# Patient Record
Sex: MALE | Race: OTHER | NOT HISPANIC OR LATINO
[De-identification: names, ages, dates, MRNs, and addresses within clinical notes are randomized per-mention and may not be internally consistent; named-entity substitution may affect disease eponyms.]

---

## 2024-02-27 PROBLEM — Z00.00 ENCOUNTER FOR PREVENTIVE HEALTH EXAMINATION: Status: ACTIVE | Noted: 2024-02-27

## 2024-02-29 ENCOUNTER — NON-APPOINTMENT (OUTPATIENT)
Age: 69
End: 2024-02-29

## 2024-02-29 ENCOUNTER — APPOINTMENT (OUTPATIENT)
Dept: THORACIC SURGERY | Facility: CLINIC | Age: 69
End: 2024-02-29
Payer: MEDICARE

## 2024-02-29 VITALS
HEART RATE: 102 BPM | DIASTOLIC BLOOD PRESSURE: 60 MMHG | BODY MASS INDEX: 21.97 KG/M2 | RESPIRATION RATE: 18 BRPM | TEMPERATURE: 96.5 F | WEIGHT: 140 LBS | SYSTOLIC BLOOD PRESSURE: 125 MMHG | HEIGHT: 67 IN | OXYGEN SATURATION: 97 %

## 2024-02-29 DIAGNOSIS — Z87.19 PERSONAL HISTORY OF OTHER DISEASES OF THE DIGESTIVE SYSTEM: ICD-10-CM

## 2024-02-29 DIAGNOSIS — Z78.9 OTHER SPECIFIED HEALTH STATUS: ICD-10-CM

## 2024-02-29 DIAGNOSIS — Z86.39 PERSONAL HISTORY OF OTHER ENDOCRINE, NUTRITIONAL AND METABOLIC DISEASE: ICD-10-CM

## 2024-02-29 DIAGNOSIS — Z86.79 PERSONAL HISTORY OF OTHER DISEASES OF THE CIRCULATORY SYSTEM: ICD-10-CM

## 2024-02-29 PROCEDURE — 99203 OFFICE O/P NEW LOW 30 MIN: CPT

## 2024-02-29 RX ORDER — METFORMIN HYDROCHLORIDE 500 MG/1
500 TABLET, COATED ORAL
Refills: 0 | Status: ACTIVE | COMMUNITY

## 2024-02-29 RX ORDER — OMEPRAZOLE 20 MG/1
20 CAPSULE, DELAYED RELEASE ORAL DAILY
Qty: 30 | Refills: 1 | Status: ACTIVE | COMMUNITY

## 2024-02-29 NOTE — PHYSICAL EXAM
[Fully active, able to carry on all pre-disease performance without restriction] : Status 0 - Fully active, able to carry on all pre-disease performance without restriction [General Appearance - Alert] : alert [General Appearance - In No Acute Distress] : in no acute distress [Sclera] : the sclera and conjunctiva were normal [PERRL With Normal Accommodation] : pupils were equal in size, round, and reactive to light [Extraocular Movements] : extraocular movements were intact [Outer Ear] : the ears and nose were normal in appearance [Oropharynx] : the oropharynx was normal [Neck Appearance] : the appearance of the neck was normal [Neck Cervical Mass (___cm)] : no neck mass was observed [Jugular Venous Distention Increased] : there was no jugular-venous distention [Auscultation Breath Sounds / Voice Sounds] : lungs were clear to auscultation bilaterally [Heart Rate And Rhythm] : heart rate was normal and rhythm regular [Heart Sounds] : normal S1 and S2 [Heart Sounds Gallop] : no gallops [Murmurs] : no murmurs [Heart Sounds Pericardial Friction Rub] : no pericardial rub [Examination Of The Chest] : the chest was normal in appearance [Chest Visual Inspection Thoracic Asymmetry] : no chest asymmetry [Diminished Respiratory Excursion] : normal chest expansion [Bowel Sounds] : normal bowel sounds [Abdomen Soft] : soft [Abdomen Tenderness] : non-tender [Abdomen Mass (___ Cm)] : no abdominal mass palpated [Cervical Lymph Nodes Enlarged Posterior Bilaterally] : posterior cervical [Cervical Lymph Nodes Enlarged Anterior Bilaterally] : anterior cervical [Supraclavicular Lymph Nodes Enlarged Bilaterally] : supraclavicular [Axillary Lymph Nodes Enlarged Bilaterally] : axillary [Femoral Lymph Nodes Enlarged Bilaterally] : femoral [Inguinal Lymph Nodes Enlarged Bilaterally] : inguinal [No CVA Tenderness] : no ~M costovertebral angle tenderness [No Spinal Tenderness] : no spinal tenderness [Abnormal Walk] : normal gait [Nail Clubbing] : no clubbing  or cyanosis of the fingernails [Musculoskeletal - Swelling] : no joint swelling seen [Skin Color & Pigmentation] : normal skin color and pigmentation [Motor Tone] : muscle strength and tone were normal [Skin Turgor] : normal skin turgor [] : no rash [Deep Tendon Reflexes (DTR)] : deep tendon reflexes were 2+ and symmetric [Sensation] : the sensory exam was normal to light touch and pinprick [No Focal Deficits] : no focal deficits [Oriented To Time, Place, And Person] : oriented to person, place, and time [Affect] : the affect was normal [Impaired Insight] : insight and judgment were intact

## 2024-03-03 NOTE — ASSESSMENT
[FreeTextEntry1] : Patient is a 67 yo male, former smoker (40+ years, currently attempting to quit) with a PMHx of HTN, GERD, DM II and high cholesterol. He is referred by DR. NATALY ARCE for surgical eval of a lingular mass.    I have independently reviewed the medical records and imaging at the time of this office consultation.  Patient was presented at multidisciplinary tumor board this morning and imaging was reviewed. I discussed the following interpretations with the patient:  Mass was initially noted on a chest x-ray and followed with a CT Chest.  There is a 4.3 x 7.4 x 5.4 cm mass in the lingula of the left lung. There are concerning hilar and mediastinal lymph nodes. There is a right adrenal nodule. There does not appear to be any other suspicious areas. The patient reports no neurologic symptoms or bone pain.   Concern for lung cancer was discussed with the patient. Diagnostic and staging tests are required. PET/CT and brain MRI will be obtained for staging. If the adrenal nodule is avid, we will attempt biopsy with IR as that would confirm stage IV disease. If there is no concern regarding the adrenal nodule, and the brain MRI is negative, then he will require invasive mediastinal staging. Patient will RTC once testing is complete to discuss next steps in care.   Plan: -MRI of the brain -PET/CT -RTC to discuss results  I, Dr. Ramone Werner MD, personally performed the evaluation and management (E/M) services for this new patient.  That E/M includes conducting the clinically appropriate initial history &/or exam, assessing all conditions, and establishing the plan of care.  Today, my CHANA, Irasema Dang NP, was here to observe &/or participate in the visit & follow plan of care established by me.

## 2024-03-03 NOTE — HISTORY OF PRESENT ILLNESS
[FreeTextEntry1] : Patient is a 69 yo male, former smoker (40+ years, currently attempting to quit) with a PMHx of HTN, GERD, DM II and high cholesterol. He is referred by DR. NATALY ARCE for surgical eval of a lingular mass.  The mass was discovered during work up for a cough.  Patient spends the majority of the year in [place] and stays the [place] for 2 months at a time.  He denies any symptoms, cough has now resolved.   Imaging is as follows;  CT chest 2/15/24: -Lingular mass highly suspicious for malignancy -Mild mediastinal lymphadenopathy -Scattered bilateral small pulmonary nodules, the largest of which measures 0.5 cm. Metastases may be considered. -Mild emphysema -Few right thyroid sub centimeter nodules for which no additional imaging is needed according to ACR recs.  -Right adrenal nonspecific nodule for which metastasis should be considered in light of pulmonary mass.  -Cholelithiasis -Right renal subcentimeter lesion too small to be characterized by ct criteria most likely benign.   Xray 2/7/24: -5.5 cm mass in the lingula segment of the left upper lobe. Neoplasm is strongly suspected ct scan of the chest is suggested.

## 2024-03-03 NOTE — SOCIAL HISTORY
[TextEntry] : 40 year pack hx- attempting to quit now   COVID history: vaccinated   Lung TB history: denies  Occupation: retired    Patient lives with familly in   Patient denies any major mental health history

## 2024-03-15 ENCOUNTER — OUTPATIENT (OUTPATIENT)
Dept: OUTPATIENT SERVICES | Facility: HOSPITAL | Age: 69
LOS: 1 days | End: 2024-03-15
Payer: MEDICARE

## 2024-03-15 ENCOUNTER — APPOINTMENT (OUTPATIENT)
Dept: NUCLEAR MEDICINE | Facility: HOSPITAL | Age: 69
End: 2024-03-15

## 2024-03-15 ENCOUNTER — APPOINTMENT (OUTPATIENT)
Dept: MRI IMAGING | Facility: HOSPITAL | Age: 69
End: 2024-03-15

## 2024-03-15 LAB
GLUCOSE BLDC GLUCOMTR-MCNC: 199 MG/DL — HIGH (ref 70–99)
GLUCOSE BLDC GLUCOMTR-MCNC: 213 MG/DL — HIGH (ref 70–99)
GLUCOSE BLDC GLUCOMTR-MCNC: 226 MG/DL — HIGH (ref 70–99)

## 2024-03-15 PROCEDURE — 70553 MRI BRAIN STEM W/O & W/DYE: CPT | Mod: 26

## 2024-03-15 PROCEDURE — 78815 PET IMAGE W/CT SKULL-THIGH: CPT | Mod: 26,PI

## 2024-03-15 PROCEDURE — 82962 GLUCOSE BLOOD TEST: CPT

## 2024-03-15 PROCEDURE — A9585: CPT

## 2024-03-15 PROCEDURE — A9552: CPT

## 2024-03-15 PROCEDURE — 70553 MRI BRAIN STEM W/O & W/DYE: CPT

## 2024-03-15 PROCEDURE — 78815 PET IMAGE W/CT SKULL-THIGH: CPT

## 2024-03-21 ENCOUNTER — OUTPATIENT (OUTPATIENT)
Dept: OUTPATIENT SERVICES | Facility: HOSPITAL | Age: 69
LOS: 1 days | End: 2024-03-21
Payer: MEDICARE

## 2024-03-21 ENCOUNTER — APPOINTMENT (OUTPATIENT)
Dept: THORACIC SURGERY | Facility: CLINIC | Age: 69
End: 2024-03-21
Payer: MEDICARE

## 2024-03-21 VITALS
DIASTOLIC BLOOD PRESSURE: 67 MMHG | BODY MASS INDEX: 24.8 KG/M2 | HEART RATE: 99 BPM | OXYGEN SATURATION: 98 % | HEIGHT: 67 IN | TEMPERATURE: 96.2 F | WEIGHT: 158 LBS | SYSTOLIC BLOOD PRESSURE: 123 MMHG

## 2024-03-21 DIAGNOSIS — C34.90 MALIGNANT NEOPLASM OF UNSPECIFIED PART OF UNSPECIFIED BRONCHUS OR LUNG: ICD-10-CM

## 2024-03-21 DIAGNOSIS — R91.8 OTHER NONSPECIFIC ABNORMAL FINDING OF LUNG FIELD: ICD-10-CM

## 2024-03-21 DIAGNOSIS — R79.1 ABNORMAL COAGULATION PROFILE: ICD-10-CM

## 2024-03-21 LAB
ALBUMIN SERPL ELPH-MCNC: 4 G/DL — SIGNIFICANT CHANGE UP (ref 3.3–5)
ALP SERPL-CCNC: 109 U/L — SIGNIFICANT CHANGE UP (ref 40–120)
ALT FLD-CCNC: 16 U/L — SIGNIFICANT CHANGE UP (ref 10–45)
ANION GAP SERPL CALC-SCNC: 14 MMOL/L — SIGNIFICANT CHANGE UP (ref 5–17)
APPEARANCE UR: CLEAR — SIGNIFICANT CHANGE UP
APTT BLD: 31.3 SEC — SIGNIFICANT CHANGE UP (ref 24.5–35.6)
AST SERPL-CCNC: 12 U/L — SIGNIFICANT CHANGE UP (ref 10–40)
BASOPHILS # BLD AUTO: 0.04 K/UL — SIGNIFICANT CHANGE UP (ref 0–0.2)
BASOPHILS NFR BLD AUTO: 0.5 % — SIGNIFICANT CHANGE UP (ref 0–2)
BILIRUB SERPL-MCNC: 0.3 MG/DL — SIGNIFICANT CHANGE UP (ref 0.2–1.2)
BILIRUB UR-MCNC: NEGATIVE — SIGNIFICANT CHANGE UP
BUN SERPL-MCNC: 20 MG/DL — SIGNIFICANT CHANGE UP (ref 7–23)
CALCIUM SERPL-MCNC: 10.1 MG/DL — SIGNIFICANT CHANGE UP (ref 8.4–10.5)
CHLORIDE SERPL-SCNC: 99 MMOL/L — SIGNIFICANT CHANGE UP (ref 96–108)
CO2 SERPL-SCNC: 23 MMOL/L — SIGNIFICANT CHANGE UP (ref 22–31)
COLOR SPEC: YELLOW — SIGNIFICANT CHANGE UP
CREAT SERPL-MCNC: 0.66 MG/DL — SIGNIFICANT CHANGE UP (ref 0.5–1.3)
DIFF PNL FLD: NEGATIVE — SIGNIFICANT CHANGE UP
EGFR: 101 ML/MIN/1.73M2 — SIGNIFICANT CHANGE UP
EOSINOPHIL # BLD AUTO: 0.08 K/UL — SIGNIFICANT CHANGE UP (ref 0–0.5)
EOSINOPHIL NFR BLD AUTO: 0.9 % — SIGNIFICANT CHANGE UP (ref 0–6)
GLUCOSE SERPL-MCNC: 207 MG/DL — HIGH (ref 70–99)
GLUCOSE UR QL: NEGATIVE MG/DL — SIGNIFICANT CHANGE UP
HCT VFR BLD CALC: 37.2 % — LOW (ref 39–50)
HGB BLD-MCNC: 12.5 G/DL — LOW (ref 13–17)
IMM GRANULOCYTES NFR BLD AUTO: 0.2 % — SIGNIFICANT CHANGE UP (ref 0–0.9)
INR BLD: 0.98 — SIGNIFICANT CHANGE UP (ref 0.85–1.18)
KETONES UR-MCNC: NEGATIVE MG/DL — SIGNIFICANT CHANGE UP
LEUKOCYTE ESTERASE UR-ACNC: NEGATIVE — SIGNIFICANT CHANGE UP
LYMPHOCYTES # BLD AUTO: 2.16 K/UL — SIGNIFICANT CHANGE UP (ref 1–3.3)
LYMPHOCYTES # BLD AUTO: 24.9 % — SIGNIFICANT CHANGE UP (ref 13–44)
MCHC RBC-ENTMCNC: 27.8 PG — SIGNIFICANT CHANGE UP (ref 27–34)
MCHC RBC-ENTMCNC: 33.6 GM/DL — SIGNIFICANT CHANGE UP (ref 32–36)
MCV RBC AUTO: 82.7 FL — SIGNIFICANT CHANGE UP (ref 80–100)
MONOCYTES # BLD AUTO: 0.73 K/UL — SIGNIFICANT CHANGE UP (ref 0–0.9)
MONOCYTES NFR BLD AUTO: 8.4 % — SIGNIFICANT CHANGE UP (ref 2–14)
NEUTROPHILS # BLD AUTO: 5.63 K/UL — SIGNIFICANT CHANGE UP (ref 1.8–7.4)
NEUTROPHILS NFR BLD AUTO: 65.1 % — SIGNIFICANT CHANGE UP (ref 43–77)
NITRITE UR-MCNC: NEGATIVE — SIGNIFICANT CHANGE UP
NRBC # BLD: 0 /100 WBCS — SIGNIFICANT CHANGE UP (ref 0–0)
PH UR: 5.5 — SIGNIFICANT CHANGE UP (ref 5–8)
PLATELET # BLD AUTO: 206 K/UL — SIGNIFICANT CHANGE UP (ref 150–400)
POTASSIUM SERPL-MCNC: 4.8 MMOL/L — SIGNIFICANT CHANGE UP (ref 3.5–5.3)
POTASSIUM SERPL-SCNC: 4.8 MMOL/L — SIGNIFICANT CHANGE UP (ref 3.5–5.3)
PROT SERPL-MCNC: 7.4 G/DL — SIGNIFICANT CHANGE UP (ref 6–8.3)
PROT UR-MCNC: NEGATIVE MG/DL — SIGNIFICANT CHANGE UP
PROTHROM AB SERPL-ACNC: 11.2 SEC — SIGNIFICANT CHANGE UP (ref 9.5–13)
RBC # BLD: 4.5 M/UL — SIGNIFICANT CHANGE UP (ref 4.2–5.8)
RBC # FLD: 13.9 % — SIGNIFICANT CHANGE UP (ref 10.3–14.5)
SODIUM SERPL-SCNC: 136 MMOL/L — SIGNIFICANT CHANGE UP (ref 135–145)
SP GR SPEC: 1.02 — SIGNIFICANT CHANGE UP (ref 1–1.03)
UROBILINOGEN FLD QL: 0.2 MG/DL — SIGNIFICANT CHANGE UP (ref 0.2–1)
WBC # BLD: 8.66 K/UL — SIGNIFICANT CHANGE UP (ref 3.8–10.5)
WBC # FLD AUTO: 8.66 K/UL — SIGNIFICANT CHANGE UP (ref 3.8–10.5)

## 2024-03-21 PROCEDURE — 85730 THROMBOPLASTIN TIME PARTIAL: CPT

## 2024-03-21 PROCEDURE — 86850 RBC ANTIBODY SCREEN: CPT

## 2024-03-21 PROCEDURE — 36415 COLL VENOUS BLD VENIPUNCTURE: CPT

## 2024-03-21 PROCEDURE — 86900 BLOOD TYPING SEROLOGIC ABO: CPT

## 2024-03-21 PROCEDURE — 85025 COMPLETE CBC W/AUTO DIFF WBC: CPT

## 2024-03-21 PROCEDURE — 81003 URINALYSIS AUTO W/O SCOPE: CPT

## 2024-03-21 PROCEDURE — 85610 PROTHROMBIN TIME: CPT

## 2024-03-21 PROCEDURE — 99215 OFFICE O/P EST HI 40 MIN: CPT

## 2024-03-21 PROCEDURE — 80053 COMPREHEN METABOLIC PANEL: CPT

## 2024-03-21 PROCEDURE — 86901 BLOOD TYPING SEROLOGIC RH(D): CPT

## 2024-03-21 PROCEDURE — 99205 OFFICE O/P NEW HI 60 MIN: CPT

## 2024-03-25 ENCOUNTER — OUTPATIENT (OUTPATIENT)
Dept: OUTPATIENT SERVICES | Facility: HOSPITAL | Age: 69
LOS: 1 days | End: 2024-03-25
Payer: MEDICARE

## 2024-03-25 DIAGNOSIS — R91.8 OTHER NONSPECIFIC ABNORMAL FINDING OF LUNG FIELD: ICD-10-CM

## 2024-03-25 PROCEDURE — 94729 DIFFUSING CAPACITY: CPT

## 2024-03-25 PROCEDURE — 94010 BREATHING CAPACITY TEST: CPT | Mod: 26

## 2024-03-25 PROCEDURE — 94726 PLETHYSMOGRAPHY LUNG VOLUMES: CPT | Mod: 26

## 2024-03-25 PROCEDURE — 94726 PLETHYSMOGRAPHY LUNG VOLUMES: CPT

## 2024-03-25 PROCEDURE — 94060 EVALUATION OF WHEEZING: CPT

## 2024-03-25 PROCEDURE — 94760 N-INVAS EAR/PLS OXIMETRY 1: CPT

## 2024-03-25 PROCEDURE — 94729 DIFFUSING CAPACITY: CPT | Mod: 26

## 2024-03-25 RX ORDER — ALBUTEROL 90 UG/1
2 AEROSOL, METERED ORAL ONCE
Refills: 0 | Status: DISCONTINUED | OUTPATIENT
Start: 2024-03-25 | End: 2024-04-08

## 2024-03-25 NOTE — ASSESSMENT
[FreeTextEntry1] : Patient is known to me. He was referred for a left upper lobe lung mass, measuring 7.4 cm in greatest dimension. He underwent brain MRI and PET/CT. He presents to review the results.   Based on imaging, stage is [id number], clinical stage IIIA. The primary tumor is PET avid, but there is no tissue diagnosis. Patient requires invasive mediastinal staging per NCCN guidelines. Discussed EBUS for staging and biopsy of the primary tumor. He agreed with that plan. Depending on diagnosis and surgical candidacy, would recommend neoadjuvant treatment. We will obtain PFTs to assess operability.   Discussed with patient who understood.   PLAN 1.  EBUS for LN staging and tumor biopsy 2.  Medical evaluation for general anesthesia 3.  PFTs 4.  Referral to medical oncology when diagnosis is established and complete staging is complete 5. Blood drawn in office and reviewed in sunrise      I, Dr. Werner, personally performed the evaluation and management (E/M) services for this established patient who presents today with (a) new problem(s)/exacerbation of (an) existing condition(s). That E/M includes conducting the clinically appropriate interval history &/or exam, assessing all new/exacerbated conditions, and establishing a new plan of care. Today, my CHANA, Irasema Dang, was here to observe my evaluation and management service for this new problem/exacerbated condition and follow the plan of care established by me going forward.

## 2024-03-25 NOTE — HISTORY OF PRESENT ILLNESS
[FreeTextEntry1] : Patient is a 69 yo male, former smoker (40+ years, currently attempting to quit) with a PMHx of HTN, GERD, DM II and high cholesterol. He is referred by DR. NATALY ARCE for surgical eval of a lingular mass.   The mass was discovered during work up for a cough. Imaging shows a 4.3 x 7.4 x 5.4 cm mass in the lingula of the left lung. There are also concerning hilar/ mediastinal lymph nodes and a right adrenal nodule noted. PET/CT and brain MRI were ordered for staging.  Current plan is to pursue IR biopsy of the adrenal nodule if  it is found to be PET avid, as that would confirm stage IV disease. If there is no concern regarding the adrenal nodule, and the brain MRI is negative, then invasive mediastinal staging will be pursued.   He presents today to review the results and discuss next steps in care.   Imaging is as follows;  PET/CT 3/15/24: Impression:  1. Compared to chest CT from 2/15/2024, the 76 mm mass in the lingula is hypermetabolic and suspicious for lung cancer. 2. The borderline sized thoracic lymph nodes on the CT scan are not FDG avid. 3. Mildly nodular right adrenal gland is not FDG avid. No evidence of metastatic lung cancer. 4. Mild increased activity in enlarged and lobular prostate. Recommend correlation with PSA to rule out prostate cancer.  MRI of the brain 3/15/24: -Punctate focus of enhancement the left lateral cerebellar hemisphere (series 701 image 55 and series 702 image 23). There is no associated signal abnormality in the brain parenchyma. Given the lack of associated signal abnormality or additional areas of pathologic enhancement, these findings may represent vascular enhancement versus artifact. However, attention on follow-up imaging is recommended to exclude intracranial metastasis.  -Focal area of intrinsic T1 hyperintensity more inferiorly in the left cerebellar hemisphere without superimposed pathologic enhancement. This may represent area of mineralization.  -5 mm hypoenhancing lesion in the left side of the sella most compatible with pituitary adenoma. Correlation with endocrine function and consideration for further evaluation dedicated MRI sella is recommended.    Patient is feeling well today, reports that the cough has since resolved. He has no active complaints today.

## 2024-03-25 NOTE — PHYSICAL EXAM
[Sclera] : the sclera and conjunctiva were normal [PERRL With Normal Accommodation] : pupils were equal in size, round, and reactive to light [Extraocular Movements] : extraocular movements were intact [Neck Appearance] : the appearance of the neck was normal [Neck Cervical Mass (___cm)] : no neck mass was observed [Jugular Venous Distention Increased] : there was no jugular-venous distention [Auscultation Breath Sounds / Voice Sounds] : lungs were clear to auscultation bilaterally [Heart Rate And Rhythm] : heart rate was normal and rhythm regular [Heart Sounds] : normal S1 and S2 [Heart Sounds Gallop] : no gallops [Chest Visual Inspection Thoracic Asymmetry] : no chest asymmetry [Examination Of The Chest] : the chest was normal in appearance [Diminished Respiratory Excursion] : normal chest expansion [Bowel Sounds] : normal bowel sounds [Abdomen Soft] : soft [Abdomen Tenderness] : non-tender [Abdomen Mass (___ Cm)] : no abdominal mass palpated [Cervical Lymph Nodes Enlarged Posterior Bilaterally] : posterior cervical [Cervical Lymph Nodes Enlarged Anterior Bilaterally] : anterior cervical [Axillary Lymph Nodes Enlarged Bilaterally] : axillary [Supraclavicular Lymph Nodes Enlarged Bilaterally] : supraclavicular [Femoral Lymph Nodes Enlarged Bilaterally] : femoral [Inguinal Lymph Nodes Enlarged Bilaterally] : inguinal [No CVA Tenderness] : no ~M costovertebral angle tenderness [No Spinal Tenderness] : no spinal tenderness [Abnormal Walk] : normal gait [Nail Clubbing] : no clubbing  or cyanosis of the fingernails [Musculoskeletal - Swelling] : no joint swelling seen [Motor Tone] : muscle strength and tone were normal [Skin Color & Pigmentation] : normal skin color and pigmentation [Skin Turgor] : normal skin turgor [] : no rash [Sensation] : the sensory exam was normal to light touch and pinprick [Deep Tendon Reflexes (DTR)] : deep tendon reflexes were 2+ and symmetric [No Focal Deficits] : no focal deficits [Oriented To Time, Place, And Person] : oriented to person, place, and time [Impaired Insight] : insight and judgment were intact [Affect] : the affect was normal

## 2024-03-25 NOTE — DATA REVIEWED
[FreeTextEntry1] : CT chest 2/15/24: -Lingular mass highly suspicious for malignancy -Mild mediastinal lymphadenopathy -Scattered bilateral small pulmonary nodules, the largest of which measures 0.5 cm. Metastases may be considered. -Mild emphysema -Few right thyroid sub centimeter nodules for which no additional imaging is needed according to ACR recs. -Right adrenal nonspecific nodule for which metastasis should be considered in light of pulmonary mass. -Cholelithiasis -Right renal subcentimeter lesion too small to be characterized by ct criteria most likely benign.  Xray 2/7/24: -5.5 cm mass in the lingula segment of the left upper lobe. Neoplasm is strongly suspected ct scan of the chest is suggested.

## 2024-03-26 ENCOUNTER — LABORATORY RESULT (OUTPATIENT)
Age: 69
End: 2024-03-26

## 2024-03-26 ENCOUNTER — APPOINTMENT (OUTPATIENT)
Dept: HEMATOLOGY ONCOLOGY | Facility: CLINIC | Age: 69
End: 2024-03-26
Payer: MEDICARE

## 2024-03-26 VITALS
BODY MASS INDEX: 24.96 KG/M2 | SYSTOLIC BLOOD PRESSURE: 119 MMHG | DIASTOLIC BLOOD PRESSURE: 76 MMHG | OXYGEN SATURATION: 98 % | HEART RATE: 88 BPM | TEMPERATURE: 97.5 F | HEIGHT: 67 IN | WEIGHT: 159 LBS | RESPIRATION RATE: 18 BRPM

## 2024-03-26 LAB
ALBUMIN SERPL ELPH-MCNC: 3.5 G/DL
ALP BLD-CCNC: 89 U/L
ALT SERPL-CCNC: 21 U/L
ANION GAP SERPL CALC-SCNC: 6 MMOL/L
APTT BLD: 33.3 SEC
AST SERPL-CCNC: 17 U/L
BILIRUB SERPL-MCNC: 0.9 MG/DL
BUN SERPL-MCNC: 18 MG/DL
CALCIUM SERPL-MCNC: 10.2 MG/DL
CHLORIDE SERPL-SCNC: 102 MMOL/L
CO2 SERPL-SCNC: 27 MMOL/L
CREAT SERPL-MCNC: 0.8 MG/DL
EGFR: 95 ML/MIN/1.73M2
GLUCOSE SERPL-MCNC: 182 MG/DL
INR PPP: 0.96
POTASSIUM SERPL-SCNC: 4.7 MMOL/L
PROT SERPL-MCNC: 8.2 G/DL
PT BLD: 11 SEC
SODIUM SERPL-SCNC: 135 MMOL/L

## 2024-03-26 PROCEDURE — G2211 COMPLEX E/M VISIT ADD ON: CPT

## 2024-03-26 PROCEDURE — 99205 OFFICE O/P NEW HI 60 MIN: CPT

## 2024-03-31 LAB — TSH SERPL-ACNC: 1.01 UIU/ML

## 2024-03-31 NOTE — HISTORY OF PRESENT ILLNESS
[Disease: _____________________] : Disease: [unfilled] [de-identified] : Don Castrejon is a 69-year-old male who presents to the clinic for initial consultation of lung mass.  Social Hx: 50 pack yr smoking hx, quit when he found out about the mass Originally from [place], lives in [place] by himself. Retired .  Son lives in [place]   3/15/24: MRI Brain:  Punctate focus of enhancement the left lateral cerebellar hemisphere (series 701 image 55 and series 702 image 23). There is no associated signal abnormality in the brain parenchyma. Given the lack of associated signal abnormality or additional areas of pathologic enhancement, these findings may represent vascular enhancement versus artifact. However, attention on follow-up imaging is recommended to exclude intracranial metastasis.  Focal area of intrinsic T1 hyperintensity more inferiorly in the left cerebellar hemisphere without superimposed pathologic enhancement. This may represent area of mineralization. 5 mm hypoenhancing lesion in the left side of the sella most compatible with pituitary adenoma. Correlation with endocrine function and consideration for further evaluation dedicated MRI sella is recommended. PET:  1. Compared to chest CT from 2/15/2024, the 76 mm mass in the lingula is hypermetabolic and suspicious for lung cancer. 2. The borderline sized thoracic lymph nodes on the CT scan are not FDG avid. 3. Mildly nodular right adrenal gland is not FDG avid. No evidence of metastatic lung cancer. 4. Mild increased activity in enlarged and lobular prostate. Recommend correlation with PSA to rule out prostate cancer. [de-identified] : Endorses feeling well overall. Initial cough resolved now. No SOB. No fatigue.  [de-identified] : CTSx:

## 2024-03-31 NOTE — END OF VISIT
[] : Fellow [FreeTextEntry3] : Seen with fellow, . Agree with above. [Time Spent: ___ minutes] : I have spent [unfilled] minutes of time on the encounter.

## 2024-03-31 NOTE — ASSESSMENT
[FreeTextEntry1] : 69YM w/ PMHx GERD, HTN, HLD, DMII presenting for initial evaluation of L lingula mass 4.3x 7.4 x 5.4cm   # L lingula mass highly suspicious for malignancy, clinical stage IIIA - planned for EBUS with Dr Werner. We explained to Mr Castrejon this is highly concerning for cancer, however, we need tissue dx and EBUS to complete staging + explore treatment options.  -- PET and MR brain negative for metastatic dx -- will fu EBUS results -- NGS, Hepatits B, C, HIV today -- RTC following EBUS  #Pitiutary adenoma --will refer to Dr.D'Amico after for lung ca complete

## 2024-04-01 RX ORDER — ATORVASTATIN CALCIUM 20 MG/1
20 TABLET, FILM COATED ORAL
Refills: 0 | Status: ACTIVE | COMMUNITY

## 2024-04-01 RX ORDER — BACLOFEN 10 MG/1
10 TABLET ORAL 3 TIMES DAILY
Refills: 0 | Status: ACTIVE | COMMUNITY

## 2024-04-01 NOTE — H&P ADULT - NSHPLABSRESULTS_GEN_ALL_CORE
CT chest 2/15/24:  -Lingular mass highly suspicious for malignancy  -Mild mediastinal lymphadenopathy  -Scattered bilateral small pulmonary nodules, the largest of which measures 0.5 cm. Metastases may be considered.  -Mild emphysema  -Few right thyroid sub centimeter nodules for which no additional imaging is needed according to ACR recs.  -Right adrenal nonspecific nodule for which metastasis should be considered in light of pulmonary mass.  -Cholelithiasis  -Right renal subcentimeter lesion too small to be characterized by ct criteria most likely benign.    Xray 2/7/24:  -5.5 cm mass in the lingula segment of the left upper lobe. Neoplasm is strongly suspected ct scan of the chest is suggested.

## 2024-04-01 NOTE — H&P ADULT - NSHPSOCIALHISTORY_GEN_ALL_CORE
Travels to [place] frequently        40 year pack hx- attempting to quit now  ?  COVID history: vaccinated  ?  Lung TB history: denies  ?  Occupation: retired   ?  Patient lives with family   ?  Patient denies any major mental health history

## 2024-04-01 NOTE — H&P ADULT - NSICDXPASTMEDICALHX_GEN_ALL_CORE_FT
PAST MEDICAL HISTORY:  History of diabetes mellitus     History of gastroesophageal reflux (GERD)     History of high cholesterol     History of hypertension     History of persistent cough     Mass of lingula of lung

## 2024-04-01 NOTE — H&P ADULT - ASSESSMENT
Patient is known to me. He was referred for a left upper lobe lung mass, measuring 7.4 cm in greatest dimension. He underwent brain MRI and PET/CT. He presents to review the results.    Based on imaging, stage is [id number], clinical stage IIIA. The primary tumor is PET avid, but there is no tissue diagnosis. Patient requires invasive mediastinal staging per NCCN guidelines. Discussed EBUS for staging and biopsy of the primary tumor. He agreed with that plan. Depending on diagnosis and surgical candidacy, would recommend neoadjuvant treatment. We will obtain PFTs to assess operability.    Discussed with patient who understood.    PLAN  1. EBUS for LN staging and tumor biopsy  2. Medical evaluation for general anesthesia  3. PFTs  4. Referral to medical oncology when diagnosis is established and complete staging is complete  5. Blood drawn in office and reviewed in sunrise

## 2024-04-01 NOTE — H&P ADULT - HISTORY OF PRESENT ILLNESS
Patient is a 67 yo male, former smoker (40+ years, currently attempting to quit) with a PMHx of HTN, GERD, DM II and high cholesterol. He is referred by DR. NATALY ARCE for surgical eval of a lingular mass.    The mass was discovered during work up for a cough. Imaging shows a 4.3 x 7.4 x 5.4 cm mass in the lingula of the left lung. There are also concerning hilar/ mediastinal lymph nodes and a right adrenal nodule noted. PET/CT and brain MRI were ordered for staging.  Current plan is to pursue IR biopsy of the adrenal nodule if it is found to be PET avid, as that would confirm stage IV disease. If there is no concern regarding the adrenal nodule, and the brain MRI is negative, then invasive mediastinal staging will be pursued.    He presents today to review the results and discuss next steps in care.    Imaging is as follows;    PET/CT 3/15/24:  Impression:  1. Compared to chest CT from 2/15/2024, the 76 mm mass in the lingula is hypermetabolic and suspicious for lung cancer.  2. The borderline sized thoracic lymph nodes on the CT scan are not FDG avid.  3. Mildly nodular right adrenal gland is not FDG avid. No evidence of metastatic lung cancer.  4. Mild increased activity in enlarged and lobular prostate. Recommend correlation with PSA to rule out prostate cancer.    MRI of the brain 3/15/24:  -Punctate focus of enhancement the left lateral cerebellar hemisphere (series 701 image 55 and series 702 image 23). There is no associated signal abnormality in the brain parenchyma. Given the lack of associated signal abnormality or additional areas of pathologic enhancement, these findings may represent vascular enhancement versus artifact. However, attention on follow-up imaging is recommended to exclude intracranial metastasis.  -Focal area of intrinsic T1 hyperintensity more inferiorly in the left cerebellar hemisphere without superimposed pathologic enhancement. This may represent area of mineralization.  -5 mm hypoenhancing lesion in the left side of the sella most compatible with pituitary adenoma. Correlation with endocrine function and consideration for further evaluation dedicated MRI sella is recommended.    Patient is feeling well today, reports that the cough has since resolved. He has no active complaints today.

## 2024-04-01 NOTE — H&P ADULT - NSHPOUTPATIENTPROVIDERS_GEN_ALL_CORE
REF:   DR. NATALY ARCE  P [phone]      PCP:     Dr. Judi Mills  [address] [place] [address]  P [phone]  F [phone]

## 2024-04-01 NOTE — H&P ADULT - NSHPPHYSICALEXAM_GEN_ALL_CORE
wt: 158 lb     Eyes: the sclera and conjunctiva were normal, pupils were equal in size, round, and reactive to light and extraocular movements were intact.  Neck: the appearance of the neck was normal and no neck mass was observed . there was no jugular-venous distention.  Pulmonary: no respiratory distress and lungs were clear to auscultation bilaterally.  Heart: heart rate was normal and rhythm regular, normal S1 and S2 and no gallops.  Chest: the chest was normal in appearance, no chest asymmetry and normal chest expansion.  Abdomen: normal bowel sounds, soft, non-tender, no hepato-splenomegaly and no abdominal mass palpated.  Lymphatics: The posterior cervical, anterior cervical, supraclavicular, axillary, femoral and inguinal nodes were non-tender and normal size.  Back: no costovertebral angle tenderness and no spinal tenderness.  Musculoskeletal: normal gait, no clubbing or cyanosis of the fingernails, no joint swelling seen and muscle strength and tone were normal.  Skin: normal skin color and pigmentation, normal skin turgor and no rash.  Neurological: deep tendon reflexes were 2+ and symmetric, the sensory exam was normal to light touch and pinprick and no focal deficits.  Psychiatric: oriented to person, place, and time, insight and judgment were intact and the affect was normal.

## 2024-04-02 ENCOUNTER — TRANSCRIPTION ENCOUNTER (OUTPATIENT)
Age: 69
End: 2024-04-02

## 2024-04-02 VITALS
HEIGHT: 67 IN | DIASTOLIC BLOOD PRESSURE: 88 MMHG | TEMPERATURE: 98 F | OXYGEN SATURATION: 97 % | RESPIRATION RATE: 16 BRPM | SYSTOLIC BLOOD PRESSURE: 132 MMHG | HEART RATE: 94 BPM | WEIGHT: 158.29 LBS

## 2024-04-03 ENCOUNTER — RESULT REVIEW (OUTPATIENT)
Age: 69
End: 2024-04-03

## 2024-04-03 ENCOUNTER — OUTPATIENT (OUTPATIENT)
Dept: OUTPATIENT SERVICES | Facility: HOSPITAL | Age: 69
LOS: 1 days | Discharge: HOME | End: 2024-04-03
Payer: MEDICARE

## 2024-04-03 ENCOUNTER — APPOINTMENT (OUTPATIENT)
Dept: THORACIC SURGERY | Facility: HOSPITAL | Age: 69
End: 2024-04-03

## 2024-04-03 VITALS
OXYGEN SATURATION: 95 % | TEMPERATURE: 97 F | RESPIRATION RATE: 22 BRPM | DIASTOLIC BLOOD PRESSURE: 59 MMHG | SYSTOLIC BLOOD PRESSURE: 109 MMHG | HEART RATE: 88 BPM

## 2024-04-03 DIAGNOSIS — Z90.49 ACQUIRED ABSENCE OF OTHER SPECIFIED PARTS OF DIGESTIVE TRACT: Chronic | ICD-10-CM

## 2024-04-03 LAB
ALBUMIN SERPL ELPH-MCNC: 3.6 G/DL — SIGNIFICANT CHANGE UP (ref 3.3–5)
ALP SERPL-CCNC: 83 U/L — SIGNIFICANT CHANGE UP (ref 40–120)
ALT FLD-CCNC: 15 U/L — SIGNIFICANT CHANGE UP (ref 10–45)
ANION GAP SERPL CALC-SCNC: 8 MMOL/L — SIGNIFICANT CHANGE UP (ref 5–17)
APTT BLD: 26.3 SEC — SIGNIFICANT CHANGE UP (ref 24.5–35.6)
AST SERPL-CCNC: 10 U/L — SIGNIFICANT CHANGE UP (ref 10–40)
BILIRUB SERPL-MCNC: 0.4 MG/DL — SIGNIFICANT CHANGE UP (ref 0.2–1.2)
BUN SERPL-MCNC: 23 MG/DL — SIGNIFICANT CHANGE UP (ref 7–23)
CALCIUM SERPL-MCNC: 9.3 MG/DL — SIGNIFICANT CHANGE UP (ref 8.4–10.5)
CHLORIDE SERPL-SCNC: 97 MMOL/L — SIGNIFICANT CHANGE UP (ref 96–108)
CO2 SERPL-SCNC: 23 MMOL/L — SIGNIFICANT CHANGE UP (ref 22–31)
CREAT SERPL-MCNC: 0.76 MG/DL — SIGNIFICANT CHANGE UP (ref 0.5–1.3)
EGFR: 97 ML/MIN/1.73M2 — SIGNIFICANT CHANGE UP
GLUCOSE BLDC GLUCOMTR-MCNC: 167 MG/DL — HIGH (ref 70–99)
GLUCOSE BLDC GLUCOMTR-MCNC: 228 MG/DL — HIGH (ref 70–99)
GLUCOSE SERPL-MCNC: 190 MG/DL — HIGH (ref 70–99)
GRAM STN FLD: SIGNIFICANT CHANGE UP
HCT VFR BLD CALC: 32.9 % — LOW (ref 39–50)
HGB BLD-MCNC: 11.3 G/DL — LOW (ref 13–17)
INR BLD: 1.14 — SIGNIFICANT CHANGE UP (ref 0.85–1.18)
MCHC RBC-ENTMCNC: 28.2 PG — SIGNIFICANT CHANGE UP (ref 27–34)
MCHC RBC-ENTMCNC: 34.3 GM/DL — SIGNIFICANT CHANGE UP (ref 32–36)
MCV RBC AUTO: 82 FL — SIGNIFICANT CHANGE UP (ref 80–100)
NRBC # BLD: 0 /100 WBCS — SIGNIFICANT CHANGE UP (ref 0–0)
PLATELET # BLD AUTO: 273 K/UL — SIGNIFICANT CHANGE UP (ref 150–400)
POTASSIUM SERPL-MCNC: 5 MMOL/L — SIGNIFICANT CHANGE UP (ref 3.5–5.3)
POTASSIUM SERPL-SCNC: 5 MMOL/L — SIGNIFICANT CHANGE UP (ref 3.5–5.3)
PROT SERPL-MCNC: 6.9 G/DL — SIGNIFICANT CHANGE UP (ref 6–8.3)
PROTHROM AB SERPL-ACNC: 13 SEC — SIGNIFICANT CHANGE UP (ref 9.5–13)
RBC # BLD: 4.01 M/UL — LOW (ref 4.2–5.8)
RBC # FLD: 13.9 % — SIGNIFICANT CHANGE UP (ref 10.3–14.5)
SODIUM SERPL-SCNC: 128 MMOL/L — LOW (ref 135–145)
SPECIMEN SOURCE: SIGNIFICANT CHANGE UP
WBC # BLD: 9.98 K/UL — SIGNIFICANT CHANGE UP (ref 3.8–10.5)
WBC # FLD AUTO: 9.98 K/UL — SIGNIFICANT CHANGE UP (ref 3.8–10.5)

## 2024-04-03 PROCEDURE — 71045 X-RAY EXAM CHEST 1 VIEW: CPT | Mod: 26

## 2024-04-03 PROCEDURE — 88173 CYTOPATH EVAL FNA REPORT: CPT | Mod: 26

## 2024-04-03 PROCEDURE — 93010 ELECTROCARDIOGRAM REPORT: CPT

## 2024-04-03 PROCEDURE — 71045 X-RAY EXAM CHEST 1 VIEW: CPT | Mod: 26,77

## 2024-04-03 PROCEDURE — 31652 BRONCH EBUS SAMPLNG 1/2 NODE: CPT

## 2024-04-03 PROCEDURE — 88342 IMHCHEM/IMCYTCHM 1ST ANTB: CPT | Mod: 26

## 2024-04-03 PROCEDURE — 88305 TISSUE EXAM BY PATHOLOGIST: CPT | Mod: 26,59

## 2024-04-03 PROCEDURE — 88341 IMHCHEM/IMCYTCHM EA ADD ANTB: CPT | Mod: 26

## 2024-04-03 PROCEDURE — 32408 CORE NDL BX LNG/MED PERQ: CPT

## 2024-04-03 PROCEDURE — 88112 CYTOPATH CELL ENHANCE TECH: CPT | Mod: 26,59

## 2024-04-03 RX ORDER — METFORMIN HYDROCHLORIDE 850 MG/1
1 TABLET ORAL
Refills: 0 | DISCHARGE

## 2024-04-03 RX ORDER — BACLOFEN 100 %
1 POWDER (GRAM) MISCELLANEOUS
Refills: 0 | DISCHARGE

## 2024-04-03 RX ORDER — OMEPRAZOLE 10 MG/1
1 CAPSULE, DELAYED RELEASE ORAL
Refills: 0 | DISCHARGE

## 2024-04-03 RX ORDER — ATORVASTATIN CALCIUM 80 MG/1
1 TABLET, FILM COATED ORAL
Refills: 0 | DISCHARGE

## 2024-04-03 RX ORDER — SODIUM CHLORIDE 9 MG/ML
1000 INJECTION, SOLUTION INTRAVENOUS
Refills: 0 | Status: DISCONTINUED | OUTPATIENT
Start: 2024-04-03 | End: 2024-04-03

## 2024-04-03 NOTE — BRIEF OPERATIVE NOTE - NSICDXBRIEFPREOP_GEN_ALL_CORE_FT
PRE-OP DIAGNOSIS:  Thoracic lymphadenopathy 03-Apr-2024 09:14:42  Andree Chavez  Lung mass 03-Apr-2024 09:15:09  Andree Chavez

## 2024-04-03 NOTE — PRE-ANESTHESIA EVALUATION ADULT - NSANTHOSAYNRD_GEN_A_CORE
No. RITESH screening performed.  STOP BANG Legend: 0-2 = LOW Risk; 3-4 = INTERMEDIATE Risk; 5-8 = HIGH Risk

## 2024-04-03 NOTE — BRIEF OPERATIVE NOTE - NSICDXBRIEFPOSTOP_GEN_ALL_CORE_FT
POST-OP DIAGNOSIS:  Lung mass 03-Apr-2024 09:14:51  Andree Chavez  Thoracic lymphadenopathy 03-Apr-2024 09:15:19  Andree Chavez

## 2024-04-03 NOTE — BRIEF OPERATIVE NOTE - NSICDXBRIEFPROCEDURE_GEN_ALL_CORE_FT
PROCEDURES:  Bronchoscopy, intraoperative, with EBUS and biopsy 03-Apr-2024 09:14:11  Andree Chavez

## 2024-04-04 ENCOUNTER — NON-APPOINTMENT (OUTPATIENT)
Age: 69
End: 2024-04-04

## 2024-04-04 LAB
NIGHT BLUE STAIN TISS: SIGNIFICANT CHANGE UP
NON-GYNECOLOGICAL CYTOLOGY STUDY: SIGNIFICANT CHANGE UP
SPECIMEN SOURCE: SIGNIFICANT CHANGE UP

## 2024-04-05 LAB
CULTURE RESULTS: SIGNIFICANT CHANGE UP
NON-GYNECOLOGICAL CYTOLOGY STUDY: SIGNIFICANT CHANGE UP
NON-GYNECOLOGICAL CYTOLOGY STUDY: SIGNIFICANT CHANGE UP
SPECIMEN SOURCE: SIGNIFICANT CHANGE UP

## 2024-04-08 ENCOUNTER — APPOINTMENT (OUTPATIENT)
Dept: OTOLARYNGOLOGY | Facility: CLINIC | Age: 69
End: 2024-04-08
Payer: MEDICARE

## 2024-04-08 ENCOUNTER — APPOINTMENT (OUTPATIENT)
Dept: HEMATOLOGY ONCOLOGY | Facility: CLINIC | Age: 69
End: 2024-04-08
Payer: MEDICARE

## 2024-04-08 VITALS
SYSTOLIC BLOOD PRESSURE: 108 MMHG | HEIGHT: 67 IN | RESPIRATION RATE: 18 BRPM | HEART RATE: 93 BPM | BODY MASS INDEX: 24.48 KG/M2 | TEMPERATURE: 97.6 F | WEIGHT: 156 LBS | DIASTOLIC BLOOD PRESSURE: 56 MMHG | OXYGEN SATURATION: 96 %

## 2024-04-08 PROBLEM — Z86.39 PERSONAL HISTORY OF OTHER ENDOCRINE, NUTRITIONAL AND METABOLIC DISEASE: Chronic | Status: ACTIVE | Noted: 2024-04-01

## 2024-04-08 PROBLEM — Z87.898 PERSONAL HISTORY OF OTHER SPECIFIED CONDITIONS: Chronic | Status: ACTIVE | Noted: 2024-04-01

## 2024-04-08 PROBLEM — Z87.19 PERSONAL HISTORY OF OTHER DISEASES OF THE DIGESTIVE SYSTEM: Chronic | Status: ACTIVE | Noted: 2024-04-01

## 2024-04-08 PROBLEM — Z86.79 PERSONAL HISTORY OF OTHER DISEASES OF THE CIRCULATORY SYSTEM: Chronic | Status: ACTIVE | Noted: 2024-04-01

## 2024-04-08 PROBLEM — R91.8 OTHER NONSPECIFIC ABNORMAL FINDING OF LUNG FIELD: Chronic | Status: ACTIVE | Noted: 2024-04-01

## 2024-04-08 LAB — SURGICAL PATHOLOGY STUDY: SIGNIFICANT CHANGE UP

## 2024-04-08 PROCEDURE — 92550 TYMPANOMETRY & REFLEX THRESH: CPT | Mod: 52

## 2024-04-08 PROCEDURE — 92557 COMPREHENSIVE HEARING TEST: CPT

## 2024-04-08 PROCEDURE — 99215 OFFICE O/P EST HI 40 MIN: CPT

## 2024-04-08 PROCEDURE — G2211 COMPLEX E/M VISIT ADD ON: CPT

## 2024-04-08 NOTE — ASSESSMENT
[FreeTextEntry1] : 69YM w/ PMHx GERD, HTN, HLD, DMII presenting for initial evaluation of L lingula mass 4.3x 7.4 x 5.4cm   # Squamous Cell Lung Ca, AJCC IIIa, T4N0M0  -- PET and MR brain negative for metastatic dx -- Liquid NGS w/ no targetable mutations; will send for ngs + PDL-1 testing on the biopsy sample  -- This is a potentially curative malignancy. Given his performance status, he is a candidate for neoadjuvant therapy followed by resection and then immunotherapy x 1 year. .  -- We proposed doublet platinum based chemotherapy + immunotherapy based on the Checkmate 816 trial - The median event-free survival was 31.6 months (95% confidence interval [CI], 30.2 to not reached) with nivolumab plus chemotherapy and 20.8 months (95% CI, 14.0 to 26.7) with chemotherapy alone (hazard ratio for disease progression, disease recurrence, or death, 0.63; 97.38% CI, 0.43 to 0.91; P=0.005). The percentage of patients with a pathological complete response was 24.0% (95% CI, 18.0 to 31.0) and 2.2% (95% CI, 0.6 to 5.6), respectively (odds ratio, 13.94; 99% CI, 3.49 to 55.75; P<0.001). Results for event-free survival and pathological complete response across most subgroups favored nivolumab plus chemotherapy over chemotherapy alone.  -- we explained risks and benefits of cisplatin (or carboplatin), gemcitabine and nivolumab to the patient, including, but not limited to, low blood cell count, infections, nausea, neuropathy, kidney/liver damage as well as pneumonitis risks. All of his questions were answered in detail and he consented for treatment.  -- tentative plan for 3 cycles cis/gem/nivo q3wks with restaging scans after.  -- We also spoke to Mr Castrejon about prospective trials at [facility]; research team will reach out to him prior to starting treatment to assess for candidacy   #Pitiutary adenoma --will refer to Dr.D'Amico after for lung ca complete  RTC next week

## 2024-04-08 NOTE — END OF VISIT
[] : Fellow [Time Spent: ___ minutes] : I have spent [unfilled] minutes of time on the encounter. [FreeTextEntry3] : Seen with fellow, . Agree with above.

## 2024-04-08 NOTE — HISTORY OF PRESENT ILLNESS
[Disease: _____________________] : Disease: [unfilled] [T: ___] : T[unfilled] [N: ___] : N[unfilled] [M: ___] : M[unfilled] [AJCC Stage: ____] : AJCC Stage: [unfilled] [de-identified] : Don Castrejon is a 69-year-old male who presents to the clinic for f/u of LLL mass, c/w stage IIIA (T4N0M0) NSCLC - SCC histology.  Onc hx:  Social Hx: 50 pack yr smoking hx, quit when he found out about the mass Originally from [place], lives in [place] by himself. Retired .  Son lives in [place]   3/15/24: MRI Brain:  Punctate focus of enhancement the left lateral cerebellar hemisphere (series 701 image 55 and series 702 image 23). There is no associated signal abnormality in the brain parenchyma. Given the lack of associated signal abnormality or additional areas of pathologic enhancement, these findings may represent vascular enhancement versus artifact. However, attention on follow-up imaging is recommended to exclude intracranial metastasis.  Focal area of intrinsic T1 hyperintensity more inferiorly in the left cerebellar hemisphere without superimposed pathologic enhancement. This may represent area of mineralization. 5 mm hypoenhancing lesion in the left side of the sella most compatible with pituitary adenoma. Correlation with endocrine function and consideration for further evaluation dedicated MRI sella is recommended. PET:  1. Compared to chest CT from 2/15/2024, the 76 mm mass in the lingula is hypermetabolic and suspicious for lung cancer. 2. The borderline sized thoracic lymph nodes on the CT scan are not FDG avid. 3. Mildly nodular right adrenal gland is not FDG avid. No evidence of metastatic lung cancer. 4. Mild increased activity in enlarged and lobular prostate. Recommend correlation with PSA to rule out prostate cancer.  3/15/24: MR Head:  Punctate focus of enhancement the left lateral cerebellar hemisphere (series 701 image 55 and series 702 image 23). There is no associated signal abnormality in the brain parenchyma. Given the lack of associated signal abnormality or additional areas of pathologic enhancement, these findings may represent vascular enhancement versus artifact. However, attention on follow-up imaging is recommended to exclude intracranial metastasis. Focal area of intrinsic T1 hyperintensity more inferiorly in the left cerebellar hemisphere without superimposed pathologic enhancement. This may represent area of mineralization. 5 mm hypoenhancing lesion in the left side of the sella most compatible with pituitary adenoma. Correlation with endocrine function and consideration for further evaluation dedicated MRI sella is recommended.  3/15/24: PET: 1. Compared to chest CT from 2/15/2024, the 76 mm mass in the lingula is hypermetabolic and suspicious for lung cancer. 2. The borderline sized thoracic lymph nodes on the CT scan are not FDG avid. 3. Mildly nodular right adrenal gland is not FDG avid. No evidence of metastatic lung cancer. 4. Mild increased activity in enlarged and lobular prostate. Recommend correlation with PSA to rule out prostate cancer.  4/3/24: Chest Xray:  Small left apex pneumothorax, similar to prior exam earlier same day. Left lower lung mass again noted. No acute infiltrates. No significant pleural effusion.  04/03/2024 EBUS  Lung, left, core biopsy: -Squamous cell carcinoma. LYMPH NODE, 11L NEGATIVE FOR MALIGNANT CELLS.  [de-identified] : Squamous Cell Ca  [de-identified] : CTSx:  [de-identified] : Endorses feeling well overall. Initial cough resolved now. No SOB. No fatigue.

## 2024-04-15 ENCOUNTER — LABORATORY RESULT (OUTPATIENT)
Age: 69
End: 2024-04-15

## 2024-04-15 ENCOUNTER — APPOINTMENT (OUTPATIENT)
Dept: HEMATOLOGY ONCOLOGY | Facility: CLINIC | Age: 69
End: 2024-04-15
Payer: MEDICARE

## 2024-04-15 VITALS
OXYGEN SATURATION: 98 % | HEIGHT: 67 IN | TEMPERATURE: 98.3 F | SYSTOLIC BLOOD PRESSURE: 120 MMHG | BODY MASS INDEX: 24.64 KG/M2 | WEIGHT: 157 LBS | DIASTOLIC BLOOD PRESSURE: 70 MMHG | RESPIRATION RATE: 18 BRPM | HEART RATE: 96 BPM

## 2024-04-15 LAB
ALBUMIN SERPL ELPH-MCNC: 3 G/DL
ALP BLD-CCNC: 69 U/L
ALT SERPL-CCNC: 18 U/L
ANION GAP SERPL CALC-SCNC: 3 MMOL/L
AST SERPL-CCNC: 13 U/L
BILIRUB SERPL-MCNC: 0.7 MG/DL
BUN SERPL-MCNC: 16 MG/DL
CALCIUM SERPL-MCNC: 9.3 MG/DL
CHLORIDE SERPL-SCNC: 101 MMOL/L
CO2 SERPL-SCNC: 28 MMOL/L
CREAT SERPL-MCNC: 0.7 MG/DL
EGFR: 99 ML/MIN/1.73M2
GLUCOSE SERPL-MCNC: 254 MG/DL
HBV CORE IGG+IGM SER QL: REACTIVE
HBV SURFACE AB SER QL: NONREACTIVE
HBV SURFACE AG SER QL: NONREACTIVE
HCV AB SER QL: NONREACTIVE
HCV S/CO RATIO: 0.03 S/CO
POTASSIUM SERPL-SCNC: 4.6 MMOL/L
PROT SERPL-MCNC: 6.9 G/DL
SODIUM SERPL-SCNC: 132 MMOL/L
TSH SERPL-ACNC: 1.5 UIU/ML

## 2024-04-15 PROCEDURE — 99214 OFFICE O/P EST MOD 30 MIN: CPT

## 2024-04-15 PROCEDURE — G2211 COMPLEX E/M VISIT ADD ON: CPT

## 2024-04-15 RX ORDER — OLANZAPINE 5 MG/1
5 TABLET, FILM COATED ORAL
Qty: 3 | Refills: 6 | Status: ACTIVE | COMMUNITY
Start: 2024-04-15 | End: 1900-01-01

## 2024-04-15 RX ORDER — DEXAMETHASONE 4 MG/1
4 TABLET ORAL
Qty: 6 | Refills: 6 | Status: ACTIVE | COMMUNITY
Start: 2024-04-15 | End: 1900-01-01

## 2024-04-15 RX ORDER — ONDANSETRON 4 MG/1
4 TABLET ORAL 3 TIMES DAILY
Qty: 90 | Refills: 6 | Status: ACTIVE | COMMUNITY
Start: 2024-04-15 | End: 1900-01-01

## 2024-04-15 NOTE — ASSESSMENT
Pt calling for refill of losartan.  States has been out for about a week.  Also informed pt due for med check and is not wanting to schedule at this time.  Pt informed may be able to give 30 day supply but for additional refills will need to be seen,  Verbalized understanding.  Bp was 118/88 on 04/25/2017.  Okay to give 1 refill in PCP absence?   [FreeTextEntry1] : 69YM w/ PMHx GERD, HTN, HLD, DMII presenting for initial evaluation of L lingula mass 4.3x 7.4 x 5.4cm   # Squamous Cell Lung Ca, AJCC IIIa, T4N0M0  - PDL1 negative -- PET and MR brain negative for metastatic dx -- Liquid NGS w/ no targetable mutations; penidng ngs on biopsy.  -- This is a potentially curative malignancy. Given his performance status, he is a candidate for neoadjuvant therapy followed by resection and then immunotherapy x 1 year. .  -- We proposed doublet platinum based chemotherapy + immunotherapy based on the Checkmate 816 trial - The median event-free survival was 31.6 months (95% confidence interval [CI], 30.2 to not reached) with nivolumab plus chemotherapy and 20.8 months (95% CI, 14.0 to 26.7) with chemotherapy alone (hazard ratio for disease progression, disease recurrence, or death, 0.63; 97.38% CI, 0.43 to 0.91; P=0.005). The percentage of patients with a pathological complete response was 24.0% (95% CI, 18.0 to 31.0) and 2.2% (95% CI, 0.6 to 5.6), respectively (odds ratio, 13.94; 99% CI, 3.49 to 55.75; P<0.001). Results for event-free survival and pathological complete response across most subgroups favored nivolumab plus chemotherapy over chemotherapy alone.  -- we explained risks and benefits of cisplatin (or carboplatin), gemcitabine and nivolumab to the patient, including, but not limited to, low blood cell count, infections, nausea, neuropathy, kidney/liver damage as well as pneumonitis risks. All of his questions were answered in detail and he consented for treatment.  -- tentative plan for 3 cycles cis/gem/nivo q3wks with restaging scans after.  -- patient does not wish to pursue clinical trials at [facility] due to distance (discussed perioperative MATISSE trial) --plan for chemoIO next week provided we obtain NGS results --Hearing test 4/8/2024 - mild SNHL - monitor while on cisplatin --hepatiis panel today  #Pitiutary adenoma --will refer to Dr.D'Amico after for lung ca complete  RTC with tx next week

## 2024-04-15 NOTE — HISTORY OF PRESENT ILLNESS
[Disease: _____________________] : Disease: [unfilled] [T: ___] : T[unfilled] [N: ___] : N[unfilled] [M: ___] : M[unfilled] [AJCC Stage: ____] : AJCC Stage: [unfilled] [de-identified] : Don Castrejon is a 69-year-old male who presents to the clinic for f/u of LLL mass, c/w stage IIIA (T4N0M0) NSCLC - SCC histology.  Onc hx:  Social Hx: 50 pack yr smoking hx, quit when he found out about the mass Originally from [place], lives in [place] by himself. Retired .  Son lives in [place]   3/15/24: MRI Brain:  Punctate focus of enhancement the left lateral cerebellar hemisphere (series 701 image 55 and series 702 image 23). There is no associated signal abnormality in the brain parenchyma. Given the lack of associated signal abnormality or additional areas of pathologic enhancement, these findings may represent vascular enhancement versus artifact. However, attention on follow-up imaging is recommended to exclude intracranial metastasis.  Focal area of intrinsic T1 hyperintensity more inferiorly in the left cerebellar hemisphere without superimposed pathologic enhancement. This may represent area of mineralization. 5 mm hypoenhancing lesion in the left side of the sella most compatible with pituitary adenoma. Correlation with endocrine function and consideration for further evaluation dedicated MRI sella is recommended. PET:  1. Compared to chest CT from 2/15/2024, the 76 mm mass in the lingula is hypermetabolic and suspicious for lung cancer. 2. The borderline sized thoracic lymph nodes on the CT scan are not FDG avid. 3. Mildly nodular right adrenal gland is not FDG avid. No evidence of metastatic lung cancer. 4. Mild increased activity in enlarged and lobular prostate. Recommend correlation with PSA to rule out prostate cancer.  3/15/24: MR Head:  Punctate focus of enhancement the left lateral cerebellar hemisphere (series 701 image 55 and series 702 image 23). There is no associated signal abnormality in the brain parenchyma. Given the lack of associated signal abnormality or additional areas of pathologic enhancement, these findings may represent vascular enhancement versus artifact. However, attention on follow-up imaging is recommended to exclude intracranial metastasis. Focal area of intrinsic T1 hyperintensity more inferiorly in the left cerebellar hemisphere without superimposed pathologic enhancement. This may represent area of mineralization. 5 mm hypoenhancing lesion in the left side of the sella most compatible with pituitary adenoma. Correlation with endocrine function and consideration for further evaluation dedicated MRI sella is recommended.  3/15/24: PET: 1. Compared to chest CT from 2/15/2024, the 76 mm mass in the lingula is hypermetabolic and suspicious for lung cancer. 2. The borderline sized thoracic lymph nodes on the CT scan are not FDG avid. 3. Mildly nodular right adrenal gland is not FDG avid. No evidence of metastatic lung cancer. 4. Mild increased activity in enlarged and lobular prostate. Recommend correlation with PSA to rule out prostate cancer.  4/3/24: Chest Xray:  Small left apex pneumothorax, similar to prior exam earlier same day. Left lower lung mass again noted. No acute infiltrates. No significant pleural effusion.  04/03/2024 EBUS  Lung, left, core biopsy: -Squamous cell carcinoma. LYMPH NODE, 11L NEGATIVE FOR MALIGNANT CELLS.  4/4/2024: TTB - rec for MAGDALENA [de-identified] : Squamous Cell Ca  - PDL1 negative [de-identified] : CTSx:  [de-identified] : Endorses feeling well overall. Initial cough resolved now. No SOB. No fatigue.

## 2024-04-23 ENCOUNTER — NON-APPOINTMENT (OUTPATIENT)
Age: 69
End: 2024-04-23

## 2024-04-23 ENCOUNTER — APPOINTMENT (OUTPATIENT)
Dept: INFUSION THERAPY | Facility: CLINIC | Age: 69
End: 2024-04-23

## 2024-04-23 ENCOUNTER — OUTPATIENT (OUTPATIENT)
Dept: OUTPATIENT SERVICES | Facility: HOSPITAL | Age: 69
LOS: 1 days | End: 2024-04-23
Payer: MEDICARE

## 2024-04-23 VITALS
DIASTOLIC BLOOD PRESSURE: 69 MMHG | SYSTOLIC BLOOD PRESSURE: 107 MMHG | TEMPERATURE: 97 F | RESPIRATION RATE: 18 BRPM | HEIGHT: 66 IN | WEIGHT: 158.07 LBS | OXYGEN SATURATION: 98 % | HEART RATE: 96 BPM

## 2024-04-23 VITALS
RESPIRATION RATE: 16 BRPM | HEART RATE: 88 BPM | TEMPERATURE: 97 F | SYSTOLIC BLOOD PRESSURE: 102 MMHG | OXYGEN SATURATION: 99 % | DIASTOLIC BLOOD PRESSURE: 64 MMHG

## 2024-04-23 DIAGNOSIS — C34.92 MALIGNANT NEOPLASM OF UNSPECIFIED PART OF LEFT BRONCHUS OR LUNG: ICD-10-CM

## 2024-04-23 DIAGNOSIS — Z90.49 ACQUIRED ABSENCE OF OTHER SPECIFIED PARTS OF DIGESTIVE TRACT: Chronic | ICD-10-CM

## 2024-04-23 LAB — GLUCOSE BLDC GLUCOMTR-MCNC: 296 MG/DL — HIGH (ref 70–99)

## 2024-04-23 PROCEDURE — 36415 COLL VENOUS BLD VENIPUNCTURE: CPT

## 2024-04-23 PROCEDURE — 96417 CHEMO IV INFUS EACH ADDL SEQ: CPT

## 2024-04-23 PROCEDURE — 96367 TX/PROPH/DG ADDL SEQ IV INF: CPT

## 2024-04-23 PROCEDURE — 96413 CHEMO IV INFUSION 1 HR: CPT

## 2024-04-23 PROCEDURE — 82962 GLUCOSE BLOOD TEST: CPT

## 2024-04-23 PROCEDURE — 96375 TX/PRO/DX INJ NEW DRUG ADDON: CPT

## 2024-04-23 RX ORDER — NIVOLUMAB 10 MG/ML
360 INJECTION INTRAVENOUS ONCE
Refills: 0 | Status: COMPLETED | OUTPATIENT
Start: 2024-04-23 | End: 2024-04-23

## 2024-04-23 RX ORDER — GEMCITABINE 38 MG/ML
1820 INJECTION, SOLUTION INTRAVENOUS ONCE
Refills: 0 | Status: COMPLETED | OUTPATIENT
Start: 2024-04-23 | End: 2024-04-23

## 2024-04-23 RX ORDER — SODIUM CHLORIDE 9 MG/ML
1000 INJECTION, SOLUTION INTRAVENOUS
Refills: 0 | Status: COMPLETED | OUTPATIENT
Start: 2024-04-23 | End: 2024-04-23

## 2024-04-23 RX ORDER — FOSAPREPITANT DIMEGLUMINE 150 MG/5ML
150 INJECTION, POWDER, LYOPHILIZED, FOR SOLUTION INTRAVENOUS ONCE
Refills: 0 | Status: COMPLETED | OUTPATIENT
Start: 2024-04-23 | End: 2024-04-23

## 2024-04-23 RX ORDER — CISPLATIN 1 MG/ML
136 INJECTION, SOLUTION INTRAVENOUS ONCE
Refills: 0 | Status: COMPLETED | OUTPATIENT
Start: 2024-04-23 | End: 2024-04-23

## 2024-04-23 RX ORDER — DEXAMETHASONE 0.5 MG/5ML
10 ELIXIR ORAL ONCE
Refills: 0 | Status: COMPLETED | OUTPATIENT
Start: 2024-04-23 | End: 2024-04-23

## 2024-04-23 RX ORDER — SODIUM CHLORIDE 9 MG/ML
1000 INJECTION INTRAMUSCULAR; INTRAVENOUS; SUBCUTANEOUS ONCE
Refills: 0 | Status: COMPLETED | OUTPATIENT
Start: 2024-04-23 | End: 2024-04-23

## 2024-04-23 RX ORDER — PALONOSETRON HYDROCHLORIDE 0.25 MG/5ML
0.25 INJECTION, SOLUTION INTRAVENOUS ONCE
Refills: 0 | Status: COMPLETED | OUTPATIENT
Start: 2024-04-23 | End: 2024-04-23

## 2024-04-23 RX ADMIN — CISPLATIN 136 MILLIGRAM(S): 1 INJECTION, SOLUTION INTRAVENOUS at 11:23

## 2024-04-23 RX ADMIN — NIVOLUMAB 360 MILLIGRAM(S): 10 INJECTION INTRAVENOUS at 10:50

## 2024-04-23 RX ADMIN — SODIUM CHLORIDE 1000 MILLILITER(S): 9 INJECTION INTRAMUSCULAR; INTRAVENOUS; SUBCUTANEOUS at 10:00

## 2024-04-23 RX ADMIN — PALONOSETRON HYDROCHLORIDE 0.25 MILLIGRAM(S): 0.25 INJECTION, SOLUTION INTRAVENOUS at 11:15

## 2024-04-23 RX ADMIN — Medication 204 MILLIGRAM(S): at 10:59

## 2024-04-23 RX ADMIN — FOSAPREPITANT DIMEGLUMINE 500 MILLIGRAM(S): 150 INJECTION, POWDER, LYOPHILIZED, FOR SOLUTION INTRAVENOUS at 11:20

## 2024-04-23 RX ADMIN — GEMCITABINE 1820 MILLIGRAM(S): 38 INJECTION, SOLUTION INTRAVENOUS at 14:31

## 2024-04-23 RX ADMIN — FOSAPREPITANT DIMEGLUMINE 150 MILLIGRAM(S): 150 INJECTION, POWDER, LYOPHILIZED, FOR SOLUTION INTRAVENOUS at 11:45

## 2024-04-23 RX ADMIN — SODIUM CHLORIDE 1000 MILLILITER(S): 9 INJECTION INTRAMUSCULAR; INTRAVENOUS; SUBCUTANEOUS at 11:00

## 2024-04-23 RX ADMIN — SODIUM CHLORIDE 507 MILLILITER(S): 9 INJECTION, SOLUTION INTRAVENOUS at 13:39

## 2024-04-23 RX ADMIN — GEMCITABINE 1820 MILLIGRAM(S): 38 INJECTION, SOLUTION INTRAVENOUS at 13:01

## 2024-04-23 RX ADMIN — CISPLATIN 136 MILLIGRAM(S): 1 INJECTION, SOLUTION INTRAVENOUS at 12:30

## 2024-04-23 RX ADMIN — SODIUM CHLORIDE 1000 MILLILITER(S): 9 INJECTION, SOLUTION INTRAVENOUS at 15:20

## 2024-04-23 RX ADMIN — Medication 10 MILLIGRAM(S): at 11:14

## 2024-04-23 RX ADMIN — NIVOLUMAB 360 MILLIGRAM(S): 10 INJECTION INTRAVENOUS at 10:20

## 2024-04-23 NOTE — CHART NOTE - NSCHARTNOTEFT_GEN_A_CORE
Patient cleared for chemo with labs from 4/15. FS only done prior to treatment. His . He receives 1L pre treatment and 1L post treatment (2L total) today. OK to proceed with chemo today per his oncology team. He is feeling well today and is well appearing.

## 2024-04-23 NOTE — PHARMACY COMMUNICATION NOTE - COMMENTS
Rafal PM, Price J, Luci S, et al. Neoadjuvant Nivolumab plus Chemotherapy in Resectable Lung Cancer. N Engl J Med. 2022;386(21):6167-7416. doi:[id number]

## 2024-04-30 ENCOUNTER — LABORATORY RESULT (OUTPATIENT)
Age: 69
End: 2024-04-30

## 2024-04-30 ENCOUNTER — APPOINTMENT (OUTPATIENT)
Dept: HEMATOLOGY ONCOLOGY | Facility: CLINIC | Age: 69
End: 2024-04-30
Payer: MEDICARE

## 2024-04-30 ENCOUNTER — APPOINTMENT (OUTPATIENT)
Dept: INFUSION THERAPY | Facility: CLINIC | Age: 69
End: 2024-04-30

## 2024-04-30 ENCOUNTER — OUTPATIENT (OUTPATIENT)
Dept: OUTPATIENT SERVICES | Facility: HOSPITAL | Age: 69
LOS: 1 days | End: 2024-04-30
Payer: MEDICARE

## 2024-04-30 VITALS
RESPIRATION RATE: 18 BRPM | DIASTOLIC BLOOD PRESSURE: 70 MMHG | HEART RATE: 80 BPM | OXYGEN SATURATION: 99 % | SYSTOLIC BLOOD PRESSURE: 110 MMHG | TEMPERATURE: 98 F

## 2024-04-30 VITALS
HEART RATE: 88 BPM | TEMPERATURE: 98 F | DIASTOLIC BLOOD PRESSURE: 63 MMHG | WEIGHT: 158.07 LBS | HEIGHT: 66 IN | OXYGEN SATURATION: 98 % | RESPIRATION RATE: 18 BRPM | SYSTOLIC BLOOD PRESSURE: 114 MMHG

## 2024-04-30 VITALS
SYSTOLIC BLOOD PRESSURE: 114 MMHG | WEIGHT: 158 LBS | HEIGHT: 67 IN | TEMPERATURE: 97.6 F | BODY MASS INDEX: 24.8 KG/M2 | HEART RATE: 88 BPM | DIASTOLIC BLOOD PRESSURE: 63 MMHG | RESPIRATION RATE: 18 BRPM | OXYGEN SATURATION: 98 %

## 2024-04-30 DIAGNOSIS — C34.92 MALIGNANT NEOPLASM OF UNSPECIFIED PART OF LEFT BRONCHUS OR LUNG: ICD-10-CM

## 2024-04-30 DIAGNOSIS — Z90.49 ACQUIRED ABSENCE OF OTHER SPECIFIED PARTS OF DIGESTIVE TRACT: Chronic | ICD-10-CM

## 2024-04-30 PROCEDURE — 96361 HYDRATE IV INFUSION ADD-ON: CPT

## 2024-04-30 PROCEDURE — G2211 COMPLEX E/M VISIT ADD ON: CPT

## 2024-04-30 PROCEDURE — 99215 OFFICE O/P EST HI 40 MIN: CPT

## 2024-04-30 PROCEDURE — 96360 HYDRATION IV INFUSION INIT: CPT

## 2024-04-30 RX ORDER — SODIUM CHLORIDE 9 MG/ML
2000 INJECTION INTRAMUSCULAR; INTRAVENOUS; SUBCUTANEOUS ONCE
Refills: 0 | Status: COMPLETED | OUTPATIENT
Start: 2024-04-30 | End: 2024-04-30

## 2024-04-30 RX ADMIN — SODIUM CHLORIDE 2000 MILLILITER(S): 9 INJECTION INTRAMUSCULAR; INTRAVENOUS; SUBCUTANEOUS at 17:35

## 2024-04-30 RX ADMIN — SODIUM CHLORIDE 1000 MILLILITER(S): 9 INJECTION INTRAMUSCULAR; INTRAVENOUS; SUBCUTANEOUS at 15:35

## 2024-05-01 ENCOUNTER — APPOINTMENT (OUTPATIENT)
Dept: INFUSION THERAPY | Facility: CLINIC | Age: 69
End: 2024-05-01

## 2024-05-01 ENCOUNTER — LABORATORY RESULT (OUTPATIENT)
Age: 69
End: 2024-05-01

## 2024-05-01 ENCOUNTER — APPOINTMENT (OUTPATIENT)
Dept: HEMATOLOGY ONCOLOGY | Facility: CLINIC | Age: 69
End: 2024-05-01
Payer: MEDICARE

## 2024-05-01 ENCOUNTER — OUTPATIENT (OUTPATIENT)
Dept: OUTPATIENT SERVICES | Facility: HOSPITAL | Age: 69
LOS: 1 days | End: 2024-05-01
Payer: MEDICARE

## 2024-05-01 VITALS
DIASTOLIC BLOOD PRESSURE: 64 MMHG | RESPIRATION RATE: 18 BRPM | OXYGEN SATURATION: 98 % | HEART RATE: 80 BPM | SYSTOLIC BLOOD PRESSURE: 114 MMHG | TEMPERATURE: 98 F

## 2024-05-01 VITALS
HEIGHT: 66 IN | WEIGHT: 162.92 LBS | RESPIRATION RATE: 18 BRPM | DIASTOLIC BLOOD PRESSURE: 68 MMHG | OXYGEN SATURATION: 99 % | SYSTOLIC BLOOD PRESSURE: 112 MMHG | TEMPERATURE: 97 F | HEART RATE: 87 BPM

## 2024-05-01 VITALS
DIASTOLIC BLOOD PRESSURE: 66 MMHG | RESPIRATION RATE: 18 BRPM | BODY MASS INDEX: 25.43 KG/M2 | OXYGEN SATURATION: 98 % | TEMPERATURE: 97.1 F | HEIGHT: 67 IN | HEART RATE: 93 BPM | SYSTOLIC BLOOD PRESSURE: 115 MMHG | WEIGHT: 162 LBS

## 2024-05-01 DIAGNOSIS — Z90.49 ACQUIRED ABSENCE OF OTHER SPECIFIED PARTS OF DIGESTIVE TRACT: Chronic | ICD-10-CM

## 2024-05-01 DIAGNOSIS — C34.92 MALIGNANT NEOPLASM OF UNSPECIFIED PART OF LEFT BRONCHUS OR LUNG: ICD-10-CM

## 2024-05-01 LAB
ALBUMIN SERPL ELPH-MCNC: 2.8 G/DL
ALBUMIN SERPL ELPH-MCNC: 3 G/DL
ALP BLD-CCNC: 65 U/L
ALP BLD-CCNC: 76 U/L
ALT SERPL-CCNC: 23 U/L
ALT SERPL-CCNC: 26 U/L
ANION GAP SERPL CALC-SCNC: 15 MMOL/L
ANION GAP SERPL CALC-SCNC: 8 MMOL/L
AST SERPL-CCNC: 20 U/L
AST SERPL-CCNC: 20 U/L
BILIRUB SERPL-MCNC: 0.5 MG/DL
BILIRUB SERPL-MCNC: 0.7 MG/DL
BUN SERPL-MCNC: 92 MG/DL
BUN SERPL-MCNC: 93 MG/DL
CALCIUM SERPL-MCNC: 9.2 MG/DL
CALCIUM SERPL-MCNC: 9.9 MG/DL
CHLORIDE SERPL-SCNC: 100 MMOL/L
CHLORIDE SERPL-SCNC: 91 MMOL/L
CO2 SERPL-SCNC: 26 MMOL/L
CO2 SERPL-SCNC: 29 MMOL/L
CREAT SERPL-MCNC: 2.6 MG/DL
CREAT SERPL-MCNC: 2.6 MG/DL
EGFR: 26 ML/MIN/1.73M2
EGFR: 26 ML/MIN/1.73M2
GLUCOSE SERPL-MCNC: 130 MG/DL
GLUCOSE SERPL-MCNC: 238 MG/DL
MAGNESIUM SERPL-MCNC: 1.6 MG/DL
MAGNESIUM SERPL-MCNC: 1.7 MG/DL
POTASSIUM SERPL-SCNC: 4.3 MMOL/L
POTASSIUM SERPL-SCNC: 4.5 MMOL/L
PROT SERPL-MCNC: 6.1 G/DL
PROT SERPL-MCNC: 7.3 G/DL
SODIUM SERPL-SCNC: 134 MMOL/L
SODIUM SERPL-SCNC: 135 MMOL/L
TSH SERPL-ACNC: 0.34 UIU/ML

## 2024-05-01 PROCEDURE — G2211 COMPLEX E/M VISIT ADD ON: CPT

## 2024-05-01 PROCEDURE — 99214 OFFICE O/P EST MOD 30 MIN: CPT

## 2024-05-01 PROCEDURE — 96360 HYDRATION IV INFUSION INIT: CPT

## 2024-05-01 PROCEDURE — 36415 COLL VENOUS BLD VENIPUNCTURE: CPT

## 2024-05-01 RX ORDER — SODIUM CHLORIDE 9 MG/ML
1000 INJECTION INTRAMUSCULAR; INTRAVENOUS; SUBCUTANEOUS ONCE
Refills: 0 | Status: COMPLETED | OUTPATIENT
Start: 2024-05-01 | End: 2024-05-01

## 2024-05-01 RX ADMIN — SODIUM CHLORIDE 1000 MILLILITER(S): 9 INJECTION INTRAMUSCULAR; INTRAVENOUS; SUBCUTANEOUS at 14:05

## 2024-05-01 RX ADMIN — SODIUM CHLORIDE 1000 MILLILITER(S): 9 INJECTION INTRAMUSCULAR; INTRAVENOUS; SUBCUTANEOUS at 13:05

## 2024-05-01 NOTE — HISTORY OF PRESENT ILLNESS
[Disease: _____________________] : Disease: [unfilled] [T: ___] : T[unfilled] [N: ___] : N[unfilled] [M: ___] : M[unfilled] [AJCC Stage: ____] : AJCC Stage: [unfilled] [de-identified] : Don Castrejon is a 69-year-old male who presents to the clinic for f/u of LLL mass, c/w stage IIIA (T4N0M0) NSCLC - SCC histology.  Onc hx:  Social Hx: 50 pack yr smoking hx, quit when he found out about the mass Originally from [place], lives in [place] by himself. Retired . Son lives in [place]  3/15/24: MRI Brain: Punctate focus of enhancement the left lateral cerebellar hemisphere (series 701 image 55 and series 702 image 23). There is no associated signal abnormality in the brain parenchyma. Given the lack of associated signal abnormality or additional areas of pathologic enhancement, these findings may represent vascular enhancement versus artifact. However, attention on follow-up imaging is recommended to exclude intracranial metastasis.  Focal area of intrinsic T1 hyperintensity more inferiorly in the left cerebellar hemisphere without superimposed pathologic enhancement. This may represent area of mineralization. 5 mm hypoenhancing lesion in the left side of the sella most compatible with pituitary adenoma. Correlation with endocrine function and consideration for further evaluation dedicated MRI sella is recommended. PET: 1. Compared to chest CT from 2/15/2024, the 76 mm mass in the lingula is hypermetabolic and suspicious for lung cancer. 2. The borderline sized thoracic lymph nodes on the CT scan are not FDG avid. 3. Mildly nodular right adrenal gland is not FDG avid. No evidence of metastatic lung cancer. 4. Mild increased activity in enlarged and lobular prostate. Recommend correlation with PSA to rule out prostate cancer.  3/15/24: MR Head: Punctate focus of enhancement the left lateral cerebellar hemisphere (series 701 image 55 and series 702 image 23). There is no associated signal abnormality in the brain parenchyma. Given the lack of associated signal abnormality or additional areas of pathologic enhancement, these findings may represent vascular enhancement versus artifact. However, attention on follow-up imaging is recommended to exclude intracranial metastasis. Focal area of intrinsic T1 hyperintensity more inferiorly in the left cerebellar hemisphere without superimposed pathologic enhancement. This may represent area of mineralization. 5 mm hypoenhancing lesion in the left side of the sella most compatible with pituitary adenoma. Correlation with endocrine function and consideration for further evaluation dedicated MRI sella is recommended.  3/15/24: PET: 1. Compared to chest CT from 2/15/2024, the 76 mm mass in the lingula is hypermetabolic and suspicious for lung cancer. 2. The borderline sized thoracic lymph nodes on the CT scan are not FDG avid. 3. Mildly nodular right adrenal gland is not FDG avid. No evidence of metastatic lung cancer. 4. Mild increased activity in enlarged and lobular prostate. Recommend correlation with PSA to rule out prostate cancer.  4/3/24: Chest Xray: Small left apex pneumothorax, similar to prior exam earlier same day. Left lower lung mass again noted. No acute infiltrates. No significant pleural effusion.  04/03/2024 EBUS Lung, left, core biopsy: -Squamous cell carcinoma. LYMPH NODE, 11L NEGATIVE FOR MALIGNANT CELLS. PD-L1 negative TMB-high KRAS A146T VUS: STK11 p.([id number])  ALK p.([id number])  ERBB3 p.([id number])  POLE p.([id number]) [de-identified] : Squamous cell carcinoma [de-identified] : CTSx:  [FreeTextEntry1] : C1D1 Nivolumab, Gemcitabine, Cisplatin  4/23/24 C1D8 Gemcitabine  4/30/24 - held due to HENRY [de-identified] : Reports had extreme fatigue, and vomited once, though now back at baseline.  Otherwise no acute complaints Had been taking zofran 3x daily (not PRN)

## 2024-05-01 NOTE — ASSESSMENT
[FreeTextEntry1] : 69YM w/ PMHx GERD, HTN, HLD, DMII presenting for follow up of L lingula mass 4.3x 7.4 x 5.4 cm.  Squamous Cell Lung Ca, AJCC IIIa, T4N0M0 PD-L1 negative, KRAS mutant, TMB-high -- PET and MR brain negative for metastatic dx -- Liquid NGS w/ no targetable mutations   -- This is a potentially curative malignancy. Given his performance status, he is a candidate for neoadjuvant therapy followed by resection and then immunotherapy x 1 year.. -- tx with doublet platinum based chemotherapy + immunotherapy based on the Checkmate 816 trial - The median event-free survival was 31.6 months (95% confidence interval [CI], 30.2 to not reached) with nivolumab plus chemotherapy and 20.8 months (95% CI, 14.0 to 26.7) with chemotherapy alone (hazard ratio for disease progression, disease recurrence, or death, 0.63; 97.38% CI, 0.43 to 0.91; P=0.005). The percentage of patients with a pathological complete response was 24.0% (95% CI, 18.0 to 31.0) and 2.2% (95% CI, 0.6 to 5.6), respectively (odds ratio, 13.94; 99% CI, 3.49 to 55.75; P<0.001). Results for event-free survival and pathological complete response across most subgroups favored nivolumab plus chemotherapy over chemotherapy alone. -- we explained risks and benefits of cisplatin (or carboplatin), gemcitabine and nivolumab to the patient, including, but not limited to, low blood cell count, infections, nausea, neuropathy, kidney/liver damage as well as pneumonitis risks. All of his questions were answered in detail and he consented for treatment.  Plan  Here for D8 gemcitabine - noted to have creatinine of 2.6 --> will hold gemcitabine today, and obtain labs tomorrow, potential treatment later in the week if improving creatinine - suspect 2/2 to cisplatin  -- tentative plan for 3 cycles cis/gem/nivo q3wks with restaging scans after.  #Pitiutary adenoma --will refer to Dr.D'Amico after tx for lung ca complete  RTC  tomorrow with RIVER [facility]man.  5/1/2024 onc clinic a/p LLL mass, c/w stage IIIA NSCLC Labs: Cr 2.60 unchanged compared to yesterday. BUN: 93. Otherwise remaining labs WNL. Continue to monitor Cr levels post-hydration.  Continue hydration on days 5/3.  Continue on 5/6 with hydration, follow up and repeat labs. Will hold C1D8 gemcitabine until 5/7 pending lab results. potential treatment later in the week if improving creatinine - suspect 2/2 to cisplatin  -- tentative plan for 3 cycles cis/gem/nivo q3wks with restaging scans after. Next visit labs: CBC, CMP .Encourage to contact the office for any concerns or worsening symptoms. Pt verbalizes understanding

## 2024-05-01 NOTE — HISTORY OF PRESENT ILLNESS
[Disease: _____________________] : Disease: [unfilled] [T: ___] : T[unfilled] [N: ___] : N[unfilled] [M: ___] : M[unfilled] [AJCC Stage: ____] : AJCC Stage: [unfilled] [de-identified] : Don Castrejon is a 69-year-old male who presents to the clinic for f/u of LLL mass, c/w stage IIIA (T4N0M0) NSCLC - SCC histology.  Onc hx:  Social Hx: 50 pack yr smoking hx, quit when he found out about the mass Originally from [place], lives in [place] by himself. Retired . Son lives in [place]  3/15/24: MRI Brain: Punctate focus of enhancement the left lateral cerebellar hemisphere (series 701 image 55 and series 702 image 23). There is no associated signal abnormality in the brain parenchyma. Given the lack of associated signal abnormality or additional areas of pathologic enhancement, these findings may represent vascular enhancement versus artifact. However, attention on follow-up imaging is recommended to exclude intracranial metastasis.  Focal area of intrinsic T1 hyperintensity more inferiorly in the left cerebellar hemisphere without superimposed pathologic enhancement. This may represent area of mineralization. 5 mm hypoenhancing lesion in the left side of the sella most compatible with pituitary adenoma. Correlation with endocrine function and consideration for further evaluation dedicated MRI sella is recommended. PET: 1. Compared to chest CT from 2/15/2024, the 76 mm mass in the lingula is hypermetabolic and suspicious for lung cancer. 2. The borderline sized thoracic lymph nodes on the CT scan are not FDG avid. 3. Mildly nodular right adrenal gland is not FDG avid. No evidence of metastatic lung cancer. 4. Mild increased activity in enlarged and lobular prostate. Recommend correlation with PSA to rule out prostate cancer.  3/15/24: MR Head: Punctate focus of enhancement the left lateral cerebellar hemisphere (series 701 image 55 and series 702 image 23). There is no associated signal abnormality in the brain parenchyma. Given the lack of associated signal abnormality or additional areas of pathologic enhancement, these findings may represent vascular enhancement versus artifact. However, attention on follow-up imaging is recommended to exclude intracranial metastasis. Focal area of intrinsic T1 hyperintensity more inferiorly in the left cerebellar hemisphere without superimposed pathologic enhancement. This may represent area of mineralization. 5 mm hypoenhancing lesion in the left side of the sella most compatible with pituitary adenoma. Correlation with endocrine function and consideration for further evaluation dedicated MRI sella is recommended.  3/15/24: PET: 1. Compared to chest CT from 2/15/2024, the 76 mm mass in the lingula is hypermetabolic and suspicious for lung cancer. 2. The borderline sized thoracic lymph nodes on the CT scan are not FDG avid. 3. Mildly nodular right adrenal gland is not FDG avid. No evidence of metastatic lung cancer. 4. Mild increased activity in enlarged and lobular prostate. Recommend correlation with PSA to rule out prostate cancer.  4/3/24: Chest Xray: Small left apex pneumothorax, similar to prior exam earlier same day. Left lower lung mass again noted. No acute infiltrates. No significant pleural effusion.  04/03/2024 EBUS Lung, left, core biopsy: -Squamous cell carcinoma. LYMPH NODE, 11L NEGATIVE FOR MALIGNANT CELLS. PD-L1 negative TMB-high KRAS A146T VUS: STK11 p.([id number])  ALK p.([id number])  ERBB3 p.([id number])  POLE p.([id number])  5/1/2024 onc clinic 69-year-old male who presents to the clinic for f/u of LLL mass, c/w stage IIIA (T4N0M0) NSCLC - SCC histology. Patient presents today for re-evaluation on Cr levels post treatment and for hydration. Patient reports feeling well, offers no acute symptoms. Denies SOB, CP, flank pain, or lower leg edema. Patient able to urinate without difficulties. [de-identified] : Squamous cell carcinoma [de-identified] : CTSx:  [FreeTextEntry1] : C1D1 Nivolumab, Gemcitabine, Cisplatin  4/23/24 C1D8 Gemcitabine  4/30/24 - held due to HENRY 4/30/2024 Hydration 5/1/2024 Hydration 5/3/2024 Hydration [de-identified] : Reports had extreme fatigue, and vomited once, though now back at baseline.  Otherwise no acute complaints Had been taking zofran 3x daily (not PRN)

## 2024-05-01 NOTE — ASSESSMENT
[FreeTextEntry1] : 69YM w/ PMHx GERD, HTN, HLD, DMII presenting for follow up of L lingula mass 4.3x 7.4 x 5.4 cm.  Squamous Cell Lung Ca, AJCC IIIa, T4N0M0 PD-L1 negative, KRAS mutant, TMB-high -- PET and MR brain negative for metastatic dx -- Liquid NGS w/ no targetable mutations   -- This is a potentially curative malignancy. Given his performance status, he is a candidate for neoadjuvant therapy followed by resection and then immunotherapy x 1 year.. -- tx with doublet platinum based chemotherapy + immunotherapy based on the Checkmate 816 trial - The median event-free survival was 31.6 months (95% confidence interval [CI], 30.2 to not reached) with nivolumab plus chemotherapy and 20.8 months (95% CI, 14.0 to 26.7) with chemotherapy alone (hazard ratio for disease progression, disease recurrence, or death, 0.63; 97.38% CI, 0.43 to 0.91; P=0.005). The percentage of patients with a pathological complete response was 24.0% (95% CI, 18.0 to 31.0) and 2.2% (95% CI, 0.6 to 5.6), respectively (odds ratio, 13.94; 99% CI, 3.49 to 55.75; P<0.001). Results for event-free survival and pathological complete response across most subgroups favored nivolumab plus chemotherapy over chemotherapy alone. -- we explained risks and benefits of cisplatin (or carboplatin), gemcitabine and nivolumab to the patient, including, but not limited to, low blood cell count, infections, nausea, neuropathy, kidney/liver damage as well as pneumonitis risks. All of his questions were answered in detail and he consented for treatment.  Plan  Here for D8 gemcitabine - noted to have creatinine of 2.6 --> will hold gemcitabine today, and obtain labs tomorrow, potential treatment later in the week if improving creatinine - suspect 2/2 to cisplatin  -- tentative plan for 3 cycles cis/gem/nivo q3wks with restaging scans after.  #Pitiutary adenoma --will refer to Dr.D'Amico after tx for lung ca complete  RTC  tomorrow with RIVER Whitman.

## 2024-05-01 NOTE — END OF VISIT
[] : Fellow [FreeTextEntry3] : Patient was seen with oncology fellow, Dr. Winn. 69-year-old male with stage IIIa squamous cell lung cancer, unresectable currently undergoing neoadjuvant chemoimmunotherapy. He received the first dose of cisplatin/etoposide in 2/nivolumab last week.  Unfortunately his creatinine today is elevated.  This could be cisplatin toxicity. Will give him 2 L IV fluids today. Follow-up tomorrow for lab check and repeat hydration.  Hold gemcitabine for now and will reschedule according to creatinine levels.

## 2024-05-02 ENCOUNTER — APPOINTMENT (OUTPATIENT)
Dept: NEPHROLOGY | Facility: CLINIC | Age: 69
End: 2024-05-02
Payer: MEDICARE

## 2024-05-02 ENCOUNTER — LABORATORY RESULT (OUTPATIENT)
Age: 69
End: 2024-05-02

## 2024-05-02 ENCOUNTER — NON-APPOINTMENT (OUTPATIENT)
Age: 69
End: 2024-05-02

## 2024-05-02 PROCEDURE — 99205 OFFICE O/P NEW HI 60 MIN: CPT

## 2024-05-02 PROCEDURE — G2211 COMPLEX E/M VISIT ADD ON: CPT

## 2024-05-03 ENCOUNTER — APPOINTMENT (OUTPATIENT)
Dept: INFUSION THERAPY | Facility: CLINIC | Age: 69
End: 2024-05-03

## 2024-05-03 ENCOUNTER — INPATIENT (INPATIENT)
Facility: HOSPITAL | Age: 69
LOS: 0 days | Discharge: HOME | End: 2024-05-04
Attending: STUDENT IN AN ORGANIZED HEALTH CARE EDUCATION/TRAINING PROGRAM | Admitting: STUDENT IN AN ORGANIZED HEALTH CARE EDUCATION/TRAINING PROGRAM
Payer: MEDICARE

## 2024-05-03 ENCOUNTER — OUTPATIENT (OUTPATIENT)
Dept: OUTPATIENT SERVICES | Facility: HOSPITAL | Age: 69
LOS: 1 days | End: 2024-05-03
Payer: MEDICARE

## 2024-05-03 VITALS
RESPIRATION RATE: 18 BRPM | WEIGHT: 164.91 LBS | DIASTOLIC BLOOD PRESSURE: 71 MMHG | TEMPERATURE: 99 F | HEART RATE: 101 BPM | OXYGEN SATURATION: 98 % | HEIGHT: 66 IN | SYSTOLIC BLOOD PRESSURE: 122 MMHG

## 2024-05-03 VITALS
TEMPERATURE: 100 F | HEIGHT: 66 IN | RESPIRATION RATE: 20 BRPM | OXYGEN SATURATION: 99 % | HEART RATE: 97 BPM | DIASTOLIC BLOOD PRESSURE: 66 MMHG | SYSTOLIC BLOOD PRESSURE: 118 MMHG

## 2024-05-03 VITALS
HEART RATE: 87 BPM | DIASTOLIC BLOOD PRESSURE: 75 MMHG | RESPIRATION RATE: 17 BRPM | OXYGEN SATURATION: 99 % | TEMPERATURE: 98 F | SYSTOLIC BLOOD PRESSURE: 116 MMHG

## 2024-05-03 DIAGNOSIS — C34.92 MALIGNANT NEOPLASM OF UNSPECIFIED PART OF LEFT BRONCHUS OR LUNG: ICD-10-CM

## 2024-05-03 DIAGNOSIS — D64.9 ANEMIA, UNSPECIFIED: ICD-10-CM

## 2024-05-03 DIAGNOSIS — I10 ESSENTIAL (PRIMARY) HYPERTENSION: ICD-10-CM

## 2024-05-03 DIAGNOSIS — D35.2 BENIGN NEOPLASM OF PITUITARY GLAND: ICD-10-CM

## 2024-05-03 DIAGNOSIS — Z90.49 ACQUIRED ABSENCE OF OTHER SPECIFIED PARTS OF DIGESTIVE TRACT: Chronic | ICD-10-CM

## 2024-05-03 DIAGNOSIS — N17.9 ACUTE KIDNEY FAILURE, UNSPECIFIED: ICD-10-CM

## 2024-05-03 DIAGNOSIS — C34.90 MALIGNANT NEOPLASM OF UNSPECIFIED PART OF UNSPECIFIED BRONCHUS OR LUNG: ICD-10-CM

## 2024-05-03 DIAGNOSIS — E87.29 OTHER ACIDOSIS: ICD-10-CM

## 2024-05-03 DIAGNOSIS — E78.5 HYPERLIPIDEMIA, UNSPECIFIED: ICD-10-CM

## 2024-05-03 DIAGNOSIS — E11.9 TYPE 2 DIABETES MELLITUS WITHOUT COMPLICATIONS: ICD-10-CM

## 2024-05-03 DIAGNOSIS — Z29.9 ENCOUNTER FOR PROPHYLACTIC MEASURES, UNSPECIFIED: ICD-10-CM

## 2024-05-03 LAB
ALBUMIN SERPL ELPH-MCNC: 2.7 G/DL — LOW (ref 3.3–5)
ALP SERPL-CCNC: 51 U/L — SIGNIFICANT CHANGE UP (ref 40–120)
ALT FLD-CCNC: 17 U/L — SIGNIFICANT CHANGE UP (ref 10–45)
ANION GAP SERPL CALC-SCNC: 14 MMOL/L — SIGNIFICANT CHANGE UP (ref 5–17)
ANION GAP SERPL CALC-SCNC: 5 MMOL/L — SIGNIFICANT CHANGE UP (ref 5–17)
ANISOCYTOSIS BLD QL: SIGNIFICANT CHANGE UP
APPEARANCE UR: CLEAR — SIGNIFICANT CHANGE UP
AST SERPL-CCNC: 19 U/L — SIGNIFICANT CHANGE UP (ref 10–40)
BASOPHILS # BLD AUTO: 0 K/UL — SIGNIFICANT CHANGE UP (ref 0–0.2)
BASOPHILS NFR BLD AUTO: 0 % — SIGNIFICANT CHANGE UP (ref 0–2)
BILIRUB SERPL-MCNC: 0.7 MG/DL — SIGNIFICANT CHANGE UP (ref 0.2–1.2)
BILIRUB UR-MCNC: NEGATIVE — SIGNIFICANT CHANGE UP
BUN SERPL-MCNC: 63 MG/DL — HIGH (ref 7–23)
BUN SERPL-MCNC: 67 MG/DL — HIGH (ref 7–23)
BURR CELLS BLD QL SMEAR: PRESENT — SIGNIFICANT CHANGE UP
CALCIUM SERPL-MCNC: 8.4 MG/DL — SIGNIFICANT CHANGE UP (ref 8.4–10.5)
CALCIUM SERPL-MCNC: 8.4 MG/DL — SIGNIFICANT CHANGE UP (ref 8.4–10.5)
CHLORIDE SERPL-SCNC: 101 MMOL/L — SIGNIFICANT CHANGE UP (ref 96–108)
CHLORIDE SERPL-SCNC: 96 MMOL/L — SIGNIFICANT CHANGE UP (ref 96–108)
CO2 SERPL-SCNC: 20 MMOL/L — LOW (ref 22–31)
CO2 SERPL-SCNC: 25 MMOL/L — SIGNIFICANT CHANGE UP (ref 22–31)
COLOR SPEC: YELLOW — SIGNIFICANT CHANGE UP
CREAT ?TM UR-MCNC: 61 MG/DL — SIGNIFICANT CHANGE UP
CREAT SERPL-MCNC: 2.79 MG/DL — HIGH (ref 0.5–1.3)
CREAT SERPL-MCNC: 2.9 MG/DL — HIGH (ref 0.5–1.3)
DACRYOCYTES BLD QL SMEAR: SLIGHT — SIGNIFICANT CHANGE UP
DIFF PNL FLD: NEGATIVE — SIGNIFICANT CHANGE UP
EGFR: 23 ML/MIN/1.73M2 — LOW
EGFR: 24 ML/MIN/1.73M2 — LOW
ELLIPTOCYTES BLD QL SMEAR: SLIGHT — SIGNIFICANT CHANGE UP
EOSINOPHIL # BLD AUTO: 0 K/UL — SIGNIFICANT CHANGE UP (ref 0–0.5)
EOSINOPHIL NFR BLD AUTO: 0 % — SIGNIFICANT CHANGE UP (ref 0–6)
GLUCOSE BLDC GLUCOMTR-MCNC: 136 MG/DL — HIGH (ref 70–99)
GLUCOSE SERPL-MCNC: 166 MG/DL — HIGH (ref 70–99)
GLUCOSE SERPL-MCNC: 171 MG/DL — HIGH (ref 70–99)
GLUCOSE UR QL: NEGATIVE MG/DL — SIGNIFICANT CHANGE UP
HCT VFR BLD CALC: 29.2 % — LOW (ref 39–50)
HGB BLD-MCNC: 10.2 G/DL — LOW (ref 13–17)
HYPOCHROMIA BLD QL: SLIGHT — SIGNIFICANT CHANGE UP
KETONES UR-MCNC: NEGATIVE MG/DL — SIGNIFICANT CHANGE UP
LEUKOCYTE ESTERASE UR-ACNC: NEGATIVE — SIGNIFICANT CHANGE UP
LYMPHOCYTES # BLD AUTO: 0.78 K/UL — LOW (ref 1–3.3)
LYMPHOCYTES # BLD AUTO: 13.3 % — SIGNIFICANT CHANGE UP (ref 13–44)
MANUAL SMEAR VERIFICATION: SIGNIFICANT CHANGE UP
MCHC RBC-ENTMCNC: 28.1 PG — SIGNIFICANT CHANGE UP (ref 27–34)
MCHC RBC-ENTMCNC: 34.9 GM/DL — SIGNIFICANT CHANGE UP (ref 32–36)
MCV RBC AUTO: 80.4 FL — SIGNIFICANT CHANGE UP (ref 80–100)
MICROCYTES BLD QL: SIGNIFICANT CHANGE UP
MONOCYTES # BLD AUTO: 0.26 K/UL — SIGNIFICANT CHANGE UP (ref 0–0.9)
MONOCYTES NFR BLD AUTO: 4.4 % — SIGNIFICANT CHANGE UP (ref 2–14)
NEUTROPHILS # BLD AUTO: 4.8 K/UL — SIGNIFICANT CHANGE UP (ref 1.8–7.4)
NEUTROPHILS NFR BLD AUTO: 82.3 % — HIGH (ref 43–77)
NITRITE UR-MCNC: NEGATIVE — SIGNIFICANT CHANGE UP
OSMOLALITY UR: 424 MOSM/KG — SIGNIFICANT CHANGE UP (ref 300–900)
OVALOCYTES BLD QL SMEAR: SLIGHT — SIGNIFICANT CHANGE UP
PH UR: 5.5 — SIGNIFICANT CHANGE UP (ref 5–8)
PLAT MORPH BLD: NORMAL — SIGNIFICANT CHANGE UP
PLATELET # BLD AUTO: 101 K/UL — LOW (ref 150–400)
POIKILOCYTOSIS BLD QL AUTO: SLIGHT — SIGNIFICANT CHANGE UP
POLYCHROMASIA BLD QL SMEAR: SLIGHT — SIGNIFICANT CHANGE UP
POTASSIUM SERPL-MCNC: 3.9 MMOL/L — SIGNIFICANT CHANGE UP (ref 3.5–5.3)
POTASSIUM SERPL-MCNC: 4.6 MMOL/L — SIGNIFICANT CHANGE UP (ref 3.5–5.3)
POTASSIUM SERPL-SCNC: 3.9 MMOL/L — SIGNIFICANT CHANGE UP (ref 3.5–5.3)
POTASSIUM SERPL-SCNC: 4.6 MMOL/L — SIGNIFICANT CHANGE UP (ref 3.5–5.3)
POTASSIUM UR-SCNC: 19 MMOL/L — SIGNIFICANT CHANGE UP
PROT ?TM UR-MCNC: 6 MG/DL — SIGNIFICANT CHANGE UP (ref 0–12)
PROT SERPL-MCNC: 5.7 G/DL — LOW (ref 6–8.3)
PROT UR-MCNC: NEGATIVE MG/DL — SIGNIFICANT CHANGE UP
PROT/CREAT UR-RTO: 0.1 RATIO — SIGNIFICANT CHANGE UP (ref 0–0.2)
RBC # BLD: 3.63 M/UL — LOW (ref 4.2–5.8)
RBC # FLD: 13.2 % — SIGNIFICANT CHANGE UP (ref 10.3–14.5)
RBC BLD AUTO: ABNORMAL
SCHISTOCYTES BLD QL AUTO: SLIGHT — SIGNIFICANT CHANGE UP
SMUDGE CELLS # BLD: PRESENT — SIGNIFICANT CHANGE UP
SODIUM SERPL-SCNC: 130 MMOL/L — LOW (ref 135–145)
SODIUM SERPL-SCNC: 131 MMOL/L — LOW (ref 135–145)
SODIUM UR-SCNC: 56 MMOL/L — SIGNIFICANT CHANGE UP
SP GR SPEC: 1.01 — SIGNIFICANT CHANGE UP (ref 1–1.03)
SPHEROCYTES BLD QL SMEAR: SLIGHT — SIGNIFICANT CHANGE UP
UROBILINOGEN FLD QL: 0.2 MG/DL — SIGNIFICANT CHANGE UP (ref 0.2–1)
UUN UR-MCNC: 755 MG/DL — SIGNIFICANT CHANGE UP
WBC # BLD: 5.83 K/UL — SIGNIFICANT CHANGE UP (ref 3.8–10.5)
WBC # FLD AUTO: 5.83 K/UL — SIGNIFICANT CHANGE UP (ref 3.8–10.5)

## 2024-05-03 PROCEDURE — 36415 COLL VENOUS BLD VENIPUNCTURE: CPT

## 2024-05-03 PROCEDURE — 80053 COMPREHEN METABOLIC PANEL: CPT

## 2024-05-03 PROCEDURE — 96360 HYDRATION IV INFUSION INIT: CPT

## 2024-05-03 PROCEDURE — 99223 1ST HOSP IP/OBS HIGH 75: CPT

## 2024-05-03 PROCEDURE — 99285 EMERGENCY DEPT VISIT HI MDM: CPT

## 2024-05-03 RX ORDER — INSULIN LISPRO 100/ML
VIAL (ML) SUBCUTANEOUS
Refills: 0 | Status: DISCONTINUED | OUTPATIENT
Start: 2024-05-03 | End: 2024-05-04

## 2024-05-03 RX ORDER — LANOLIN ALCOHOL/MO/W.PET/CERES
3 CREAM (GRAM) TOPICAL AT BEDTIME
Refills: 0 | Status: DISCONTINUED | OUTPATIENT
Start: 2024-05-03 | End: 2024-05-04

## 2024-05-03 RX ORDER — SODIUM CHLORIDE 9 MG/ML
1000 INJECTION, SOLUTION INTRAVENOUS
Refills: 0 | Status: DISCONTINUED | OUTPATIENT
Start: 2024-05-03 | End: 2024-05-04

## 2024-05-03 RX ORDER — ONDANSETRON 8 MG/1
4 TABLET, FILM COATED ORAL EVERY 8 HOURS
Refills: 0 | Status: DISCONTINUED | OUTPATIENT
Start: 2024-05-03 | End: 2024-05-04

## 2024-05-03 RX ORDER — DEXTROSE 50 % IN WATER 50 %
25 SYRINGE (ML) INTRAVENOUS ONCE
Refills: 0 | Status: DISCONTINUED | OUTPATIENT
Start: 2024-05-03 | End: 2024-05-04

## 2024-05-03 RX ORDER — INFLUENZA VIRUS VACCINE 15; 15; 15; 15 UG/.5ML; UG/.5ML; UG/.5ML; UG/.5ML
0.7 SUSPENSION INTRAMUSCULAR ONCE
Refills: 0 | Status: DISCONTINUED | OUTPATIENT
Start: 2024-05-03 | End: 2024-05-04

## 2024-05-03 RX ORDER — GLUCAGON INJECTION, SOLUTION 0.5 MG/.1ML
1 INJECTION, SOLUTION SUBCUTANEOUS ONCE
Refills: 0 | Status: DISCONTINUED | OUTPATIENT
Start: 2024-05-03 | End: 2024-05-04

## 2024-05-03 RX ORDER — DEXTROSE 50 % IN WATER 50 %
15 SYRINGE (ML) INTRAVENOUS ONCE
Refills: 0 | Status: DISCONTINUED | OUTPATIENT
Start: 2024-05-03 | End: 2024-05-04

## 2024-05-03 RX ORDER — DEXTROSE 10 % IN WATER 10 %
125 INTRAVENOUS SOLUTION INTRAVENOUS ONCE
Refills: 0 | Status: DISCONTINUED | OUTPATIENT
Start: 2024-05-03 | End: 2024-05-04

## 2024-05-03 RX ORDER — DEXTROSE 50 % IN WATER 50 %
12.5 SYRINGE (ML) INTRAVENOUS ONCE
Refills: 0 | Status: DISCONTINUED | OUTPATIENT
Start: 2024-05-03 | End: 2024-05-04

## 2024-05-03 RX ORDER — ACETAMINOPHEN 500 MG
650 TABLET ORAL EVERY 6 HOURS
Refills: 0 | Status: DISCONTINUED | OUTPATIENT
Start: 2024-05-03 | End: 2024-05-04

## 2024-05-03 RX ORDER — SODIUM CHLORIDE 9 MG/ML
1000 INJECTION INTRAMUSCULAR; INTRAVENOUS; SUBCUTANEOUS ONCE
Refills: 0 | Status: COMPLETED | OUTPATIENT
Start: 2024-05-03 | End: 2024-05-03

## 2024-05-03 RX ADMIN — SODIUM CHLORIDE 1000 MILLILITER(S): 9 INJECTION INTRAMUSCULAR; INTRAVENOUS; SUBCUTANEOUS at 09:45

## 2024-05-03 RX ADMIN — SODIUM CHLORIDE 1000 MILLILITER(S): 9 INJECTION INTRAMUSCULAR; INTRAVENOUS; SUBCUTANEOUS at 10:45

## 2024-05-03 NOTE — H&P ADULT - HISTORY OF PRESENT ILLNESS
69M former smoker  PMH squamous cell lung CA sp first chemotx 4/23 gem-cisplatin- nevo sp 2L NS 4/30, 1L NS 5/1, 1L today with worsening Cr  possibly 2/2 checkpt inhibitor toxicity     ED   Vitals T99 HR 90 /72 RR 18 spO2 96%  Labs Hb 10.2 platelet 101 Cr 2.79 AG 14 bicarb 20  Imaging none  Intervention none   69M former smoker  PMH squamous cell lung CA sp first chemotx 4/23 gem-cisplatin- nevo sp 2L NS 4/30, 1L NS 5/1, 1L today with worsening Cr concern for 2/2 checkpt inhibitor toxicity    ED   Vitals T99 HR 90 /72 RR 18 spO2 96%  Labs Hb 10.2 platelet 101 Cr 2.79 AG 14 bicarb 20  Imaging none  Intervention none   69M former smoker  PMH squamous cell lung CA sp first chemotx 4/23 gem/cisplatin/nevo sp 2L NS 4/30, 1L NS 5/1, 1L today with worsening Cr concern for 2/2 checkpt inhibitor toxicity. Pt reports he tolerated chemotherapy well last week 4/30 but yesterday noted swelling in all his extremities and cheeks that was causing him fatigue. He reports the swelling was the worst this morning causing him fatigue when he was taking the taxi to the hospital this morning. He usually drinks around 2L water a day even when he was receiving IV fluids. Denies dysuria, polyuria and changes to the color and character of his urine, constipation and diarrhea. He denies blurry vision, itchiness,     ED   Vitals T99 HR 90 /72 RR 18 spO2 96%  Labs Hb 10.2 platelet 101 Cr 2.79 AG 14 bicarb 20  Imaging none  Intervention none

## 2024-05-03 NOTE — H&P ADULT - PROBLEM SELECTOR PLAN 2
Follows with Dr Larose s/p    - heme onc consulted, following  - DVT pphx: heparin sq 5000 q8 Follows with Dr Larose diagnosed with LLL Stage IIIA squamous cell lung CA  s/p C1D1 Nivolumab, Gemcitabine, Cisplatin 4/23/24  C1D8 Gemcitabine 4/30/24 - held due to HENRY    - heme onc consulted, following  - DVT pphx: heparin sq 5000 q8

## 2024-05-03 NOTE — H&P ADULT - ATTENDING COMMENTS
70 yo M with PMHx HTN, HLD, GERD, DM, L lingula mass (Cisplastin chemotx c/b HENRY) px at urging of outpatient oncologist due to concern for worsening kidney function admitted for further management of suspected cisplatin toxicity.     VSS. Labs with mild anemia/thrombocytopenia (+schistocytes). HENRY with BUN/Cr 67/2.79. UA bland. Renal consulted in ED given worsening creatinine trend from outpatient labs thought to be 2/2 cisplatin toxicity.      [ ] Renal consulted in ED (Non oliguric ATN 2/2 cisplatin toxicity)    [ ] Renal US  [ ] Strict UOP   [ ] Hemolysis labs (LDH, Haptoglobin, Reticulocyte)   [ ] Repeat UA + Ulytes   [ ] Avoid nephrotoxic agents   [ ] Hold Lisinopril

## 2024-05-03 NOTE — H&P ADULT - PROBLEM SELECTOR PLAN 8
Home meds: atorvastatin 20mg qd. Denies hx of heart disease     - can continue home meds on discharge

## 2024-05-03 NOTE — CONSULT NOTE ADULT - SUBJECTIVE AND OBJECTIVE BOX
HPI:  69YM w/ PMHx GERD, HTN, HLD, DMII with L lingula mass 4.3x 7.4 x 5.4 cm, started on cisplatin based chemotherapy and developed HENRY. Had nl renal function previously (baseline sCr 0.7-0.8) noted sCr of 2.6 s/p platinum based chemotherapy. Chemotherapy currently held iso HENRY. Pte with nocturia and straining at the end of urination, denies hematuria.  Repeat blood work was done and showed sCr of 2.9 with bland UA no proteinuria or albuminuira on labs from 5/2. Nephrology consulted for further management of HENRY   ?    PAST MEDICAL & SURGICAL HISTORY:  History of hypertension      History of high cholesterol      History of gastroesophageal reflux (GERD)      History of diabetes mellitus      History of persistent cough      Mass of lingula of lung      S/P appendectomy            Allergies:  No Known Allergies      Home Medications:   atorvastatin 20 mg oral tablet: 1 tab(s) orally once a day (at bedtime)  lisinopril 10 mg oral tablet: 1 tab(s) orally once a day  metFORMIN 500 mg oral tablet: 1 tab(s) orally 2 times a day      Hospital Medications:   MEDICATIONS  (STANDING):      SOCIAL HISTORY:  Denies ETOh, Smoking    Family History:  FAMILY HISTORY:  No pertinent family history in first degree relatives          VITALS:  T(F): 99.7 (05-03-24 @ 15:29), Max: 99.7 (05-03-24 @ 15:29)  HR: 97 (05-03-24 @ 15:29)  BP: 118/66 (05-03-24 @ 15:29)  RR: 20 (05-03-24 @ 15:29)  SpO2: 99% (05-03-24 @ 15:29)  Wt(kg): --    Height (cm): 167.6 (05-03 @ 15:29), 167.6 (05-03 @ 09:33)  Weight (kg): 74.843 (05-03 @ 09:33)  BMI (kg/m2): 26.6 (05-03 @ 15:29), 26.6 (05-03 @ 09:33)  BSA (m2): 1.84 (05-03 @ 15:29), 1.84 (05-03 @ 09:33)  CAPILLARY BLOOD GLUCOSE          Review of Systems:  CONSTITUTIONAL: No fever or chills.  RESPIRATORY: No shortness of breath, cough  CARDIOVASCULAR: No Chest pain, shortness of breath, palpitations  GASTROINTESTINAL: No abdominal pain, nausea, vomiting, diarrhea  GENITOURINARY: No urinary frequency, gross hematuria, dysuria  NEUROLOGICAL: No headache, weakness  SKIN: No rash or skin lesion  MUSCULOSKELETAL: No swelling  Psych: Denies suicidal or homicidal ideation    PHYSICAL EXAM:  GENERAL: Alert, awake, oriented x3   HEENT: NKECHI, EOMI, neck supple, no JVP  CHEST/LUNG: Bilateral clear breath sounds  HEART: Regular rate and rhythm, no murmur, no gallops, no rub   ABDOMEN: Soft, nontender, non distended  : No flank or supra pubic tenderness.  EXTREMITIES: no pedal edema  Neurology: AAOx3, no focal neurological deficit  SKIN: No rash or skin lesion     LABS:  05-03    131<L>  |  101  |  63<H>  ----------------------------<  166<H>  4.6   |  25  |  2.90<H>    Ca    8.4      03 May 2024 11:13    TPro  5.7<L>  /  Alb  2.7<L>  /  TBili  0.7  /  DBili      /  AST  19  /  ALT  17  /  AlkPhos  51  05-03    Creatinine Trend: 2.90 <--    Urine Studies:  Urinalysis Basic - ( 03 May 2024 11:13 )    Color:  / Appearance:  / SG:  / pH:   Gluc: 166 mg/dL / Ketone:   / Bili:  / Urobili:    Blood:  / Protein:  / Nitrite:    Leuk Esterase:  / RBC:  / WBC    Sq Epi:  / Non Sq Epi:  / Bacteria:                HPI:  69YM w/ PMHx GERD, HTN, HLD, DMII with L lingula mass 4.3x 7.4 x 5.4 cm, started on cisplatin based chemotherapy and developed HENRY. Had nl renal function previously (baseline sCr 0.7-0.8) noted sCr of 2.6 s/p platinum based chemotherapy. Chemotherapy currently held iso HENRY. Pte with nocturia and straining at the end of urination, denies hematuria.  Repeat blood work was done and showed sCr of 2.9 with bland UA no proteinuria or albuminuira on labs from 5/2. Patient states that he has has a good Apetite and no issues with urination. Patient also mentions that he has gained 4lbs since last week at start of tx. Also states he was continued to take his daily dose of lisinopril  Nephrology consulted for further management of HENRY  ?    PAST MEDICAL & SURGICAL HISTORY:  History of hypertension      History of high cholesterol      History of gastroesophageal reflux (GERD)      History of diabetes mellitus      History of persistent cough      Mass of lingula of lung      S/P appendectomy            Allergies:  No Known Allergies      Home Medications:   atorvastatin 20 mg oral tablet: 1 tab(s) orally once a day (at bedtime)  lisinopril 10 mg oral tablet: 1 tab(s) orally once a day  metFORMIN 500 mg oral tablet: 1 tab(s) orally 2 times a day      Hospital Medications:   MEDICATIONS  (STANDING):      SOCIAL HISTORY:  Denies ETOh, Smoking    Family History:  FAMILY HISTORY:  No pertinent family history in first degree relatives          VITALS:  T(F): 99.7 (05-03-24 @ 15:29), Max: 99.7 (05-03-24 @ 15:29)  HR: 97 (05-03-24 @ 15:29)  BP: 118/66 (05-03-24 @ 15:29)  RR: 20 (05-03-24 @ 15:29)  SpO2: 99% (05-03-24 @ 15:29)  Wt(kg): --    Height (cm): 167.6 (05-03 @ 15:29), 167.6 (05-03 @ 09:33)  Weight (kg): 74.843 (05-03 @ 09:33)  BMI (kg/m2): 26.6 (05-03 @ 15:29), 26.6 (05-03 @ 09:33)  BSA (m2): 1.84 (05-03 @ 15:29), 1.84 (05-03 @ 09:33)  CAPILLARY BLOOD GLUCOSE          Review of Systems:  CONSTITUTIONAL: No fever or chills.  RESPIRATORY: No shortness of breath, cough  CARDIOVASCULAR: No Chest pain, shortness of breath, palpitations  GASTROINTESTINAL: No abdominal pain, nausea, vomiting, diarrhea  GENITOURINARY: No urinary frequency, gross hematuria, dysuria  NEUROLOGICAL: No headache, weakness  SKIN: No rash or skin lesion  MUSCULOSKELETAL: No swelling      PHYSICAL EXAM:  GENERAL: Alert, awake, oriented x3   CHEST/LUNG: Bilateral clear breath sounds  HEART: Regular rate and rhythm, no murmur, no gallops, no rub   ABDOMEN: Soft, nontender, non distended  : No flank or supra pubic tenderness.  EXTREMITIES: trace pedal edema b/l LE   Neurology: AAOx3, no focal neurological deficit  SKIN: No rash or skin lesion     LABS:  05-03    131<L>  |  101  |  63<H>  ----------------------------<  166<H>  4.6   |  25  |  2.90<H>    Ca    8.4      03 May 2024 11:13    TPro  5.7<L>  /  Alb  2.7<L>  /  TBili  0.7  /  DBili      /  AST  19  /  ALT  17  /  AlkPhos  51  05-03    Creatinine Trend: 2.90 <--    Urine Studies:  Urinalysis Basic - ( 03 May 2024 11:13 )    Color:  / Appearance:  / SG:  / pH:   Gluc: 166 mg/dL / Ketone:   / Bili:  / Urobili:    Blood:  / Protein:  / Nitrite:    Leuk Esterase:  / RBC:  / WBC    Sq Epi:  / Non Sq Epi:  / Bacteria:

## 2024-05-03 NOTE — H&P ADULT - NSHPLABSRESULTS_GEN_ALL_CORE
.  LABS:                         10.2   5.83  )-----------( 101      ( 03 May 2024 16:58 )             29.2     05-03    130<L>  |  96  |  67<H>  ----------------------------<  171<H>  3.9   |  20<L>  |  2.79<H>    Ca    8.4      03 May 2024 16:58    TPro  5.7<L>  /  Alb  2.7<L>  /  TBili  0.7  /  DBili  x   /  AST  19  /  ALT  17  /  AlkPhos  51  05-03      Urinalysis Basic - ( 03 May 2024 18:10 )    Color: Yellow / Appearance: Clear / S.015 / pH: x  Gluc: x / Ketone: Negative mg/dL  / Bili: Negative / Urobili: 0.2 mg/dL   Blood: x / Protein: Negative mg/dL / Nitrite: Negative   Leuk Esterase: Negative / RBC: x / WBC x   Sq Epi: x / Non Sq Epi: x / Bacteria: x                RADIOLOGY, EKG & ADDITIONAL TESTS: Reviewed.

## 2024-05-03 NOTE — H&P ADULT - ASSESSMENT
69M former smoker  PMH squamous cell lung CA sp first chemotx 4/23 gem-cisplatin- nevo sp 2L NS 4/30, 1L NS 5/1, 1L today with worsening Cr  likely 2/2 checkpt inhibitor toxicity  69M former smoker  PMH squamous cell lung CA sp first chemotx 4/23 gem-cisplatin- nevo sp 2L NS 4/30, 1L NS 5/1, 1L today with worsening Cr  likely 2/2 checkpt inhibitor toxicity admitted for HENRY management and assessment

## 2024-05-03 NOTE — ED ADULT NURSE REASSESSMENT NOTE - NS ED NURSE REASSESS COMMENT FT1
Received patient care and endorsement from previous RN.  Patient w/ Hx of lung CA,  as per patient acute kidney injury from chemotherapy, patient aox 4, ambulatory, not in any pain, no symptom complaint.  Creatinine 2.79.  Patient continent, able to void urine, no dysuria. PIV #18 left AC.  Vital signs stable.  For admit.

## 2024-05-03 NOTE — ED ADULT TRIAGE NOTE - MEANS OF ARRIVAL
oriented to person, place and time , normal sensation , short and long term memory intact ambulatory

## 2024-05-03 NOTE — ED ADULT NURSE NOTE - OBJECTIVE STATEMENT
70 yo M presents to ED with abnormal kidney function. PMHx HTN, HLD, DM, and lung cx on chemotherapy. Last chemotherapy session on 4/23/24. Alert and oriented and ambulatory independently.

## 2024-05-03 NOTE — CONSULT NOTE ADULT - ASSESSMENT
69YM w/hx of  GERD, HTN, HLD, DMII with L lingula mass 4.3x 7.4 x 5.4 cm, started on cisplatin based chemotherapy 4/15 and started to have HENRY with sCr of 2.9 from baseline (0.7-0.8), sent in by oncology for worsening renal function, consulted for further management     Nonoliguric ATN 2/2 cisplatin toxicity   Current tx as below:   C1D1 Nivolumab, Gemcitabine, Cisplatin 4/23/24  C1D8 Gemcitabine 4/30/24 - held due to HENRY  4/30/2024 Hydration  5/1/2024 Hydration  5/3/2024 Hydration.    sCr continues to rise despite hydration also noted to have drop in Hgb and and Plts from 12.2 to 10.9 and 256 with most recent 171, can also be possible TMA but less likely given bland UA, v possible AIN though less suspicion given no peripheral eosinophilia, bland UA     Plan:  Recommend formal renal US   Strict ins and outs   Please send repeat UA with Urine Na, Urine Cr, UPCR, ACR,   Please send LDH, haptoglobin   Encourage PO intake, if unable to tolerate PO can start on IV NS @ 40cc/hr   Hold home ACE/ARB i/s/o of HENRY   Hold PPI     69YM w/hx of  GERD, HTN, HLD, DMII with L lingula mass 4.3x 7.4 x 5.4 cm, started on cisplatin based chemotherapy 4/15 and started to have HENRY with sCr of 2.9 from baseline (0.7-0.8), sent in by oncology for worsening renal function, consulted for further management     Nonoliguric ATN 2/2 cisplatin toxicity   Current tx as below:   C1D1 Nivolumab, Gemcitabine, Cisplatin 4/23/24  C1D8 Gemcitabine 4/30/24 - held due to HENRY  4/30/2024 Hydration  5/1/2024 Hydration  5/3/2024 Hydration.    sCr continues to rise despite hydration also noted to have drop in Hgb and and Plts from 12.2 to 10.9 and 256 with most recent 171, can also be possible TMA but less likely given bland UA, v possible AIN though less suspicion given no peripheral eosinophilia, bland UA     Plan:  Recommend formal renal US   Strict ins and outs   Please send repeat UA with Urine Na, Urine Cr, UPCR, ACR,   Please send LDH, haptoglobin   Encourage PO intake   Hold home ACE/ARB i/s/o of HENRY   Hold PPI

## 2024-05-03 NOTE — ED ADULT TRIAGE NOTE - CHIEF COMPLAINT QUOTE
70 y/o male sent by oncologist for evaluation of worsening kidney function. Pt last  and only chemotherapy session 4/23/24.

## 2024-05-03 NOTE — H&P ADULT - PROBLEM SELECTOR PLAN 5
Admission Hb 10.2 MCV 80.4. +eliza cell, smudge cells Home meds metformin 500 q12. BG normal on metabolic panel    - mod iSS with BG monitoring

## 2024-05-03 NOTE — H&P ADULT - PROBLEM SELECTOR PLAN 1
Admission Cr 2.9-> 2.79. UA negative   squamous cell lung CA sp first chemotx 4/23 gem-cisplatin- nevo  s/p outpatient Dr Larose's office 2L NS, 1L NS at  Urine studies may be skewed by recent IV hydration    - f/u urine studies, RTP U/S Admission Cr 2.9-> 2.79. UA negative   squamous cell lung CA sp first chemotx 4/23 gem-cisplatin- nevo  s/p outpatient Dr Larose's office 2L NS 4/30, 1L NS 5/2, 1L NS this AM and sent to Dr Elaine' office for evaluation yesterday.  HENRY likely intrinsic- has continued ACEi, Urine studies may be skewed by recent IV hydration    - f/u urine studies, RTP U/S Admission Cr 2.9-> 2.79. (2.6 outpatient 5/1) UA negative   squamous cell lung CA sp first chemotx 4/23 gem-cisplatin- nevo  s/p outpatient Dr Larose's office 2L NS 4/30, 1L NS 5/2, 1L NS this AM and sent to Dr Elaine' office for evaluation yesterday.  HENRY likely intrinsic- has continued ACEi,  Urine studies may be skewed by recent IV hydration    - on exam appears euvolemic will hold off on fluids for now  - f/u urine studies, RTP U/S

## 2024-05-03 NOTE — H&P ADULT - NSHPPHYSICALEXAM_GEN_ALL_CORE
.  VITAL SIGNS:  T(C): 36.9 (05-03-24 @ 19:45), Max: 37.6 (05-03-24 @ 15:29)  T(F): 98.4 (05-03-24 @ 19:45), Max: 99.7 (05-03-24 @ 15:29)  HR: 88 (05-03-24 @ 19:45) (87 - 101)  BP: 123/72 (05-03-24 @ 19:45) (116/71 - 125/72)  BP(mean): --  RR: 18 (05-03-24 @ 19:45) (17 - 20)  SpO2: 96% (05-03-24 @ 19:45) (96% - 99%)  Wt(kg): --    PHYSICAL EXAM:    Constitutional: Resting comfortably in bed; NAD  Head: NC/AT  Eyes: PERRL, EOMI, clear conjunctiva  ENT: no nasal discharge; uvula midline, no oropharyngeal erythema or exudates; MMM  Neck: supple; no JVD or thyromegaly  Respiratory: CTA B/L; no W/R/R, no retractions  Cardiac: +S1/S2; RRR; no M/R/G;  Gastrointestinal: soft, NT/ND; no rebound or guarding; +BSx4, no CVA tenderness  Extremities: WWP, no clubbing or cyanosis; no peripheral edema  Musculoskeletal: NROM x4; no joint swelling, tenderness or erythema  Vascular: 2+ radial, femoral, DP/PT pulses B/L  Dermatologic: skin warm, dry and intact; no rashes, wounds, or scars  Neurologic: AAOx3; CNII-XII grossly intact; no focal deficits  Psychiatric: affect and characteristics of appearance, verbalizations, behaviors are appropriate

## 2024-05-03 NOTE — H&P ADULT - PROBLEM SELECTOR PLAN 3
AG 14 bicarb 20  Not toxic appearing VSS- 1L NS infusion this AM can cause increase in NAGMA     - f/u repeat CMP, lactate, repeat BG AG 14 bicarb 20  Not toxic appearing VSS- 1L NS infusion this AM can cause increase in NAGMA   Likely 2/2 renal dysfunction     - f/u repeat CMP, lactate, repeat BG AG 14 bicarb 20  Not toxic appearing VSS- 1L NS infusion this AM can cause increase in NAGMA   Likely 2/2 renal dysfunction     - f/u AM CMP, lactate, repeat BG

## 2024-05-03 NOTE — H&P ADULT - PROBLEM SELECTOR PLAN 6
Admission Hb 10.2 MCV 80.4. +eliza cell, smudge cells  No active s/s bleeding  Likely 2/2 chemotherapy- per UTD, gemcitabine causes anemia 68% of patients, neutropenia 63%, thrombocytopenia 24%    - cw monitor

## 2024-05-03 NOTE — ED PROVIDER NOTE - OBJECTIVE STATEMENT
Pt is a 68yo m, h/o GERD, HTN, HLD, DMII, L lingula mass 4.3x 7.4 x 5.4 cm, started on cisplatin based chemotherapy on 4/23/24, and developed HENRY; was scheduled to received 2nd round of chemo on 4/30/24 but withheld due to rising Cr. Pt was hydrated with 2L NS on 4/30, followed by another 1L on 5/1 and today. Repeat labs today showed worsening Cr to 2.90. Pt admits to wt gain over the past few days and gen weakness. Otherwise denies any cp, sob, palp, dizziness, syncope, abd pain, flank pain, urinary difficulty, dysuria, hematuria, urinary urgency, fever, chills...

## 2024-05-03 NOTE — H&P ADULT - PROBLEM SELECTOR PLAN 9
F: s/p 1L outpatient today, PO  E: Replete PRN  N: CC diet  Lines: pIV    DVT pphx: heparin sq 5000 q8H      CODE STATUS: Full  DISPO: [id number]

## 2024-05-03 NOTE — H&P ADULT - PROBLEM SELECTOR PLAN 4
Home meds metformin 500 q12. 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 Was planned for outpatient followup with Dr DAmico after lung CA treatment  Denies bluriness, visual field deficits     - cw outpatient f/u

## 2024-05-03 NOTE — ED PROVIDER NOTE - CLINICAL SUMMARY MEDICAL DECISION MAKING FREE TEXT BOX
Impression: 70yo m, h/o GERD, HTN, HLD, DMII, L lingula mass 4.3x 7.4 x 5.4 cm, started on cisplatin based chemotherapy on 4/23/24, and developed HENRY; was scheduled to received 2nd round of chemo on 4/30/24 but withheld due to rising Cr. Pt was hydrated with 2L NS on 4/30, followed by another 1L on 5/1 and today. Repeat labs today showed worsening Cr to 2.90. Pt admits to wt gain over the past few days and gen weakness. Otherwise denies any cp, sob, palp, dizziness, syncope, abd pain, flank pain, urinary difficulty, dysuria, hematuria, urinary urgency, fever, chills... Impression: 70yo m, h/o GERD, HTN, HLD, DMII, L lingula mass 4.3x 7.4 x 5.4 cm, started on cisplatin based chemotherapy on 4/23/24, and developed HENRY; was scheduled to received 2nd round of chemo on 4/30/24 but withheld due to rising Cr. Pt was hydrated with 2L NS on 4/30, followed by another 1L on 5/1 and today. Repeat labs today showed worsening Cr to 2.90. Pt admits to wt gain over the past few days and gen weakness. Otherwise denies any cp, sob, palp, dizziness, syncope, abd pain, flank pain, urinary difficulty, dysuria, hematuria, urinary urgency, fever, chills...    Afebrile. HDS. PE unremarkable. Repeat labs showing slight improvement of Cr to 2.79. + mild anemia. Case d/w Dr. Elaine- will admit to hospitalist [facility] for monitoring of Cr. Renal us ordered- medicine to f/u on results.

## 2024-05-03 NOTE — H&P ADULT - PROBLEM SELECTOR PLAN 7
Home meds lisinopril 10mg daily. Currently normotensive  Hold ACEi iso HENRY    - monitor give hydralazine if  sBP>160

## 2024-05-04 ENCOUNTER — TRANSCRIPTION ENCOUNTER (OUTPATIENT)
Age: 69
End: 2024-05-04

## 2024-05-04 VITALS
OXYGEN SATURATION: 98 % | RESPIRATION RATE: 17 BRPM | SYSTOLIC BLOOD PRESSURE: 125 MMHG | HEART RATE: 88 BPM | TEMPERATURE: 98 F | DIASTOLIC BLOOD PRESSURE: 76 MMHG

## 2024-05-04 DIAGNOSIS — R91.8 OTHER NONSPECIFIC ABNORMAL FINDING OF LUNG FIELD: ICD-10-CM

## 2024-05-04 LAB
A1C WITH ESTIMATED AVERAGE GLUCOSE RESULT: 8 % — HIGH (ref 4–5.6)
ADD ON TEST-SPECIMEN IN LAB: SIGNIFICANT CHANGE UP
ALBUMIN SERPL ELPH-MCNC: 2.7 G/DL — LOW (ref 3.3–5)
ALBUMIN SERPL ELPH-MCNC: 2.8 G/DL — LOW (ref 3.3–5)
ALP SERPL-CCNC: 58 U/L — SIGNIFICANT CHANGE UP (ref 40–120)
ALP SERPL-CCNC: 62 U/L — SIGNIFICANT CHANGE UP (ref 40–120)
ALT FLD-CCNC: 15 U/L — SIGNIFICANT CHANGE UP (ref 10–45)
ALT FLD-CCNC: 17 U/L — SIGNIFICANT CHANGE UP (ref 10–45)
ANION GAP SERPL CALC-SCNC: 10 MMOL/L — SIGNIFICANT CHANGE UP (ref 5–17)
ANION GAP SERPL CALC-SCNC: 13 MMOL/L — SIGNIFICANT CHANGE UP (ref 5–17)
ANISOCYTOSIS BLD QL: SLIGHT — SIGNIFICANT CHANGE UP
AST SERPL-CCNC: 15 U/L — SIGNIFICANT CHANGE UP (ref 10–40)
AST SERPL-CCNC: 16 U/L — SIGNIFICANT CHANGE UP (ref 10–40)
BASOPHILS # BLD AUTO: 0 K/UL — SIGNIFICANT CHANGE UP (ref 0–0.2)
BASOPHILS NFR BLD AUTO: 0 % — SIGNIFICANT CHANGE UP (ref 0–2)
BILIRUB SERPL-MCNC: 0.3 MG/DL — SIGNIFICANT CHANGE UP (ref 0.2–1.2)
BILIRUB SERPL-MCNC: 0.3 MG/DL — SIGNIFICANT CHANGE UP (ref 0.2–1.2)
BUN SERPL-MCNC: 61 MG/DL — HIGH (ref 7–23)
BUN SERPL-MCNC: 62 MG/DL — HIGH (ref 7–23)
BURR CELLS BLD QL SMEAR: PRESENT — SIGNIFICANT CHANGE UP
CALCIUM SERPL-MCNC: 8 MG/DL — LOW (ref 8.4–10.5)
CALCIUM SERPL-MCNC: 8.2 MG/DL — LOW (ref 8.4–10.5)
CHLORIDE SERPL-SCNC: 100 MMOL/L — SIGNIFICANT CHANGE UP (ref 96–108)
CHLORIDE SERPL-SCNC: 100 MMOL/L — SIGNIFICANT CHANGE UP (ref 96–108)
CO2 SERPL-SCNC: 18 MMOL/L — LOW (ref 22–31)
CO2 SERPL-SCNC: 23 MMOL/L — SIGNIFICANT CHANGE UP (ref 22–31)
CREAT SERPL-MCNC: 2.61 MG/DL — HIGH (ref 0.5–1.3)
CREAT SERPL-MCNC: 2.75 MG/DL — HIGH (ref 0.5–1.3)
CULTURE RESULTS: SIGNIFICANT CHANGE UP
EGFR: 24 ML/MIN/1.73M2 — LOW
EGFR: 26 ML/MIN/1.73M2 — LOW
ELLIPTOCYTES BLD QL SMEAR: SLIGHT — SIGNIFICANT CHANGE UP
EOSINOPHIL # BLD AUTO: 0 K/UL — SIGNIFICANT CHANGE UP (ref 0–0.5)
EOSINOPHIL NFR BLD AUTO: 0 % — SIGNIFICANT CHANGE UP (ref 0–6)
ESTIMATED AVERAGE GLUCOSE: 183 MG/DL — HIGH (ref 68–114)
GLUCOSE BLDC GLUCOMTR-MCNC: 139 MG/DL — HIGH (ref 70–99)
GLUCOSE BLDC GLUCOMTR-MCNC: 211 MG/DL — HIGH (ref 70–99)
GLUCOSE SERPL-MCNC: 142 MG/DL — HIGH (ref 70–99)
GLUCOSE SERPL-MCNC: 223 MG/DL — HIGH (ref 70–99)
HAPTOGLOB SERPL-MCNC: SIGNIFICANT CHANGE UP (ref 34–200)
HCT VFR BLD CALC: 27.5 % — LOW (ref 39–50)
HGB BLD-MCNC: 9.8 G/DL — LOW (ref 13–17)
LDH SERPL L TO P-CCNC: 275 U/L — HIGH (ref 50–242)
LYMPHOCYTES # BLD AUTO: 0.69 K/UL — LOW (ref 1–3.3)
LYMPHOCYTES # BLD AUTO: 14.9 % — SIGNIFICANT CHANGE UP (ref 13–44)
MAGNESIUM SERPL-MCNC: 1.5 MG/DL — LOW (ref 1.6–2.6)
MANUAL SMEAR VERIFICATION: SIGNIFICANT CHANGE UP
MCHC RBC-ENTMCNC: 27.8 PG — SIGNIFICANT CHANGE UP (ref 27–34)
MCHC RBC-ENTMCNC: 35.6 GM/DL — SIGNIFICANT CHANGE UP (ref 32–36)
MCV RBC AUTO: 78.1 FL — LOW (ref 80–100)
MICROCYTES BLD QL: SLIGHT — SIGNIFICANT CHANGE UP
MONOCYTES # BLD AUTO: 0.16 K/UL — SIGNIFICANT CHANGE UP (ref 0–0.9)
MONOCYTES NFR BLD AUTO: 3.5 % — SIGNIFICANT CHANGE UP (ref 2–14)
NEUTROPHILS # BLD AUTO: 3.75 K/UL — SIGNIFICANT CHANGE UP (ref 1.8–7.4)
NEUTROPHILS NFR BLD AUTO: 81.6 % — HIGH (ref 43–77)
PHOSPHATE SERPL-MCNC: 3.8 MG/DL — SIGNIFICANT CHANGE UP (ref 2.5–4.5)
PLAT MORPH BLD: NORMAL — SIGNIFICANT CHANGE UP
PLATELET # BLD AUTO: 88 K/UL — LOW (ref 150–400)
POIKILOCYTOSIS BLD QL AUTO: SLIGHT — SIGNIFICANT CHANGE UP
POLYCHROMASIA BLD QL SMEAR: SLIGHT — SIGNIFICANT CHANGE UP
POTASSIUM SERPL-MCNC: 3.6 MMOL/L — SIGNIFICANT CHANGE UP (ref 3.5–5.3)
POTASSIUM SERPL-MCNC: 4 MMOL/L — SIGNIFICANT CHANGE UP (ref 3.5–5.3)
POTASSIUM SERPL-SCNC: 3.6 MMOL/L — SIGNIFICANT CHANGE UP (ref 3.5–5.3)
POTASSIUM SERPL-SCNC: 4 MMOL/L — SIGNIFICANT CHANGE UP (ref 3.5–5.3)
PROT SERPL-MCNC: 5 G/DL — LOW (ref 6–8.3)
PROT SERPL-MCNC: 5.4 G/DL — LOW (ref 6–8.3)
RBC # BLD: 3.52 M/UL — LOW (ref 4.2–5.8)
RBC # FLD: 13.3 % — SIGNIFICANT CHANGE UP (ref 10.3–14.5)
RBC BLD AUTO: ABNORMAL
SODIUM SERPL-SCNC: 131 MMOL/L — LOW (ref 135–145)
SODIUM SERPL-SCNC: 133 MMOL/L — LOW (ref 135–145)
SPECIMEN SOURCE: SIGNIFICANT CHANGE UP
WBC # BLD: 4.6 K/UL — SIGNIFICANT CHANGE UP (ref 3.8–10.5)
WBC # FLD AUTO: 4.6 K/UL — SIGNIFICANT CHANGE UP (ref 3.8–10.5)

## 2024-05-04 PROCEDURE — 81003 URINALYSIS AUTO W/O SCOPE: CPT

## 2024-05-04 PROCEDURE — 99285 EMERGENCY DEPT VISIT HI MDM: CPT

## 2024-05-04 PROCEDURE — 84540 ASSAY OF URINE/UREA-N: CPT

## 2024-05-04 PROCEDURE — 36415 COLL VENOUS BLD VENIPUNCTURE: CPT

## 2024-05-04 PROCEDURE — 84100 ASSAY OF PHOSPHORUS: CPT

## 2024-05-04 PROCEDURE — 80053 COMPREHEN METABOLIC PANEL: CPT

## 2024-05-04 PROCEDURE — 99232 SBSQ HOSP IP/OBS MODERATE 35: CPT

## 2024-05-04 PROCEDURE — 80048 BASIC METABOLIC PNL TOTAL CA: CPT

## 2024-05-04 PROCEDURE — 83935 ASSAY OF URINE OSMOLALITY: CPT

## 2024-05-04 PROCEDURE — 83615 LACTATE (LD) (LDH) ENZYME: CPT

## 2024-05-04 PROCEDURE — 84156 ASSAY OF PROTEIN URINE: CPT

## 2024-05-04 PROCEDURE — 85025 COMPLETE CBC W/AUTO DIFF WBC: CPT

## 2024-05-04 PROCEDURE — 82962 GLUCOSE BLOOD TEST: CPT

## 2024-05-04 PROCEDURE — 83735 ASSAY OF MAGNESIUM: CPT

## 2024-05-04 PROCEDURE — 83010 ASSAY OF HAPTOGLOBIN QUANT: CPT

## 2024-05-04 PROCEDURE — 84133 ASSAY OF URINE POTASSIUM: CPT

## 2024-05-04 PROCEDURE — 82570 ASSAY OF URINE CREATININE: CPT

## 2024-05-04 PROCEDURE — 84300 ASSAY OF URINE SODIUM: CPT

## 2024-05-04 PROCEDURE — 99239 HOSP IP/OBS DSCHRG MGMT >30: CPT | Mod: GC

## 2024-05-04 PROCEDURE — 83036 HEMOGLOBIN GLYCOSYLATED A1C: CPT

## 2024-05-04 RX ORDER — POTASSIUM CHLORIDE 20 MEQ
40 PACKET (EA) ORAL ONCE
Refills: 0 | Status: COMPLETED | OUTPATIENT
Start: 2024-05-04 | End: 2024-05-04

## 2024-05-04 RX ORDER — MAGNESIUM SULFATE 500 MG/ML
2 VIAL (ML) INJECTION ONCE
Refills: 0 | Status: COMPLETED | OUTPATIENT
Start: 2024-05-04 | End: 2024-05-04

## 2024-05-04 RX ORDER — HEPARIN SODIUM 5000 [USP'U]/ML
5000 INJECTION INTRAVENOUS; SUBCUTANEOUS EVERY 8 HOURS
Refills: 0 | Status: DISCONTINUED | OUTPATIENT
Start: 2024-05-04 | End: 2024-05-04

## 2024-05-04 RX ORDER — LISINOPRIL 2.5 MG/1
1 TABLET ORAL
Refills: 0 | DISCHARGE

## 2024-05-04 RX ADMIN — Medication 4: at 13:17

## 2024-05-04 RX ADMIN — Medication 25 GRAM(S): at 09:35

## 2024-05-04 RX ADMIN — HEPARIN SODIUM 5000 UNIT(S): 5000 INJECTION INTRAVENOUS; SUBCUTANEOUS at 13:17

## 2024-05-04 RX ADMIN — Medication 40 MILLIEQUIVALENT(S): at 09:34

## 2024-05-04 NOTE — DISCHARGE NOTE PROVIDER - HOSPITAL COURSE
#Discharge: do not delete    69M former smoker PMH squamous cell lung CA sp first chemotx 4/23 gem/cisplatin/nevo sp 2L NS 4/30, 1L NS 5/1, 1L today with worsening Cr concern for 2/2 checkpt inhibitor toxicity. Pt reports he tolerated chemotherapy well last week 4/30 presents with HENRY. Cr trend improving and renal ultrasound showed ____    Problem List/Main Diagnoses (system-based):   #HENRY  Likely 2/2 cisplatin nephrotoxicity, Cr improving with renal ultrasound showing  - outpatient follow up with heme/onc and nephro    #Squamous cell carcinoma of lung  Follows with Dr Larose diagnosed with LLL Stage IIIA squamous cell lung CA  s/p C1D1 Nivolumab, Gemcitabine, Cisplatin 4/23/24  C1D8 Gemcitabine 4/30/24  - outpatient heme/onc f/u    Pituitary adenoma.   Outpatient MR head 3/15 showing 5mm hypoenchancing  L sella adenoma likely pituitary. Was planned for outpatient followup with Dr DAmico after lung CA treatment  Denies bluriness, visual field deficits   - cw outpatient f/u.    #HTN  Normotensive during admission  - continue to hold lisinopril in setting of HENRY, restart outpatient    #HLD  - continue statin    Inpatient treatment course: None  New medications: None  Labs to be followed outpatient: BMP  Exam to be followed outpatient: f/u with heme/onc and nephrology   #Discharge: do not delete    69M former smoker PMH squamous cell lung CA sp first chemotx 4/23 gem/cisplatin/nevo sp 2L NS 4/30, 1L NS 5/1, 1L today with worsening Cr concern for 2/2 checkpt inhibitor toxicity. Pt reports he tolerated chemotherapy well last week 4/30 presents with HENRY. Cr trend improving and renal pocus by nephrology showed no hydronephrosis and normal echogenicity.    Problem List/Main Diagnoses (system-based):   #HENRY  Likely 2/2 cisplatin nephrotoxicity, Cr improving with renal pocus by nephrology showed no hydronephrosis and normal echogenicity.  - outpatient follow up with heme/onc and nephro    #Squamous cell carcinoma of lung  Follows with Dr Larose diagnosed with LLL Stage IIIA squamous cell lung CA  s/p C1D1 Nivolumab, Gemcitabine, Cisplatin 4/23/24  C1D8 Gemcitabine 4/30/24  - outpatient heme/onc f/u    Pituitary adenoma.   Outpatient MR head 3/15 showing 5mm hypoenchancing  L sella adenoma likely pituitary. Was planned for outpatient followup with Dr DAmico after lung CA treatment  Denies bluriness, visual field deficits   - cw outpatient f/u.    #HTN  Normotensive during admission  - continue to hold lisinopril in setting of HENRY, restart outpatient once renal function stable    #HLD  - continue statin    Inpatient treatment course: None  New medications: None  Labs to be followed outpatient: BMP  Exam to be followed outpatient: f/u with heme/onc and nephrology   #Discharge: do not delete    69M former smoker PMH squamous cell lung CA sp first chemotx 4/23 gem/cisplatin/nevo sp 2L NS 4/30, 1L NS 5/1, 1L today with worsening Cr concern for 2/2 checkpt inhibitor toxicity. Pt reports he tolerated chemotherapy well last week 4/30 presents with HENRY. Cr trend improving and renal pocus by nephrology showed no hydronephrosis and normal echogenicity.    Problem List/Main Diagnoses (system-based):   #HENRY  Likely 2/2 cisplatin nephrotoxicity, Cr improving with renal pocus by nephrology showed no hydronephrosis and normal echogenicity.  - outpatient follow up with heme/onc and nephro (f/u with Dr. Elaine 5/6)    #Squamous cell carcinoma of lung  Follows with Dr Larose diagnosed with LLL Stage IIIA squamous cell lung CA  s/p C1D1 Nivolumab, Gemcitabine, Cisplatin 4/23/24  C1D8 Gemcitabine 4/30/24  - outpatient heme/onc f/u    Pituitary adenoma.   Outpatient MR head 3/15 showing 5mm hypoenchancing  L sella adenoma likely pituitary. Was planned for outpatient followup with Dr DAmico after lung CA treatment  Denies bluriness, visual field deficits   - cw outpatient f/u.    #HTN  Normotensive during admission  - continue to hold lisinopril in setting of HENRY, restart outpatient once renal function stable    #HLD  - continue statin    Inpatient treatment course: None  New medications: None  Labs to be followed outpatient: BMP  Exam to be followed outpatient: f/u with heme/onc and nephrology

## 2024-05-04 NOTE — PROGRESS NOTE ADULT - SUBJECTIVE AND OBJECTIVE BOX
Patient is a 69y Male seen and evaluated at bedside. States that he feels well, overall, no complaints   Renal function improving sCr down to 2.6      Meds:    acetaminophen     Tablet .. 650 every 6 hours PRN  dextrose 10% Bolus 125 once  dextrose 5%. 1000 <Continuous>  dextrose 5%. 1000 <Continuous>  dextrose 50% Injectable 12.5 once  dextrose 50% Injectable 25 once  dextrose Oral Gel 15 once PRN  glucagon  Injectable 1 once  heparin   Injectable 5000 every 8 hours  influenza  Vaccine (HIGH DOSE) 0.7 once  insulin lispro (ADMELOG) corrective regimen sliding scale  Before meals and at bedtime      T(C): , Max: 37.6 (05-03-24 @ 15:29)  T(F): , Max: 99.7 (05-03-24 @ 15:29)  HR: 93 (05-04-24 @ 06:01)  BP: 130/72 (05-04-24 @ 06:01)  BP(mean): 91 (05-04-24 @ 06:01)  RR: 18 (05-04-24 @ 06:01)  SpO2: 97% (05-04-24 @ 06:01)  Wt(kg): --    05-03 @ 07:01  -  05-04 @ 07:00  --------------------------------------------------------  IN: 240 mL / OUT: 750 mL / NET: -510 mL    05-04 @ 07:01  -  05-04 @ 14:07  --------------------------------------------------------  IN: 0 mL / OUT: 600 mL / NET: -600 mL      Height (cm): 170.2 (05-03 @ 19:45)  Weight (kg): 75.3 (05-03 @ 19:45)  BMI (kg/m2): 26 (05-03 @ 19:45)  BSA (m2): 1.87 (05-03 @ 19:45)    Review of Systems:  ROS negative except as per HPI      PHYSICAL EXAM:  GENERAL: NAD, sitting up in bed   CHEST/LUNG: Clear to auscultation bilaterally  HEART: normal S1S2, RRR  ABDOMEN: Soft, Nontender, +BS, No flank tenderness bilateral  EXTREMITIES: No clubbing, cyanosis, or edema   NEUROLOGY: AAO x3, no focal neurological deficit  POCUS: Right Kidney 9cm with no hydro nephrosis, stones, normal echogenicity , Left Kidney 9cm with no hydro nephrosis, stones, normal echogenicity       LABS:                        9.8    4.60  )-----------( 88       ( 04 May 2024 12:13 )             27.5     05-04    133<L>  |  100  |  61<H>  ----------------------------<  223<H>  4.0   |  23  |  2.61<H>    Ca    8.2<L>      04 May 2024 12:13  Phos  3.8     05-04  Mg     1.5     05-04    TPro  5.4<L>  /  Alb  2.8<L>  /  TBili  0.3  /  DBili  x   /  AST  16  /  ALT  17  /  AlkPhos  62  05-04        Urinalysis Basic - ( 04 May 2024 12:13 )    Color: x / Appearance: x / SG: x / pH: x  Gluc: 223 mg/dL / Ketone: x  / Bili: x / Urobili: x   Blood: x / Protein: x / Nitrite: x   Leuk Esterase: x / RBC: x / WBC x   Sq Epi: x / Non Sq Epi: x / Bacteria: x      Sodium, Random Urine: 56 mmol/L (05-03 @ 22:37)  Creatinine, Random Urine: 61 mg/dL (05-03 @ 22:37)  Protein/Creatinine Ratio Calculation: 0.1 Ratio (05-03 @ 22:37)  Osmolality, Random Urine: 424 mosm/kg (05-03 @ 22:37)  Potassium, Random Urine: 19 mmol/L (05-03 @ 22:37)        RADIOLOGY & ADDITIONAL STUDIES:

## 2024-05-04 NOTE — DISCHARGE NOTE NURSING/CASE MANAGEMENT/SOCIAL WORK - NSDCPEFALRISK_GEN_ALL_CORE
For information on Fall & Injury Prevention, visit: [email or web address] OR  [email or web address] OR  https://www.cdc.gov/steadi/patient.html

## 2024-05-04 NOTE — DISCHARGE NOTE PROVIDER - CARE PROVIDERS DIRECT ADDRESSES
[email or web address]

## 2024-05-04 NOTE — PROGRESS NOTE ADULT - ASSESSMENT
69YM w/hx of GERD, HTN, HLD, DMII with L lingula mass 4.3x 7.4 x 5.4 cm, started on cisplatin based chemotherapy 4/15 admitted for nonoliguric ATN 2/2 cisplatin with sCr of 2.9 at peak now downtrending with most recent at 2.6    Nonoliguric ATN 2/2 cisplatin toxicity   Current tx as below:   C1D1 Nivolumab, Gemcitabine, Cisplatin 4/23/24  C1D8 Gemcitabine 4/30/24 - held due to HENRY  4/30/2024 Hydration  5/1/2024 Hydration  5/3/2024 Hydration.  Repeat UA balnd, consistent with ATN     Plan:  Patient stable for discharge from renal perspective, will need close follow up with outpatient Nephrology and Oncology   Should get repeat BMP on 5/6   Continue to hold ACE on d/c can resume as outpatient once renal function stable   No indication for renal bx at this time

## 2024-05-04 NOTE — DISCHARGE NOTE PROVIDER - NSDCFUSCHEDAPPT_GEN_ALL_CORE_FT
Marcelo Larose  [facility]  [facility] [address]  Scheduled Appointment: 05/14/2024    [facility]  AM[facility]  [address]  Scheduled Appointment: 05/14/2024    Marcelo Larose  [facility]  [facility] [address]  Scheduled Appointment: 05/21/2024    Ramone Werner  [facility]  TH[facility] [address]  Scheduled Appointment: 05/30/2024    Marcelo Larose  [facility]  [facility] [address]  Scheduled Appointment: 06/04/2024    [facility]  AM[facility]  [address]  Scheduled Appointment: 06/04/2024    Marcelo Larose  [facility]  [facility] [address]  Scheduled Appointment: 06/11/2024    [facility]  [facility]  [address]  Scheduled Appointment: 06/11/2024

## 2024-05-04 NOTE — DISCHARGE NOTE PROVIDER - NSDCMRMEDTOKEN_GEN_ALL_CORE_FT
atorvastatin 20 mg oral tablet: 1 tab(s) orally once a day (at bedtime)  lisinopril 10 mg oral tablet: 1 tab(s) orally once a day  metFORMIN 500 mg oral tablet: 1 tab(s) orally 2 times a day   atorvastatin 20 mg oral tablet: 1 tab(s) orally once a day (at bedtime)  metFORMIN 500 mg oral tablet: 1 tab(s) orally 2 times a day

## 2024-05-04 NOTE — DISCHARGE NOTE PROVIDER - ATTENDING DISCHARGE PHYSICAL EXAMINATION:
PE  Gen: pleasant male in NAD  HEENT: EOMI NCAT  Neck: no jvd no bruits  Lungs: CTA b/l nonlabored  CV: RRR S1S2  GI: +BS nontender  LE: no edema appreciated up to knees b/l  Psych: calm cooperative

## 2024-05-04 NOTE — DISCHARGE NOTE PROVIDER - NSDCFUADDAPPT_GEN_ALL_CORE_FT
Please follow up with nephrologist Dr. Raymundo Elaine on 5/6 to repeat your blood work to monitor your kidney function.

## 2024-05-04 NOTE — CONSULT NOTE ADULT - ASSESSMENT
69 M former smoker PMH squamous cell lung CA (s/p first chemotx 4/23 gem/cisplatin/nivo) c/c/b HENRY, sp 2L NS 4/30, 1L NS 5/1, 1L admitted to [facility] with worsening Cr concern for 2/2 checkpt inhibitor toxicity.    # Squamous Cell Lung Ca, AJCC IIIa, T4N0M0  PD-L1 negative, KRAS mutant, TMB-high  - This is a potentially curative malignancy. Given his performance status, he is a candidate for neoadjuvant therapy followed by resection and then immunotherapy x 1 year.  - Treatment Hx: C1D1 Nivolumab, Gemcitabine, Cisplatin 4/23/24, C1D8 Gemcitabine 4/30/24 - held due to HENRY.  4/30/2024 Hydration outpt, 5/1/2024 Hydration outpt, 5/3/2024 Hydration outpt  - Differential for current HENRY possibly 2/2 cisplatin vs less likely immune checkpoint related    Plan:  - Will hold further systemic therapy until resolution of HENRY  - Appreciate renal recommendations for HENRY  - When medically stable for discharge, please ensure that patient has follow up with renal. patient has appt with Dr. Larose on 5/14/24.    Discussed with hematology oncology attending Dr. Marcelo Larose. Recommendations are considered final after attending attestation.

## 2024-05-04 NOTE — DISCHARGE NOTE PROVIDER - CARE PROVIDER_API CALL
Marcelo Larose  Medical Oncology  [address]  [place] [address]  Phone: [phone]  Fax: [phone]  Follow Up Time:    Marcelo Larose  Medical Oncology  [address]  [place] [address]  Phone: [phone]  Fax: [phone]  Follow Up Time:     Raymundo Elaine  Nephrology  [address]  [place] [address]  Phone: [phone]  Fax: [phone]  Follow Up Time: 1-3 days

## 2024-05-04 NOTE — DISCHARGE NOTE NURSING/CASE MANAGEMENT/SOCIAL WORK - PATIENT PORTAL LINK FT
You can access the FollowMyHealth Patient Portal offered by [facility] by registering at the following website: [email or web address]. By joining [employer]’s FollowMyHealth portal, you will also be able to view your health information using other applications (apps) compatible with our system.

## 2024-05-04 NOTE — PROGRESS NOTE ADULT - ATTENDING COMMENTS
likely cisplatin ATN -- more likely than CPI AIN grazyna as normal UA and no eosinophilia-- as creat improving ok to dc with close f/u   hopefully can limit or avoid cisplatin in future  if can stops improving may consider kidney bx later but suspect will not change mgt

## 2024-05-04 NOTE — DISCHARGE NOTE PROVIDER - PROVIDER TOKENS
PROVIDER:[TOKEN:[[id number]]] PROVIDER:[TOKEN:[[id number]]],PROVIDER:[TOKEN:[[id number]],FOLLOWUP:[1-3 days]]

## 2024-05-04 NOTE — CONSULT NOTE ADULT - SUBJECTIVE AND OBJECTIVE BOX
*** NOTE IN PROGRESS ***    Hematology Oncology Progress / Consult Note      HPI:  69M former smoker  PMH squamous cell lung CA sp first chemotx 4/23 gem/cisplatin/nevo sp 2L NS 4/30, 1L NS 5/1, 1L today with worsening Cr concern for 2/2 checkpt inhibitor toxicity. Pt reports he tolerated chemotherapy well last week 4/30 but yesterday noted swelling in all his extremities and cheeks that was causing him fatigue. He reports the swelling was the worst this morning causing him fatigue when he was taking the taxi to the hospital this morning. He usually drinks around 2L water a day even when he was receiving IV fluids. Denies dysuria, polyuria and changes to the color and character of his urine, constipation and diarrhea. He denies blurry vision, itchiness,     ED   Vitals T99 HR 90 /72 RR 18 spO2 96%  Labs Hb 10.2 platelet 101 Cr 2.79 AG 14 bicarb 20  Imaging none  Intervention none   (03 May 2024 21:09)      Interval History:    SUBJECTIVE: Patient seen and examined at bedside.    OBJECTIVE:    VITAL SIGNS:  ICU Vital Signs Last 24 Hrs  T(C): 36.9 (04 May 2024 06:01), Max: 37.6 (03 May 2024 15:29)  T(F): 98.5 (04 May 2024 06:01), Max: 99.7 (03 May 2024 15:29)  HR: 93 (04 May 2024 06:01) (87 - 97)  BP: 130/72 (04 May 2024 06:01) (116/71 - 130/72)  BP(mean): 91 (04 May 2024 06:01) (91 - 91)  ABP: --  ABP(mean): --  RR: 18 (04 May 2024 06:01) (17 - 20)  SpO2: 97% (04 May 2024 06:01) (96% - 99%)    O2 Parameters below as of 04 May 2024 06:01  Patient On (Oxygen Delivery Method): room air              05-03 @ 07:01  -  05-04 @ 07:00  --------------------------------------------------------  IN: 240 mL / OUT: 750 mL / NET: -510 mL      CAPILLARY BLOOD GLUCOSE      POCT Blood Glucose.: 139 mg/dL (04 May 2024 07:03)      PHYSICAL EXAM:  General: NAD, answering questions, pleasantly conversant  HEENT: NC/AT; PERRL, clear conjunctiva  Neck: supple  Respiratory: CTA b/l  Cardiovascular: +S1/S2; RRR  Abdomen: soft, NT/ND; +BS x4  Extremities: WWP, 2+ peripheral pulses b/l; no LE edema  Skin: normal color and turgor; no rash  Neurological:     MEDICATIONS:  MEDICATIONS  (STANDING):  dextrose 10% Bolus 125 milliLiter(s) IV Bolus once  dextrose 5%. 1000 milliLiter(s) (50 mL/Hr) IV Continuous <Continuous>  dextrose 5%. 1000 milliLiter(s) (100 mL/Hr) IV Continuous <Continuous>  dextrose 50% Injectable 12.5 Gram(s) IV Push once  dextrose 50% Injectable 25 Gram(s) IV Push once  glucagon  Injectable 1 milliGRAM(s) IntraMuscular once  heparin   Injectable 5000 Unit(s) SubCutaneous every 8 hours  influenza  Vaccine (HIGH DOSE) 0.7 milliLiter(s) IntraMuscular once  insulin lispro (ADMELOG) corrective regimen sliding scale   SubCutaneous Before meals and at bedtime    MEDICATIONS  (PRN):  acetaminophen     Tablet .. 650 milliGRAM(s) Oral every 6 hours PRN Temp greater or equal to 38C (100.4F), Mild Pain (1 - 3)  dextrose Oral Gel 15 Gram(s) Oral once PRN Blood Glucose LESS THAN 70 milliGRAM(s)/deciliter      ALLERGIES:  Allergies    No Known Allergies    Intolerances        LABS:                        10.2   5.83  )-----------( 101      ( 03 May 2024 16:58 )             29.2     05-04    131<L>  |  100  |  62<H>  ----------------------------<  142<H>  3.6   |  18<L>  |  2.75<H>    Ca    8.0<L>      04 May 2024 07:01  Phos  3.8     05-04  Mg     1.5     05-04    TPro  5.0<L>  /  Alb  2.7<L>  /  TBili  0.3  /  DBili  x   /  AST  15  /  ALT  15  /  AlkPhos  58  05-04      Urinalysis Basic - ( 04 May 2024 07:01 )    Color: x / Appearance: x / SG: x / pH: x  Gluc: 142 mg/dL / Ketone: x  / Bili: x / Urobili: x   Blood: x / Protein: x / Nitrite: x   Leuk Esterase: x / RBC: x / WBC x   Sq Epi: x / Non Sq Epi: x / Bacteria: x            RADIOLOGY, PATHOLOGY, & ADDITIONAL TESTS: Reviewed.   Hematology Oncology Consult Note      SUBJECTIVE: Patient seen and examined at bedside. Denies fever, headache, nausea, vomiting, chest pain, shortness of breath, abdominal pain, changes in bowel movements or urination.     OBJECTIVE:    VITAL SIGNS:  ICU Vital Signs Last 24 Hrs  T(C): 36.9 (04 May 2024 06:01), Max: 37.6 (03 May 2024 15:29)  T(F): 98.5 (04 May 2024 06:01), Max: 99.7 (03 May 2024 15:29)  HR: 93 (04 May 2024 06:01) (87 - 97)  BP: 130/72 (04 May 2024 06:01) (116/71 - 130/72)  BP(mean): 91 (04 May 2024 06:01) (91 - 91)  ABP: --  ABP(mean): --  RR: 18 (04 May 2024 06:01) (17 - 20)  SpO2: 97% (04 May 2024 06:01) (96% - 99%)    O2 Parameters below as of 04 May 2024 06:01  Patient On (Oxygen Delivery Method): room air              05-03 @ 07:01  -  05-04 @ 07:00  --------------------------------------------------------  IN: 240 mL / OUT: 750 mL / NET: -510 mL      CAPILLARY BLOOD GLUCOSE      POCT Blood Glucose.: 139 mg/dL (04 May 2024 07:03)      PHYSICAL EXAM:  General: NAD, answering questions, pleasantly conversant  HEENT: NC/AT; PERRL, clear conjunctiva  Neck: supple  Respiratory: CTA b/l  Cardiovascular: +S1/S2; RRR  Abdomen: soft, NT/ND; +BS x4  Extremities: WWP, 2+ peripheral pulses b/l; no LE edema  Skin: normal color and turgor; no rash  Neurological: AAOx3    MEDICATIONS:  MEDICATIONS  (STANDING):  dextrose 10% Bolus 125 milliLiter(s) IV Bolus once  dextrose 5%. 1000 milliLiter(s) (50 mL/Hr) IV Continuous <Continuous>  dextrose 5%. 1000 milliLiter(s) (100 mL/Hr) IV Continuous <Continuous>  dextrose 50% Injectable 12.5 Gram(s) IV Push once  dextrose 50% Injectable 25 Gram(s) IV Push once  glucagon  Injectable 1 milliGRAM(s) IntraMuscular once  heparin   Injectable 5000 Unit(s) SubCutaneous every 8 hours  influenza  Vaccine (HIGH DOSE) 0.7 milliLiter(s) IntraMuscular once  insulin lispro (ADMELOG) corrective regimen sliding scale   SubCutaneous Before meals and at bedtime    MEDICATIONS  (PRN):  acetaminophen     Tablet .. 650 milliGRAM(s) Oral every 6 hours PRN Temp greater or equal to 38C (100.4F), Mild Pain (1 - 3)  dextrose Oral Gel 15 Gram(s) Oral once PRN Blood Glucose LESS THAN 70 milliGRAM(s)/deciliter      ALLERGIES:  Allergies    No Known Allergies    Intolerances        LABS:                        10.2   5.83  )-----------( 101      ( 03 May 2024 16:58 )             29.2     05-04    131<L>  |  100  |  62<H>  ----------------------------<  142<H>  3.6   |  18<L>  |  2.75<H>    Ca    8.0<L>      04 May 2024 07:01  Phos  3.8     05-04  Mg     1.5     05-04    TPro  5.0<L>  /  Alb  2.7<L>  /  TBili  0.3  /  DBili  x   /  AST  15  /  ALT  15  /  AlkPhos  58  05-04      Urinalysis Basic - ( 04 May 2024 07:01 )    Color: x / Appearance: x / SG: x / pH: x  Gluc: 142 mg/dL / Ketone: x  / Bili: x / Urobili: x   Blood: x / Protein: x / Nitrite: x   Leuk Esterase: x / RBC: x / WBC x   Sq Epi: x / Non Sq Epi: x / Bacteria: x            RADIOLOGY, PATHOLOGY, & ADDITIONAL TESTS: Reviewed.

## 2024-05-04 NOTE — DISCHARGE NOTE PROVIDER - NSDCCPCAREPLAN_GEN_ALL_CORE_FT
PRINCIPAL DISCHARGE DIAGNOSIS  Diagnosis: HENRY (acute kidney injury)  Assessment and Plan of Treatment: You presented to the hospital due to worsening kidney function. During your hospitalization, the nephrology team (kidney doctors) monitored your kidney function closely and continued to improve. In addition, an ultrasound of your kidney was performed and they were normal. Continue to follow up with your hematology and oncology doctors as well as nephrology (kidney) doctor for continued evaluation of your kidney function.      SECONDARY DISCHARGE DIAGNOSES  Diagnosis: Hypertension  Assessment and Plan of Treatment: Due to your kidney function, your blood pressure medication lisinopril was held and not given to you during your hospitalization. Your blood pressure remained normal. Follow up with your primary care physician to monitor your kidney function and to evaluate restarting lisinopril.    Diagnosis: Lung mass  Assessment and Plan of Treatment: Continue to follow up with your hematologist and oncologist for further evaluation and management regarding your lung cancer.     PRINCIPAL DISCHARGE DIAGNOSIS  Diagnosis: HENRY (acute kidney injury)  Assessment and Plan of Treatment: You presented to the hospital due to worsening kidney function. During your hospitalization, the nephrology team (kidney doctors) monitored your kidney function closely and continued to improve. In addition, a bedside ultrasound of your kidney was performed by the nephrology team and did not show abnormality. Continue to follow up with your hematology and oncology doctors as well as nephrology (kidney) doctor for continued evaluation of your kidney function. Follow up with nephrologist Dr. Elaine to repeat your labs to monitor your kidney function.      SECONDARY DISCHARGE DIAGNOSES  Diagnosis: Hypertension  Assessment and Plan of Treatment: Due to your kidney function, your blood pressure medication lisinopril was held and not given to you during your hospitalization. Your blood pressure remained normal. Follow up with your primary care physician to monitor your kidney function and to evaluate restarting lisinopril once your kidney function stabilizes.    Diagnosis: Lung mass  Assessment and Plan of Treatment: Continue to follow up with your hematologist and oncologist for further evaluation and management regarding your lung cancer.

## 2024-05-06 ENCOUNTER — LABORATORY RESULT (OUTPATIENT)
Age: 69
End: 2024-05-06

## 2024-05-06 ENCOUNTER — APPOINTMENT (OUTPATIENT)
Dept: HEMATOLOGY ONCOLOGY | Facility: CLINIC | Age: 69
End: 2024-05-06
Payer: MEDICARE

## 2024-05-06 ENCOUNTER — APPOINTMENT (OUTPATIENT)
Dept: HEMATOLOGY ONCOLOGY | Facility: CLINIC | Age: 69
End: 2024-05-06

## 2024-05-06 ENCOUNTER — APPOINTMENT (OUTPATIENT)
Dept: INFUSION THERAPY | Facility: CLINIC | Age: 69
End: 2024-05-06

## 2024-05-06 VITALS
SYSTOLIC BLOOD PRESSURE: 117 MMHG | RESPIRATION RATE: 18 BRPM | HEIGHT: 67 IN | TEMPERATURE: 98.9 F | HEART RATE: 86 BPM | BODY MASS INDEX: 25.9 KG/M2 | DIASTOLIC BLOOD PRESSURE: 74 MMHG | WEIGHT: 165 LBS | OXYGEN SATURATION: 97 %

## 2024-05-06 VITALS — HEART RATE: 84 BPM | RESPIRATION RATE: 14 BRPM | DIASTOLIC BLOOD PRESSURE: 72 MMHG | SYSTOLIC BLOOD PRESSURE: 118 MMHG

## 2024-05-06 LAB
25(OH)D3 SERPL-MCNC: 15.6 NG/ML
ALBUMIN MFR SERPL ELPH: 51.4 %
ALBUMIN SERPL ELPH-MCNC: 3.6 G/DL
ALBUMIN SERPL-MCNC: 3.2 G/DL
ALBUMIN/GLOB SERPL: 1 RATIO
ALP BLD-CCNC: 70 U/L
ALPHA1 GLOB MFR SERPL ELPH: 6.6 %
ALPHA1 GLOB SERPL ELPH-MCNC: 0.4 G/DL
ALPHA2 GLOB MFR SERPL ELPH: 15.9 %
ALPHA2 GLOB SERPL ELPH-MCNC: 1 G/DL
ALT SERPL-CCNC: 22 U/L
ANA SER IF-ACNC: NEGATIVE
ANION GAP SERPL CALC-SCNC: 15 MMOL/L
APPEARANCE: CLEAR
AST SERPL-CCNC: 18 U/L
B-GLOBULIN MFR SERPL ELPH: 12.6 %
B-GLOBULIN SERPL ELPH-MCNC: 0.8 G/DL
BACTERIA UR CULT: NORMAL
BACTERIA: NEGATIVE /HPF
BASOPHILS # BLD AUTO: 0 K/UL
BASOPHILS NFR BLD AUTO: 0 %
BILIRUB SERPL-MCNC: 0.5 MG/DL
BILIRUBIN URINE: NEGATIVE
BLOOD URINE: NEGATIVE
BUN SERPL-MCNC: 64 MG/DL
C3 SERPL-MCNC: 135 MG/DL
C4 SERPL-MCNC: 38 MG/DL
CALCIUM SERPL-MCNC: 8.6 MG/DL
CAST: 0 /LPF
CHLORIDE SERPL-SCNC: 96 MMOL/L
CO2 SERPL-SCNC: 22 MMOL/L
COLOR: YELLOW
CREAT SERPL-MCNC: 2.87 MG/DL
CREAT SPEC-SCNC: 98 MG/DL
CREAT SPEC-SCNC: 98 MG/DL
CREAT/PROT UR: 0.1 RATIO
CRP SERPL-MCNC: 54 MG/L
CRYOGLOB SERPL-MCNC: NEGATIVE
CYSTATIN C SERPL-MCNC: 1.65 MG/L
DEPRECATED D DIMER PPP IA-ACNC: 1466 NG/ML DDU
DEPRECATED KAPPA LC FREE/LAMBDA SER: 1.65 RATIO
DSDNA AB SER-ACNC: <1 IU/ML
EGFR: 23 ML/MIN/1.73M2
EOSINOPHIL # BLD AUTO: 0.03 K/UL
EOSINOPHIL NFR BLD AUTO: 0.5 %
EPITHELIAL CELLS: 2 /HPF
ERYTHROCYTE [SEDIMENTATION RATE] IN BLOOD BY WESTERGREN METHOD: 45 MM/HR
ESTIMATED AVERAGE GLUCOSE: 194 MG/DL
GAMMA GLOB FLD ELPH-MCNC: 0.9 G/DL
GAMMA GLOB MFR SERPL ELPH: 13.5 %
GFR/BSA.PRED SERPLBLD CYS-BASED-ARV: 39 ML/MIN/1.73M2
GLUCOSE QUALITATIVE U: NEGATIVE MG/DL
GLUCOSE SERPL-MCNC: 145 MG/DL
HBA1C MFR BLD HPLC: 8.4 %
HBV SURFACE AB SER QL: NONREACTIVE
HBV SURFACE AG SER QL: NONREACTIVE
HCT VFR BLD CALC: 34 %
HCV AB SER QL: NONREACTIVE
HCV S/CO RATIO: 0.09 S/CO
HGB BLD-MCNC: 10.9 G/DL
IGA SER QL IEP: 194 MG/DL
IGG SER QL IEP: 931 MG/DL
IGM SER QL IEP: 98 MG/DL
IMM GRANULOCYTES NFR BLD AUTO: 0.3 %
INTERPRETATION SERPL IEP-IMP: NORMAL
KAPPA LC CSF-MCNC: 1.77 MG/DL
KAPPA LC SERPL-MCNC: 2.92 MG/DL
KETONES URINE: NEGATIVE MG/DL
LEUKOCYTE ESTERASE URINE: NEGATIVE
LYMPHOCYTES # BLD AUTO: 0.76 K/UL
LYMPHOCYTES NFR BLD AUTO: 12.6 %
M PROTEIN SPEC IFE-MCNC: NORMAL
MAN DIFF?: NORMAL
MCHC RBC-ENTMCNC: 27.1 PG
MCHC RBC-ENTMCNC: 32.1 GM/DL
MCV RBC AUTO: 84.6 FL
MICROALBUMIN 24H UR DL<=1MG/L-MCNC: 2.6 MG/DL
MICROALBUMIN/CREAT 24H UR-RTO: 27 MG/G
MICROSCOPIC-UA: NORMAL
MONOCYTES # BLD AUTO: 0.49 K/UL
MONOCYTES NFR BLD AUTO: 8.2 %
MYELOPEROXIDASE AB SER QL IA: <5 UNITS
MYELOPEROXIDASE CELLS FLD QL: NEGATIVE
NEUTROPHILS # BLD AUTO: 4.71 K/UL
NEUTROPHILS NFR BLD AUTO: 78.4 %
NITRITE URINE: NEGATIVE
OSMOLALITY UR: 481 MOSM/KG
PH URINE: 5.5
PHOSPHATE SERPL-MCNC: 3.6 MG/DL
PLATELET # BLD AUTO: NORMAL K/UL
POTASSIUM SERPL-SCNC: 4 MMOL/L
PROT SERPL-MCNC: 6.2 G/DL
PROT SERPL-MCNC: 6.3 G/DL
PROT SERPL-MCNC: 6.3 G/DL
PROT UR-MCNC: 10 MG/DL
PROTEIN URINE: NEGATIVE MG/DL
PROTEINASE3 AB SER IA-ACNC: <5 UNITS
PROTEINASE3 AB SER-ACNC: NEGATIVE
RBC # BLD: 4.02 M/UL
RBC # FLD: 14.3 %
RED BLOOD CELLS URINE: 1 /HPF
SODIUM ?TM SUB UR QN: 36 MMOL/L
SODIUM SERPL-SCNC: 133 MMOL/L
SPECIFIC GRAVITY URINE: 1.01
TSH SERPL-ACNC: 0.48 UIU/ML
UROBILINOGEN URINE: 0.2 MG/DL
WBC # FLD AUTO: 6.01 K/UL
WHITE BLOOD CELLS URINE: 1 /HPF

## 2024-05-06 PROCEDURE — G2211 COMPLEX E/M VISIT ADD ON: CPT

## 2024-05-06 PROCEDURE — 99215 OFFICE O/P EST HI 40 MIN: CPT

## 2024-05-06 NOTE — ASSESSMENT
[FreeTextEntry1] : 69YM w/ PMHx GERD, HTN, HLD, DMII with L lingula mass 4.3x 7.4 x 5.4 cm, started on cisplatin based chemotherapy and developed HENRY  Imp: Non oliguric HENRY. Suspected AIN.   Plan: HENRY - check labs broadly as below, will call with results ASAP and discuss with heme/onc  HTN - at goal, monitor for now  Hyponatremia - 2/2 HENRY, monitor for now  Holding further chemotherapy fo rnow

## 2024-05-06 NOTE — HISTORY OF PRESENT ILLNESS
[FreeTextEntry1] : 69YM w/ PMHx GERD, HTN, HLD, DMII with L lingula mass 4.3x 7.4 x 5.4 cm, started on cisplatin based chemotherapy and developed HENRY. Had nl renal function previously noted sCr of 2.6 s/p platinum based chemotherapy. Chemotherapy currently held iso HENRY. Pte with nocturia and straining at the end of urination, denies hematuria.

## 2024-05-06 NOTE — PHYSICAL EXAM
[General Appearance - Alert] : alert [General Appearance - In No Acute Distress] : in no acute distress [Auscultation Breath Sounds / Voice Sounds] : lungs were clear to auscultation bilaterally [Heart Rate And Rhythm] : heart rate was normal and rhythm regular [Heart Sounds] : normal S1 and S2 [Heart Sounds Gallop] : no gallops [Murmurs] : no murmurs [Heart Sounds Pericardial Friction Rub] : no pericardial rub [Full Pulse] : the pedal pulses are present [Edema] : there was no peripheral edema [Bowel Sounds] : normal bowel sounds [Abdomen Soft] : soft [Abdomen Tenderness] : non-tender [Abdomen Mass (___ Cm)] : no abdominal mass palpated [Abnormal Walk] : normal gait [Nail Clubbing] : no clubbing  or cyanosis of the fingernails [Musculoskeletal - Swelling] : no joint swelling seen [Motor Tone] : muscle strength and tone were normal [Sclera] : the sclera and conjunctiva were normal [PERRL With Normal Accommodation] : pupils were equal in size, round, and reactive to light [Extraocular Movements] : extraocular movements were intact [Outer Ear] : the ears and nose were normal in appearance [Oropharynx] : the oropharynx was normal [Neck Appearance] : the appearance of the neck was normal [Neck Cervical Mass (___cm)] : no neck mass was observed [Jugular Venous Distention Increased] : there was no jugular-venous distention [Respiration, Rhythm And Depth] : normal respiratory rhythm and effort [Exaggerated Use Of Accessory Muscles For Inspiration] : no accessory muscle use [No CVA Tenderness] : no ~M costovertebral angle tenderness [No Spinal Tenderness] : no spinal tenderness [Skin Color & Pigmentation] : normal skin color and pigmentation [Skin Turgor] : normal skin turgor [] : no rash [Cranial Nerves] : cranial nerves 2-12 were intact [No Focal Deficits] : no focal deficits [Affect] : the affect was normal [Mood] : the mood was normal

## 2024-05-07 ENCOUNTER — APPOINTMENT (OUTPATIENT)
Dept: INFUSION THERAPY | Facility: CLINIC | Age: 69
End: 2024-05-07

## 2024-05-07 NOTE — ASSESSMENT
[FreeTextEntry1] : 69YM w/ PMHx GERD, HTN, HLD, DMII presenting for follow-up for squamous cell lung cancer, stage IIIa, T4 N0 M0, PD-L1 negative currently on neoadjuvant chemoimmunotherapy. Chemotherapy course complicated by acute kidney injury, likely related to cisplatin.  Patient is here for follow-up.  Squamous Cell Lung Ca, AJCC IIIa, T4N0M0  - PDL1 negative We proposed doublet platinum based chemotherapy + immunotherapy based on the Checkmate 816 trial - The median event-free survival was 31.6 months (95% confidence interval [CI], 30.2 to not reached) with nivolumab plus chemotherapy and 20.8 months (95% CI, 14.0 to 26.7) with chemotherapy alone (hazard ratio for disease progression, disease recurrence, or death, 0.63; 97.38% CI, 0.43 to 0.91; P=0.005). The percentage of patients with a pathological complete response was 24.0% (95% CI, 18.0 to 31.0) and 2.2% (95% CI, 0.6 to 5.6), respectively (odds ratio, 13.94; 99% CI, 3.49 to 55.75; P<0.001). Results for event-free survival and pathological complete response across most subgroups favored nivolumab plus chemotherapy over chemotherapy alone.  -- we explained risks and benefits of cisplatin (or carboplatin), gemcitabine and nivolumab to the patient, including, but not limited to, low blood cell count, infections, nausea, neuropathy, kidney/liver damage as well as pneumonitis risks. All of his questions were answered in detail and he consented for treatment.  -- Unfortunately given he developed HENRY after 1 dose of cisplatin, I doubt he would be able to tolerate further cisplatin --Given his creatinine is still elevated, though downtrending we will forego chemotherapy at this time, delay for an extra week to obtain nephrology clearance to restart chemotherapy --Discussed the patient the need to change chemotherapy from cisplatin-based to carboplatin based --Teaching provided today patient was provided with educational materials, informed consent was signed -- Plan to follow-up in 2 weeks, with cycle 2 -changed to carboplatin/gemcitabine/nivolumab  HENRY Likely due to cisplatin as improving. -- Patient to follow with Dr. 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

## 2024-05-07 NOTE — HISTORY OF PRESENT ILLNESS
[Disease: _____________________] : Disease: [unfilled] [T: ___] : T[unfilled] [N: ___] : N[unfilled] [M: ___] : M[unfilled] [AJCC Stage: ____] : AJCC Stage: [unfilled] [de-identified] : Squamous Cell Ca  - PDL1 negative [de-identified] : Don Castrejon is a 69-year-old male who presents to the clinic for f/u of LLL mass, c/w stage IIIA (T4N0M0) NSCLC - SCC histology.  Onc hx:  Social Hx: 50 pack yr smoking hx, quit when he found out about the mass Originally from [place], lives in [place] by himself. Retired .  Son lives in [place]   3/15/24: MRI Brain:  Punctate focus of enhancement the left lateral cerebellar hemisphere (series 701 image 55 and series 702 image 23). There is no associated signal abnormality in the brain parenchyma. Given the lack of associated signal abnormality or additional areas of pathologic enhancement, these findings may represent vascular enhancement versus artifact. However, attention on follow-up imaging is recommended to exclude intracranial metastasis.  Focal area of intrinsic T1 hyperintensity more inferiorly in the left cerebellar hemisphere without superimposed pathologic enhancement. This may represent area of mineralization. 5 mm hypoenhancing lesion in the left side of the sella most compatible with pituitary adenoma. Correlation with endocrine function and consideration for further evaluation dedicated MRI sella is recommended. PET:  1. Compared to chest CT from 2/15/2024, the 76 mm mass in the lingula is hypermetabolic and suspicious for lung cancer. 2. The borderline sized thoracic lymph nodes on the CT scan are not FDG avid. 3. Mildly nodular right adrenal gland is not FDG avid. No evidence of metastatic lung cancer. 4. Mild increased activity in enlarged and lobular prostate. Recommend correlation with PSA to rule out prostate cancer.  3/15/24: MR Head:  Punctate focus of enhancement the left lateral cerebellar hemisphere (series 701 image 55 and series 702 image 23). There is no associated signal abnormality in the brain parenchyma. Given the lack of associated signal abnormality or additional areas of pathologic enhancement, these findings may represent vascular enhancement versus artifact. However, attention on follow-up imaging is recommended to exclude intracranial metastasis. Focal area of intrinsic T1 hyperintensity more inferiorly in the left cerebellar hemisphere without superimposed pathologic enhancement. This may represent area of mineralization. 5 mm hypoenhancing lesion in the left side of the sella most compatible with pituitary adenoma. Correlation with endocrine function and consideration for further evaluation dedicated MRI sella is recommended.  3/15/24: PET: 1. Compared to chest CT from 2/15/2024, the 76 mm mass in the lingula is hypermetabolic and suspicious for lung cancer. 2. The borderline sized thoracic lymph nodes on the CT scan are not FDG avid. 3. Mildly nodular right adrenal gland is not FDG avid. No evidence of metastatic lung cancer. 4. Mild increased activity in enlarged and lobular prostate. Recommend correlation with PSA to rule out prostate cancer.  4/3/24: Chest Xray:  Small left apex pneumothorax, similar to prior exam earlier same day. Left lower lung mass again noted. No acute infiltrates. No significant pleural effusion.  04/03/2024 EBUS  Lung, left, core biopsy: -Squamous cell carcinoma. LYMPH NODE, 11L NEGATIVE FOR MALIGNANT CELLS.  4/4/2024: TTB - rec for MAGDALENA 5/3/2024: Sent to ED by  for worsening HENRY [de-identified] : CTSx:  [de-identified] : Endorses feeling well overall, except for anasarca that is currently stable since Saturday.  He was discharged from the hospital on Saturday because his creatinine was improving at the time.  He has been drinking at least 2 L of water per day.  He is urinating frequently.  He offers no complaints. [100: Normal, no complaints, no evidence of disease.] : 100: Normal, no complaints, no evidence of disease. [ECOG Performance Status: 0 - Fully active, able to carry on all pre-disease performance without restriction] : Performance Status: 0 - Fully active, able to carry on all pre-disease performance without restriction

## 2024-05-08 LAB
ALBUMIN SERPL ELPH-MCNC: 3 G/DL
ALP BLD-CCNC: 68 U/L
ALT SERPL-CCNC: 27 U/L
ANION GAP SERPL CALC-SCNC: 6 MMOL/L
AST SERPL-CCNC: 20 U/L
BILIRUB SERPL-MCNC: 0.6 MG/DL
BUN SERPL-MCNC: 47 MG/DL
CALCIUM SERPL-MCNC: 8.8 MG/DL
CHLORIDE SERPL-SCNC: 107 MMOL/L
CO2 SERPL-SCNC: 23 MMOL/L
CREAT SERPL-MCNC: 2.4 MG/DL
EGFR: 28 ML/MIN/1.73M2
GLUCOSE SERPL-MCNC: 117 MG/DL
POTASSIUM SERPL-SCNC: 4.9 MMOL/L
PROT SERPL-MCNC: 6.4 G/DL
SODIUM SERPL-SCNC: 136 MMOL/L

## 2024-05-10 DIAGNOSIS — D35.2 BENIGN NEOPLASM OF PITUITARY GLAND: ICD-10-CM

## 2024-05-10 DIAGNOSIS — N17.9 ACUTE KIDNEY FAILURE, UNSPECIFIED: ICD-10-CM

## 2024-05-10 DIAGNOSIS — E11.9 TYPE 2 DIABETES MELLITUS WITHOUT COMPLICATIONS: ICD-10-CM

## 2024-05-10 DIAGNOSIS — N17.0 ACUTE KIDNEY FAILURE WITH TUBULAR NECROSIS: ICD-10-CM

## 2024-05-10 DIAGNOSIS — K21.9 GASTRO-ESOPHAGEAL REFLUX DISEASE WITHOUT ESOPHAGITIS: ICD-10-CM

## 2024-05-10 DIAGNOSIS — Z92.21 PERSONAL HISTORY OF ANTINEOPLASTIC CHEMOTHERAPY: ICD-10-CM

## 2024-05-10 DIAGNOSIS — C34.92 MALIGNANT NEOPLASM OF UNSPECIFIED PART OF LEFT BRONCHUS OR LUNG: ICD-10-CM

## 2024-05-10 DIAGNOSIS — Z87.891 PERSONAL HISTORY OF NICOTINE DEPENDENCE: ICD-10-CM

## 2024-05-10 DIAGNOSIS — I10 ESSENTIAL (PRIMARY) HYPERTENSION: ICD-10-CM

## 2024-05-10 DIAGNOSIS — E78.5 HYPERLIPIDEMIA, UNSPECIFIED: ICD-10-CM

## 2024-05-10 DIAGNOSIS — T45.1X5A ADVERSE EFFECT OF ANTINEOPLASTIC AND IMMUNOSUPPRESSIVE DRUGS, INITIAL ENCOUNTER: ICD-10-CM

## 2024-05-14 ENCOUNTER — APPOINTMENT (OUTPATIENT)
Dept: INFUSION THERAPY | Facility: CLINIC | Age: 69
End: 2024-05-14

## 2024-05-14 ENCOUNTER — APPOINTMENT (OUTPATIENT)
Dept: HEMATOLOGY ONCOLOGY | Facility: CLINIC | Age: 69
End: 2024-05-14

## 2024-05-15 LAB
ALBUMIN SERPL ELPH-MCNC: 3.7 G/DL
ALP BLD-CCNC: 82 U/L
ALT SERPL-CCNC: 17 U/L
ANION GAP SERPL CALC-SCNC: 13 MMOL/L
APPEARANCE: CLEAR
AST SERPL-CCNC: 12 U/L
BACTERIA: NEGATIVE /HPF
BASOPHILS # BLD AUTO: 0.09 K/UL
BASOPHILS NFR BLD AUTO: 1.5 %
BILIRUB SERPL-MCNC: 0.4 MG/DL
BILIRUBIN URINE: NEGATIVE
BLOOD URINE: NEGATIVE
BUN SERPL-MCNC: 27 MG/DL
CALCIUM SERPL-MCNC: 8.9 MG/DL
CAST: 4 /LPF
CHLORIDE SERPL-SCNC: 103 MMOL/L
CO2 SERPL-SCNC: 24 MMOL/L
COLOR: YELLOW
CREAT SERPL-MCNC: 1.81 MG/DL
CREAT SPEC-SCNC: 185 MG/DL
CREAT SPEC-SCNC: 185 MG/DL
CREAT/PROT UR: 0.1 RATIO
CYSTATIN C SERPL-MCNC: 1.32 MG/L
EGFR: 40 ML/MIN/1.73M2
EOSINOPHIL # BLD AUTO: 0.08 K/UL
EOSINOPHIL NFR BLD AUTO: 1.4 %
EPITHELIAL CELLS: 2 /HPF
GFR/BSA.PRED SERPLBLD CYS-BASED-ARV: 52 ML/MIN/1.73M2
GLUCOSE QUALITATIVE U: NEGATIVE MG/DL
GLUCOSE SERPL-MCNC: 140 MG/DL
HCT VFR BLD CALC: 32.7 %
HGB BLD-MCNC: 10.3 G/DL
IMM GRANULOCYTES NFR BLD AUTO: 0.5 %
KETONES URINE: NEGATIVE MG/DL
LEUKOCYTE ESTERASE URINE: ABNORMAL
LYMPHOCYTES # BLD AUTO: 1.5 K/UL
LYMPHOCYTES NFR BLD AUTO: 25.7 %
MAN DIFF?: NORMAL
MCHC RBC-ENTMCNC: 27.3 PG
MCHC RBC-ENTMCNC: 31.5 GM/DL
MCV RBC AUTO: 86.7 FL
MICROALBUMIN 24H UR DL<=1MG/L-MCNC: 2.9 MG/DL
MICROALBUMIN/CREAT 24H UR-RTO: 16 MG/G
MICROSCOPIC-UA: NORMAL
MONOCYTES # BLD AUTO: 0.7 K/UL
MONOCYTES NFR BLD AUTO: 12 %
NEUTROPHILS # BLD AUTO: 3.43 K/UL
NEUTROPHILS NFR BLD AUTO: 58.9 %
NITRITE URINE: NEGATIVE
PH URINE: 5.5
PLATELET # BLD AUTO: 349 K/UL
POTASSIUM SERPL-SCNC: 4.8 MMOL/L
PROT SERPL-MCNC: 6.7 G/DL
PROT UR-MCNC: 22 MG/DL
PROTEIN URINE: NORMAL MG/DL
RBC # BLD: 3.77 M/UL
RBC # FLD: 15.4 %
RED BLOOD CELLS URINE: 2 /HPF
SODIUM ?TM SUB UR QN: 42 MMOL/L
SODIUM SERPL-SCNC: 139 MMOL/L
SPECIFIC GRAVITY URINE: 1.02
UROBILINOGEN URINE: 1 MG/DL
WBC # FLD AUTO: 5.83 K/UL
WHITE BLOOD CELLS URINE: 6 /HPF

## 2024-05-16 ENCOUNTER — APPOINTMENT (OUTPATIENT)
Dept: NEPHROLOGY | Facility: CLINIC | Age: 69
End: 2024-05-16

## 2024-05-16 ENCOUNTER — APPOINTMENT (OUTPATIENT)
Dept: NEPHROLOGY | Facility: CLINIC | Age: 69
End: 2024-05-16
Payer: MEDICARE

## 2024-05-16 PROCEDURE — G2211 COMPLEX E/M VISIT ADD ON: CPT

## 2024-05-16 PROCEDURE — 99214 OFFICE O/P EST MOD 30 MIN: CPT

## 2024-05-19 VITALS — DIASTOLIC BLOOD PRESSURE: 60 MMHG | SYSTOLIC BLOOD PRESSURE: 132 MMHG

## 2024-05-19 NOTE — ASSESSMENT
[FreeTextEntry1] : 69YM w/ PMHx GERD, HTN, HLD, DMII with L lingula mass 4.3x 7.4 x 5.4 cm, started on cisplatin based chemotherapy and developed HENRY  HENRY - improving with holding of cisplatin, recheck labs on monday  HTN - at goal, monitor for now  Hyponatremia - 2/2 HENRY, monitor for now  Recheck monday, if downtrending will restart chemotherapy with close followup

## 2024-05-19 NOTE — HISTORY OF PRESENT ILLNESS
[FreeTextEntry1] : 69YM w/ PMHx GERD, HTN, HLD, DMII with L lingula mass 4.3x 7.4 x 5.4 cm, started on cisplatin based chemotherapy and developed HENRY Hacving post ATN diuresis. Discussed fluid intake, monitoring closely, ensuring light urine color and high fluid intake If sCr cvontinues to improve on monday, can restart chemotherapy dosed for GFR Discussed with heme/onc

## 2024-05-19 NOTE — PHYSICAL EXAM
[General Appearance - Alert] : alert [General Appearance - In No Acute Distress] : in no acute distress [Sclera] : the sclera and conjunctiva were normal [PERRL With Normal Accommodation] : pupils were equal in size, round, and reactive to light [Extraocular Movements] : extraocular movements were intact [Outer Ear] : the ears and nose were normal in appearance [Oropharynx] : the oropharynx was normal [Neck Appearance] : the appearance of the neck was normal [Neck Cervical Mass (___cm)] : no neck mass was observed [Jugular Venous Distention Increased] : there was no jugular-venous distention [Respiration, Rhythm And Depth] : normal respiratory rhythm and effort [Exaggerated Use Of Accessory Muscles For Inspiration] : no accessory muscle use [Auscultation Breath Sounds / Voice Sounds] : lungs were clear to auscultation bilaterally [Heart Rate And Rhythm] : heart rate was normal and rhythm regular [Heart Sounds] : normal S1 and S2 [Heart Sounds Gallop] : no gallops [Murmurs] : no murmurs [Heart Sounds Pericardial Friction Rub] : no pericardial rub [Full Pulse] : the pedal pulses are present [Edema] : there was no peripheral edema [Bowel Sounds] : normal bowel sounds [Abdomen Soft] : soft [Abdomen Tenderness] : non-tender [Abdomen Mass (___ Cm)] : no abdominal mass palpated [No CVA Tenderness] : no ~M costovertebral angle tenderness [No Spinal Tenderness] : no spinal tenderness [Abnormal Walk] : normal gait [Nail Clubbing] : no clubbing  or cyanosis of the fingernails [Musculoskeletal - Swelling] : no joint swelling seen [Motor Tone] : muscle strength and tone were normal [Skin Color & Pigmentation] : normal skin color and pigmentation [Skin Turgor] : normal skin turgor [] : no rash [Cranial Nerves] : cranial nerves 2-12 were intact [No Focal Deficits] : no focal deficits [Affect] : the affect was normal [Mood] : the mood was normal

## 2024-05-20 ENCOUNTER — APPOINTMENT (OUTPATIENT)
Dept: HEMATOLOGY ONCOLOGY | Facility: CLINIC | Age: 69
End: 2024-05-20
Payer: MEDICARE

## 2024-05-20 ENCOUNTER — LABORATORY RESULT (OUTPATIENT)
Age: 69
End: 2024-05-20

## 2024-05-20 VITALS
TEMPERATURE: 97.2 F | DIASTOLIC BLOOD PRESSURE: 77 MMHG | OXYGEN SATURATION: 97 % | RESPIRATION RATE: 18 BRPM | BODY MASS INDEX: 25.11 KG/M2 | HEIGHT: 67 IN | WEIGHT: 160 LBS | HEART RATE: 99 BPM | SYSTOLIC BLOOD PRESSURE: 133 MMHG

## 2024-05-20 DIAGNOSIS — N17.9 ACUTE KIDNEY FAILURE, UNSPECIFIED: ICD-10-CM

## 2024-05-20 LAB
ALBUMIN SERPL ELPH-MCNC: 3.1 G/DL
ALP BLD-CCNC: 71 U/L
ALT SERPL-CCNC: 17 U/L
ANION GAP SERPL CALC-SCNC: 13 MMOL/L
AST SERPL-CCNC: 16 U/L
BILIRUB SERPL-MCNC: 0.7 MG/DL
BUN SERPL-MCNC: 27 MG/DL
CALCIUM SERPL-MCNC: 9.4 MG/DL
CHLORIDE SERPL-SCNC: 102 MMOL/L
CO2 SERPL-SCNC: 27 MMOL/L
CREAT SERPL-MCNC: 1.3 MG/DL
EGFR: 59 ML/MIN/1.73M2
GLUCOSE SERPL-MCNC: 146 MG/DL
POTASSIUM SERPL-SCNC: 4.2 MMOL/L
PROT SERPL-MCNC: 7.1 G/DL
SODIUM SERPL-SCNC: 142 MMOL/L

## 2024-05-20 PROCEDURE — 99215 OFFICE O/P EST HI 40 MIN: CPT

## 2024-05-20 PROCEDURE — G2211 COMPLEX E/M VISIT ADD ON: CPT

## 2024-05-20 NOTE — END OF VISIT
[] : Fellow [FreeTextEntry3] : Patient was seen with oncology fellow, Dr. Sanches. Briefly this is a 69-year-old male with stage IIIa non-small cell lung cancer, squamous cell histology receiving neoadjuvant chemoimmunotherapy prior to planned resection. After cycle 1 he developed HENRY due to cisplatin, now resolved.  Obtained clearance from nephrology, Dr. Elaine for further chemotherapy treatments given his creatinine continues to trend. Plan to treat this Wednesday with carboplatin/gemcitabine/nivolumab.

## 2024-05-20 NOTE — HISTORY OF PRESENT ILLNESS
[Disease: _____________________] : Disease: [unfilled] [T: ___] : T[unfilled] [N: ___] : N[unfilled] [M: ___] : M[unfilled] [AJCC Stage: ____] : AJCC Stage: [unfilled] [100: Normal, no complaints, no evidence of disease.] : 100: Normal, no complaints, no evidence of disease. [ECOG Performance Status: 0 - Fully active, able to carry on all pre-disease performance without restriction] : Performance Status: 0 - Fully active, able to carry on all pre-disease performance without restriction [de-identified] : Don Castrejon is a 69-year-old male who presents to the clinic for f/u of LLL mass, c/w stage IIIA (T4N0M0) NSCLC - SCC histology.  Onc hx:  Social Hx: 50 pack yr smoking hx, quit when he found out about the mass Originally from [place], lives in [place] by himself. Retired .  Son lives in [place]   3/15/24: MRI Brain:  Punctate focus of enhancement the left lateral cerebellar hemisphere (series 701 image 55 and series 702 image 23). There is no associated signal abnormality in the brain parenchyma. Given the lack of associated signal abnormality or additional areas of pathologic enhancement, these findings may represent vascular enhancement versus artifact. However, attention on follow-up imaging is recommended to exclude intracranial metastasis.  Focal area of intrinsic T1 hyperintensity more inferiorly in the left cerebellar hemisphere without superimposed pathologic enhancement. This may represent area of mineralization. 5 mm hypoenhancing lesion in the left side of the sella most compatible with pituitary adenoma. Correlation with endocrine function and consideration for further evaluation dedicated MRI sella is recommended. PET:  1. Compared to chest CT from 2/15/2024, the 76 mm mass in the lingula is hypermetabolic and suspicious for lung cancer. 2. The borderline sized thoracic lymph nodes on the CT scan are not FDG avid. 3. Mildly nodular right adrenal gland is not FDG avid. No evidence of metastatic lung cancer. 4. Mild increased activity in enlarged and lobular prostate. Recommend correlation with PSA to rule out prostate cancer.  3/15/24: MR Head:  Punctate focus of enhancement the left lateral cerebellar hemisphere (series 701 image 55 and series 702 image 23). There is no associated signal abnormality in the brain parenchyma. Given the lack of associated signal abnormality or additional areas of pathologic enhancement, these findings may represent vascular enhancement versus artifact. However, attention on follow-up imaging is recommended to exclude intracranial metastasis. Focal area of intrinsic T1 hyperintensity more inferiorly in the left cerebellar hemisphere without superimposed pathologic enhancement. This may represent area of mineralization. 5 mm hypoenhancing lesion in the left side of the sella most compatible with pituitary adenoma. Correlation with endocrine function and consideration for further evaluation dedicated MRI sella is recommended.  3/15/24: PET: 1. Compared to chest CT from 2/15/2024, the 76 mm mass in the lingula is hypermetabolic and suspicious for lung cancer. 2. The borderline sized thoracic lymph nodes on the CT scan are not FDG avid. 3. Mildly nodular right adrenal gland is not FDG avid. No evidence of metastatic lung cancer. 4. Mild increased activity in enlarged and lobular prostate. Recommend correlation with PSA to rule out prostate cancer.  4/3/24: Chest Xray:  Small left apex pneumothorax, similar to prior exam earlier same day. Left lower lung mass again noted. No acute infiltrates. No significant pleural effusion.  04/03/2024 EBUS  Lung, left, core biopsy: -Squamous cell carcinoma. LYMPH NODE, 11L NEGATIVE FOR MALIGNANT CELLS.  4/4/2024: TTB - rec for MAGDALENA 5/3/2024: Sent to ED by  for worsening HENRY [de-identified] : Squamous Cell Ca  - PDL1 negative [de-identified] : CTSx:  [FreeTextEntry1] : 4/23/2024: C1D1 cisplatin/gemcitabine/nivolumab D8 gemcitabine skipped due to HENRY 2/2 cisplatin [de-identified] : The patient reports feeling well.  He denies any shortness of breath swelling has grossly resolved.  He is drinking a lot of fluid every day. He was seen by Dr. Elaine, nephrology, recently who recommended restarting alternative chemotherapy, will change to carboplatin, given improvement in kidney function.

## 2024-05-20 NOTE — ASSESSMENT
[FreeTextEntry1] : 69YM w/ PMHx GERD, HTN, HLD, DMII presenting for follow-up for squamous cell lung cancer, stage IIIa, T4 N0 M0, PD-L1 negative currently on neoadjuvant chemoimmunotherapy. Chemotherapy course complicated by acute kidney injury, likely related to cisplatin.  Patient is here for follow-up.  Squamous Cell Lung Ca, AJCC IIIa, T4N0M0  - PDL1 negative We proposed doublet platinum based chemotherapy + immunotherapy based on the Checkmate 816 trial - The median event-free survival was 31.6 months (95% confidence interval [CI], 30.2 to not reached) with nivolumab plus chemotherapy and 20.8 months (95% CI, 14.0 to 26.7) with chemotherapy alone (hazard ratio for disease progression, disease recurrence, or death, 0.63; 97.38% CI, 0.43 to 0.91; P=0.005). The percentage of patients with a pathological complete response was 24.0% (95% CI, 18.0 to 31.0) and 2.2% (95% CI, 0.6 to 5.6), respectively (odds ratio, 13.94; 99% CI, 3.49 to 55.75; P<0.001). Results for event-free survival and pathological complete response across most subgroups favored nivolumab plus chemotherapy over chemotherapy alone.  -- we explained risks and benefits of cisplatin (or carboplatin), gemcitabine and nivolumab to the patient, including, but not limited to, low blood cell count, infections, nausea, neuropathy, kidney/liver damage as well as pneumonitis risks. All of his questions were answered in detail and he consented for treatment.  -- He developed an HENRY following a 1 cycle of cisplatin and subsequently switched to carboplatin - ok for C2 today, planned for 5/20/2024  HENRY Likely due to cisplatin as improving. -- follows with emmanuel Smith for labs today    Pitiutary adenoma --will refer to Dr.D'Amico after therapy for lung ca complete

## 2024-05-21 LAB — TSH SERPL-ACNC: 1.38 UIU/ML

## 2024-05-22 ENCOUNTER — OUTPATIENT (OUTPATIENT)
Dept: OUTPATIENT SERVICES | Facility: HOSPITAL | Age: 69
LOS: 1 days | End: 2024-05-22
Payer: MEDICARE

## 2024-05-22 ENCOUNTER — APPOINTMENT (OUTPATIENT)
Dept: INFUSION THERAPY | Facility: CLINIC | Age: 69
End: 2024-05-22

## 2024-05-22 VITALS
OXYGEN SATURATION: 99 % | TEMPERATURE: 97 F | HEART RATE: 91 BPM | WEIGHT: 160.94 LBS | SYSTOLIC BLOOD PRESSURE: 132 MMHG | DIASTOLIC BLOOD PRESSURE: 73 MMHG | RESPIRATION RATE: 17 BRPM | HEIGHT: 66 IN

## 2024-05-22 VITALS
DIASTOLIC BLOOD PRESSURE: 74 MMHG | SYSTOLIC BLOOD PRESSURE: 130 MMHG | TEMPERATURE: 98 F | OXYGEN SATURATION: 99 % | HEART RATE: 87 BPM | RESPIRATION RATE: 17 BRPM

## 2024-05-22 DIAGNOSIS — Z90.49 ACQUIRED ABSENCE OF OTHER SPECIFIED PARTS OF DIGESTIVE TRACT: Chronic | ICD-10-CM

## 2024-05-22 DIAGNOSIS — C34.92 MALIGNANT NEOPLASM OF UNSPECIFIED PART OF LEFT BRONCHUS OR LUNG: ICD-10-CM

## 2024-05-22 LAB
ALBUMIN SERPL ELPH-MCNC: 3.2 G/DL — LOW (ref 3.3–5)
ALP SERPL-CCNC: 70 U/L — SIGNIFICANT CHANGE UP (ref 40–120)
ALT FLD-CCNC: 16 U/L — SIGNIFICANT CHANGE UP (ref 10–45)
ANION GAP SERPL CALC-SCNC: 11 MMOL/L — SIGNIFICANT CHANGE UP (ref 5–17)
AST SERPL-CCNC: 15 U/L — SIGNIFICANT CHANGE UP (ref 10–40)
BILIRUB SERPL-MCNC: 0.7 MG/DL — SIGNIFICANT CHANGE UP (ref 0.2–1.2)
BUN SERPL-MCNC: 32 MG/DL — HIGH (ref 7–23)
CALCIUM SERPL-MCNC: 9.6 MG/DL — SIGNIFICANT CHANGE UP (ref 8.4–10.5)
CHLORIDE SERPL-SCNC: 99 MMOL/L — SIGNIFICANT CHANGE UP (ref 96–108)
CO2 SERPL-SCNC: 24 MMOL/L — SIGNIFICANT CHANGE UP (ref 22–31)
CREAT SERPL-MCNC: 1.3 MG/DL — SIGNIFICANT CHANGE UP (ref 0.5–1.3)
CULTURE RESULTS: SIGNIFICANT CHANGE UP
EGFR: 59 ML/MIN/1.73M2 — LOW
GLUCOSE SERPL-MCNC: 294 MG/DL — HIGH (ref 70–99)
HCT VFR BLD CALC: 27.7 % — LOW (ref 39–50)
HGB BLD-MCNC: 9.8 G/DL — LOW (ref 13–17)
LYMPHOCYTES # BLD AUTO: 1.1 K/UL — SIGNIFICANT CHANGE UP (ref 1–3.3)
LYMPHOCYTES # BLD AUTO: 9.8 % — LOW (ref 13–44)
MAGNESIUM SERPL-MCNC: 1.4 MG/DL — LOW (ref 1.6–2.6)
MCHC RBC-ENTMCNC: 28.2 PG — SIGNIFICANT CHANGE UP (ref 27–34)
MCHC RBC-ENTMCNC: 35.4 GM/DL — SIGNIFICANT CHANGE UP (ref 32–36)
MCV RBC AUTO: 79.8 FL — LOW (ref 80–100)
NEUTROPHILS # BLD AUTO: 10 K/UL — HIGH (ref 1.8–7.4)
NEUTROPHILS NFR BLD AUTO: 85.9 % — HIGH (ref 43–77)
PLATELET # BLD AUTO: 253 K/UL — SIGNIFICANT CHANGE UP (ref 150–400)
POTASSIUM SERPL-MCNC: 4.8 MMOL/L — SIGNIFICANT CHANGE UP (ref 3.5–5.3)
POTASSIUM SERPL-SCNC: 4.8 MMOL/L — SIGNIFICANT CHANGE UP (ref 3.5–5.3)
PROT SERPL-MCNC: 7.2 G/DL — SIGNIFICANT CHANGE UP (ref 6–8.3)
RBC # BLD: 3.47 M/UL — LOW (ref 4.2–5.8)
RBC # FLD: 14 % — SIGNIFICANT CHANGE UP (ref 10.3–14.5)
SODIUM SERPL-SCNC: 134 MMOL/L — LOW (ref 135–145)
SPECIMEN SOURCE: SIGNIFICANT CHANGE UP
T4 FREE SERPL-MCNC: 1.16 NG/DL — SIGNIFICANT CHANGE UP (ref 0.93–1.7)
TSH SERPL-MCNC: 0.4 UIU/ML — SIGNIFICANT CHANGE UP (ref 0.27–4.2)
WBC # BLD: 11.6 K/UL — HIGH (ref 3.8–10.5)
WBC # FLD AUTO: 11.6 K/UL — HIGH (ref 3.8–10.5)

## 2024-05-22 PROCEDURE — 83735 ASSAY OF MAGNESIUM: CPT

## 2024-05-22 PROCEDURE — 84439 ASSAY OF FREE THYROXINE: CPT

## 2024-05-22 PROCEDURE — 96375 TX/PRO/DX INJ NEW DRUG ADDON: CPT

## 2024-05-22 PROCEDURE — 96417 CHEMO IV INFUS EACH ADDL SEQ: CPT

## 2024-05-22 PROCEDURE — 96367 TX/PROPH/DG ADDL SEQ IV INF: CPT

## 2024-05-22 PROCEDURE — 80053 COMPREHEN METABOLIC PANEL: CPT

## 2024-05-22 PROCEDURE — 96413 CHEMO IV INFUSION 1 HR: CPT

## 2024-05-22 PROCEDURE — 85025 COMPLETE CBC W/AUTO DIFF WBC: CPT

## 2024-05-22 PROCEDURE — 36415 COLL VENOUS BLD VENIPUNCTURE: CPT

## 2024-05-22 PROCEDURE — 84443 ASSAY THYROID STIM HORMONE: CPT

## 2024-05-22 RX ORDER — FOSAPREPITANT DIMEGLUMINE 150 MG/5ML
150 INJECTION, POWDER, LYOPHILIZED, FOR SOLUTION INTRAVENOUS ONCE
Refills: 0 | Status: COMPLETED | OUTPATIENT
Start: 2024-05-22 | End: 2024-05-22

## 2024-05-22 RX ORDER — DEXAMETHASONE 0.5 MG/5ML
10 ELIXIR ORAL ONCE
Refills: 0 | Status: COMPLETED | OUTPATIENT
Start: 2024-05-22 | End: 2024-05-22

## 2024-05-22 RX ORDER — GEMCITABINE 38 MG/ML
1860 INJECTION, SOLUTION INTRAVENOUS ONCE
Refills: 0 | Status: COMPLETED | OUTPATIENT
Start: 2024-05-22 | End: 2024-05-22

## 2024-05-22 RX ORDER — NIVOLUMAB 10 MG/ML
360 INJECTION INTRAVENOUS ONCE
Refills: 0 | Status: COMPLETED | OUTPATIENT
Start: 2024-05-22 | End: 2024-05-22

## 2024-05-22 RX ORDER — CARBOPLATIN 50 MG
400 VIAL (EA) INTRAVENOUS ONCE
Refills: 0 | Status: COMPLETED | OUTPATIENT
Start: 2024-05-22 | End: 2024-05-22

## 2024-05-22 RX ORDER — PALONOSETRON HYDROCHLORIDE 0.25 MG/5ML
0.25 INJECTION, SOLUTION INTRAVENOUS ONCE
Refills: 0 | Status: COMPLETED | OUTPATIENT
Start: 2024-05-22 | End: 2024-05-22

## 2024-05-22 RX ORDER — SODIUM CHLORIDE 9 MG/ML
1000 INJECTION INTRAMUSCULAR; INTRAVENOUS; SUBCUTANEOUS ONCE
Refills: 0 | Status: COMPLETED | OUTPATIENT
Start: 2024-05-22 | End: 2024-05-22

## 2024-05-22 RX ADMIN — PALONOSETRON HYDROCHLORIDE 0.25 MILLIGRAM(S): 0.25 INJECTION, SOLUTION INTRAVENOUS at 13:38

## 2024-05-22 RX ADMIN — Medication 204 MILLIGRAM(S): at 13:06

## 2024-05-22 RX ADMIN — SODIUM CHLORIDE 1000 MILLILITER(S): 9 INJECTION INTRAMUSCULAR; INTRAVENOUS; SUBCUTANEOUS at 15:54

## 2024-05-22 RX ADMIN — GEMCITABINE 1860 MILLIGRAM(S): 38 INJECTION, SOLUTION INTRAVENOUS at 14:22

## 2024-05-22 RX ADMIN — Medication 400 MILLIGRAM(S): at 14:20

## 2024-05-22 RX ADMIN — Medication 10 MILLIGRAM(S): at 13:55

## 2024-05-22 RX ADMIN — NIVOLUMAB 360 MILLIGRAM(S): 10 INJECTION INTRAVENOUS at 14:18

## 2024-05-22 RX ADMIN — NIVOLUMAB 360 MILLIGRAM(S): 10 INJECTION INTRAVENOUS at 15:55

## 2024-05-22 RX ADMIN — GEMCITABINE 1860 MILLIGRAM(S): 38 INJECTION, SOLUTION INTRAVENOUS at 16:30

## 2024-05-22 RX ADMIN — SODIUM CHLORIDE 1000 MILLILITER(S): 9 INJECTION INTRAMUSCULAR; INTRAVENOUS; SUBCUTANEOUS at 13:38

## 2024-05-22 RX ADMIN — Medication 400 MILLIGRAM(S): at 16:00

## 2024-05-22 RX ADMIN — FOSAPREPITANT DIMEGLUMINE 500 MILLIGRAM(S): 150 INJECTION, POWDER, LYOPHILIZED, FOR SOLUTION INTRAVENOUS at 13:07

## 2024-05-22 RX ADMIN — FOSAPREPITANT DIMEGLUMINE 150 MILLIGRAM(S): 150 INJECTION, POWDER, LYOPHILIZED, FOR SOLUTION INTRAVENOUS at 13:38

## 2024-05-28 RX ORDER — DEXAMETHASONE 0.5 MG/5ML
10 ELIXIR ORAL ONCE
Refills: 0 | Status: COMPLETED | OUTPATIENT
Start: 2024-05-29 | End: 2024-05-29

## 2024-05-29 ENCOUNTER — APPOINTMENT (OUTPATIENT)
Dept: INFUSION THERAPY | Facility: CLINIC | Age: 69
End: 2024-05-29

## 2024-05-29 ENCOUNTER — OUTPATIENT (OUTPATIENT)
Dept: OUTPATIENT SERVICES | Facility: HOSPITAL | Age: 69
LOS: 1 days | End: 2024-05-29
Payer: MEDICARE

## 2024-05-29 VITALS
SYSTOLIC BLOOD PRESSURE: 112 MMHG | HEART RATE: 78 BPM | HEIGHT: 66 IN | TEMPERATURE: 97 F | OXYGEN SATURATION: 99 % | RESPIRATION RATE: 18 BRPM | WEIGHT: 160.94 LBS | DIASTOLIC BLOOD PRESSURE: 82 MMHG

## 2024-05-29 DIAGNOSIS — C34.92 MALIGNANT NEOPLASM OF UNSPECIFIED PART OF LEFT BRONCHUS OR LUNG: ICD-10-CM

## 2024-05-29 DIAGNOSIS — Z90.49 ACQUIRED ABSENCE OF OTHER SPECIFIED PARTS OF DIGESTIVE TRACT: Chronic | ICD-10-CM

## 2024-05-29 LAB
ALBUMIN SERPL ELPH-MCNC: 3.2 G/DL — LOW (ref 3.3–5)
ALP SERPL-CCNC: 69 U/L — SIGNIFICANT CHANGE UP (ref 40–120)
ALT FLD-CCNC: 25 U/L — SIGNIFICANT CHANGE UP (ref 10–45)
ANION GAP SERPL CALC-SCNC: 14 MMOL/L — SIGNIFICANT CHANGE UP (ref 5–17)
AST SERPL-CCNC: 25 U/L — SIGNIFICANT CHANGE UP (ref 10–40)
BILIRUB SERPL-MCNC: 0.7 MG/DL — SIGNIFICANT CHANGE UP (ref 0.2–1.2)
BUN SERPL-MCNC: 26 MG/DL — HIGH (ref 7–23)
CALCIUM SERPL-MCNC: 9.3 MG/DL — SIGNIFICANT CHANGE UP (ref 8.4–10.5)
CHLORIDE SERPL-SCNC: 101 MMOL/L — SIGNIFICANT CHANGE UP (ref 96–108)
CO2 SERPL-SCNC: 28 MMOL/L — SIGNIFICANT CHANGE UP (ref 22–31)
CREAT SERPL-MCNC: 1.1 MG/DL — SIGNIFICANT CHANGE UP (ref 0.5–1.3)
EGFR: 72 ML/MIN/1.73M2 — SIGNIFICANT CHANGE UP
GLUCOSE SERPL-MCNC: 171 MG/DL — HIGH (ref 70–99)
HCT VFR BLD CALC: 25.5 % — LOW (ref 39–50)
HGB BLD-MCNC: 8.9 G/DL — LOW (ref 13–17)
LYMPHOCYTES # BLD AUTO: 0.9 K/UL — LOW (ref 1–3.3)
LYMPHOCYTES # BLD AUTO: 25.8 % — SIGNIFICANT CHANGE UP (ref 13–44)
MAGNESIUM SERPL-MCNC: 1.2 MG/DL — LOW (ref 1.6–2.6)
MCHC RBC-ENTMCNC: 27.6 PG — SIGNIFICANT CHANGE UP (ref 27–34)
MCHC RBC-ENTMCNC: 34.9 GM/DL — SIGNIFICANT CHANGE UP (ref 32–36)
MCV RBC AUTO: 79.2 FL — LOW (ref 80–100)
NEUTROPHILS # BLD AUTO: 2.4 K/UL — SIGNIFICANT CHANGE UP (ref 1.8–7.4)
NEUTROPHILS NFR BLD AUTO: 71.1 % — SIGNIFICANT CHANGE UP (ref 43–77)
PLATELET # BLD AUTO: 120 K/UL — LOW (ref 150–400)
POTASSIUM SERPL-MCNC: 4.7 MMOL/L — SIGNIFICANT CHANGE UP (ref 3.5–5.3)
POTASSIUM SERPL-SCNC: 4.7 MMOL/L — SIGNIFICANT CHANGE UP (ref 3.5–5.3)
PROT SERPL-MCNC: 7.3 G/DL — SIGNIFICANT CHANGE UP (ref 6–8.3)
RBC # BLD: 3.22 M/UL — LOW (ref 4.2–5.8)
RBC # FLD: 13.6 % — SIGNIFICANT CHANGE UP (ref 10.3–14.5)
SODIUM SERPL-SCNC: 143 MMOL/L — SIGNIFICANT CHANGE UP (ref 135–145)
T4 AB SER-ACNC: 9.01 UG/DL — SIGNIFICANT CHANGE UP (ref 4.5–11.7)
TSH SERPL-MCNC: 1.24 UIU/ML — SIGNIFICANT CHANGE UP (ref 0.27–4.2)
WBC # BLD: 3.4 K/UL — LOW (ref 3.8–10.5)
WBC # FLD AUTO: 3.4 K/UL — LOW (ref 3.8–10.5)

## 2024-05-29 PROCEDURE — 85025 COMPLETE CBC W/AUTO DIFF WBC: CPT

## 2024-05-29 PROCEDURE — 84443 ASSAY THYROID STIM HORMONE: CPT

## 2024-05-29 PROCEDURE — 84439 ASSAY OF FREE THYROXINE: CPT

## 2024-05-29 PROCEDURE — 84436 ASSAY OF TOTAL THYROXINE: CPT

## 2024-05-29 PROCEDURE — 36415 COLL VENOUS BLD VENIPUNCTURE: CPT

## 2024-05-29 PROCEDURE — 96375 TX/PRO/DX INJ NEW DRUG ADDON: CPT

## 2024-05-29 PROCEDURE — 80053 COMPREHEN METABOLIC PANEL: CPT

## 2024-05-29 PROCEDURE — 96413 CHEMO IV INFUSION 1 HR: CPT

## 2024-05-29 PROCEDURE — 83735 ASSAY OF MAGNESIUM: CPT

## 2024-05-29 RX ORDER — PALONOSETRON HYDROCHLORIDE 0.25 MG/5ML
0.25 INJECTION, SOLUTION INTRAVENOUS ONCE
Refills: 0 | Status: COMPLETED | OUTPATIENT
Start: 2024-05-29 | End: 2024-05-29

## 2024-05-29 RX ORDER — MAGNESIUM OXIDE 400 MG ORAL TABLET 241.3 MG
800 TABLET ORAL ONCE
Refills: 0 | Status: COMPLETED | OUTPATIENT
Start: 2024-05-29 | End: 2024-05-29

## 2024-05-29 RX ORDER — GEMCITABINE 38 MG/ML
1860 INJECTION, SOLUTION INTRAVENOUS ONCE
Refills: 0 | Status: COMPLETED | OUTPATIENT
Start: 2024-05-29 | End: 2024-05-29

## 2024-05-29 RX ADMIN — MAGNESIUM OXIDE 400 MG ORAL TABLET 800 MILLIGRAM(S): 241.3 TABLET ORAL at 14:45

## 2024-05-29 RX ADMIN — PALONOSETRON HYDROCHLORIDE 0.25 MILLIGRAM(S): 0.25 INJECTION, SOLUTION INTRAVENOUS at 13:51

## 2024-05-29 RX ADMIN — GEMCITABINE 1860 MILLIGRAM(S): 38 INJECTION, SOLUTION INTRAVENOUS at 14:45

## 2024-05-29 RX ADMIN — Medication 10 MILLIGRAM(S): at 13:45

## 2024-05-29 RX ADMIN — Medication 204 MILLIGRAM(S): at 13:30

## 2024-05-29 RX ADMIN — GEMCITABINE 1860 MILLIGRAM(S): 38 INJECTION, SOLUTION INTRAVENOUS at 13:59

## 2024-05-30 ENCOUNTER — APPOINTMENT (OUTPATIENT)
Dept: THORACIC SURGERY | Facility: CLINIC | Age: 69
End: 2024-05-30
Payer: MEDICARE

## 2024-05-30 VITALS
HEART RATE: 94 BPM | OXYGEN SATURATION: 98 % | WEIGHT: 159 LBS | HEIGHT: 67 IN | SYSTOLIC BLOOD PRESSURE: 137 MMHG | BODY MASS INDEX: 24.96 KG/M2 | DIASTOLIC BLOOD PRESSURE: 68 MMHG | TEMPERATURE: 97.4 F

## 2024-05-30 DIAGNOSIS — Z86.39 PERSONAL HISTORY OF OTHER ENDOCRINE, NUTRITIONAL AND METABOLIC DISEASE: ICD-10-CM

## 2024-05-30 DIAGNOSIS — R91.8 OTHER NONSPECIFIC ABNORMAL FINDING OF LUNG FIELD: ICD-10-CM

## 2024-05-30 PROCEDURE — 99214 OFFICE O/P EST MOD 30 MIN: CPT

## 2024-05-31 PROBLEM — Z86.39 HISTORY OF DIABETES MELLITUS: Status: RESOLVED | Noted: 2024-02-29 | Resolved: 2024-05-31

## 2024-05-31 PROBLEM — R91.8 MASS OF LINGULA OF LUNG: Status: ACTIVE | Noted: 2024-02-29

## 2024-05-31 RX ORDER — AMLODIPINE BESYLATE 5 MG/1
5 TABLET ORAL DAILY
Refills: 0 | Status: ACTIVE | COMMUNITY

## 2024-05-31 RX ORDER — LISINOPRIL 10 MG/1
10 TABLET ORAL DAILY
Refills: 0 | Status: DISCONTINUED | COMMUNITY
End: 2024-05-31

## 2024-05-31 NOTE — ASSESSMENT
[FreeTextEntry1] : Patient is a 70 yo male, former smoker with a PMHx of HTN, GERD, DM II and high cholesterol. He was referred by DR. NATALY ARCE for surgical evaluation of a left upper lobe lung mass.  The mass was discovered during work up for a cough. Imaging showed a 4.3 x 7.4 x 5.4 cm mass in the lingula of the left lung. Patient was appropriately staged per NCCN guidelines and clinic stage was determined to be [id number], stage IIIA.  Case was discussed at multidisciplinary tumor board and consensus opinion was to proceed with neoadjuvant chemo-immunotherapy and subsequent resection.   He started treatment with Dr. Larose on April 23. He had significant HENRY requiring an adjustment of his chemotherapy but is otherwise tolerating treatment and doing well. He presents today for a checkup and to discuss his plan going forward.   He has completed 2 cycles and will finish his last cycle in about 3 weeks. We will obtain repeat imaging in about 5 weeks. At that time we will repeat PFTs and see him back in clinic. We will plan for surgical resection for about 4-6 weeks after completion of systemic treatment. Patient understood and agreed.   Additionally, patient reports that he has had some difficulty managing his DM and nutrition while on treatment. Will refer to nutrition for further support, especially in preparation for surgery. Patient understood and agreed.  All questions and concerns were addressed with the patient at the time of visit, who expressed understanding.  PLAN 1.  Referral to cancer center nutritionist 2.  PFTs 5 weeks from now 3.  Follow up in office after CT and PFT completion to discuss surgery.  I, Dr. Ramone Werner MD, personally performed the evaluation and management (E/M) services for this established patient who presents today with (a) new problem(s)/exacerbation of (an) existing condition(s).  That E/M includes conducting the clinically appropriate interval history &/or exam, assessing all new/exacerbated conditions, and establishing a new plan of care.  Today, my CHANA, Irasema Dang NP, was here to observe &/or participate in the visit & follow plan of care established by me.

## 2024-05-31 NOTE — PHYSICAL EXAM
[] : no respiratory distress [Respiration, Rhythm And Depth] : normal respiratory rhythm and effort [Exaggerated Use Of Accessory Muscles For Inspiration] : no accessory muscle use [Heart Rate And Rhythm] : heart rate was normal and rhythm regular [Heart Sounds] : normal S1 and S2 [Heart Sounds Gallop] : no gallops [Murmurs] : no murmurs [Heart Sounds Pericardial Friction Rub] : no pericardial rub [Examination Of The Chest] : the chest was normal in appearance [Chest Visual Inspection Thoracic Asymmetry] : no chest asymmetry [Diminished Respiratory Excursion] : normal chest expansion [Bowel Sounds] : normal bowel sounds [Abdomen Soft] : soft [Abdomen Mass (___ Cm)] : no abdominal mass palpated [Oriented To Time, Place, And Person] : oriented to person, place, and time [Impaired Insight] : insight and judgment were intact [Affect] : the affect was normal

## 2024-05-31 NOTE — DATA REVIEWED
[FreeTextEntry1] : PFT done on 3/25/24 showed FEV1 =2.54, 94% of predicted value, FVC =4.20, 119% of predicted value, PEF =8.93,119% of predicted value and DLCO = 18.65, 81% of predicted value.  PET/CT 3/15/24: Impression: 1. Compared to chest CT from 2/15/2024, the 76 mm mass in the lingula is hypermetabolic and suspicious for lung cancer. 2. The borderline sized thoracic lymph nodes on the CT scan are not FDG avid. 3. Mildly nodular right adrenal gland is not FDG avid. No evidence of metastatic lung cancer. 4. Mild increased activity in enlarged and lobular prostate. Recommend correlation with PSA to rule out prostate cancer.  MRI of the brain 3/15/24: -Punctate focus of enhancement the left lateral cerebellar hemisphere (series 701 image 55 and series 702 image 23). There is no associated signal abnormality in the brain parenchyma. Given the lack of associated signal abnormality or additional areas of pathologic enhancement, these findings may represent vascular enhancement versus artifact. However, attention on follow-up imaging is recommended to exclude intracranial metastasis. -Focal area of intrinsic T1 hyperintensity more inferiorly in the left cerebellar hemisphere without superimposed pathologic enhancement. This may represent area of mineralization. -5 mm hypoenhancing lesion in the left side of the sella most compatible with pituitary adenoma. Correlation with endocrine function and consideration for further evaluation dedicated MRI sella is recommended.  CT chest 2/15/24: -Lingular mass highly suspicious for malignancy -Mild mediastinal lymphadenopathy -Scattered bilateral small pulmonary nodules, the largest of which measures 0.5 cm. Metastases may be considered. -Mild emphysema -Few right thyroid sub centimeter nodules for which no additional imaging is needed according to ACR recs. -Right adrenal nonspecific nodule for which metastasis should be considered in light of pulmonary mass. -Cholelithiasis -Right renal subcentimeter lesion too small to be characterized by ct criteria most likely benign.  Xray 2/7/24: -5.5 cm mass in the lingula segment of the left upper lobe. Neoplasm is strongly suspected ct scan of the chest is suggested.

## 2024-05-31 NOTE — HISTORY OF PRESENT ILLNESS
[FreeTextEntry1] : Patient is a 68 yo male, former smoker (40+ years, currently attempting to quit) with a PMHx of HTN, GERD, DM II and high cholesterol. He was referred by DR. NATALY ARCE for surgical eval of a lingular mass.  The mass was discovered during work up for a cough. Imaging showed a 4.3 x 7.4 x 5.4 cm mass in the lingula of the left lung. There are also concerning hilar/ mediastinal lymph nodes and a right adrenal nodule noted. PET/CT and brain MRI were ordered for staging. Based on imaging, stage is [id number], clinical stage IIIA.  He underwent flex bronch ebus biopsy on 4/3 to obtain a tissue dx. PFTs were also obtained to assess for operability. His case was presented at tumor board on 4/4. Outcome of the discussion was that he is a good candidate for neoadjuvant therapy, and can tx w/ immunotherapy regardless of PDL-1 status; eGFR negative. He has since started treatment with Dr. Larose on April 23. During initial treatment he experienced HENRY and was switched to a new medication. He reports that he is tolerating the new medication well.

## 2024-06-04 ENCOUNTER — APPOINTMENT (OUTPATIENT)
Dept: HEMATOLOGY ONCOLOGY | Facility: CLINIC | Age: 69
End: 2024-06-04

## 2024-06-04 ENCOUNTER — APPOINTMENT (OUTPATIENT)
Dept: INFUSION THERAPY | Facility: CLINIC | Age: 69
End: 2024-06-04

## 2024-06-05 LAB — T4 FREE SERPL-MCNC: 1.3 NG/DL — SIGNIFICANT CHANGE UP

## 2024-06-06 ENCOUNTER — NON-APPOINTMENT (OUTPATIENT)
Age: 69
End: 2024-06-06

## 2024-06-07 NOTE — HISTORY OF PRESENT ILLNESS
[Disease: _____________________] : Disease: [unfilled] [T: ___] : T[unfilled] [N: ___] : N[unfilled] [M: ___] : M[unfilled] [AJCC Stage: ____] : AJCC Stage: [unfilled] [de-identified] : Don Castrejon is a 69-year-old male who presents to the clinic for f/u of LLL mass, c/w stage IIIA (T4N0M0) NSCLC - SCC histology.  Onc hx:  Social Hx: 50 pack yr smoking hx, quit when he found out about the mass Originally from [place], lives in [place] by himself. Retired .  Son lives in [place]   3/15/24: MRI Brain:  Punctate focus of enhancement the left lateral cerebellar hemisphere (series 701 image 55 and series 702 image 23). There is no associated signal abnormality in the brain parenchyma. Given the lack of associated signal abnormality or additional areas of pathologic enhancement, these findings may represent vascular enhancement versus artifact. However, attention on follow-up imaging is recommended to exclude intracranial metastasis.  Focal area of intrinsic T1 hyperintensity more inferiorly in the left cerebellar hemisphere without superimposed pathologic enhancement. This may represent area of mineralization. 5 mm hypoenhancing lesion in the left side of the sella most compatible with pituitary adenoma. Correlation with endocrine function and consideration for further evaluation dedicated MRI sella is recommended. PET:  1. Compared to chest CT from 2/15/2024, the 76 mm mass in the lingula is hypermetabolic and suspicious for lung cancer. 2. The borderline sized thoracic lymph nodes on the CT scan are not FDG avid. 3. Mildly nodular right adrenal gland is not FDG avid. No evidence of metastatic lung cancer. 4. Mild increased activity in enlarged and lobular prostate. Recommend correlation with PSA to rule out prostate cancer.  3/15/24: MR Head:  Punctate focus of enhancement the left lateral cerebellar hemisphere (series 701 image 55 and series 702 image 23). There is no associated signal abnormality in the brain parenchyma. Given the lack of associated signal abnormality or additional areas of pathologic enhancement, these findings may represent vascular enhancement versus artifact. However, attention on follow-up imaging is recommended to exclude intracranial metastasis. Focal area of intrinsic T1 hyperintensity more inferiorly in the left cerebellar hemisphere without superimposed pathologic enhancement. This may represent area of mineralization. 5 mm hypoenhancing lesion in the left side of the sella most compatible with pituitary adenoma. Correlation with endocrine function and consideration for further evaluation dedicated MRI sella is recommended.  3/15/24: PET: 1. Compared to chest CT from 2/15/2024, the 76 mm mass in the lingula is hypermetabolic and suspicious for lung cancer. 2. The borderline sized thoracic lymph nodes on the CT scan are not FDG avid. 3. Mildly nodular right adrenal gland is not FDG avid. No evidence of metastatic lung cancer. 4. Mild increased activity in enlarged and lobular prostate. Recommend correlation with PSA to rule out prostate cancer.  4/3/24: Chest Xray:  Small left apex pneumothorax, similar to prior exam earlier same day. Left lower lung mass again noted. No acute infiltrates. No significant pleural effusion.  04/03/2024 EBUS  Lung, left, core biopsy: -Squamous cell carcinoma. LYMPH NODE, 11L NEGATIVE FOR MALIGNANT CELLS.  4/4/2024: TTB - rec for MAGDALENA 5/3/2024: Sent to ED by  for worsening HENRY [de-identified] : Squamous Cell Ca  - PDL1 negative [de-identified] : CTSx:  [FreeTextEntry1] : 4/23/2024: C1D1 cisplatin/gemcitabine/nivolumab D8 gemcitabine skipped due to HENRY 2/2 cisplatin [de-identified] : The patient reports feeling well.  He denies any shortness of breath swelling has grossly resolved.  He is drinking a lot of fluid every day. He was seen by Dr. Elaine, nephrology, recently who recommended restarting alternative chemotherapy, will change to carboplatin, given improvement in kidney function.  [100: Normal, no complaints, no evidence of disease.] : 100: Normal, no complaints, no evidence of disease. [ECOG Performance Status: 0 - Fully active, able to carry on all pre-disease performance without restriction] : Performance Status: 0 - Fully active, able to carry on all pre-disease performance without restriction

## 2024-06-10 ENCOUNTER — APPOINTMENT (OUTPATIENT)
Dept: HEMATOLOGY ONCOLOGY | Facility: CLINIC | Age: 69
End: 2024-06-10
Payer: MEDICARE

## 2024-06-10 VITALS
DIASTOLIC BLOOD PRESSURE: 67 MMHG | TEMPERATURE: 97.2 F | HEIGHT: 67 IN | OXYGEN SATURATION: 99 % | SYSTOLIC BLOOD PRESSURE: 114 MMHG | RESPIRATION RATE: 18 BRPM | WEIGHT: 152 LBS | BODY MASS INDEX: 23.86 KG/M2 | HEART RATE: 99 BPM

## 2024-06-10 LAB
ALBUMIN SERPL ELPH-MCNC: 3.1 G/DL
ALP BLD-CCNC: 82 U/L
ALT SERPL-CCNC: 31 U/L
ANION GAP SERPL CALC-SCNC: 6 MMOL/L
AST SERPL-CCNC: 19 U/L
BILIRUB SERPL-MCNC: 0.6 MG/DL
BUN SERPL-MCNC: 14 MG/DL
CALCIUM SERPL-MCNC: 9.5 MG/DL
CHLORIDE SERPL-SCNC: 105 MMOL/L
CO2 SERPL-SCNC: 28 MMOL/L
CREAT SERPL-MCNC: 1.1 MG/DL
EGFR: 72 ML/MIN/1.73M2
GLUCOSE SERPL-MCNC: 186 MG/DL
HCT VFR BLD CALC: 23.5 %
HGB BLD-MCNC: 8.2 G/DL
LYMPHOCYTES # BLD AUTO: 1.3 K/UL
LYMPHOCYTES NFR BLD AUTO: 27.4 %
MAN DIFF?: NO
MCHC RBC-ENTMCNC: 28.1 PG
MCHC RBC-ENTMCNC: 34.9 GM/DL
MCV RBC AUTO: 80.5 FL
NEUTROPHILS # BLD AUTO: 2.7 K/UL
NEUTROPHILS NFR BLD AUTO: 54.7 %
PLATELET # BLD AUTO: 332 K/UL
POTASSIUM SERPL-SCNC: 3.6 MMOL/L
PROT SERPL-MCNC: 7.1 G/DL
RBC # BLD: 2.92 M/UL
RBC # FLD: 15.2 %
SODIUM SERPL-SCNC: 139 MMOL/L
WBC # FLD AUTO: 4.9 K/UL

## 2024-06-10 PROCEDURE — G2211 COMPLEX E/M VISIT ADD ON: CPT

## 2024-06-10 PROCEDURE — 99214 OFFICE O/P EST MOD 30 MIN: CPT

## 2024-06-10 RX ORDER — CARBOPLATIN 50 MG
469 VIAL (EA) INTRAVENOUS ONCE
Refills: 0 | Status: COMPLETED | OUTPATIENT
Start: 2024-06-11 | End: 2024-06-11

## 2024-06-10 RX ORDER — PALONOSETRON HYDROCHLORIDE 0.25 MG/5ML
0.25 INJECTION, SOLUTION INTRAVENOUS ONCE
Refills: 0 | Status: COMPLETED | OUTPATIENT
Start: 2024-06-11 | End: 2024-06-11

## 2024-06-10 RX ORDER — DEXAMETHASONE 4 MG/1
4 TABLET ORAL
Qty: 12 | Refills: 2 | Status: ACTIVE | COMMUNITY
Start: 2024-05-20 | End: 1900-01-01

## 2024-06-10 NOTE — ASSESSMENT
[FreeTextEntry1] : 69YM w/ PMHx GERD, HTN, HLD, DMII presenting for follow-up for squamous cell lung cancer, stage IIIa, T4 N0 M0, PD-L1 negative currently on neoadjuvant chemoimmunotherapy. Chemotherapy course complicated by acute kidney injury, likely related to cisplatin.  Patient is here for follow-up.  Squamous Cell Lung Ca, AJCC IIIa, T4N0M0  - PDL1 negative We proposed doublet platinum based chemotherapy + immunotherapy based on the Checkmate 816 trial - The median event-free survival was 31.6 months (95% confidence interval [CI], 30.2 to not reached) with nivolumab plus chemotherapy and 20.8 months (95% CI, 14.0 to 26.7) with chemotherapy alone (hazard ratio for disease progression, disease recurrence, or death, 0.63; 97.38% CI, 0.43 to 0.91; P=0.005). The percentage of patients with a pathological complete response was 24.0% (95% CI, 18.0 to 31.0) and 2.2% (95% CI, 0.6 to 5.6), respectively (odds ratio, 13.94; 99% CI, 3.49 to 55.75; P<0.001). Results for event-free survival and pathological complete response across most subgroups favored nivolumab plus chemotherapy over chemotherapy alone.  -- He developed an HENRY following a 1 cycle of cisplatin and subsequently switched to carboplatin - C2 5/20/2024 - labs for for C3 06/10/24, cleared for tx on 6/11/2024 Hgb in 8 range - patient advised to suplement nutrition with iron rich foods, re-check next week --plan to repeat CT chest in 3 weeks with referral to CT surgery for hopefully resection  HENRY Likely due to cisplatin, now resolved - follows with Dr Chele samayoa --will refer to Dr.D'Amico after therapy for lung ca complete

## 2024-06-10 NOTE — HISTORY OF PRESENT ILLNESS
[Disease: _____________________] : Disease: [unfilled] [T: ___] : T[unfilled] [N: ___] : N[unfilled] [M: ___] : M[unfilled] [AJCC Stage: ____] : AJCC Stage: [unfilled] [100: Normal, no complaints, no evidence of disease.] : 100: Normal, no complaints, no evidence of disease. [ECOG Performance Status: 0 - Fully active, able to carry on all pre-disease performance without restriction] : Performance Status: 0 - Fully active, able to carry on all pre-disease performance without restriction [de-identified] : Don Castrejon is a 69-year-old male who presents to the clinic for f/u of LLL mass, c/w stage IIIA (T4N0M0) NSCLC - SCC histology.  Onc hx:  Social Hx: 50 pack yr smoking hx, quit when he found out about the mass Originally from [place], lives in [place] by himself. Retired .  Son lives in [place]   3/15/24: MRI Brain:  Punctate focus of enhancement the left lateral cerebellar hemisphere (series 701 image 55 and series 702 image 23). There is no associated signal abnormality in the brain parenchyma. Given the lack of associated signal abnormality or additional areas of pathologic enhancement, these findings may represent vascular enhancement versus artifact. However, attention on follow-up imaging is recommended to exclude intracranial metastasis.  Focal area of intrinsic T1 hyperintensity more inferiorly in the left cerebellar hemisphere without superimposed pathologic enhancement. This may represent area of mineralization. 5 mm hypoenhancing lesion in the left side of the sella most compatible with pituitary adenoma. Correlation with endocrine function and consideration for further evaluation dedicated MRI sella is recommended. PET:  1. Compared to chest CT from 2/15/2024, the 76 mm mass in the lingula is hypermetabolic and suspicious for lung cancer. 2. The borderline sized thoracic lymph nodes on the CT scan are not FDG avid. 3. Mildly nodular right adrenal gland is not FDG avid. No evidence of metastatic lung cancer. 4. Mild increased activity in enlarged and lobular prostate. Recommend correlation with PSA to rule out prostate cancer.  3/15/24: MR Head:  Punctate focus of enhancement the left lateral cerebellar hemisphere (series 701 image 55 and series 702 image 23). There is no associated signal abnormality in the brain parenchyma. Given the lack of associated signal abnormality or additional areas of pathologic enhancement, these findings may represent vascular enhancement versus artifact. However, attention on follow-up imaging is recommended to exclude intracranial metastasis. Focal area of intrinsic T1 hyperintensity more inferiorly in the left cerebellar hemisphere without superimposed pathologic enhancement. This may represent area of mineralization. 5 mm hypoenhancing lesion in the left side of the sella most compatible with pituitary adenoma. Correlation with endocrine function and consideration for further evaluation dedicated MRI sella is recommended.  3/15/24: PET: 1. Compared to chest CT from 2/15/2024, the 76 mm mass in the lingula is hypermetabolic and suspicious for lung cancer. 2. The borderline sized thoracic lymph nodes on the CT scan are not FDG avid. 3. Mildly nodular right adrenal gland is not FDG avid. No evidence of metastatic lung cancer. 4. Mild increased activity in enlarged and lobular prostate. Recommend correlation with PSA to rule out prostate cancer.  4/3/24: Chest Xray:  Small left apex pneumothorax, similar to prior exam earlier same day. Left lower lung mass again noted. No acute infiltrates. No significant pleural effusion.  04/03/2024 EBUS  Lung, left, core biopsy: -Squamous cell carcinoma. LYMPH NODE, 11L NEGATIVE FOR MALIGNANT CELLS.  4/4/2024: TTB - rec for MAGDALENA 5/3/2024: Sent to ED by  for worsening HENRY [de-identified] : Squamous Cell Ca  - PDL1 negative [de-identified] : CTSx:  [FreeTextEntry1] : 4/23/2024: C1D1 cisplatin/gemcitabine/nivolumab D8 gemcitabine skipped due to HENRY 2/2 cisplatin 5/21/2024: C2D1 carboplatin/gemcitabine.nivolumab 5/28/2024: C2D8 gemcitabine 6/11/2024: Due for C3D1 carboplatin/gemcitabine/nivolumab [de-identified] : The patient reports feeling well.  He denies any shortness of breath. No more swelling. Endorses fatigue around day 4-6 of chemotherapy, otherwise feeling well.

## 2024-06-11 ENCOUNTER — APPOINTMENT (OUTPATIENT)
Dept: HEMATOLOGY ONCOLOGY | Facility: CLINIC | Age: 69
End: 2024-06-11

## 2024-06-11 ENCOUNTER — APPOINTMENT (OUTPATIENT)
Dept: INFUSION THERAPY | Facility: CLINIC | Age: 69
End: 2024-06-11

## 2024-06-11 ENCOUNTER — OUTPATIENT (OUTPATIENT)
Dept: OUTPATIENT SERVICES | Facility: HOSPITAL | Age: 69
LOS: 1 days | End: 2024-06-11
Payer: MEDICARE

## 2024-06-11 VITALS
HEART RATE: 93 BPM | RESPIRATION RATE: 18 BRPM | TEMPERATURE: 96 F | SYSTOLIC BLOOD PRESSURE: 124 MMHG | HEIGHT: 66 IN | WEIGHT: 156.09 LBS | OXYGEN SATURATION: 100 % | DIASTOLIC BLOOD PRESSURE: 74 MMHG

## 2024-06-11 VITALS
HEART RATE: 84 BPM | RESPIRATION RATE: 18 BRPM | DIASTOLIC BLOOD PRESSURE: 67 MMHG | SYSTOLIC BLOOD PRESSURE: 116 MMHG | OXYGEN SATURATION: 98 % | TEMPERATURE: 98 F

## 2024-06-11 DIAGNOSIS — E83.42 HYPOMAGNESEMIA: ICD-10-CM

## 2024-06-11 DIAGNOSIS — C34.92 MALIGNANT NEOPLASM OF UNSPECIFIED PART OF LEFT BRONCHUS OR LUNG: ICD-10-CM

## 2024-06-11 DIAGNOSIS — Z90.49 ACQUIRED ABSENCE OF OTHER SPECIFIED PARTS OF DIGESTIVE TRACT: Chronic | ICD-10-CM

## 2024-06-11 LAB
ALBUMIN SERPL ELPH-MCNC: 3.1 G/DL — LOW (ref 3.3–5)
ALP SERPL-CCNC: 86 U/L — SIGNIFICANT CHANGE UP (ref 40–120)
ALT FLD-CCNC: 33 U/L — SIGNIFICANT CHANGE UP (ref 10–45)
ANION GAP SERPL CALC-SCNC: 13 MMOL/L — SIGNIFICANT CHANGE UP (ref 5–17)
AST SERPL-CCNC: 24 U/L — SIGNIFICANT CHANGE UP (ref 10–40)
BILIRUB SERPL-MCNC: 0.6 MG/DL — SIGNIFICANT CHANGE UP (ref 0.2–1.2)
BUN SERPL-MCNC: 21 MG/DL — SIGNIFICANT CHANGE UP (ref 7–23)
CALCIUM SERPL-MCNC: 10 MG/DL — SIGNIFICANT CHANGE UP (ref 8.4–10.5)
CHLORIDE SERPL-SCNC: 99 MMOL/L — SIGNIFICANT CHANGE UP (ref 96–108)
CO2 SERPL-SCNC: 26 MMOL/L — SIGNIFICANT CHANGE UP (ref 22–31)
CREAT SERPL-MCNC: 1 MG/DL — SIGNIFICANT CHANGE UP (ref 0.5–1.3)
EGFR: 81 ML/MIN/1.73M2 — SIGNIFICANT CHANGE UP
GLUCOSE SERPL-MCNC: 286 MG/DL — HIGH (ref 70–99)
HCT VFR BLD CALC: 23.9 % — LOW (ref 39–50)
HGB BLD-MCNC: 8.5 G/DL — LOW (ref 13–17)
LYMPHOCYTES # BLD AUTO: 0.8 K/UL — LOW (ref 1–3.3)
LYMPHOCYTES # BLD AUTO: 14.1 % — SIGNIFICANT CHANGE UP (ref 13–44)
MAGNESIUM SERPL-MCNC: 1.2 MG/DL — LOW (ref 1.6–2.6)
MCHC RBC-ENTMCNC: 28.6 PG — SIGNIFICANT CHANGE UP (ref 27–34)
MCHC RBC-ENTMCNC: 35.6 GM/DL — SIGNIFICANT CHANGE UP (ref 32–36)
MCV RBC AUTO: 80.5 FL — SIGNIFICANT CHANGE UP (ref 80–100)
NEUTROPHILS # BLD AUTO: 4.7 K/UL — SIGNIFICANT CHANGE UP (ref 1.8–7.4)
NEUTROPHILS NFR BLD AUTO: 81 % — HIGH (ref 43–77)
PLATELET # BLD AUTO: 436 K/UL — HIGH (ref 150–400)
POTASSIUM SERPL-MCNC: 5.2 MMOL/L — SIGNIFICANT CHANGE UP (ref 3.5–5.3)
POTASSIUM SERPL-SCNC: 5.2 MMOL/L — SIGNIFICANT CHANGE UP (ref 3.5–5.3)
PROT SERPL-MCNC: 7.6 G/DL — SIGNIFICANT CHANGE UP (ref 6–8.3)
RBC # BLD: 2.97 M/UL — LOW (ref 4.2–5.8)
RBC # FLD: 15.7 % — HIGH (ref 10.3–14.5)
SODIUM SERPL-SCNC: 138 MMOL/L — SIGNIFICANT CHANGE UP (ref 135–145)
T4 FREE SERPL-MCNC: 1.2 NG/DL — SIGNIFICANT CHANGE UP (ref 0.93–1.7)
TSH SERPL-ACNC: 1.38 UIU/ML
TSH SERPL-MCNC: 0.35 UIU/ML — SIGNIFICANT CHANGE UP (ref 0.27–4.2)
WBC # BLD: 5.8 K/UL — SIGNIFICANT CHANGE UP (ref 3.8–10.5)
WBC # FLD AUTO: 5.8 K/UL — SIGNIFICANT CHANGE UP (ref 3.8–10.5)

## 2024-06-11 PROCEDURE — 96367 TX/PROPH/DG ADDL SEQ IV INF: CPT

## 2024-06-11 PROCEDURE — 96375 TX/PRO/DX INJ NEW DRUG ADDON: CPT

## 2024-06-11 PROCEDURE — 96417 CHEMO IV INFUS EACH ADDL SEQ: CPT

## 2024-06-11 PROCEDURE — 80053 COMPREHEN METABOLIC PANEL: CPT

## 2024-06-11 PROCEDURE — 84443 ASSAY THYROID STIM HORMONE: CPT

## 2024-06-11 PROCEDURE — 84439 ASSAY OF FREE THYROXINE: CPT

## 2024-06-11 PROCEDURE — 83735 ASSAY OF MAGNESIUM: CPT

## 2024-06-11 PROCEDURE — 36415 COLL VENOUS BLD VENIPUNCTURE: CPT

## 2024-06-11 PROCEDURE — 85025 COMPLETE CBC W/AUTO DIFF WBC: CPT

## 2024-06-11 PROCEDURE — 96413 CHEMO IV INFUSION 1 HR: CPT

## 2024-06-11 RX ORDER — GEMCITABINE 38 MG/ML
1860 INJECTION, SOLUTION INTRAVENOUS ONCE
Refills: 0 | Status: COMPLETED | OUTPATIENT
Start: 2024-06-11 | End: 2024-06-11

## 2024-06-11 RX ORDER — NIVOLUMAB 10 MG/ML
360 INJECTION INTRAVENOUS ONCE
Refills: 0 | Status: COMPLETED | OUTPATIENT
Start: 2024-06-11 | End: 2024-06-11

## 2024-06-11 RX ORDER — FOSAPREPITANT DIMEGLUMINE 150 MG/5ML
150 INJECTION, POWDER, LYOPHILIZED, FOR SOLUTION INTRAVENOUS ONCE
Refills: 0 | Status: COMPLETED | OUTPATIENT
Start: 2024-06-11 | End: 2024-06-11

## 2024-06-11 RX ORDER — DEXAMETHASONE 0.5 MG/5ML
10 ELIXIR ORAL ONCE
Refills: 0 | Status: COMPLETED | OUTPATIENT
Start: 2024-06-11 | End: 2024-06-11

## 2024-06-11 RX ORDER — OMEGA-3/DHA/EPA/FISH OIL 300-1000MG
400 CAPSULE ORAL DAILY
Qty: 5 | Refills: 0 | Status: ACTIVE | COMMUNITY
Start: 2024-06-11 | End: 1900-01-01

## 2024-06-11 RX ORDER — MAGNESIUM OXIDE 400 MG ORAL TABLET 241.3 MG
800 TABLET ORAL ONCE
Refills: 0 | Status: COMPLETED | OUTPATIENT
Start: 2024-06-11 | End: 2024-06-11

## 2024-06-11 RX ADMIN — MAGNESIUM OXIDE 400 MG ORAL TABLET 800 MILLIGRAM(S): 241.3 TABLET ORAL at 12:53

## 2024-06-11 RX ADMIN — PALONOSETRON HYDROCHLORIDE 0.25 MILLIGRAM(S): 0.25 INJECTION, SOLUTION INTRAVENOUS at 13:05

## 2024-06-11 RX ADMIN — GEMCITABINE 1860 MILLIGRAM(S): 38 INJECTION, SOLUTION INTRAVENOUS at 13:55

## 2024-06-11 RX ADMIN — FOSAPREPITANT DIMEGLUMINE 500 MILLIGRAM(S): 150 INJECTION, POWDER, LYOPHILIZED, FOR SOLUTION INTRAVENOUS at 12:10

## 2024-06-11 RX ADMIN — FOSAPREPITANT DIMEGLUMINE 150 MILLIGRAM(S): 150 INJECTION, POWDER, LYOPHILIZED, FOR SOLUTION INTRAVENOUS at 12:40

## 2024-06-11 RX ADMIN — NIVOLUMAB 360 MILLIGRAM(S): 10 INJECTION INTRAVENOUS at 12:00

## 2024-06-11 RX ADMIN — Medication 10 MILLIGRAM(S): at 13:04

## 2024-06-11 RX ADMIN — Medication 469 MILLIGRAM(S): at 15:40

## 2024-06-11 RX ADMIN — Medication 469 MILLIGRAM(S): at 15:12

## 2024-06-11 RX ADMIN — NIVOLUMAB 360 MILLIGRAM(S): 10 INJECTION INTRAVENOUS at 11:30

## 2024-06-11 RX ADMIN — Medication 204 MILLIGRAM(S): at 12:49

## 2024-06-11 RX ADMIN — GEMCITABINE 1860 MILLIGRAM(S): 38 INJECTION, SOLUTION INTRAVENOUS at 14:35

## 2024-06-14 RX ORDER — PALONOSETRON HYDROCHLORIDE 0.25 MG/5ML
0.25 INJECTION, SOLUTION INTRAVENOUS ONCE
Refills: 0 | Status: COMPLETED | OUTPATIENT
Start: 2024-06-19 | End: 2024-06-19

## 2024-06-14 RX ORDER — DEXAMETHASONE 1 MG/1
10 TABLET ORAL ONCE
Refills: 0 | Status: COMPLETED | OUTPATIENT
Start: 2024-06-19 | End: 2024-06-19

## 2024-06-18 ENCOUNTER — OUTPATIENT (OUTPATIENT)
Dept: OUTPATIENT SERVICES | Facility: HOSPITAL | Age: 69
LOS: 1 days | End: 2024-06-18
Payer: MEDICARE

## 2024-06-18 ENCOUNTER — APPOINTMENT (OUTPATIENT)
Dept: INFUSION THERAPY | Facility: CLINIC | Age: 69
End: 2024-06-18

## 2024-06-18 VITALS
WEIGHT: 153 LBS | TEMPERATURE: 98 F | DIASTOLIC BLOOD PRESSURE: 72 MMHG | HEART RATE: 90 BPM | RESPIRATION RATE: 16 BRPM | HEIGHT: 66 IN | SYSTOLIC BLOOD PRESSURE: 119 MMHG | OXYGEN SATURATION: 100 %

## 2024-06-18 DIAGNOSIS — Z90.49 ACQUIRED ABSENCE OF OTHER SPECIFIED PARTS OF DIGESTIVE TRACT: Chronic | ICD-10-CM

## 2024-06-18 DIAGNOSIS — C34.92 MALIGNANT NEOPLASM OF UNSPECIFIED PART OF LEFT BRONCHUS OR LUNG: ICD-10-CM

## 2024-06-18 LAB
ALBUMIN SERPL ELPH-MCNC: 3.1 G/DL — LOW (ref 3.3–5)
ALP SERPL-CCNC: 70 U/L — SIGNIFICANT CHANGE UP (ref 40–120)
ALT FLD-CCNC: 25 U/L — SIGNIFICANT CHANGE UP (ref 10–45)
ANION GAP SERPL CALC-SCNC: 7 MMOL/L — SIGNIFICANT CHANGE UP (ref 5–17)
AST SERPL-CCNC: 20 U/L — SIGNIFICANT CHANGE UP (ref 10–40)
BILIRUB SERPL-MCNC: 0.6 MG/DL — SIGNIFICANT CHANGE UP (ref 0.2–1.2)
BUN SERPL-MCNC: 18 MG/DL — SIGNIFICANT CHANGE UP (ref 7–23)
CALCIUM SERPL-MCNC: 9.6 MG/DL — SIGNIFICANT CHANGE UP (ref 8.4–10.5)
CHLORIDE SERPL-SCNC: 99 MMOL/L — SIGNIFICANT CHANGE UP (ref 96–108)
CO2 SERPL-SCNC: 31 MMOL/L — SIGNIFICANT CHANGE UP (ref 22–31)
CREAT SERPL-MCNC: 1 MG/DL — SIGNIFICANT CHANGE UP (ref 0.5–1.3)
EGFR: 81 ML/MIN/1.73M2 — SIGNIFICANT CHANGE UP
GLUCOSE SERPL-MCNC: 184 MG/DL — HIGH (ref 70–99)
HCT VFR BLD CALC: 22.2 % — LOW (ref 39–50)
HGB BLD-MCNC: 7.7 G/DL — LOW (ref 13–17)
LYMPHOCYTES # BLD AUTO: 1.2 K/UL — SIGNIFICANT CHANGE UP (ref 1–3.3)
LYMPHOCYTES # BLD AUTO: 28.1 % — SIGNIFICANT CHANGE UP (ref 13–44)
MAGNESIUM SERPL-MCNC: 1.3 MG/DL — LOW (ref 1.6–2.6)
MCHC RBC-ENTMCNC: 27.9 PG — SIGNIFICANT CHANGE UP (ref 27–34)
MCHC RBC-ENTMCNC: 34.7 GM/DL — SIGNIFICANT CHANGE UP (ref 32–36)
MCV RBC AUTO: 80.4 FL — SIGNIFICANT CHANGE UP (ref 80–100)
NEUTROPHILS # BLD AUTO: 2.7 K/UL — SIGNIFICANT CHANGE UP (ref 1.8–7.4)
NEUTROPHILS NFR BLD AUTO: 64 % — SIGNIFICANT CHANGE UP (ref 43–77)
PLATELET # BLD AUTO: 250 K/UL — SIGNIFICANT CHANGE UP (ref 150–400)
POTASSIUM SERPL-MCNC: 4.2 MMOL/L — SIGNIFICANT CHANGE UP (ref 3.5–5.3)
POTASSIUM SERPL-SCNC: 4.2 MMOL/L — SIGNIFICANT CHANGE UP (ref 3.5–5.3)
PROT SERPL-MCNC: 7.1 G/DL — SIGNIFICANT CHANGE UP (ref 6–8.3)
RBC # BLD: 2.76 M/UL — LOW (ref 4.2–5.8)
RBC # FLD: 15.1 % — HIGH (ref 10.3–14.5)
SODIUM SERPL-SCNC: 137 MMOL/L — SIGNIFICANT CHANGE UP (ref 135–145)
T4 FREE SERPL-MCNC: 1.15 NG/DL — SIGNIFICANT CHANGE UP (ref 0.93–1.7)
TSH SERPL-MCNC: 1.41 UIU/ML — SIGNIFICANT CHANGE UP (ref 0.27–4.2)
WBC # BLD: 4.2 K/UL — SIGNIFICANT CHANGE UP (ref 3.8–10.5)
WBC # FLD AUTO: 4.2 K/UL — SIGNIFICANT CHANGE UP (ref 3.8–10.5)

## 2024-06-18 PROCEDURE — 80053 COMPREHEN METABOLIC PANEL: CPT

## 2024-06-18 PROCEDURE — 87102 FUNGUS ISOLATION CULTURE: CPT

## 2024-06-18 PROCEDURE — 84439 ASSAY OF FREE THYROXINE: CPT

## 2024-06-18 PROCEDURE — 71045 X-RAY EXAM CHEST 1 VIEW: CPT

## 2024-06-18 PROCEDURE — 88112 CYTOPATH CELL ENHANCE TECH: CPT

## 2024-06-18 PROCEDURE — 31652 BRONCH EBUS SAMPLNG 1/2 NODE: CPT

## 2024-06-18 PROCEDURE — 87070 CULTURE OTHR SPECIMN AEROBIC: CPT

## 2024-06-18 PROCEDURE — C2613: CPT

## 2024-06-18 PROCEDURE — 84443 ASSAY THYROID STIM HORMONE: CPT

## 2024-06-18 PROCEDURE — 83735 ASSAY OF MAGNESIUM: CPT

## 2024-06-18 PROCEDURE — 86900 BLOOD TYPING SEROLOGIC ABO: CPT

## 2024-06-18 PROCEDURE — 88342 IMHCHEM/IMCYTCHM 1ST ANTB: CPT

## 2024-06-18 PROCEDURE — 87015 SPECIMEN INFECT AGNT CONCNTJ: CPT

## 2024-06-18 PROCEDURE — 32408 CORE NDL BX LNG/MED PERQ: CPT

## 2024-06-18 PROCEDURE — 85730 THROMBOPLASTIN TIME PARTIAL: CPT

## 2024-06-18 PROCEDURE — 85610 PROTHROMBIN TIME: CPT

## 2024-06-18 PROCEDURE — 82962 GLUCOSE BLOOD TEST: CPT

## 2024-06-18 PROCEDURE — 86901 BLOOD TYPING SEROLOGIC RH(D): CPT

## 2024-06-18 PROCEDURE — 88173 CYTOPATH EVAL FNA REPORT: CPT

## 2024-06-18 PROCEDURE — 86850 RBC ANTIBODY SCREEN: CPT

## 2024-06-18 PROCEDURE — 36415 COLL VENOUS BLD VENIPUNCTURE: CPT

## 2024-06-18 PROCEDURE — 87116 MYCOBACTERIA CULTURE: CPT

## 2024-06-18 PROCEDURE — 93005 ELECTROCARDIOGRAM TRACING: CPT

## 2024-06-18 PROCEDURE — 88341 IMHCHEM/IMCYTCHM EA ADD ANTB: CPT

## 2024-06-18 PROCEDURE — 88305 TISSUE EXAM BY PATHOLOGIST: CPT

## 2024-06-18 PROCEDURE — 87206 SMEAR FLUORESCENT/ACID STAI: CPT

## 2024-06-18 PROCEDURE — 85025 COMPLETE CBC W/AUTO DIFF WBC: CPT

## 2024-06-18 PROCEDURE — 85027 COMPLETE CBC AUTOMATED: CPT

## 2024-06-19 ENCOUNTER — APPOINTMENT (OUTPATIENT)
Dept: INFUSION THERAPY | Facility: CLINIC | Age: 69
End: 2024-06-19

## 2024-06-19 ENCOUNTER — APPOINTMENT (OUTPATIENT)
Dept: HEMATOLOGY ONCOLOGY | Facility: CLINIC | Age: 69
End: 2024-06-19
Payer: MEDICARE

## 2024-06-19 ENCOUNTER — OUTPATIENT (OUTPATIENT)
Dept: OUTPATIENT SERVICES | Facility: HOSPITAL | Age: 69
LOS: 1 days | End: 2024-06-19
Payer: MEDICARE

## 2024-06-19 VITALS
TEMPERATURE: 98 F | WEIGHT: 151.9 LBS | OXYGEN SATURATION: 99 % | SYSTOLIC BLOOD PRESSURE: 124 MMHG | DIASTOLIC BLOOD PRESSURE: 71 MMHG | RESPIRATION RATE: 18 BRPM | HEART RATE: 97 BPM | HEIGHT: 66 IN

## 2024-06-19 VITALS
WEIGHT: 152 LBS | RESPIRATION RATE: 18 BRPM | HEART RATE: 97 BPM | HEIGHT: 67 IN | OXYGEN SATURATION: 98 % | BODY MASS INDEX: 23.86 KG/M2 | SYSTOLIC BLOOD PRESSURE: 124 MMHG | TEMPERATURE: 98.2 F | DIASTOLIC BLOOD PRESSURE: 71 MMHG

## 2024-06-19 DIAGNOSIS — C34.92 MALIGNANT NEOPLASM OF UNSPECIFIED PART OF LEFT BRONCHUS OR LUNG: ICD-10-CM

## 2024-06-19 PROCEDURE — 99213 OFFICE O/P EST LOW 20 MIN: CPT

## 2024-06-19 PROCEDURE — 36430 TRANSFUSION BLD/BLD COMPNT: CPT

## 2024-06-19 PROCEDURE — 96375 TX/PRO/DX INJ NEW DRUG ADDON: CPT

## 2024-06-19 PROCEDURE — 96413 CHEMO IV INFUSION 1 HR: CPT

## 2024-06-19 PROCEDURE — G2211 COMPLEX E/M VISIT ADD ON: CPT

## 2024-06-19 PROCEDURE — 86923 COMPATIBILITY TEST ELECTRIC: CPT

## 2024-06-19 PROCEDURE — 36415 COLL VENOUS BLD VENIPUNCTURE: CPT

## 2024-06-19 PROCEDURE — P9040: CPT

## 2024-06-19 RX ORDER — GEMCITABINE 10 MG/ML
1860 INJECTION, SOLUTION INTRAVENOUS ONCE
Refills: 0 | Status: COMPLETED | OUTPATIENT
Start: 2024-06-19 | End: 2024-06-19

## 2024-06-19 RX ADMIN — PALONOSETRON HYDROCHLORIDE 0.25 MILLIGRAM(S): 0.25 INJECTION, SOLUTION INTRAVENOUS at 11:35

## 2024-06-19 RX ADMIN — DEXAMETHASONE 10 MILLIGRAM(S): 1 TABLET ORAL at 11:35

## 2024-06-19 RX ADMIN — DEXAMETHASONE 204 MILLIGRAM(S): 1 TABLET ORAL at 11:20

## 2024-06-19 RX ADMIN — GEMCITABINE 1860 MILLIGRAM(S): 10 INJECTION, SOLUTION INTRAVENOUS at 13:00

## 2024-06-19 RX ADMIN — GEMCITABINE 1860 MILLIGRAM(S): 10 INJECTION, SOLUTION INTRAVENOUS at 11:54

## 2024-06-19 NOTE — ASSESSMENT
[FreeTextEntry1] : 69YM w/ PMHx GERD, HTN, HLD, DMII presenting for follow-up for squamous cell lung cancer, stage IIIa, T4 N0 M0, PD-L1 negative currently on neoadjuvant chemoimmunotherapy. Chemotherapy course complicated by acute kidney injury, likely related to cisplatin.  Patient is here for follow-up.  Squamous Cell Lung Ca, AJCC IIIa, T4N0M0  - PDL1 negative We proposed doublet platinum based chemotherapy + immunotherapy based on the Checkmate 816 trial - The median event-free survival was 31.6 months (95% confidence interval [CI], 30.2 to not reached) with nivolumab plus chemotherapy and 20.8 months (95% CI, 14.0 to 26.7) with chemotherapy alone (hazard ratio for disease progression, disease recurrence, or death, 0.63; 97.38% CI, 0.43 to 0.91; P=0.005). The percentage of patients with a pathological complete response was 24.0% (95% CI, 18.0 to 31.0) and 2.2% (95% CI, 0.6 to 5.6), respectively (odds ratio, 13.94; 99% CI, 3.49 to 55.75; P<0.001). Results for event-free survival and pathological complete response across most subgroups favored nivolumab plus chemotherapy over chemotherapy alone.  -- He developed an HENRY following a 1 cycle of cisplatin and subsequently switched to carboplatin - C2 5/20/2024 - labs for for C3 06/10/24, cleared for tx on 6/11/2024 Hgb in 8 range - patient advised to suplement nutrition with iron rich foods, re-check next week --plan to repeat CT chest in 3 weeks with referral to CT surgery for hopefully resection  HENRY Likely due to cisplatin, now resolved - follows with Dr Chele Jin adenoma --will refer to Dr.D'Amico after therapy for lung ca complete   6/19/24 onc clinic  a/p NSCLC  Labs: 6/28/24 : h/h 7.7/ 22. Tx was held and will be given 6/19/24 with a blood transfusion Continue to monitor labs and s/s  Will follow up with pt on Monday 6/24/24  Pending scan 6/26/24  -plan to repeat CT chest with referral to CT surgery for hopefully resection  RTC after scans for review.  .Encourage to contact the office for any concerns or worsening symptoms. Pt verbalizes understanding

## 2024-06-19 NOTE — HISTORY OF PRESENT ILLNESS
[Disease: _____________________] : Disease: [unfilled] [T: ___] : T[unfilled] [N: ___] : N[unfilled] [M: ___] : M[unfilled] [AJCC Stage: ____] : AJCC Stage: [unfilled] [100: Normal, no complaints, no evidence of disease.] : 100: Normal, no complaints, no evidence of disease. [ECOG Performance Status: 0 - Fully active, able to carry on all pre-disease performance without restriction] : Performance Status: 0 - Fully active, able to carry on all pre-disease performance without restriction [de-identified] : Don Castrejon is a 69-year-old male who presents to the clinic for f/u of LLL mass, c/w stage IIIA (T4N0M0) NSCLC - SCC histology.  Onc hx:  Social Hx: 50 pack yr smoking hx, quit when he found out about the mass Originally from [place], lives in [place] by himself. Retired .  Son lives in [place]   3/15/24: MRI Brain:  Punctate focus of enhancement the left lateral cerebellar hemisphere (series 701 image 55 and series 702 image 23). There is no associated signal abnormality in the brain parenchyma. Given the lack of associated signal abnormality or additional areas of pathologic enhancement, these findings may represent vascular enhancement versus artifact. However, attention on follow-up imaging is recommended to exclude intracranial metastasis.  Focal area of intrinsic T1 hyperintensity more inferiorly in the left cerebellar hemisphere without superimposed pathologic enhancement. This may represent area of mineralization. 5 mm hypoenhancing lesion in the left side of the sella most compatible with pituitary adenoma. Correlation with endocrine function and consideration for further evaluation dedicated MRI sella is recommended. PET:  1. Compared to chest CT from 2/15/2024, the 76 mm mass in the lingula is hypermetabolic and suspicious for lung cancer. 2. The borderline sized thoracic lymph nodes on the CT scan are not FDG avid. 3. Mildly nodular right adrenal gland is not FDG avid. No evidence of metastatic lung cancer. 4. Mild increased activity in enlarged and lobular prostate. Recommend correlation with PSA to rule out prostate cancer.  3/15/24: MR Head:  Punctate focus of enhancement the left lateral cerebellar hemisphere (series 701 image 55 and series 702 image 23). There is no associated signal abnormality in the brain parenchyma. Given the lack of associated signal abnormality or additional areas of pathologic enhancement, these findings may represent vascular enhancement versus artifact. However, attention on follow-up imaging is recommended to exclude intracranial metastasis. Focal area of intrinsic T1 hyperintensity more inferiorly in the left cerebellar hemisphere without superimposed pathologic enhancement. This may represent area of mineralization. 5 mm hypoenhancing lesion in the left side of the sella most compatible with pituitary adenoma. Correlation with endocrine function and consideration for further evaluation dedicated MRI sella is recommended.  3/15/24: PET: 1. Compared to chest CT from 2/15/2024, the 76 mm mass in the lingula is hypermetabolic and suspicious for lung cancer. 2. The borderline sized thoracic lymph nodes on the CT scan are not FDG avid. 3. Mildly nodular right adrenal gland is not FDG avid. No evidence of metastatic lung cancer. 4. Mild increased activity in enlarged and lobular prostate. Recommend correlation with PSA to rule out prostate cancer.  4/3/24: Chest Xray:  Small left apex pneumothorax, similar to prior exam earlier same day. Left lower lung mass again noted. No acute infiltrates. No significant pleural effusion.  04/03/2024 EBUS  Lung, left, core biopsy: -Squamous cell carcinoma. LYMPH NODE, 11L NEGATIVE FOR MALIGNANT CELLS.  4/4/2024: TTB - rec for MAGDALENA 5/3/2024: Sent to ED by  for worsening HENRY  6/19/24 onc clin 69-year-old male who presents to the clinic for f/u of LLL mass, c/w stage IIIA (T4N0M0) NSCLC - SCC. Presents today for follow up. Patient is here due to treatment being held yesterday because low Hgb. Patient has been set up to receive both blood transfusion and tx (Gemcitabine Day 8) today. He admits feeling fatigue but denies any sob or cp.   [de-identified] : Squamous Cell Ca  - PDL1 negative [de-identified] : CTSx:  [FreeTextEntry1] : 4/23/2024: C1D1 cisplatin/gemcitabine/nivolumab D8 gemcitabine skipped due to HENRY 2/2 cisplatin 5/21/2024: C2D1 carboplatin/gemcitabine.nivolumab 5/28/2024: C2D8 gemcitabine 6/11/2024: Due for C3D1 carboplatin/gemcitabine/nivolumab 6/18/24    C3D8 Gemcitabine held due to low h/h 6/19/24    C3D8 Gemcitabine given w/ blood tranfusion  [de-identified] : The patient reports feeling well.  He denies any shortness of breath. No more swelling. Endorses fatigue around day 4-6 of chemotherapy, otherwise feeling well.

## 2024-06-24 ENCOUNTER — APPOINTMENT (OUTPATIENT)
Dept: HEMATOLOGY ONCOLOGY | Facility: CLINIC | Age: 69
End: 2024-06-24
Payer: MEDICARE

## 2024-06-24 VITALS
WEIGHT: 153 LBS | HEART RATE: 93 BPM | RESPIRATION RATE: 18 BRPM | OXYGEN SATURATION: 100 % | BODY MASS INDEX: 24.01 KG/M2 | DIASTOLIC BLOOD PRESSURE: 69 MMHG | TEMPERATURE: 98.1 F | HEIGHT: 67 IN | SYSTOLIC BLOOD PRESSURE: 111 MMHG

## 2024-06-24 LAB
ALBUMIN SERPL ELPH-MCNC: 3.6 G/DL
ALP BLD-CCNC: 64 U/L
ALT SERPL-CCNC: 29 U/L
ANION GAP SERPL CALC-SCNC: 7 MMOL/L
AST SERPL-CCNC: 23 U/L
BILIRUB SERPL-MCNC: 1 MG/DL
BUN SERPL-MCNC: 15 MG/DL
CALCIUM SERPL-MCNC: 9.8 MG/DL
CHLORIDE SERPL-SCNC: 99 MMOL/L
CO2 SERPL-SCNC: 30 MMOL/L
CREAT SERPL-MCNC: 1 MG/DL
EGFR: 81 ML/MIN/1.73M2
GLUCOSE SERPL-MCNC: 132 MG/DL
HCT VFR BLD CALC: 23.2 %
HGB BLD-MCNC: 8 G/DL
LYMPHOCYTES # BLD AUTO: 0.9 K/UL
LYMPHOCYTES NFR BLD AUTO: 22.6 %
MAN DIFF?: NO
MCHC RBC-ENTMCNC: 28.2 PG
MCHC RBC-ENTMCNC: 34.5 GM/DL
MCV RBC AUTO: 81.7 FL
NEUTROPHILS # BLD AUTO: 2.8 K/UL
NEUTROPHILS NFR BLD AUTO: 74.8 %
PLATELET # BLD AUTO: 131 K/UL
POTASSIUM SERPL-SCNC: 4.3 MMOL/L
PROT SERPL-MCNC: 7.1 G/DL
RBC # BLD: 2.84 M/UL
RBC # FLD: 15.4 %
SODIUM SERPL-SCNC: 136 MMOL/L
WBC # FLD AUTO: 3.8 K/UL

## 2024-06-24 PROCEDURE — G2211 COMPLEX E/M VISIT ADD ON: CPT

## 2024-06-24 PROCEDURE — 36415 COLL VENOUS BLD VENIPUNCTURE: CPT

## 2024-06-24 PROCEDURE — 99214 OFFICE O/P EST MOD 30 MIN: CPT

## 2024-06-25 NOTE — ASSESSMENT
[FreeTextEntry1] : 69YM w/ PMHx GERD, HTN, HLD, DMII presenting for follow-up for squamous cell lung cancer, stage IIIa, T4 N0 M0, PD-L1 negative currently on neoadjuvant chemoimmunotherapy. Chemotherapy course complicated by acute kidney injury, likely related to cisplatin.  Patient is here for follow-up.  Squamous Cell Lung Ca, AJCC IIIa, T4N0M0  - PDL1 negative We proposed doublet platinum based chemotherapy + immunotherapy based on the Checkmate 816 trial - The median event-free survival was 31.6 months (95% confidence interval [CI], 30.2 to not reached) with nivolumab plus chemotherapy and 20.8 months (95% CI, 14.0 to 26.7) with chemotherapy alone (hazard ratio for disease progression, disease recurrence, or death, 0.63; 97.38% CI, 0.43 to 0.91; P=0.005). The percentage of patients with a pathological complete response was 24.0% (95% CI, 18.0 to 31.0) and 2.2% (95% CI, 0.6 to 5.6), respectively (odds ratio, 13.94; 99% CI, 3.49 to 55.75; P<0.001). Results for event-free survival and pathological complete response across most subgroups favored nivolumab plus chemotherapy over chemotherapy alone.  -- He developed an HENRY following a 1 cycle of cisplatin and subsequently switched to carboplatin - C2 5/20/2024 - labs for for C3 06/10/24, cleared for tx on 6/11/2024 Hgb in 8 range - patient advised to suplement nutrition with iron rich foods, re-check next week --plan to repeat CT chest in 3 weeks with referral to CT surgery for hopefully resection  HENRY Likely due to cisplatin, now resolved - follows with Dr Chele Jin adenoma --will refer to Dr.D'Amico after therapy for lung ca complete   6/19/24 onc clinic  a/p NSCLC  Labs: 6/28/24 : h/h 7.7/ 22. Tx was held and will be given 6/19/24 with a blood transfusion Continue to monitor labs and s/s  Will follow up with pt on Monday 6/24/24  Pending scan 6/26/24  -plan to repeat CT chest with referral to CT surgery for hopefully resection  RTC after scans for review.  .Encourage to contact the office for any concerns or worsening symptoms. Pt verbalizes understanding  6/24/24 onc clinic A/p NSCLC  Labs: H/H   8.0/23.2 . Tx was given  Three Cycles :carbo/gemcitabine/nivo completed  Pending scans 6/26/24 continue to monitor labs and s/s  RTC 7/1/24 scan review  .Encourage to contact the office for any concerns or worsening symptoms. Pt verbalizes understanding

## 2024-06-25 NOTE — HISTORY OF PRESENT ILLNESS
[Disease: _____________________] : Disease: [unfilled] [T: ___] : T[unfilled] [N: ___] : N[unfilled] [M: ___] : M[unfilled] [AJCC Stage: ____] : AJCC Stage: [unfilled] [100: Normal, no complaints, no evidence of disease.] : 100: Normal, no complaints, no evidence of disease. [ECOG Performance Status: 0 - Fully active, able to carry on all pre-disease performance without restriction] : Performance Status: 0 - Fully active, able to carry on all pre-disease performance without restriction [de-identified] : Don Castrejon is a 69-year-old male who presents to the clinic for f/u of LLL mass, c/w stage IIIA (T4N0M0) NSCLC - SCC histology.  Onc hx:  Social Hx: 50 pack yr smoking hx, quit when he found out about the mass Originally from [place], lives in [place] by himself. Retired .  Son lives in [place]   3/15/24: MRI Brain:  Punctate focus of enhancement the left lateral cerebellar hemisphere (series 701 image 55 and series 702 image 23). There is no associated signal abnormality in the brain parenchyma. Given the lack of associated signal abnormality or additional areas of pathologic enhancement, these findings may represent vascular enhancement versus artifact. However, attention on follow-up imaging is recommended to exclude intracranial metastasis.  Focal area of intrinsic T1 hyperintensity more inferiorly in the left cerebellar hemisphere without superimposed pathologic enhancement. This may represent area of mineralization. 5 mm hypoenhancing lesion in the left side of the sella most compatible with pituitary adenoma. Correlation with endocrine function and consideration for further evaluation dedicated MRI sella is recommended. PET:  1. Compared to chest CT from 2/15/2024, the 76 mm mass in the lingula is hypermetabolic and suspicious for lung cancer. 2. The borderline sized thoracic lymph nodes on the CT scan are not FDG avid. 3. Mildly nodular right adrenal gland is not FDG avid. No evidence of metastatic lung cancer. 4. Mild increased activity in enlarged and lobular prostate. Recommend correlation with PSA to rule out prostate cancer.  3/15/24: MR Head:  Punctate focus of enhancement the left lateral cerebellar hemisphere (series 701 image 55 and series 702 image 23). There is no associated signal abnormality in the brain parenchyma. Given the lack of associated signal abnormality or additional areas of pathologic enhancement, these findings may represent vascular enhancement versus artifact. However, attention on follow-up imaging is recommended to exclude intracranial metastasis. Focal area of intrinsic T1 hyperintensity more inferiorly in the left cerebellar hemisphere without superimposed pathologic enhancement. This may represent area of mineralization. 5 mm hypoenhancing lesion in the left side of the sella most compatible with pituitary adenoma. Correlation with endocrine function and consideration for further evaluation dedicated MRI sella is recommended.  3/15/24: PET: 1. Compared to chest CT from 2/15/2024, the 76 mm mass in the lingula is hypermetabolic and suspicious for lung cancer. 2. The borderline sized thoracic lymph nodes on the CT scan are not FDG avid. 3. Mildly nodular right adrenal gland is not FDG avid. No evidence of metastatic lung cancer. 4. Mild increased activity in enlarged and lobular prostate. Recommend correlation with PSA to rule out prostate cancer.  4/3/24: Chest Xray:  Small left apex pneumothorax, similar to prior exam earlier same day. Left lower lung mass again noted. No acute infiltrates. No significant pleural effusion.  04/03/2024 EBUS  Lung, left, core biopsy: -Squamous cell carcinoma. LYMPH NODE, 11L NEGATIVE FOR MALIGNANT CELLS.  4/4/2024: TTB - rec for MAGDALENA 5/3/2024: Sent to ED by  for worsening HENRY  6/19/24 onc clin 69-year-old male who presents to the clinic for f/u of LLL mass, c/w stage IIIA (T4N0M0) NSCLC - SCC. Presents today for follow up. Patient is here due to treatment being held yesterday because low Hgb. Patient has been set up to receive both blood transfusion and tx (Gemcitabine Day 8) today. He admits feeling fatigue but denies any sob or cp.   6/24/24 onc clinic 70 y/o m with LLL mass stage IIIA NSCLC -SCC is here for follow up post tx. Patient report to feel better. Denies any acute symptoms at this time.  [de-identified] : Squamous Cell Ca  - PDL1 negative [de-identified] : CTSx:  [FreeTextEntry1] : 4/23/2024: C1D1 cisplatin/gemcitabine/nivolumab D8 gemcitabine skipped due to HENRY 2/2 cisplatin 5/21/2024: C2D1 carboplatin/gemcitabine.nivolumab 5/28/2024: C2D8 gemcitabine 6/11/2024: Due for C3D1 carboplatin/gemcitabine/nivolumab 6/18/24    C3D8 Gemcitabine held due to low h/h 6/19/24    C3D8 Gemcitabine given w/ blood tranfusion  [de-identified] : The patient reports feeling well.  He denies any shortness of breath. No more swelling. Endorses fatigue around day 4-6 of chemotherapy, otherwise feeling well.

## 2024-06-26 ENCOUNTER — NON-APPOINTMENT (OUTPATIENT)
Age: 69
End: 2024-06-26

## 2024-06-26 ENCOUNTER — OUTPATIENT (OUTPATIENT)
Dept: OUTPATIENT SERVICES | Facility: HOSPITAL | Age: 69
LOS: 1 days | End: 2024-06-26
Payer: MEDICARE

## 2024-06-26 ENCOUNTER — APPOINTMENT (OUTPATIENT)
Dept: CT IMAGING | Facility: HOSPITAL | Age: 69
End: 2024-06-26

## 2024-06-26 DIAGNOSIS — Z90.49 ACQUIRED ABSENCE OF OTHER SPECIFIED PARTS OF DIGESTIVE TRACT: Chronic | ICD-10-CM

## 2024-06-26 LAB — POCT ISTAT CREATININE: 1 MG/DL — SIGNIFICANT CHANGE UP (ref 0.5–1.3)

## 2024-06-26 PROCEDURE — 71260 CT THORAX DX C+: CPT | Mod: 26

## 2024-06-26 PROCEDURE — 82565 ASSAY OF CREATININE: CPT

## 2024-06-26 PROCEDURE — 71260 CT THORAX DX C+: CPT

## 2024-06-27 ENCOUNTER — NON-APPOINTMENT (OUTPATIENT)
Age: 69
End: 2024-06-27

## 2024-06-28 ENCOUNTER — TRANSCRIPTION ENCOUNTER (OUTPATIENT)
Age: 69
End: 2024-06-28

## 2024-07-01 ENCOUNTER — APPOINTMENT (OUTPATIENT)
Dept: NUCLEAR MEDICINE | Facility: HOSPITAL | Age: 69
End: 2024-07-01
Payer: MEDICARE

## 2024-07-01 ENCOUNTER — RESULT REVIEW (OUTPATIENT)
Age: 69
End: 2024-07-01

## 2024-07-01 ENCOUNTER — OUTPATIENT (OUTPATIENT)
Dept: OUTPATIENT SERVICES | Facility: HOSPITAL | Age: 69
LOS: 1 days | End: 2024-07-01
Payer: MEDICARE

## 2024-07-01 ENCOUNTER — APPOINTMENT (OUTPATIENT)
Dept: HEMATOLOGY ONCOLOGY | Facility: CLINIC | Age: 69
End: 2024-07-01
Payer: MEDICARE

## 2024-07-01 VITALS
HEART RATE: 100 BPM | TEMPERATURE: 98.1 F | HEIGHT: 67 IN | WEIGHT: 151 LBS | SYSTOLIC BLOOD PRESSURE: 118 MMHG | DIASTOLIC BLOOD PRESSURE: 69 MMHG | OXYGEN SATURATION: 99 % | BODY MASS INDEX: 23.7 KG/M2 | RESPIRATION RATE: 18 BRPM

## 2024-07-01 DIAGNOSIS — C34.92 MALIGNANT NEOPLASM OF UNSPECIFIED PART OF LEFT BRONCHUS OR LUNG: ICD-10-CM

## 2024-07-01 DIAGNOSIS — D64.9 ANEMIA, UNSPECIFIED: ICD-10-CM

## 2024-07-01 LAB
ALBUMIN SERPL ELPH-MCNC: 3.1 G/DL
ALP BLD-CCNC: 64 U/L
ALT SERPL-CCNC: 23 U/L
ANION GAP SERPL CALC-SCNC: 14 MMOL/L
AST SERPL-CCNC: 25 U/L
BILIRUB SERPL-MCNC: 0.7 MG/DL
BUN SERPL-MCNC: 13 MG/DL
CALCIUM SERPL-MCNC: 10.5 MG/DL
CHLORIDE SERPL-SCNC: 101 MMOL/L
CO2 SERPL-SCNC: 32 MMOL/L
CREAT SERPL-MCNC: 1 MG/DL
EGFR: 81 ML/MIN/1.73M2
GLUCOSE SERPL-MCNC: 178 MG/DL
HCT VFR BLD CALC: 25.9 %
HGB BLD-MCNC: 8.6 G/DL
LYMPHOCYTES # BLD AUTO: 1.5 K/UL
LYMPHOCYTES NFR BLD AUTO: 22.3 %
MAN DIFF?: NO
MCHC RBC-ENTMCNC: 28.1 PG
MCHC RBC-ENTMCNC: 33.2 GM/DL
MCV RBC AUTO: 84.6 FL
NEUTROPHILS # BLD AUTO: 4.2 K/UL
NEUTROPHILS NFR BLD AUTO: 61.4 %
PLATELET # BLD AUTO: 329 K/UL
POTASSIUM SERPL-SCNC: 4.4 MMOL/L
PROT SERPL-MCNC: 7 G/DL
RBC # BLD: 3.06 M/UL
RBC # FLD: 17.8 %
SODIUM SERPL-SCNC: 147 MMOL/L
WBC # FLD AUTO: 6.8 K/UL

## 2024-07-01 PROCEDURE — 99215 OFFICE O/P EST HI 40 MIN: CPT

## 2024-07-01 PROCEDURE — G2211 COMPLEX E/M VISIT ADD ON: CPT

## 2024-07-01 PROCEDURE — 78815 PET IMAGE W/CT SKULL-THIGH: CPT

## 2024-07-01 PROCEDURE — A9552: CPT

## 2024-07-01 PROCEDURE — 82962 GLUCOSE BLOOD TEST: CPT

## 2024-07-01 PROCEDURE — 78815 PET IMAGE W/CT SKULL-THIGH: CPT | Mod: 26,PS

## 2024-07-02 ENCOUNTER — APPOINTMENT (OUTPATIENT)
Dept: INFUSION THERAPY | Facility: CLINIC | Age: 69
End: 2024-07-02

## 2024-07-08 ENCOUNTER — NON-APPOINTMENT (OUTPATIENT)
Age: 69
End: 2024-07-08

## 2024-07-08 DIAGNOSIS — Z01.818 ENCOUNTER FOR OTHER PREPROCEDURAL EXAMINATION: ICD-10-CM

## 2024-07-09 ENCOUNTER — APPOINTMENT (OUTPATIENT)
Dept: INFUSION THERAPY | Facility: CLINIC | Age: 69
End: 2024-07-09

## 2024-07-09 ENCOUNTER — NON-APPOINTMENT (OUTPATIENT)
Age: 69
End: 2024-07-09

## 2024-07-10 ENCOUNTER — OUTPATIENT (OUTPATIENT)
Dept: OUTPATIENT SERVICES | Facility: HOSPITAL | Age: 69
LOS: 1 days | End: 2024-07-10
Payer: MEDICARE

## 2024-07-10 ENCOUNTER — APPOINTMENT (OUTPATIENT)
Dept: NUCLEAR MEDICINE | Facility: HOSPITAL | Age: 69
End: 2024-07-10

## 2024-07-10 PROCEDURE — A9567: CPT

## 2024-07-10 PROCEDURE — 78598 LUNG PERF&VENTILAT DIFERENTL: CPT

## 2024-07-10 PROCEDURE — A9540: CPT

## 2024-07-10 PROCEDURE — 78598 LUNG PERF&VENTILAT DIFERENTL: CPT | Mod: 26

## 2024-07-11 ENCOUNTER — OUTPATIENT (OUTPATIENT)
Dept: OUTPATIENT SERVICES | Facility: HOSPITAL | Age: 69
LOS: 1 days | End: 2024-07-11
Payer: MEDICARE

## 2024-07-11 ENCOUNTER — APPOINTMENT (OUTPATIENT)
Dept: MRI IMAGING | Facility: HOSPITAL | Age: 69
End: 2024-07-11

## 2024-07-11 DIAGNOSIS — Z90.49 ACQUIRED ABSENCE OF OTHER SPECIFIED PARTS OF DIGESTIVE TRACT: Chronic | ICD-10-CM

## 2024-07-11 PROCEDURE — 70553 MRI BRAIN STEM W/O & W/DYE: CPT

## 2024-07-11 PROCEDURE — 70553 MRI BRAIN STEM W/O & W/DYE: CPT | Mod: 26

## 2024-07-11 PROCEDURE — A9585: CPT

## 2024-07-15 ENCOUNTER — OUTPATIENT (OUTPATIENT)
Dept: OUTPATIENT SERVICES | Facility: HOSPITAL | Age: 69
LOS: 1 days | End: 2024-07-15
Payer: MEDICARE

## 2024-07-15 DIAGNOSIS — R91.8 OTHER NONSPECIFIC ABNORMAL FINDING OF LUNG FIELD: ICD-10-CM

## 2024-07-15 DIAGNOSIS — Z01.818 ENCOUNTER FOR OTHER PREPROCEDURAL EXAMINATION: ICD-10-CM

## 2024-07-15 DIAGNOSIS — Z90.49 ACQUIRED ABSENCE OF OTHER SPECIFIED PARTS OF DIGESTIVE TRACT: Chronic | ICD-10-CM

## 2024-07-15 PROCEDURE — 94729 DIFFUSING CAPACITY: CPT | Mod: 26

## 2024-07-15 PROCEDURE — 94726 PLETHYSMOGRAPHY LUNG VOLUMES: CPT

## 2024-07-15 PROCEDURE — 94060 EVALUATION OF WHEEZING: CPT

## 2024-07-15 PROCEDURE — 94726 PLETHYSMOGRAPHY LUNG VOLUMES: CPT | Mod: 26

## 2024-07-15 PROCEDURE — 94729 DIFFUSING CAPACITY: CPT

## 2024-07-15 PROCEDURE — 94010 BREATHING CAPACITY TEST: CPT | Mod: 26

## 2024-07-15 PROCEDURE — 94760 N-INVAS EAR/PLS OXIMETRY 1: CPT

## 2024-07-22 ENCOUNTER — NON-APPOINTMENT (OUTPATIENT)
Age: 69
End: 2024-07-22

## 2024-07-23 ENCOUNTER — NON-APPOINTMENT (OUTPATIENT)
Age: 69
End: 2024-07-23

## 2024-07-24 NOTE — HISTORY OF PRESENT ILLNESS
[Disease: _____________________] : Disease: [unfilled] [T: ___] : T[unfilled] [N: ___] : N[unfilled] [M: ___] : M[unfilled] [AJCC Stage: ____] : AJCC Stage: [unfilled] [de-identified] : Don Castrejon is a 69-year-old male who presents to the clinic for f/u of LLL mass, c/w stage IIIA (T4N0M0) NSCLC - SCC histology.  Onc hx:  Social Hx: 50 pack yr smoking hx, quit when he found out about the mass Originally from [place], lives in [place] by himself. Retired .  Son lives in [place]   3/15/24: MRI Brain:  Punctate focus of enhancement the left lateral cerebellar hemisphere (series 701 image 55 and series 702 image 23). There is no associated signal abnormality in the brain parenchyma. Given the lack of associated signal abnormality or additional areas of pathologic enhancement, these findings may represent vascular enhancement versus artifact. However, attention on follow-up imaging is recommended to exclude intracranial metastasis.  Focal area of intrinsic T1 hyperintensity more inferiorly in the left cerebellar hemisphere without superimposed pathologic enhancement. This may represent area of mineralization. 5 mm hypoenhancing lesion in the left side of the sella most compatible with pituitary adenoma. Correlation with endocrine function and consideration for further evaluation dedicated MRI sella is recommended. PET:  1. Compared to chest CT from 2/15/2024, the 76 mm mass in the lingula is hypermetabolic and suspicious for lung cancer. 2. The borderline sized thoracic lymph nodes on the CT scan are not FDG avid. 3. Mildly nodular right adrenal gland is not FDG avid. No evidence of metastatic lung cancer. 4. Mild increased activity in enlarged and lobular prostate. Recommend correlation with PSA to rule out prostate cancer.  3/15/24: MR Head:  Punctate focus of enhancement the left lateral cerebellar hemisphere (series 701 image 55 and series 702 image 23). There is no associated signal abnormality in the brain parenchyma. Given the lack of associated signal abnormality or additional areas of pathologic enhancement, these findings may represent vascular enhancement versus artifact. However, attention on follow-up imaging is recommended to exclude intracranial metastasis. Focal area of intrinsic T1 hyperintensity more inferiorly in the left cerebellar hemisphere without superimposed pathologic enhancement. This may represent area of mineralization. 5 mm hypoenhancing lesion in the left side of the sella most compatible with pituitary adenoma. Correlation with endocrine function and consideration for further evaluation dedicated MRI sella is recommended.  3/15/24: PET: 1. Compared to chest CT from 2/15/2024, the 76 mm mass in the lingula is hypermetabolic and suspicious for lung cancer. 2. The borderline sized thoracic lymph nodes on the CT scan are not FDG avid. 3. Mildly nodular right adrenal gland is not FDG avid. No evidence of metastatic lung cancer. 4. Mild increased activity in enlarged and lobular prostate. Recommend correlation with PSA to rule out prostate cancer.  4/3/24: Chest Xray:  Small left apex pneumothorax, similar to prior exam earlier same day. Left lower lung mass again noted. No acute infiltrates. No significant pleural effusion.  04/03/2024 EBUS  Lung, left, core biopsy: -Squamous cell carcinoma. LYMPH NODE, 11L NEGATIVE FOR MALIGNANT CELLS.  4/4/2024: TTB - rec for MAGDALENA 5/3/2024: Sent to ED by  for worsening HENRY  6/26/2024: CT chest: 1. Enlarging necrotic neoplasm occupying the inferior segment of the lingula with obstruction of the lingular segmental bronchus and pulmonary artery. 2. Mild mediastinal lymphadenopathy with progressive encasement of the right hilum by above described necrotic lingular neoplasm. 6/27/2024: TTB: Patient presented during interdisciplinary TB on 6/27/24: Concern for a large lobulated spiculated lingular mass. Prominent mediastinal LNs also noted. On recent scan area seems to be enlarging. Plan to obtain a PET to r/o any distant disease/progression. If negative will discuss the possibility of a pneumonectomy. Can obtain a VQ scan prior. PLAN: PET-CT. [de-identified] : Squamous Cell Ca  - PDL1 negative [de-identified] : CTSx:  [FreeTextEntry1] : 4/23/2024: C1D1 cisplatin/gemcitabine/nivolumab D8 gemcitabine skipped due to HENRY 2/2 cisplatin 5/21/2024: C2D1 carboplatin/gemcitabine.nivolumab 5/28/2024: C2D8 gemcitabine 6/11/2024: Due for C3D1 carboplatin/gemcitabine/nivolumab 6/18/24    C3D8 Gemcitabine held due to low h/h 6/19/24    C3D8 Gemcitabine given w/ blood tranfusion  [de-identified] : The patient reports feeling well.  He denies any shortness of breath. No more swelling.  [100: Normal, no complaints, no evidence of disease.] : 100: Normal, no complaints, no evidence of disease. [ECOG Performance Status: 0 - Fully active, able to carry on all pre-disease performance without restriction] : Performance Status: 0 - Fully active, able to carry on all pre-disease performance without restriction

## 2024-07-24 NOTE — ASSESSMENT
[FreeTextEntry1] : 69YM w/ PMHx GERD, HTN, HLD, DMII presenting for follow-up for squamous cell lung cancer, stage IIIa, T4 N0 M0, PD-L1 negative currently on neoadjuvant chemoimmunotherapy. Chemotherapy course complicated by acute kidney injury, likely related to cisplatin.  Patient is here for follow-up.  Squamous Cell Lung Ca, AJCC IIIa, T4N0M0  - PDL1 negative Completed 3 cycles of neoadjuvant platinum based with gemcitabine and nivolumab chemoimmunotherapy.  Interim CT chest with progression of disease concern. -- He developed an HENRY following a 1 cycle of cisplatin and subsequently switched to carboplatin -- Patient's case was discussed at thoracic tumor board on 6/27-consensus of panel was to obtain a PET scan, followed by pulmonary function test and VQ scan and possible resection of enlarging mass provided no evidence of metastatic disease We discussed tumor board recommendations during visit in detail with patient and son present during the visit.  All questions answered.  Scans report and images reviewed.  Anemia-likely chemotherapy related.  Hemoglobin today 8.6. --No role for Procrit in the neoadjuvant setting -- Will monitor for now and give another blood transfusion if needed, though expect that now he completed chemotherapy this will recover  Pitiutary adenoma --will refer to Dr.D'Amico after therapy for lung ca complete

## 2024-07-25 ENCOUNTER — APPOINTMENT (OUTPATIENT)
Dept: THORACIC SURGERY | Facility: CLINIC | Age: 69
End: 2024-07-25
Payer: MEDICARE

## 2024-07-25 VITALS
TEMPERATURE: 97.5 F | OXYGEN SATURATION: 98 % | WEIGHT: 147 LBS | SYSTOLIC BLOOD PRESSURE: 125 MMHG | DIASTOLIC BLOOD PRESSURE: 66 MMHG | BODY MASS INDEX: 23.63 KG/M2 | HEIGHT: 66 IN | HEART RATE: 102 BPM

## 2024-07-25 DIAGNOSIS — C34.92 MALIGNANT NEOPLASM OF UNSPECIFIED PART OF LEFT BRONCHUS OR LUNG: ICD-10-CM

## 2024-07-25 DIAGNOSIS — R91.8 OTHER NONSPECIFIC ABNORMAL FINDING OF LUNG FIELD: ICD-10-CM

## 2024-07-25 PROCEDURE — 99215 OFFICE O/P EST HI 40 MIN: CPT

## 2024-07-26 NOTE — ASSESSMENT
[FreeTextEntry1] : Patient is a 70 yo male, former smoker with a PMHx of HTN, GERD, DM II and high cholesterol. He was referred by DR. NATALY ARCE for surgical evaluation of a left upper lobe lung mass.  The mass was discovered during work up for a cough. Imaging showed a 4.3 x 7.4 x 5.4 cm mass in the lingula of the left lung. Patient was appropriately staged per NCCN guidelines and clinic stage was determined to be [id number], stage IIIA. Case was discussed at multidisciplinary tumor board and consensus opinion was to proceed with neoadjuvant chemo-immunotherapy and subsequent resection.  He started treatment with Dr. Larose on April 23. He had significant HENRY requiring an adjustment of his chemotherapy but is otherwise tolerated treatment well.   He completed a PET/CT on 7/8/24 which showed signs of disease progression. Imaging was reviewed and brain MRI was ordered, which was negative.  He has since obtained cardiac clearance (7/20), medical clearance (7/18) and labs/urine (completed 7/12).  He returns with further work up to discuss scheduling surgery.  I have independently reviewed the medical records and imaging at the time of this office consultation, and discussed the following interpretations with the patient:  The brain MRI shows no signs of metastasis. Will plan to proceed to the operating room as planned. At that time, I will first preform a mediastinoscopy to evaluate the lymph nodes for disease progression. If he is found to have advanced disease in LN, with not proceed with lung resection and will recommend that he undergo alternative treatment. If no cancer is found in the lymph nodes will proceed with lung resection. Discussed with the patient that the extent of resection will depend on the extent of tumor size/location noted in the OR. Prior to surgery, will obtain an ECHO to evaluate his pulmonary artery pressure in the event that a pneumonectomy needs to be preformed.   The patient was counseled on the risks, benefits and alternatives of proceeding with lung resection. Specifically, the risk of bleeding, need for a blood transfusion, DVT, pulmonary embolism, pneumonia, postoperative respiratory insufficiency, postoperative ventilator support, as well as prolonged air leak, need for chest drainage, dysrhythmia, myocardial infarction, postoperative pain syndrome, need for adjuvant therapy, risk of recurrence, need for surveillance, conversion to an open procedure, as well as mortality was discussed with the patient who understands and agrees to the above.   Plan: -ECHO to evaluate pulmonary artery pressures -Bronchoscopy, mediastinoscopy, possible left thoracoscopy, robot assisted left upper lobectomy, possible left pneumonectomy, possible thoracotomy, possible mediastinal lymph node dissection on 8/5/24  I, Dr. Ramone Werner MD, personally performed the evaluation and management (E/M) services for this established patient who presents today with (a) new problem(s)/exacerbation of (an) existing condition(s).  That E/M includes conducting the clinically appropriate interval history &/or exam, assessing all new/exacerbated conditions, and establishing a new plan of care.  Today, my CHANA, Irasema Dang NP, was here to observe &/or participate in the visit & follow plan of care established by me.

## 2024-07-26 NOTE — HISTORY OF PRESENT ILLNESS
[FreeTextEntry1] : Patient is a 68 yo male, former smoker with a PMHx of HTN, GERD, DM II and high cholesterol. He was referred by DR. NATALY ARCE for surgical evaluation of a left upper lobe lung mass.  The mass was discovered during work up for a cough. Imaging showed a 4.3 x 7.4 x 5.4 cm mass in the lingula of the left lung. Patient was appropriately staged per NCCN guidelines and clinic stage was determined to be [id number], stage IIIA. Case was discussed at multidisciplinary tumor board and consensus opinion was to proceed with neoadjuvant chemo-immunotherapy and subsequent resection.  He started treatment with Dr. Larose on April 23. He had significant HENRY requiring an adjustment of his chemotherapy but is otherwise tolerated treatment well. C3D8 Gemcitabine given w/ blood transfusion on 6/19/24.  He completed a PET/CT on 7/8 which showed signs of disease progression. Dr. Larose reviewed the results with the patient and ordered a brain MRI to repeat staging.  He has since obtained cardiac clearance (7/20), medical clearance ( 7/18) and  labs/urine (completed 7/12).  He returns with further work up to discuss scheduling surgery.  Work up is as follows;    MR head w/wo IVC 7/11/24: No acute intracranial pathology or abnormal enhancement to suggest intracranial metastatic disease. Stable 5 mm rounded focus of hypoenhancement within the left aspect of the sella likely reflecting microadenoma.  PET/CT 7/8/24: 1. 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 The SUV max is decreased compared to prior exam, however FDG uptake is globally decreased on this exam best demonstrated by mean liver SUV of 0.2 on this exam and 2.5 on comparison PET/CT. 2. Several FDG avid mediastinal lymph nodes, new from PET/CT dated 3/15/2024, consistent with mirna metastatic disease  VQ scan 7/10/24: Large matching defect corresponds to a known left lingular tumor.  Based on these data, a left upper lobectomy might result in as much as a 20% loss of function.  Ct chest 6/26/24: 1. Enlarging necrotic neoplasm occupying the inferior segment of the lingula with obstruction of the lingular segmental bronchus and pulmonary artery. 2. Mild mediastinal lymphadenopathy with progressive encasement of the right hilum by above described necrotic lingular neoplasm.  PFT done on 7/15/24 showed FEV1 = 2.28, 85% of predicted value, FVC =3.88 , 111% of predicted value, PEF =7.57, 102% of predicted value and DLCO =13.43 , 59% of predicted value.  He reports that he is in his usual state of health today. He experiences cough with praying and reports an 8 lb weight loss since March.

## 2024-07-26 NOTE — PHYSICAL EXAM
[Fully active, able to carry on all pre-disease performance without restriction] : Status 0 - Fully active, able to carry on all pre-disease performance without restriction [Sclera] : the sclera and conjunctiva were normal [] : no respiratory distress [Respiration, Rhythm And Depth] : normal respiratory rhythm and effort [Exaggerated Use Of Accessory Muscles For Inspiration] : no accessory muscle use [Apical Impulse] : the apical impulse was normal [Heart Sounds] : normal S1 and S2 [Examination Of The Chest] : the chest was normal in appearance [Bowel Sounds] : normal bowel sounds [Cervical Lymph Nodes Enlarged Posterior Bilaterally] : posterior cervical [Abnormal Walk] : normal gait [Skin Color & Pigmentation] : normal skin color and pigmentation [Oriented To Time, Place, And Person] : oriented to person, place, and time [Impaired Insight] : insight and judgment were intact [Affect] : the affect was normal [Mood] : the mood was normal

## 2024-07-26 NOTE — ASSESSMENT
[FreeTextEntry1] : Patient is a 70 yo male, former smoker with a PMHx of HTN, GERD, DM II and high cholesterol. He was referred by DR. NATALY ARCE for surgical evaluation of a left upper lobe lung mass.  The mass was discovered during work up for a cough. Imaging showed a 4.3 x 7.4 x 5.4 cm mass in the lingula of the left lung. Patient was appropriately staged per NCCN guidelines and clinic stage was determined to be [id number], stage IIIA. Case was discussed at multidisciplinary tumor board and consensus opinion was to proceed with neoadjuvant chemo-immunotherapy and subsequent resection.  He started treatment with Dr. Larose on April 23. He had significant HENRY requiring an adjustment of his chemotherapy but is otherwise tolerated treatment well.   He completed a PET/CT on 7/8/24 which showed signs of disease progression. Imaging was reviewed and brain MRI was ordered, which was negative.  He has since obtained cardiac clearance (7/20), medical clearance (7/18) and labs/urine (completed 7/12).  He returns with further work up to discuss scheduling surgery.  I have independently reviewed the medical records and imaging at the time of this office consultation, and discussed the following interpretations with the patient:  The brain MRI shows no signs of metastasis. Will plan to proceed to the operating room as planned. At that time, I will first preform a mediastinoscopy to evaluate the lymph nodes for disease progression. If he is found to have advanced disease in LN, with not proceed with lung resection and will recommend that he undergo alternative treatment. If no cancer is found in the lymph nodes will proceed with lung resection. Discussed with the patient that the extent of resection will depend on the extent of tumor size/location noted in the OR. Prior to surgery, will obtain an ECHO to evaluate his pulmonary artery pressure in the event that a pneumonectomy needs to be preformed.   The patient was counseled on the risks, benefits and alternatives of proceeding with lung resection. Specifically, the risk of bleeding, need for a blood transfusion, DVT, pulmonary embolism, pneumonia, postoperative respiratory insufficiency, postoperative ventilator support, as well as prolonged air leak, need for chest drainage, dysrhythmia, myocardial infarction, postoperative pain syndrome, need for adjuvant therapy, risk of recurrence, need for surveillance, conversion to an open procedure, as well as mortality was discussed with the patient who understands and agrees to the above.   Plan: -ECHO to evaluate pulmonary artery pressures -Bronchoscopy, mediastinoscopy, possible left thoracoscopy, robot assisted left upper lobectomy, possible left pneumonectomy, possible thoracotomy, possible mediastinal lymph node dissection on 8/5/24  I, Dr. Ramone Werner MD, personally performed the evaluation and management (E/M) services for this established patient who presents today with (a) new problem(s)/exacerbation of (an) existing condition(s).  That E/M includes conducting the clinically appropriate interval history &/or exam, assessing all new/exacerbated conditions, and establishing a new plan of care.  Today, my CHANA, Irasema Dang NP, was here to observe &/or participate in the visit & follow plan of care established by me.

## 2024-07-26 NOTE — HISTORY OF PRESENT ILLNESS
[FreeTextEntry1] : Patient is a 68 yo male, former smoker with a PMHx of HTN, GERD, DM II and high cholesterol. He was referred by DR. NATALY ARCE for surgical evaluation of a left upper lobe lung mass.  The mass was discovered during work up for a cough. Imaging showed a 4.3 x 7.4 x 5.4 cm mass in the lingula of the left lung. Patient was appropriately staged per NCCN guidelines and clinic stage was determined to be [id number], stage IIIA. Case was discussed at multidisciplinary tumor board and consensus opinion was to proceed with neoadjuvant chemo-immunotherapy and subsequent resection.  He started treatment with Dr. Larose on April 23. He had significant HENRY requiring an adjustment of his chemotherapy but is otherwise tolerated treatment well. C3D8 Gemcitabine given w/ blood transfusion on 6/19/24.  He completed a PET/CT on 7/8 which showed signs of disease progression. Dr. Larose reviewed the results with the patient and ordered a brain MRI to repeat staging.  He has since obtained cardiac clearance (7/20), medical clearance ( 7/18) and  labs/urine (completed 7/12).  He returns with further work up to discuss scheduling surgery.  Work up is as follows;    MR head w/wo IVC 7/11/24: No acute intracranial pathology or abnormal enhancement to suggest intracranial metastatic disease. Stable 5 mm rounded focus of hypoenhancement within the left aspect of the sella likely reflecting microadenoma.  PET/CT 7/8/24: 1. 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 The SUV max is decreased compared to prior exam, however FDG uptake is globally decreased on this exam best demonstrated by mean liver SUV of 0.2 on this exam and 2.5 on comparison PET/CT. 2. Several FDG avid mediastinal lymph nodes, new from PET/CT dated 3/15/2024, consistent with mirna metastatic disease  VQ scan 7/10/24: Large matching defect corresponds to a known left lingular tumor.  Based on these data, a left upper lobectomy might result in as much as a 20% loss of function.  Ct chest 6/26/24: 1. Enlarging necrotic neoplasm occupying the inferior segment of the lingula with obstruction of the lingular segmental bronchus and pulmonary artery. 2. Mild mediastinal lymphadenopathy with progressive encasement of the right hilum by above described necrotic lingular neoplasm.  PFT done on 7/15/24 showed FEV1 = 2.28, 85% of predicted value, FVC =3.88 , 111% of predicted value, PEF =7.57, 102% of predicted value and DLCO =13.43 , 59% of predicted value.  He reports that he is in his usual state of health today. He experiences cough with praying and reports an 8 lb weight loss since March.

## 2024-07-29 ENCOUNTER — APPOINTMENT (OUTPATIENT)
Dept: HEMATOLOGY ONCOLOGY | Facility: CLINIC | Age: 69
End: 2024-07-29
Payer: MEDICARE

## 2024-07-29 ENCOUNTER — APPOINTMENT (OUTPATIENT)
Dept: HEMATOLOGY ONCOLOGY | Facility: CLINIC | Age: 69
End: 2024-07-29

## 2024-07-29 VITALS
HEART RATE: 100 BPM | RESPIRATION RATE: 18 BRPM | TEMPERATURE: 99 F | SYSTOLIC BLOOD PRESSURE: 110 MMHG | BODY MASS INDEX: 23.46 KG/M2 | HEIGHT: 66 IN | DIASTOLIC BLOOD PRESSURE: 69 MMHG | WEIGHT: 146 LBS | OXYGEN SATURATION: 98 %

## 2024-07-29 LAB
ALBUMIN SERPL ELPH-MCNC: 3.2 G/DL
ALP BLD-CCNC: 68 U/L
ALT SERPL-CCNC: 21 U/L
ANION GAP SERPL CALC-SCNC: 5 MMOL/L
AST SERPL-CCNC: 18 U/L
BILIRUB SERPL-MCNC: 0.8 MG/DL
BUN SERPL-MCNC: 16 MG/DL
CALCIUM SERPL-MCNC: 10.5 MG/DL
CHLORIDE SERPL-SCNC: 103 MMOL/L
CO2 SERPL-SCNC: 30 MMOL/L
CREAT SERPL-MCNC: 1.2 MG/DL
EGFR: 65 ML/MIN/1.73M2
GLUCOSE SERPL-MCNC: 186 MG/DL
HCT VFR BLD CALC: 28.3 %
HGB BLD-MCNC: 9.3 G/DL
LYMPHOCYTES # BLD AUTO: 1.5 K/UL
LYMPHOCYTES NFR BLD AUTO: 14 %
MAN DIFF?: NO
MCHC RBC-ENTMCNC: 28 PG
MCHC RBC-ENTMCNC: 32.9 GM/DL
MCV RBC AUTO: 85.2 FL
NEUTROPHILS # BLD AUTO: 8.2 K/UL
NEUTROPHILS NFR BLD AUTO: 76.5 %
PLATELET # BLD AUTO: 339 K/UL
POTASSIUM SERPL-SCNC: 5.2 MMOL/L
PROT SERPL-MCNC: 7.6 G/DL
RBC # BLD: 3.32 M/UL
RBC # FLD: 15.4 %
SODIUM SERPL-SCNC: 138 MMOL/L
WBC # FLD AUTO: 10.7 K/UL

## 2024-07-29 PROCEDURE — G2211 COMPLEX E/M VISIT ADD ON: CPT

## 2024-07-29 PROCEDURE — 99214 OFFICE O/P EST MOD 30 MIN: CPT

## 2024-07-29 PROCEDURE — 36415 COLL VENOUS BLD VENIPUNCTURE: CPT

## 2024-07-29 NOTE — ASSESSMENT
[FreeTextEntry1] : 69YM w/ PMHx GERD, HTN, HLD, DMII presenting for follow-up for squamous cell lung cancer, stage IIIa, T4 N0 M0, PD-L1 negative currently on neoadjuvant chemoimmunotherapy. Chemotherapy course complicated by acute kidney injury, likely related to cisplatin.  Patient is here for follow-up.  Squamous Cell Lung Ca, AJCC IIIa, T4N0M0  - PDL1 negative Completed 3 cycles of neoadjuvant platinum based with gemcitabine and nivolumab chemoimmunotherapy.  Interim CT chest with progression of disease concern. -- He developed an HENRY following a 1 cycle of cisplatin and subsequently switched to carboplatin -- Patient's case was discussed at thoracic tumor board on 6/27-consensus of panel was to obtain a PET scan, followed by pulmonary function test and VQ scan and possible resection of enlarging mass provided no evidence of metastatic disease We discussed tumor board recommendations during visit in detail with patient and son present during the visit.  All questions answered.  Scans report and images reviewed.  Anemia-likely chemotherapy related.  Hemoglobin today 8.6. --No role for Procrit in the neoadjuvant setting -- Will monitor for now and give another blood transfusion if needed, though expect that now he completed chemotherapy this will recover  Pitiutary adenoma --will refer to Dr.D'Amico after therapy for lung ca complete  7/29/24 a/p stage IIIA (T4N0M0) NSCLC - SCC histology. Labs stable continue to monitor and s/s Pending surgery w/ Debbi Chong 8/5/24  will f/u after procedure.  .Encourage to contact the office for any concerns or worsening symptoms. Pt verbalizes understanding

## 2024-07-29 NOTE — HISTORY OF PRESENT ILLNESS
[Disease: _____________________] : Disease: [unfilled] [T: ___] : T[unfilled] [N: ___] : N[unfilled] [M: ___] : M[unfilled] [AJCC Stage: ____] : AJCC Stage: [unfilled] [100: Normal, no complaints, no evidence of disease.] : 100: Normal, no complaints, no evidence of disease. [ECOG Performance Status: 0 - Fully active, able to carry on all pre-disease performance without restriction] : Performance Status: 0 - Fully active, able to carry on all pre-disease performance without restriction [de-identified] : Don Castrejon is a 69-year-old male who presents to the clinic for f/u of LLL mass, c/w stage IIIA (T4N0M0) NSCLC - SCC histology.  Onc hx:  Social Hx: 50 pack yr smoking hx, quit when he found out about the mass Originally from [place], lives in [place] by himself. Retired .  Son lives in [place]   3/15/24: MRI Brain:  Punctate focus of enhancement the left lateral cerebellar hemisphere (series 701 image 55 and series 702 image 23). There is no associated signal abnormality in the brain parenchyma. Given the lack of associated signal abnormality or additional areas of pathologic enhancement, these findings may represent vascular enhancement versus artifact. However, attention on follow-up imaging is recommended to exclude intracranial metastasis.  Focal area of intrinsic T1 hyperintensity more inferiorly in the left cerebellar hemisphere without superimposed pathologic enhancement. This may represent area of mineralization. 5 mm hypoenhancing lesion in the left side of the sella most compatible with pituitary adenoma. Correlation with endocrine function and consideration for further evaluation dedicated MRI sella is recommended. PET:  1. Compared to chest CT from 2/15/2024, the 76 mm mass in the lingula is hypermetabolic and suspicious for lung cancer. 2. The borderline sized thoracic lymph nodes on the CT scan are not FDG avid. 3. Mildly nodular right adrenal gland is not FDG avid. No evidence of metastatic lung cancer. 4. Mild increased activity in enlarged and lobular prostate. Recommend correlation with PSA to rule out prostate cancer.  3/15/24: MR Head:  Punctate focus of enhancement the left lateral cerebellar hemisphere (series 701 image 55 and series 702 image 23). There is no associated signal abnormality in the brain parenchyma. Given the lack of associated signal abnormality or additional areas of pathologic enhancement, these findings may represent vascular enhancement versus artifact. However, attention on follow-up imaging is recommended to exclude intracranial metastasis. Focal area of intrinsic T1 hyperintensity more inferiorly in the left cerebellar hemisphere without superimposed pathologic enhancement. This may represent area of mineralization. 5 mm hypoenhancing lesion in the left side of the sella most compatible with pituitary adenoma. Correlation with endocrine function and consideration for further evaluation dedicated MRI sella is recommended.  3/15/24: PET: 1. Compared to chest CT from 2/15/2024, the 76 mm mass in the lingula is hypermetabolic and suspicious for lung cancer. 2. The borderline sized thoracic lymph nodes on the CT scan are not FDG avid. 3. Mildly nodular right adrenal gland is not FDG avid. No evidence of metastatic lung cancer. 4. Mild increased activity in enlarged and lobular prostate. Recommend correlation with PSA to rule out prostate cancer.  4/3/24: Chest Xray:  Small left apex pneumothorax, similar to prior exam earlier same day. Left lower lung mass again noted. No acute infiltrates. No significant pleural effusion.  04/03/2024 EBUS  Lung, left, core biopsy: -Squamous cell carcinoma. LYMPH NODE, 11L NEGATIVE FOR MALIGNANT CELLS.  4/4/2024: TTB - rec for MAGDALENA 5/3/2024: Sent to ED by  for worsening HENRY  6/26/2024: CT chest: 1. Enlarging necrotic neoplasm occupying the inferior segment of the lingula with obstruction of the lingular segmental bronchus and pulmonary artery. 2. Mild mediastinal lymphadenopathy with progressive encasement of the right hilum by above described necrotic lingular neoplasm. 6/27/2024: TTB: Patient presented during interdisciplinary TB on 6/27/24: Concern for a large lobulated spiculated lingular mass. Prominent mediastinal LNs also noted. On recent scan area seems to be enlarging. Plan to obtain a PET to r/o any distant disease/progression. If negative will discuss the possibility of a pneumonectomy. Can obtain a VQ scan prior. PLAN: PET-CT.  7/29/24 onc clinic  70 y/o m with stage IIIA (T4N0M0) NSCLC - SCC histology present to for follow up. Patient report when he bends over to pray, he begins to cough. Also, when he walks too far, he feels sob and has to stop. Denies fever, CP or dizziness.   [de-identified] : Squamous Cell Ca  - PDL1 negative [de-identified] : CTSx:  [FreeTextEntry1] : 4/23/2024: C1D1 cisplatin/gemcitabine/nivolumab D8 gemcitabine skipped due to HENRY 2/2 cisplatin 5/21/2024: C2D1 carboplatin/gemcitabine.nivolumab 5/28/2024: C2D8 gemcitabine 6/11/2024: Due for C3D1 carboplatin/gemcitabine/nivolumab 6/18/24    C3D8 Gemcitabine held due to low h/h 6/19/24    C3D8 Gemcitabine given w/ blood tranfusion  [de-identified] : The patient reports feeling well.  He denies any shortness of breath. No more swelling.

## 2024-07-29 NOTE — HISTORY OF PRESENT ILLNESS
[Disease: _____________________] : Disease: [unfilled] [T: ___] : T[unfilled] [N: ___] : N[unfilled] [M: ___] : M[unfilled] [AJCC Stage: ____] : AJCC Stage: [unfilled] [100: Normal, no complaints, no evidence of disease.] : 100: Normal, no complaints, no evidence of disease. [ECOG Performance Status: 0 - Fully active, able to carry on all pre-disease performance without restriction] : Performance Status: 0 - Fully active, able to carry on all pre-disease performance without restriction [de-identified] : Don Castrejon is a 69-year-old male who presents to the clinic for f/u of LLL mass, c/w stage IIIA (T4N0M0) NSCLC - SCC histology.  Onc hx:  Social Hx: 50 pack yr smoking hx, quit when he found out about the mass Originally from [place], lives in [place] by himself. Retired .  Son lives in [place]   3/15/24: MRI Brain:  Punctate focus of enhancement the left lateral cerebellar hemisphere (series 701 image 55 and series 702 image 23). There is no associated signal abnormality in the brain parenchyma. Given the lack of associated signal abnormality or additional areas of pathologic enhancement, these findings may represent vascular enhancement versus artifact. However, attention on follow-up imaging is recommended to exclude intracranial metastasis.  Focal area of intrinsic T1 hyperintensity more inferiorly in the left cerebellar hemisphere without superimposed pathologic enhancement. This may represent area of mineralization. 5 mm hypoenhancing lesion in the left side of the sella most compatible with pituitary adenoma. Correlation with endocrine function and consideration for further evaluation dedicated MRI sella is recommended. PET:  1. Compared to chest CT from 2/15/2024, the 76 mm mass in the lingula is hypermetabolic and suspicious for lung cancer. 2. The borderline sized thoracic lymph nodes on the CT scan are not FDG avid. 3. Mildly nodular right adrenal gland is not FDG avid. No evidence of metastatic lung cancer. 4. Mild increased activity in enlarged and lobular prostate. Recommend correlation with PSA to rule out prostate cancer.  3/15/24: MR Head:  Punctate focus of enhancement the left lateral cerebellar hemisphere (series 701 image 55 and series 702 image 23). There is no associated signal abnormality in the brain parenchyma. Given the lack of associated signal abnormality or additional areas of pathologic enhancement, these findings may represent vascular enhancement versus artifact. However, attention on follow-up imaging is recommended to exclude intracranial metastasis. Focal area of intrinsic T1 hyperintensity more inferiorly in the left cerebellar hemisphere without superimposed pathologic enhancement. This may represent area of mineralization. 5 mm hypoenhancing lesion in the left side of the sella most compatible with pituitary adenoma. Correlation with endocrine function and consideration for further evaluation dedicated MRI sella is recommended.  3/15/24: PET: 1. Compared to chest CT from 2/15/2024, the 76 mm mass in the lingula is hypermetabolic and suspicious for lung cancer. 2. The borderline sized thoracic lymph nodes on the CT scan are not FDG avid. 3. Mildly nodular right adrenal gland is not FDG avid. No evidence of metastatic lung cancer. 4. Mild increased activity in enlarged and lobular prostate. Recommend correlation with PSA to rule out prostate cancer.  4/3/24: Chest Xray:  Small left apex pneumothorax, similar to prior exam earlier same day. Left lower lung mass again noted. No acute infiltrates. No significant pleural effusion.  04/03/2024 EBUS  Lung, left, core biopsy: -Squamous cell carcinoma. LYMPH NODE, 11L NEGATIVE FOR MALIGNANT CELLS.  4/4/2024: TTB - rec for MAGDALENA 5/3/2024: Sent to ED by  for worsening HENRY  6/26/2024: CT chest: 1. Enlarging necrotic neoplasm occupying the inferior segment of the lingula with obstruction of the lingular segmental bronchus and pulmonary artery. 2. Mild mediastinal lymphadenopathy with progressive encasement of the right hilum by above described necrotic lingular neoplasm. 6/27/2024: TTB: Patient presented during interdisciplinary TB on 6/27/24: Concern for a large lobulated spiculated lingular mass. Prominent mediastinal LNs also noted. On recent scan area seems to be enlarging. Plan to obtain a PET to r/o any distant disease/progression. If negative will discuss the possibility of a pneumonectomy. Can obtain a VQ scan prior. PLAN: PET-CT.  7/29/24 onc clinic  68 y/o m with stage IIIA (T4N0M0) NSCLC - SCC histology present to for follow up. Patient report when he bends over to pray, he begins to cough. Also, when he walks too far, he feels sob and has to stop. Denies fever, CP or dizziness.   [de-identified] : Squamous Cell Ca  - PDL1 negative [de-identified] : CTSx:  [FreeTextEntry1] : 4/23/2024: C1D1 cisplatin/gemcitabine/nivolumab D8 gemcitabine skipped due to HENRY 2/2 cisplatin 5/21/2024: C2D1 carboplatin/gemcitabine.nivolumab 5/28/2024: C2D8 gemcitabine 6/11/2024: Due for C3D1 carboplatin/gemcitabine/nivolumab 6/18/24    C3D8 Gemcitabine held due to low h/h 6/19/24    C3D8 Gemcitabine given w/ blood tranfusion  [de-identified] : The patient reports feeling well.  He denies any shortness of breath. No more swelling.

## 2024-07-29 NOTE — REVIEW OF SYSTEMS
[Fatigue] : fatigue [Negative] : Heme/Lymph [Cough] : cough [SOB on Exertion] : shortness of breath during exertion [FreeTextEntry2] : mild fatigue  [FreeTextEntry6] : distance walking makes him fatigue and sob

## 2024-07-30 LAB — TSH SERPL-ACNC: 0.99 UIU/ML

## 2024-07-30 NOTE — H&P ADULT - NSHPSOCIALHISTORY_GEN_ALL_CORE
40 year pack hx- former smoker    COVID history: vaccinated    Lung TB history: denies    Occupation: retired     Patient lives with family in    Patient denies any major mental health history

## 2024-07-30 NOTE — H&P ADULT - NSICDXPASTMEDICALHX_GEN_ALL_CORE_FT
PAST MEDICAL HISTORY:  HENRY (acute kidney injury)     History of diabetes mellitus     History of gastroesophageal reflux (GERD)     History of high cholesterol     History of hypertension     History of persistent cough     Mass of lingula of lung     Squamous cell lung cancer

## 2024-07-30 NOTE — H&P ADULT - NSHPPHYSICALEXAM_GEN_ALL_CORE
wt: 147 lb    ECOG Performance Status: Status 0 - Fully active, able to carry on all pre-disease performance without restriction.     Eyes: the sclera and conjunctiva were normal.  Pulmonary: no respiratory distress, normal respiratory rhythm and effort and no accessory muscle use.  Heart: the apical impulse was normal and normal S1 and S2.  Chest: the chest was normal in appearance.  Abdomen: normal bowel sounds.  Lymphatics: The posterior cervical nodes were non-tender and normal size.  Musculoskeletal: normal gait.  Skin: normal skin color and pigmentation.  Psychiatric: oriented to person, place, and time, insight and judgment were intact, the affect was normal and the mood was normal.

## 2024-07-30 NOTE — H&P ADULT - NSICDXNOFAMILYHX_GEN_ALL_CORE
"S: Neck is problematic, persists and has not changed much.  \"thinking of following up with MD for lack of progress at neck.\"  O: pain with left SB and left rotation cervical-pain is left sided  " <-- Click to add NO pertinent Family History

## 2024-07-30 NOTE — H&P ADULT - ASSESSMENT
Patient is a 68 yo male, former smoker with a PMHx of HTN, GERD, DM II and high cholesterol. He was referred by DR. NATALY ARCE for surgical evaluation of a left upper lobe lung mass.    The mass was discovered during work up for a cough. Imaging showed a 4.3 x 7.4 x 5.4 cm mass in the lingula of the left lung. Patient was appropriately staged per NCCN guidelines and clinic stage was determined to be [id number], stage IIIA. Case was discussed at multidisciplinary tumor board and consensus opinion was to proceed with neoadjuvant chemo-immunotherapy and subsequent resection.    He started treatment with Dr. Larose on April 23. He had significant HENRY requiring an adjustment of his chemotherapy but is otherwise tolerated treatment well.    He completed a PET/CT on 7/8/24 which showed signs of disease progression. Imaging was reviewed and brain MRI was ordered, which was negative. He has since obtained cardiac clearance (7/20), medical clearance (7/18) and labs/urine (completed 7/12).    He returns with further work up to discuss scheduling surgery.    I have independently reviewed the medical records and imaging at the time of this office consultation, and discussed the following interpretations with the patient:    The brain MRI shows no signs of metastasis. Will plan to proceed to the operating room as planned. At that time, I will first preform a mediastinoscopy to evaluate the lymph nodes for disease progression. If he is found to have advanced disease in LN, with not proceed with lung resection and will recommend that he undergo alternative treatment. If no cancer is found in the lymph nodes will proceed with lung resection. Discussed with the patient that the extent of resection will depend on the extent of tumor size/location noted in the OR. Prior to surgery, will obtain an ECHO to evaluate his pulmonary artery pressure in the event that a pneumonectomy needs to be preformed.    The patient was counseled on the risks, benefits and alternatives of proceeding with lung resection. Specifically, the risk of bleeding, need for a blood transfusion, DVT, pulmonary embolism, pneumonia, postoperative respiratory insufficiency, postoperative ventilator support, as well as prolonged air leak, need for chest drainage, dysrhythmia, myocardial infarction, postoperative pain syndrome, need for adjuvant therapy, risk of recurrence, need for surveillance, conversion to an open procedure, as well as mortality was discussed with the patient who understands and agrees to the above.      Plan:  -ECHO to evaluate pulmonary artery pressures  -Bronchoscopy, mediastinoscopy, possible left thoracoscopy, robot assisted left upper lobectomy, possible left pneumonectomy, possible thoracotomy, possible mediastinal lymph node dissection on 8/5/24   Patient is a 68 yo male, former smoker with a PMHx of HTN, GERD, DM II and high cholesterol. He was referred by DR. NATALY ARCE for surgical evaluation of a left upper lobe lung mass.    The mass was discovered during work up for a cough. Imaging showed a 4.3 x 7.4 x 5.4 cm mass in the lingula of the left lung. Patient was appropriately staged per NCCN guidelines and clinic stage was determined to be [id number], stage IIIA. Case was discussed at multidisciplinary tumor board and consensus opinion was to proceed with neoadjuvant chemo-immunotherapy and subsequent resection.    He started treatment with Dr. Larose on April 23. He had significant HENRY requiring an adjustment of his chemotherapy but is otherwise tolerated treatment well.    He completed a PET/CT on 7/8/24 which showed signs of disease progression. Imaging was reviewed and brain MRI was ordered, which was negative. He has since obtained cardiac clearance (7/20), medical clearance (7/18) and labs/urine (completed 7/12).    He returns with further work up to discuss scheduling surgery.    I have independently reviewed the medical records and imaging at the time of this office consultation, and discussed the following interpretations with the patient:    The brain MRI shows no signs of metastasis. Will plan to proceed to the operating room as planned. At that time, I will first preform a mediastinoscopy to evaluate the lymph nodes for disease progression. If he is found to have advanced disease in LN, with not proceed with lung resection and will recommend that he undergo alternative treatment. If no cancer is found in the lymph nodes will proceed with lung resection. Discussed with the patient that the extent of resection will depend on the extent of tumor size/location noted in the OR. Prior to surgery, will obtain an ECHO to evaluate his pulmonary artery pressure in the event that a pneumonectomy needs to be preformed.    The patient was counseled on the risks, benefits and alternatives of proceeding with lung resection. Specifically, the risk of bleeding, need for a blood transfusion, DVT, pulmonary embolism, pneumonia, postoperative respiratory insufficiency, postoperative ventilator support, as well as prolonged air leak, need for chest drainage, dysrhythmia, myocardial infarction, postoperative pain syndrome, need for adjuvant therapy, risk of recurrence, need for surveillance, conversion to an open procedure, as well as mortality was discussed with the patient who understands and agrees to the above.      Plan:  -ECHO to evaluate pulmonary artery pressures  -Bronchoscopy, mediastinoscopy, possible left thoracoscopy, robot assisted left upper lobectomy, possible left pneumonectomy, possible thoracotomy, possible mediastinal lymph node dissection on 8/5/24    Admit under Dr. Werner via same day surgery. Consent signed, placed on chart.  Risks/benefits reviewed, patient understands and agrees. T&S ordered and blood products placed on hold for OR.  To 9LA  post-op.

## 2024-07-30 NOTE — H&P ADULT - NSHPOUTPATIENTPROVIDERS_GEN_ALL_CORE
REF:     DR. NATALY ARCE  P [phone]      PCP:       Dr. Judi Mills  [address] [place] [address]  P [phone]  F [phone]

## 2024-07-30 NOTE — H&P ADULT - HISTORY OF PRESENT ILLNESS
Patient is a 70 yo male, former smoker with a PMHx of HTN, GERD, DM II and high cholesterol. He was referred by DR. NATALY ARCE for surgical evaluation of a left upper lobe lung mass.    The mass was discovered during work up for a cough. Imaging showed a 4.3 x 7.4 x 5.4 cm mass in the lingula of the left lung. Patient was appropriately staged per NCCN guidelines and clinic stage was determined to be [id number], stage IIIA. Case was discussed at multidisciplinary tumor board and consensus opinion was to proceed with neoadjuvant chemo-immunotherapy and subsequent resection.    He started treatment with Dr. Larose on April 23. He had significant HENRY requiring an adjustment of his chemotherapy but is otherwise tolerated treatment well. C3D8 Gemcitabine given w/ blood transfusion on 6/19/24.    He completed a PET/CT on 7/8 which showed signs of disease progression. Dr. Larose reviewed the results with the patient and ordered a brain MRI to repeat staging. He has since obtained cardiac clearance (7/20), medical clearance ( 7/18) and labs/urine (completed 7/12).    He returns with further work up to discuss scheduling surgery.    Work up is as follows;      MR head w/wo IVC 7/11/24:  No acute intracranial pathology or abnormal enhancement to suggest intracranial metastatic disease.  Stable 5 mm rounded focus of hypoenhancement within the left aspect of the sella likely reflecting microadenoma.    PET/CT 7/8/24:  1. 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 The SUV max is decreased compared to prior exam, however FDG uptake is globally decreased on this exam best demonstrated by mean liver SUV of 0.2 on this exam and 2.5 on comparison PET/CT.  2. Several FDG avid mediastinal lymph nodes, new from PET/CT dated 3/15/2024, consistent with mirna metastatic disease    VQ scan 7/10/24:  Large matching defect corresponds to a known left lingular tumor. Based on these data, a left upper lobectomy might result in as much as a 20% loss of function.    Ct chest 6/26/24:  1. Enlarging necrotic neoplasm occupying the inferior segment of the lingula with obstruction of the lingular segmental bronchus and pulmonary artery.  2. Mild mediastinal lymphadenopathy with progressive encasement of the right hilum by above described necrotic lingular neoplasm.    PFT done on 7/15/24 showed FEV1 = 2.28, 85% of predicted value, FVC =3.88 , 111% of predicted value, PEF =7.57, 102% of predicted value and DLCO =13.43 , 59% of predicted value.    He reports that he is in his usual state of health today. He experiences cough with praying and reports an 8 lb weight loss since March.   Patient is a 68 yo male, former smoker with a PMHx of HTN, GERD, DM II and high cholesterol. He was referred by DR. NATALY ARCE for surgical evaluation of a left upper lobe lung mass.    The mass was discovered during work up for a cough. Imaging showed a 4.3 x 7.4 x 5.4 cm mass in the lingula of the left lung. Patient was appropriately staged per NCCN guidelines and clinic stage was determined to be [id number], stage IIIA. Case was discussed at multidisciplinary tumor board and consensus opinion was to proceed with neoadjuvant chemo-immunotherapy and subsequent resection.    He started treatment with Dr. Larose on April 23. He had significant HENRY requiring an adjustment of his chemotherapy but is otherwise tolerated treatment well. C3D8 Gemcitabine given w/ blood transfusion on 6/19/24.    He completed a PET/CT on 7/8 which showed signs of disease progression. Dr. Larose reviewed the results with the patient and ordered a brain MRI to repeat staging. He has since obtained cardiac clearance (7/20), medical clearance ( 7/18) and labs/urine (completed 7/12).    He returns with further work up to discuss scheduling surgery.    Work up is as follows;      MR head w/wo IVC 7/11/24:  No acute intracranial pathology or abnormal enhancement to suggest intracranial metastatic disease.  Stable 5 mm rounded focus of hypoenhancement within the left aspect of the sella likely reflecting microadenoma.    PET/CT 7/8/24:  1. 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 The SUV max is decreased compared to prior exam, however FDG uptake is globally decreased on this exam best demonstrated by mean liver SUV of 0.2 on this exam and 2.5 on comparison PET/CT.  2. Several FDG avid mediastinal lymph nodes, new from PET/CT dated 3/15/2024, consistent with mirna metastatic disease    VQ scan 7/10/24:  Large matching defect corresponds to a known left lingular tumor. Based on these data, a left upper lobectomy might result in as much as a 20% loss of function.    Ct chest 6/26/24:  1. Enlarging necrotic neoplasm occupying the inferior segment of the lingula with obstruction of the lingular segmental bronchus and pulmonary artery.  2. Mild mediastinal lymphadenopathy with progressive encasement of the right hilum by above described necrotic lingular neoplasm.    PFT done on 7/15/24 showed FEV1 = 2.28, 85% of predicted value, FVC =3.88 , 111% of predicted value, PEF =7.57, 102% of predicted value and DLCO =13.43 , 59% of predicted value.    He reports that he is in his usual state of health today. He experiences cough with praying and reports an 8 lb weight loss since March.    Patient seen in same day holding area; Reports no changes to PMHx or medications since last seen by our team. Denies acute or current SOB, chest pain, palpitation, N/V/D, fever/chills, recent illness, or any other concerning symptoms.

## 2024-07-30 NOTE — H&P ADULT - REASON FOR ADMISSION
Bronchoscopy, mediastinoscopy, possible left thoracoscopy, robot assisted left upper lobectomy, possible left pneumonectomy, possible thoracotomy, possible mediastinal lymph node dissection on 8/5/24

## 2024-07-31 ENCOUNTER — OUTPATIENT (OUTPATIENT)
Dept: OUTPATIENT SERVICES | Facility: HOSPITAL | Age: 69
LOS: 1 days | End: 2024-07-31
Payer: MEDICARE

## 2024-07-31 ENCOUNTER — RESULT REVIEW (OUTPATIENT)
Age: 69
End: 2024-07-31

## 2024-07-31 DIAGNOSIS — R91.8 OTHER NONSPECIFIC ABNORMAL FINDING OF LUNG FIELD: ICD-10-CM

## 2024-07-31 DIAGNOSIS — C34.92 MALIGNANT NEOPLASM OF UNSPECIFIED PART OF LEFT BRONCHUS OR LUNG: ICD-10-CM

## 2024-07-31 DIAGNOSIS — Z90.49 ACQUIRED ABSENCE OF OTHER SPECIFIED PARTS OF DIGESTIVE TRACT: Chronic | ICD-10-CM

## 2024-07-31 DIAGNOSIS — Z01.818 ENCOUNTER FOR OTHER PREPROCEDURAL EXAMINATION: ICD-10-CM

## 2024-07-31 PROCEDURE — 93306 TTE W/DOPPLER COMPLETE: CPT

## 2024-07-31 PROCEDURE — 93306 TTE W/DOPPLER COMPLETE: CPT | Mod: 26

## 2024-08-02 NOTE — PATIENT PROFILE ADULT - FUNCTIONAL ASSESSMENT - BASIC MOBILITY 1.
----- Message from Chip Gar sent at 12/1/2023  8:46 AM CST -----  Regarding: self  Type:  Sooner Appointment Request    Patient is requesting a sooner appointment.  Patient declined first available appointment listed as well as another facility and provider .  Patient will not accept being placed on the waitlist and is requesting a message be sent to doctor.    Name of Caller: self    When is the first available appointment? None     Symptoms: f/u    Would the patient rather a call back or a response via [facility]? callback    Best Call Back Number: [phone]    Additional Information:       4 = No assist / stand by assistance

## 2024-08-02 NOTE — PATIENT PROFILE ADULT - PUBLIC BENEFITS
Pt. c/o left-sided chest pain traveling to shoulder that started this morning. denies numbness or tingling or SOB. EKG in progress. no

## 2024-08-05 ENCOUNTER — INPATIENT (INPATIENT)
Facility: HOSPITAL | Age: 69
LOS: 2 days | Discharge: HOME | End: 2024-08-08
Attending: THORACIC SURGERY (CARDIOTHORACIC VASCULAR SURGERY) | Admitting: THORACIC SURGERY (CARDIOTHORACIC VASCULAR SURGERY)
Payer: MEDICARE

## 2024-08-05 ENCOUNTER — APPOINTMENT (OUTPATIENT)
Dept: THORACIC SURGERY | Facility: HOSPITAL | Age: 69
End: 2024-08-05

## 2024-08-05 VITALS
HEART RATE: 100 BPM | WEIGHT: 149.03 LBS | SYSTOLIC BLOOD PRESSURE: 124 MMHG | HEIGHT: 66 IN | DIASTOLIC BLOOD PRESSURE: 77 MMHG | RESPIRATION RATE: 16 BRPM | OXYGEN SATURATION: 100 % | TEMPERATURE: 98 F

## 2024-08-05 DIAGNOSIS — Z90.49 ACQUIRED ABSENCE OF OTHER SPECIFIED PARTS OF DIGESTIVE TRACT: Chronic | ICD-10-CM

## 2024-08-05 PROBLEM — N17.9 ACUTE KIDNEY FAILURE, UNSPECIFIED: Chronic | Status: ACTIVE | Noted: 2024-08-01

## 2024-08-05 PROBLEM — C34.90 MALIGNANT NEOPLASM OF UNSPECIFIED PART OF UNSPECIFIED BRONCHUS OR LUNG: Chronic | Status: ACTIVE | Noted: 2024-08-01

## 2024-08-05 PROBLEM — Z86.39 PERSONAL HISTORY OF OTHER ENDOCRINE, NUTRITIONAL AND METABOLIC DISEASE: Chronic | Status: ACTIVE | Noted: 2024-08-02

## 2024-08-05 LAB
ANION GAP SERPL CALC-SCNC: 11 MMOL/L — SIGNIFICANT CHANGE UP (ref 5–17)
BASE EXCESS BLDA CALC-SCNC: -2.6 MMOL/L — LOW (ref -2–3)
BASOPHILS # BLD AUTO: 0.03 K/UL — SIGNIFICANT CHANGE UP (ref 0–0.2)
BASOPHILS NFR BLD AUTO: 0.4 % — SIGNIFICANT CHANGE UP (ref 0–2)
BLD GP AB SCN SERPL QL: NEGATIVE — SIGNIFICANT CHANGE UP
BUN SERPL-MCNC: 15 MG/DL — SIGNIFICANT CHANGE UP (ref 7–23)
CA-I BLDA-SCNC: 1.28 MMOL/L — SIGNIFICANT CHANGE UP (ref 1.15–1.33)
CALCIUM SERPL-MCNC: 9.1 MG/DL — SIGNIFICANT CHANGE UP (ref 8.4–10.5)
CHLORIDE SERPL-SCNC: 101 MMOL/L — SIGNIFICANT CHANGE UP (ref 96–108)
CO2 BLDA-SCNC: 24 MMOL/L — SIGNIFICANT CHANGE UP (ref 19–24)
CO2 SERPL-SCNC: 22 MMOL/L — SIGNIFICANT CHANGE UP (ref 22–31)
COHGB MFR BLDA: 1.1 % — SIGNIFICANT CHANGE UP
CREAT SERPL-MCNC: 0.82 MG/DL — SIGNIFICANT CHANGE UP (ref 0.5–1.3)
EGFR: 94 ML/MIN/1.73M2 — SIGNIFICANT CHANGE UP
EOSINOPHIL # BLD AUTO: 0.03 K/UL — SIGNIFICANT CHANGE UP (ref 0–0.5)
EOSINOPHIL NFR BLD AUTO: 0.4 % — SIGNIFICANT CHANGE UP (ref 0–6)
GLUCOSE BLDA-MCNC: 153 MG/DL — HIGH (ref 70–99)
GLUCOSE BLDC GLUCOMTR-MCNC: 194 MG/DL — HIGH (ref 70–99)
GLUCOSE BLDC GLUCOMTR-MCNC: 245 MG/DL — HIGH (ref 70–99)
GLUCOSE BLDC GLUCOMTR-MCNC: 265 MG/DL — HIGH (ref 70–99)
GLUCOSE BLDC GLUCOMTR-MCNC: 267 MG/DL — HIGH (ref 70–99)
GLUCOSE SERPL-MCNC: 184 MG/DL — HIGH (ref 70–99)
HCO3 BLDA-SCNC: 22 MMOL/L — SIGNIFICANT CHANGE UP (ref 21–28)
HCT VFR BLD CALC: 26.3 % — LOW (ref 39–50)
HGB BLD-MCNC: 8.5 G/DL — LOW (ref 13–17)
HGB BLDA-MCNC: 7.4 G/DL — LOW (ref 12.6–17.4)
IMM GRANULOCYTES NFR BLD AUTO: 0.4 % — SIGNIFICANT CHANGE UP (ref 0–0.9)
LYMPHOCYTES # BLD AUTO: 0.98 K/UL — LOW (ref 1–3.3)
LYMPHOCYTES # BLD AUTO: 11.9 % — LOW (ref 13–44)
MCHC RBC-ENTMCNC: 28.1 PG — SIGNIFICANT CHANGE UP (ref 27–34)
MCHC RBC-ENTMCNC: 32.3 GM/DL — SIGNIFICANT CHANGE UP (ref 32–36)
MCV RBC AUTO: 86.8 FL — SIGNIFICANT CHANGE UP (ref 80–100)
METHGB MFR BLDA: 0 % — SIGNIFICANT CHANGE UP
MONOCYTES # BLD AUTO: 0.35 K/UL — SIGNIFICANT CHANGE UP (ref 0–0.9)
MONOCYTES NFR BLD AUTO: 4.3 % — SIGNIFICANT CHANGE UP (ref 2–14)
NEUTROPHILS # BLD AUTO: 6.81 K/UL — SIGNIFICANT CHANGE UP (ref 1.8–7.4)
NEUTROPHILS NFR BLD AUTO: 82.6 % — HIGH (ref 43–77)
NRBC # BLD: 0 /100 WBCS — SIGNIFICANT CHANGE UP (ref 0–0)
OXYHGB MFR BLDA: 98.7 % — HIGH (ref 90–95)
PCO2 BLDA: 39 MMHG — SIGNIFICANT CHANGE UP (ref 35–48)
PH BLDA: 7.37 — SIGNIFICANT CHANGE UP (ref 7.35–7.45)
PLATELET # BLD AUTO: 237 K/UL — SIGNIFICANT CHANGE UP (ref 150–400)
PO2 BLDA: 302 MMHG — HIGH (ref 83–108)
POTASSIUM BLDA-SCNC: 3.7 MMOL/L — SIGNIFICANT CHANGE UP (ref 3.5–5.1)
POTASSIUM SERPL-MCNC: 4.3 MMOL/L — SIGNIFICANT CHANGE UP (ref 3.5–5.3)
POTASSIUM SERPL-SCNC: 4.3 MMOL/L — SIGNIFICANT CHANGE UP (ref 3.5–5.3)
RBC # BLD: 3.03 M/UL — LOW (ref 4.2–5.8)
RBC # FLD: 14.4 % — SIGNIFICANT CHANGE UP (ref 10.3–14.5)
RH IG SCN BLD-IMP: POSITIVE — SIGNIFICANT CHANGE UP
SAO2 % BLDA: 99.8 % — HIGH (ref 94–98)
SODIUM BLDA-SCNC: 134 MMOL/L — LOW (ref 136–145)
SODIUM SERPL-SCNC: 134 MMOL/L — LOW (ref 135–145)
WBC # BLD: 8.23 K/UL — SIGNIFICANT CHANGE UP (ref 3.8–10.5)
WBC # FLD AUTO: 8.23 K/UL — SIGNIFICANT CHANGE UP (ref 3.8–10.5)

## 2024-08-05 PROCEDURE — 71045 X-RAY EXAM CHEST 1 VIEW: CPT | Mod: 26

## 2024-08-05 PROCEDURE — 39402 MEDIASTINOSCPY W/LMPH NOD BX: CPT

## 2024-08-05 DEVICE — SURGIFLO HEMOSTATIC MATRIX KIT: Type: IMPLANTABLE DEVICE | Site: LEFT | Status: FUNCTIONAL

## 2024-08-05 DEVICE — SURGCEL 4 X 8": Type: IMPLANTABLE DEVICE | Site: LEFT | Status: FUNCTIONAL

## 2024-08-05 DEVICE — CLIP APPLIER ETHICON LIGAMAX 5MM: Type: IMPLANTABLE DEVICE | Site: LEFT | Status: FUNCTIONAL

## 2024-08-05 RX ORDER — DEXTROSE 4 G
25 TABLET,CHEWABLE ORAL ONCE
Refills: 0 | Status: DISCONTINUED | OUTPATIENT
Start: 2024-08-05 | End: 2024-08-08

## 2024-08-05 RX ORDER — PANTOPRAZOLE SODIUM 20 MG/1
40 TABLET, DELAYED RELEASE ORAL
Refills: 0 | Status: DISCONTINUED | OUTPATIENT
Start: 2024-08-05 | End: 2024-08-08

## 2024-08-05 RX ORDER — ACETAMINOPHEN 500 MG
975 TABLET ORAL EVERY 6 HOURS
Refills: 0 | Status: DISCONTINUED | OUTPATIENT
Start: 2024-08-05 | End: 2024-08-08

## 2024-08-05 RX ORDER — DEXTROSE MONOHYDRATE, SODIUM CHLORIDE, SODIUM LACTATE, CALCIUM CHLORIDE, MAGNESIUM CHLORIDE 1.5; 538; 448; 18.4; 5.08 G/100ML; MG/100ML; MG/100ML; MG/100ML; MG/100ML
1000 SOLUTION INTRAPERITONEAL
Refills: 0 | Status: DISCONTINUED | OUTPATIENT
Start: 2024-08-05 | End: 2024-08-08

## 2024-08-05 RX ORDER — IPRATROPIUM BROMIDE AND ALBUTEROL SULFATE 2.5; .5 MG/3ML; MG/3ML
3 SOLUTION RESPIRATORY (INHALATION) EVERY 6 HOURS
Refills: 0 | Status: DISCONTINUED | OUTPATIENT
Start: 2024-08-05 | End: 2024-08-08

## 2024-08-05 RX ORDER — AMLODIPINE BESYLATE 2.5 MG/1
1 TABLET ORAL
Refills: 0 | DISCHARGE

## 2024-08-05 RX ORDER — DEXTROSE 4 G
12.5 TABLET,CHEWABLE ORAL ONCE
Refills: 0 | Status: DISCONTINUED | OUTPATIENT
Start: 2024-08-05 | End: 2024-08-08

## 2024-08-05 RX ORDER — CEFAZOLIN SODIUM 10 G
2000 VIAL (EA) INJECTION EVERY 8 HOURS
Refills: 0 | Status: COMPLETED | OUTPATIENT
Start: 2024-08-05 | End: 2024-08-06

## 2024-08-05 RX ORDER — ACETAMINOPHEN 500 MG
975 TABLET ORAL ONCE
Refills: 0 | Status: COMPLETED | OUTPATIENT
Start: 2024-08-05 | End: 2024-08-05

## 2024-08-05 RX ORDER — ENOXAPARIN SODIUM 120 MG/.8ML
40 INJECTION SUBCUTANEOUS EVERY 24 HOURS
Refills: 0 | Status: DISCONTINUED | OUTPATIENT
Start: 2024-08-06 | End: 2024-08-08

## 2024-08-05 RX ORDER — ONDANSETRON HCL/PF 4 MG/2 ML
1 VIAL (ML) INJECTION
Refills: 0 | DISCHARGE

## 2024-08-05 RX ORDER — DEXTROSE 4 G
15 TABLET,CHEWABLE ORAL ONCE
Refills: 0 | Status: DISCONTINUED | OUTPATIENT
Start: 2024-08-05 | End: 2024-08-08

## 2024-08-05 RX ORDER — BACLOFEN 10 MG/1
1 TABLET ORAL
Refills: 0 | DISCHARGE

## 2024-08-05 RX ORDER — HEPARIN SODIUM 1000 [USP'U]/ML
5000 INJECTION, SOLUTION INTRAVENOUS; SUBCUTANEOUS ONCE
Refills: 0 | Status: COMPLETED | OUTPATIENT
Start: 2024-08-05 | End: 2024-08-05

## 2024-08-05 RX ORDER — SENNOSIDES 8.6 MG/1
2 TABLET ORAL AT BEDTIME
Refills: 0 | Status: DISCONTINUED | OUTPATIENT
Start: 2024-08-05 | End: 2024-08-08

## 2024-08-05 RX ORDER — INSULIN LISPRO 100/ML
VIAL (ML) SUBCUTANEOUS
Refills: 0 | Status: DISCONTINUED | OUTPATIENT
Start: 2024-08-05 | End: 2024-08-08

## 2024-08-05 RX ORDER — LORATADINE 10 MG
17 TABLET,DISINTEGRATING ORAL EVERY 24 HOURS
Refills: 0 | Status: DISCONTINUED | OUTPATIENT
Start: 2024-08-05 | End: 2024-08-08

## 2024-08-05 RX ORDER — GLUCAGON INJECTION, SOLUTION 0.5 MG/.1ML
1 INJECTION, SOLUTION SUBCUTANEOUS ONCE
Refills: 0 | Status: DISCONTINUED | OUTPATIENT
Start: 2024-08-05 | End: 2024-08-08

## 2024-08-05 RX ORDER — ATORVASTATIN CALCIUM 40 MG/1
20 TABLET, FILM COATED ORAL AT BEDTIME
Refills: 0 | Status: DISCONTINUED | OUTPATIENT
Start: 2024-08-05 | End: 2024-08-08

## 2024-08-05 RX ADMIN — Medication 3: at 21:39

## 2024-08-05 RX ADMIN — Medication 975 MILLIGRAM(S): at 08:12

## 2024-08-05 RX ADMIN — Medication 100 MILLIGRAM(S): at 17:23

## 2024-08-05 RX ADMIN — IPRATROPIUM BROMIDE AND ALBUTEROL SULFATE 3 MILLILITER(S): 2.5; .5 SOLUTION RESPIRATORY (INHALATION) at 17:36

## 2024-08-05 RX ADMIN — ATORVASTATIN CALCIUM 20 MILLIGRAM(S): 40 TABLET, FILM COATED ORAL at 21:29

## 2024-08-05 RX ADMIN — HEPARIN SODIUM 5000 UNIT(S): 1000 INJECTION, SOLUTION INTRAVENOUS; SUBCUTANEOUS at 08:12

## 2024-08-05 RX ADMIN — SENNOSIDES 2 TABLET(S): 8.6 TABLET ORAL at 21:29

## 2024-08-05 NOTE — PRE-ANESTHESIA EVALUATION ADULT - NSRADCARDRESULTSFT_GEN_ALL_CORE
CT chest, MR brain, CXR, PETCT, VQ scan, TTE all reviewed    TTE 7/31/24:  CONCLUSIONS:   1. Normal left and right ventricular size and systolic function.   2. No significant valvular disease.   3. No evidence of pulmonary hypertension.   4. Trivial pericardial effusion.   5. No prior echo is available for comparison.

## 2024-08-05 NOTE — BRIEF OPERATIVE NOTE - NSICDXBRIEFPROCEDURE_GEN_ALL_CORE_FT
PROCEDURES:  Mediastinoscopy, with mediastinal lymphadenectomy 05-Aug-2024 12:34:32 Bronchoscopy, Mediastinoscopy Paul Hernnadez

## 2024-08-05 NOTE — PROGRESS NOTE ADULT - SUBJECTIVE AND OBJECTIVE BOX
Patient received out of OR s/p bronchoscopy w/ mediastinoscopy     Operation / Date:   S/p bronchoscopy, mediastinoscopy, w/ bleeding intraop, so procedure aborted (8/5/24)    SUBJECTIVE ASSESSMENT:  69y Male         Vital Signs Last 24 Hrs  T(C): 36.2 (05 Aug 2024 12:46), Max: 36.9 (05 Aug 2024 07:50)  T(F): 97.2 (05 Aug 2024 12:46), Max: 98.5 (05 Aug 2024 07:50)  HR: 91 (05 Aug 2024 15:12) (83 - 100)  BP: 140/78 (05 Aug 2024 15:12) (124/77 - 153/68)  BP(mean): 103 (05 Aug 2024 15:12) (92 - 103)  RR: 14 (05 Aug 2024 15:12) (10 - 17)  SpO2: 95% (05 Aug 2024 15:12) (93% - 100%)    Parameters below as of 05 Aug 2024 15:12  Patient On (Oxygen Delivery Method): room air      I&O's Detail    05 Aug 2024 07:01  -  05 Aug 2024 15:51  --------------------------------------------------------  IN:    Lactated Ringers: 150 mL  Total IN: 150 mL    OUT:    Voided (mL): 200 mL  Total OUT: 200 mL    Total NET: -50 mL          CHEST TUBE:  Yes/No. AIR LEAKS: Yes/No. Suction / H2O SEAL.   MARCELO DRAIN:  Yes/No.  EPICARDIAL WIRES: Yes/No.  TIE DOWNS: Yes/No.  PAUL: Yes/No.    PHYSICAL EXAM:    General:     Neurological:    Cardiovascular:    Respiratory:    Gastrointestinal:    Extremities:    Vascular:    Incision Sites:    LABS:                        8.5    8.23  )-----------( 237      ( 05 Aug 2024 12:52 )             26.3       COUMADIN:  Yes/No. REASON: .        08-05    134<L>  |  101  |  15  ----------------------------<  184<H>  4.3   |  22  |  0.82    Ca    9.1      05 Aug 2024 12:52        Urinalysis Basic - ( 05 Aug 2024 12:52 )    Color: x / Appearance: x / SG: x / pH: x  Gluc: 184 mg/dL / Ketone: x  / Bili: x / Urobili: x   Blood: x / Protein: x / Nitrite: x   Leuk Esterase: x / RBC: x / WBC x   Sq Epi: x / Non Sq Epi: x / Bacteria: x        MEDICATIONS  (STANDING):  albuterol/ipratropium for Nebulization 3 milliLiter(s) Nebulizer every 6 hours  atorvastatin 20 milliGRAM(s) Oral at bedtime  ceFAZolin   IVPB 2000 milliGRAM(s) IV Intermittent every 8 hours  lactated ringers. 1000 milliLiter(s) (50 mL/Hr) IV Continuous <Continuous>  pantoprazole    Tablet 40 milliGRAM(s) Oral before breakfast  polyethylene glycol 3350 17 Gram(s) Oral every 24 hours  senna 2 Tablet(s) Oral at bedtime    MEDICATIONS  (PRN):  acetaminophen     Tablet .. 975 milliGRAM(s) Oral every 6 hours PRN Moderate Pain (4 - 6)        RADIOLOGY & ADDITIONAL TESTS:     Patient received out of OR s/p bronchoscopy w/ mediastinoscopy     Operation / Date:   S/p bronchoscopy, mediastinoscopy, w/ bleeding intraop, so procedure aborted (8/5/24)    SUBJECTIVE ASSESSMENT:  69y Male w/ c/o sore throat post op. Otherwise denies fever, chills, dizziness, chest pain, sob, abd pain, n/v/d.     Vital Signs Last 24 Hrs  T(C): 36.2 (05 Aug 2024 12:46), Max: 36.9 (05 Aug 2024 07:50)  T(F): 97.2 (05 Aug 2024 12:46), Max: 98.5 (05 Aug 2024 07:50)  HR: 91 (05 Aug 2024 15:12) (83 - 100)  BP: 140/78 (05 Aug 2024 15:12) (124/77 - 153/68)  BP(mean): 103 (05 Aug 2024 15:12) (92 - 103)  RR: 14 (05 Aug 2024 15:12) (10 - 17)  SpO2: 95% (05 Aug 2024 15:12) (93% - 100%)    Parameters below as of 05 Aug 2024 15:12  Patient On (Oxygen Delivery Method): room air      I&O's Detail    05 Aug 2024 07:01  -  05 Aug 2024 15:51  --------------------------------------------------------  IN:    Lactated Ringers: 150 mL  Total IN: 150 mL    OUT:    Voided (mL): 200 mL  Total OUT: 200 mL    Total NET: -50 mL      CHEST TUBE:  No.   MARCELO DRAIN:  No.  EPICARDIAL WIRES: No.  TIE DOWNS: No.  PAUL: No.    PHYSICAL EXAM:  Appearance: No acute distress.  Neurologic: AAOx3, no AMS or focal deficits.  Responds appropriately to verbal and physical stimuli; exhibits purposeful movement in all extremities.  Neck: +midline incision well approximated, neck is soft, minimall TTP   Cardiovascular: RRR, S1 S2. No m/r/g.  Respiratory: No acute respiratory distress. CTA b/l, no w/r/r.   Gastrointestinal:  Soft, non-tender, non-distended, + BS.	  Skin: No rashes. No ecchymoses. No cyanosis.  Extremities: Exhibits normal range of motion, no clubbing, cyanosis or edema.  Vascular: Peripheral pulses palpable 2+ bilaterally.  Incision Sites: +mediastinoscopy site well approximated, neck is soft, minimally TTP     LABS:                        8.5    8.23  )-----------( 237      ( 05 Aug 2024 12:52 )             26.3     08-05    134<L>  |  101  |  15  ----------------------------<  184<H>  4.3   |  22  |  0.82    Ca    9.1      05 Aug 2024 12:52        Urinalysis Basic - ( 05 Aug 2024 12:52 )    Color: x / Appearance: x / SG: x / pH: x  Gluc: 184 mg/dL / Ketone: x  / Bili: x / Urobili: x   Blood: x / Protein: x / Nitrite: x   Leuk Esterase: x / RBC: x / WBC x   Sq Epi: x / Non Sq Epi: x / Bacteria: x        MEDICATIONS  (STANDING):  albuterol/ipratropium for Nebulization 3 milliLiter(s) Nebulizer every 6 hours  atorvastatin 20 milliGRAM(s) Oral at bedtime  ceFAZolin   IVPB 2000 milliGRAM(s) IV Intermittent every 8 hours  lactated ringers. 1000 milliLiter(s) (50 mL/Hr) IV Continuous <Continuous>  pantoprazole    Tablet 40 milliGRAM(s) Oral before breakfast  polyethylene glycol 3350 17 Gram(s) Oral every 24 hours  senna 2 Tablet(s) Oral at bedtime    MEDICATIONS  (PRN):  acetaminophen     Tablet .. 975 milliGRAM(s) Oral every 6 hours PRN Moderate Pain (4 - 6)        RADIOLOGY & ADDITIONAL TESTS:

## 2024-08-05 NOTE — PROGRESS NOTE ADULT - ASSESSMENT
68yo M w/ PMHx of HTN, GERD, T2DM, HLD, referred by Dr.Joseph Warren for surgical evaluation of COLETTE mass (determined to be [id number], stage IIIA), started on neoadjuvent chemo on 4/23/24 with  w/ PET scan 7/8/24 showing progression of disease. Patient is s/p bronchscopy w/ mediastinoscopy which was aborted after noting intraop bleeding. Patient w c/o sore throat post op, otherwise remains hemodynamically stable, now planned for EBUS this week to evaluate lymphnodes prior to proceeding to further intervention.     A/P:  Neurovascular: No delirium. Pain well controlled with current regimen.  -Pain: tylenol prn     Cardiovascular: Hemodynamically stable. HR controlled.  -HLD: c/w atorvastatin 20mg daily   -HTN: on home amlodipine 5mg daily, monitor vitals, restart as needed     Respiratory: 02 Sat = 98% on RA.  -Encourage C+DB and Use of IS 10x / hr while awake.  -CXR.  -COLETTE Mass, s/p bronchscopy w/ mediastinoscopy which was aborted after noting intraop bleeding (8/5/24)   Planned for EBUS later this week for lymphnode evaluation   C/w Duoneb q6hrs post op.    GI: Stable.  -PPX: c/w pantoprazole 40mg daily   -Clear liquid diet, progress as tolerated   -Constipation: c/w miralax, senna     Renal / :  -Monitor renal function: Cr .82, BUN 15  -Monitor I/O's.    Endocrine:    -T2DM (A1c: pending): takes metformin at home, c/w ISS inpatient.    -TSH: 1.410    Hematologic:  -CBC: RBC 3.03, Hgb 8.5, Plt 237  -Coagulation Panel: not obtained    ID:  Temperature afebrile  -CBC" WBC 8.23  -Observe for SIRS/Sepsis Syndrome.    Prophylaxis:  -DVT prophylaxis with Lovenox 40mg daily.  -SCD's    Disposition:  -Planned for EBUS later this week to evaluate lymphnodes    70yo M w/ PMHx of HTN, GERD, T2DM, HLD, referred by Dr.Joseph Warren for surgical evaluation of COLETTE mass (determined to be [id number], stage IIIA), started on neoadjuvent chemo on 4/23/24 with  w/ PET scan 7/8/24 showing progression of disease. Patient is s/p bronchscopy w/ mediastinoscopy which was aborted after noting intraop bleeding. Patient w c/o sore throat post op, otherwise remains hemodynamically stable, now planned for EBUS this week to evaluate lymphnodes prior to proceeding to further intervention.     A/P:  Neurovascular: No delirium. Pain well controlled with current regimen.  -Pain: tylenol prn     Cardiovascular: Hemodynamically stable. HR controlled.  -HLD: c/w atorvastatin 20mg daily   -HTN: on home amlodipine 5mg daily, monitor vitals, restart as needed     Respiratory: 02 Sat = 98% on RA.  -Encourage C+DB and Use of IS 10x / hr while awake.  -CXR: LLL w/ large mass   -COLETTE Mass, s/p bronchscopy w/ mediastinoscopy which was aborted after noting intraop bleeding (8/5/24)   Planned for EBUS later this week for lymphnode evaluation   C/w Duoneb q6hrs post op.    GI: Stable.  -PPX: c/w pantoprazole 40mg daily   -Clear liquid diet, progress as tolerated   -Constipation: c/w miralax, senna     Renal / :  -Monitor renal function: Cr .82, BUN 15  -Monitor I/O's.    Endocrine:    -T2DM (A1c: pending): takes metformin at home, c/w ISS inpatient.    -TSH: 1.410    Hematologic:  -CBC: RBC 3.03, Hgb 8.5, Plt 237  -Coagulation Panel: not obtained    ID:  Temperature afebrile  -CBC" WBC 8.23  -Observe for SIRS/Sepsis Syndrome.    Prophylaxis:  -DVT prophylaxis with Lovenox 40mg daily.  -SCD's    Disposition:  -Planned for EBUS later this week to evaluate lymphnodes

## 2024-08-06 ENCOUNTER — TRANSCRIPTION ENCOUNTER (OUTPATIENT)
Age: 69
End: 2024-08-06

## 2024-08-06 ENCOUNTER — RESULT REVIEW (OUTPATIENT)
Age: 69
End: 2024-08-06

## 2024-08-06 LAB
A1C WITH ESTIMATED AVERAGE GLUCOSE RESULT: 6.3 % — HIGH (ref 4–5.6)
ANION GAP SERPL CALC-SCNC: 11 MMOL/L — SIGNIFICANT CHANGE UP (ref 5–17)
APTT BLD: 26.8 SEC — SIGNIFICANT CHANGE UP (ref 24.5–35.6)
BASOPHILS # BLD AUTO: 0.02 K/UL — SIGNIFICANT CHANGE UP (ref 0–0.2)
BASOPHILS NFR BLD AUTO: 0.2 % — SIGNIFICANT CHANGE UP (ref 0–2)
BUN SERPL-MCNC: 18 MG/DL — SIGNIFICANT CHANGE UP (ref 7–23)
CALCIUM SERPL-MCNC: 9.2 MG/DL — SIGNIFICANT CHANGE UP (ref 8.4–10.5)
CHLORIDE SERPL-SCNC: 99 MMOL/L — SIGNIFICANT CHANGE UP (ref 96–108)
CO2 SERPL-SCNC: 25 MMOL/L — SIGNIFICANT CHANGE UP (ref 22–31)
CREAT SERPL-MCNC: 0.99 MG/DL — SIGNIFICANT CHANGE UP (ref 0.5–1.3)
EGFR: 82 ML/MIN/1.73M2 — SIGNIFICANT CHANGE UP
EOSINOPHIL # BLD AUTO: 0.04 K/UL — SIGNIFICANT CHANGE UP (ref 0–0.5)
EOSINOPHIL NFR BLD AUTO: 0.4 % — SIGNIFICANT CHANGE UP (ref 0–6)
ESTIMATED AVERAGE GLUCOSE: 134 MG/DL — HIGH (ref 68–114)
GLUCOSE BLDC GLUCOMTR-MCNC: 175 MG/DL — HIGH (ref 70–99)
GLUCOSE BLDC GLUCOMTR-MCNC: 177 MG/DL — HIGH (ref 70–99)
GLUCOSE BLDC GLUCOMTR-MCNC: 188 MG/DL — HIGH (ref 70–99)
GLUCOSE BLDC GLUCOMTR-MCNC: 198 MG/DL — HIGH (ref 70–99)
GLUCOSE BLDC GLUCOMTR-MCNC: 256 MG/DL — HIGH (ref 70–99)
GLUCOSE SERPL-MCNC: 189 MG/DL — HIGH (ref 70–99)
HCT VFR BLD CALC: 30.3 % — LOW (ref 39–50)
HGB BLD-MCNC: 10.1 G/DL — LOW (ref 13–17)
IMM GRANULOCYTES NFR BLD AUTO: 0.3 % — SIGNIFICANT CHANGE UP (ref 0–0.9)
INR BLD: 1.26 — HIGH (ref 0.85–1.18)
LYMPHOCYTES # BLD AUTO: 1.61 K/UL — SIGNIFICANT CHANGE UP (ref 1–3.3)
LYMPHOCYTES # BLD AUTO: 14.8 % — SIGNIFICANT CHANGE UP (ref 13–44)
MAGNESIUM SERPL-MCNC: 1.2 MG/DL — LOW (ref 1.6–2.6)
MCHC RBC-ENTMCNC: 28.4 PG — SIGNIFICANT CHANGE UP (ref 27–34)
MCHC RBC-ENTMCNC: 33.3 GM/DL — SIGNIFICANT CHANGE UP (ref 32–36)
MCV RBC AUTO: 85.1 FL — SIGNIFICANT CHANGE UP (ref 80–100)
MONOCYTES # BLD AUTO: 1.02 K/UL — HIGH (ref 0–0.9)
MONOCYTES NFR BLD AUTO: 9.4 % — SIGNIFICANT CHANGE UP (ref 2–14)
NEUTROPHILS # BLD AUTO: 8.16 K/UL — HIGH (ref 1.8–7.4)
NEUTROPHILS NFR BLD AUTO: 74.9 % — SIGNIFICANT CHANGE UP (ref 43–77)
NRBC # BLD: 0 /100 WBCS — SIGNIFICANT CHANGE UP (ref 0–0)
PLATELET # BLD AUTO: 287 K/UL — SIGNIFICANT CHANGE UP (ref 150–400)
POTASSIUM SERPL-MCNC: 4.5 MMOL/L — SIGNIFICANT CHANGE UP (ref 3.5–5.3)
POTASSIUM SERPL-SCNC: 4.5 MMOL/L — SIGNIFICANT CHANGE UP (ref 3.5–5.3)
PROTHROM AB SERPL-ACNC: 14.3 SEC — HIGH (ref 9.5–13)
RBC # BLD: 3.56 M/UL — LOW (ref 4.2–5.8)
RBC # FLD: 14 % — SIGNIFICANT CHANGE UP (ref 10.3–14.5)
SODIUM SERPL-SCNC: 135 MMOL/L — SIGNIFICANT CHANGE UP (ref 135–145)
WBC # BLD: 10.88 K/UL — HIGH (ref 3.8–10.5)
WBC # FLD AUTO: 10.88 K/UL — HIGH (ref 3.8–10.5)

## 2024-08-06 PROCEDURE — 88173 CYTOPATH EVAL FNA REPORT: CPT | Mod: 26

## 2024-08-06 PROCEDURE — 99232 SBSQ HOSP IP/OBS MODERATE 35: CPT

## 2024-08-06 PROCEDURE — 31653 BRONCH EBUS SAMPLNG 3/> NODE: CPT | Mod: GC

## 2024-08-06 PROCEDURE — 88305 TISSUE EXAM BY PATHOLOGIST: CPT | Mod: 26

## 2024-08-06 PROCEDURE — 71045 X-RAY EXAM CHEST 1 VIEW: CPT | Mod: 26,76

## 2024-08-06 RX ADMIN — IPRATROPIUM BROMIDE AND ALBUTEROL SULFATE 3 MILLILITER(S): 2.5; .5 SOLUTION RESPIRATORY (INHALATION) at 00:06

## 2024-08-06 RX ADMIN — ENOXAPARIN SODIUM 40 MILLIGRAM(S): 120 INJECTION SUBCUTANEOUS at 18:33

## 2024-08-06 RX ADMIN — Medication 100 MILLIGRAM(S): at 12:28

## 2024-08-06 RX ADMIN — Medication 1: at 07:07

## 2024-08-06 RX ADMIN — Medication 3: at 21:46

## 2024-08-06 RX ADMIN — Medication 1: at 11:41

## 2024-08-06 RX ADMIN — IPRATROPIUM BROMIDE AND ALBUTEROL SULFATE 3 MILLILITER(S): 2.5; .5 SOLUTION RESPIRATORY (INHALATION) at 05:49

## 2024-08-06 RX ADMIN — IPRATROPIUM BROMIDE AND ALBUTEROL SULFATE 3 MILLILITER(S): 2.5; .5 SOLUTION RESPIRATORY (INHALATION) at 18:33

## 2024-08-06 RX ADMIN — Medication 100 MILLIGRAM(S): at 02:17

## 2024-08-06 RX ADMIN — SENNOSIDES 2 TABLET(S): 8.6 TABLET ORAL at 21:47

## 2024-08-06 RX ADMIN — PANTOPRAZOLE SODIUM 40 MILLIGRAM(S): 20 TABLET, DELAYED RELEASE ORAL at 06:08

## 2024-08-06 RX ADMIN — IPRATROPIUM BROMIDE AND ALBUTEROL SULFATE 3 MILLILITER(S): 2.5; .5 SOLUTION RESPIRATORY (INHALATION) at 11:20

## 2024-08-06 RX ADMIN — ATORVASTATIN CALCIUM 20 MILLIGRAM(S): 40 TABLET, FILM COATED ORAL at 21:46

## 2024-08-06 NOTE — PROGRESS NOTE ADULT - ASSESSMENT
68yo M w/ PMHx of HTN, GERD, T2DM, HLD, referred by Dr.Joseph Warren for surgical evaluation of COLETTE mass (determined to be [id number], stage IIIA), started on neoadjuvent chemo on 4/23/24 with  w/ PET scan 7/8/24 showing progression of disease. Patient is s/p bronchscopy w/ mediastinoscopy which was aborted after noting intraop bleeding. Plan for EBUS with interventional pulmonology today to evaluate lymph nodes prior to proceeding to further intervention.     Plan:    Neurovascular:   -No delirium.   -Pain regimen, PRN's: Tylenol    Cardiovascular:   - Hx HTN: Norvasc 5mg at home, plan to resume after procedure, presently normotensive   - Hx HLD: continue lipitor 20mg qHS  -Hemodynamically stable. HR controlled.  -Continue to monitor HR, BP, telemetry      Respiratory: COLETTE Mass  - POD 1 aborted bronchoscopy/mediastinoscopy 2/2 bleeding   - h&h stable; HDS  - No signs of active bleeding  - Plan for EBUS with IP today   -Oxygenating well on RA   -Encourage coughing and use of IS 10x / hr while awake.  -CXR: Left sided mass    GI: Hx GERD  -PPX:  Protonix  -PO Diet: NPO for EBUS, then resume diet   - LR 75cc/hr while npo  -Bowel regimen: miralax/senna    Renal / : stable  -Monitor renal function.  -Monitor I/O's.  -BUN/Cr:  18 & 0.99    Endocrine:  DM2  -A1c: 6.3: ISS  -TSH:  1.4    Hematologic: stable  -H/H: 10.1 & 30.3  -Coagulation Panel: pt 14.3 aptt 26.8 INR 1.26    ID: stable  -Temperature: afebrile  -WBC: 10.88    Prophylaxis:  -DVT prophylaxis with lovenox 40mg qHS  -Continue with SCD's    Disposition:  -Discharge home when medically appropriate

## 2024-08-06 NOTE — PRE-ANESTHESIA EVALUATION ADULT - NSANTHOSAYNRD_GEN_A_CORE
No. RITESH screening performed.  STOP BANG Legend: 0-2 = LOW Risk; 3-4 = INTERMEDIATE Risk; 5-8 = HIGH Risk
No. RITESH screening performed.  STOP BANG Legend: 0-2 = LOW Risk; 3-4 = INTERMEDIATE Risk; 5-8 = HIGH Risk

## 2024-08-06 NOTE — PROGRESS NOTE ADULT - SUBJECTIVE AND OBJECTIVE BOX
Patient discussed on morning rounds with Dr. Werner    Operation / Date: 8/5/24: Bronch, mediastinoscopy (aborted sec bleeding)    SUBJECTIVE ASSESSMENT:  69y Male assessed at bedside this AM. C/o of mild sore throat and dry, non-productive cough. Denies cp/sob/coelho/n/v/fever/chills. Awaiting EBUS with interventional pulmonology today.         Vital Signs Last 24 Hrs  T(C): 36.3 (06 Aug 2024 08:57), Max: 36.5 (06 Aug 2024 05:19)  T(F): 97.4 (06 Aug 2024 08:57), Max: 97.7 (06 Aug 2024 05:19)  HR: 98 (06 Aug 2024 08:30) (83 - 103)  BP: 139/59 (06 Aug 2024 08:30) (118/67 - 153/68)  BP(mean): 89 (06 Aug 2024 08:30) (87 - 103)  RR: 18 (06 Aug 2024 08:30) (10 - 22)  SpO2: 93% (06 Aug 2024 08:30) (93% - 99%)    Parameters below as of 06 Aug 2024 08:30  Patient On (Oxygen Delivery Method): room air      I&O's Detail    05 Aug 2024 07:01  -  06 Aug 2024 07:00  --------------------------------------------------------  IN:    Lactated Ringers: 750 mL  Total IN: 750 mL    OUT:    Voided (mL): 400 mL  Total OUT: 400 mL    Total NET: 350 mL      06 Aug 2024 07:01  -  06 Aug 2024 12:34  --------------------------------------------------------  IN:    IV PiggyBack: 50 mL    Lactated Ringers: 250 mL  Total IN: 300 mL    OUT:    Voided (mL): 100 mL  Total OUT: 100 mL    Total NET: 200 mL          PHYSICAL EXAM:  Appearance: No acute distress.  Neurologic: AAOx3, no AMS or focal deficits.  Responds appropriately to verbal and physical stimuli; exhibits purposeful movement in all extremities.  HEENT:   MMM, PERRLA  Neck: Supple  Cardiovascular: RRR, S1 S2. No m/r/g.  Respiratory: No acute respiratory distress. CTA b/l, no w/r/r.   Gastrointestinal:  Soft, non-tender, non-distended, + BS.	  Skin: No rashes. No ecchymoses. No cyanosis.  Extremities: Exhibits normal range of motion, no clubbing, cyanosis or edema.  Vascular: Peripheral pulses palpable 2+ bilaterally.  Incision Sites: anterior neck incision with steri strips, c/d/i.    LABS:                        10.1   10.88 )-----------( 287      ( 06 Aug 2024 05:30 )             30.3       COUMADIN:  No    PT/INR - ( 06 Aug 2024 10:20 )   PT: 14.3 sec;   INR: 1.26          PTT - ( 06 Aug 2024 10:20 )  PTT:26.8 sec    08-06    135  |  99  |  18  ----------------------------<  189<H>  4.5   |  25  |  0.99    Ca    9.2      06 Aug 2024 05:30  Mg     1.2     08-06        Urinalysis Basic - ( 06 Aug 2024 05:30 )    Color: x / Appearance: x / SG: x / pH: x  Gluc: 189 mg/dL / Ketone: x  / Bili: x / Urobili: x   Blood: x / Protein: x / Nitrite: x   Leuk Esterase: x / RBC: x / WBC x   Sq Epi: x / Non Sq Epi: x / Bacteria: x        MEDICATIONS  (STANDING):  albuterol/ipratropium for Nebulization 3 milliLiter(s) Nebulizer every 6 hours  atorvastatin 20 milliGRAM(s) Oral at bedtime  dextrose 5%. 1000 milliLiter(s) (50 mL/Hr) IV Continuous <Continuous>  dextrose 5%. 1000 milliLiter(s) (100 mL/Hr) IV Continuous <Continuous>  dextrose 50% Injectable 12.5 Gram(s) IV Push once  dextrose 50% Injectable 25 Gram(s) IV Push once  dextrose 50% Injectable 25 Gram(s) IV Push once  enoxaparin Injectable 40 milliGRAM(s) SubCutaneous every 24 hours  glucagon  Injectable 1 milliGRAM(s) IntraMuscular once  insulin lispro (ADMELOG) corrective regimen sliding scale   SubCutaneous Before meals and at bedtime  lactated ringers. 1000 milliLiter(s) (50 mL/Hr) IV Continuous <Continuous>  pantoprazole    Tablet 40 milliGRAM(s) Oral before breakfast  polyethylene glycol 3350 17 Gram(s) Oral every 24 hours  senna 2 Tablet(s) Oral at bedtime    MEDICATIONS  (PRN):  acetaminophen     Tablet .. 975 milliGRAM(s) Oral every 6 hours PRN Moderate Pain (4 - 6)  dextrose Oral Gel 15 Gram(s) Oral once PRN Blood Glucose LESS THAN 70 milliGRAM(s)/deciliter        RADIOLOGY & ADDITIONAL TESTS:    < from: Xray Chest 1 View- PORTABLE-Routine (Xray Chest 1 View- PORTABLE-Routine in AM.) (08.06.24 @ 06:04) >  IMPRESSION:    Minimal lung base focal atelectasis. No lung consolidation. Left lower   lung mass unchanged compared to the prior exam 8/5/2024. No pneumothorax.   No pleural effusion noted.    < end of copied text >

## 2024-08-06 NOTE — PROGRESS NOTE ADULT - REASON FOR ADMISSION
Bronchoscopy, mediastinoscopy, possible left thoracoscopy, robot assisted left upper lobectomy, possible left pneumonectomy, possible thoracotomy, possible mediastinal lymph node dissection on 8/5/24
Bronchoscopy, mediastinoscopy, possible left thoracoscopy, robot assisted left upper lobectomy, possible left pneumonectomy, possible thoracotomy, possible mediastinal lymph node dissection on 8/5/24

## 2024-08-06 NOTE — PRE-ANESTHESIA EVALUATION ADULT - NSANTHPMHFT_GEN_ALL_CORE
Per primary team, patient is a 69 year old male, former smoker with a PMHx of HTN, GERD, DM II and high cholesterol. He was referred for surgical evaluation of a left upper lobe lung mass.    The mass was discovered during work up for a cough. Imaging showed a 4.3 x 7.4 x 5.4 cm mass in the lingula of the left lung. Patient was appropriately staged and determined to be [id number], stage IIIA. Case was discussed at multidisciplinary tumor board and consensus opinion was to proceed with neoadjuvant chemo-immunotherapy and subsequent resection. He started treatment with Dr. Larose on April 23. He had significant HENRY requiring an adjustment of his chemotherapy but has otherwise tolerated treatment well. C3D8 A blood transfusion was given on 6/19/24. He completed a PET/CT on 7/8 which showed signs of disease progression. Dr. Larose reviewed the results with the patient and ordered a brain MRI to repeat staging. He has since obtained cardiac clearance (7/20), medical clearance (7/18) and labs/urine (completed 7/12).    Otherwise, No CP/SOB/N/V/GERD (controlled meds). No numbness or weakness. METS > 4. All chronic conditions stable.
Per primary team, patient is a 69 year old male, former smoker with a PMHx of HTN, GERD, DM II and high cholesterol. He was referred for surgical evaluation of a left upper lobe lung mass.    The mass was discovered during work up for a cough. Imaging showed a 4.3 x 7.4 x 5.4 cm mass in the lingula of the left lung. Patient was appropriately staged and determined to be [id number], stage IIIA. Case was discussed at multidisciplinary tumor board and consensus opinion was to proceed with neoadjuvant chemo-immunotherapy and subsequent resection. He started treatment with Dr. Larose on April 23. He had significant HENRY requiring an adjustment of his chemotherapy but has otherwise tolerated treatment well. C3D8 A blood transfusion was given on 6/19/24. He completed a PET/CT on 7/8 which showed signs of disease progression. Dr. Larose reviewed the results with the patient and ordered a brain MRI to repeat staging. He has since obtained cardiac clearance (7/20), medical clearance (7/18) and labs/urine (completed 7/12).    Otherwise, No CP/SOB/N/V/GERD (controlled meds). No numbness or weakness. METS > 4. All chronic conditions stable.

## 2024-08-06 NOTE — PRE-ANESTHESIA EVALUATION ADULT - NSANTHPEFT_GEN_ALL_CORE
General: Appearance is consistent with chronological age. No abnormal facies.  Airway:  See Mallampati score  EENT: Anicteric sclera; oropharynx clear, moist mucus membranes  Cardiovascular:  Regular rate and rhythm  Respiratory: Unlabored breathing  Neurological: Awake and alert, moves all extremities  Constitutional: MET>4
AAOx3 in NAD  NSR, S1, S2 heard, no murmurs, gallops, or rubs auscultated  Lungs clear to auscultation bilaterally  Full ROM

## 2024-08-06 NOTE — PRE-ANESTHESIA EVALUATION ADULT - NSPROPOSEDPROCEDFT_GEN_ALL_CORE
Bronchoscopy, mediostinoscopy, possible left thoracoscopy, robot assisted left upper lobectomy, possible left pneumonectomy, possible thoracotomy, possible mediastinal lymph node dissection
Bronchoscopy, EBUS

## 2024-08-07 ENCOUNTER — TRANSCRIPTION ENCOUNTER (OUTPATIENT)
Age: 69
End: 2024-08-07

## 2024-08-07 ENCOUNTER — APPOINTMENT (OUTPATIENT)
Dept: THORACIC SURGERY | Facility: HOSPITAL | Age: 69
End: 2024-08-07

## 2024-08-07 LAB
ANION GAP SERPL CALC-SCNC: 14 MMOL/L — SIGNIFICANT CHANGE UP (ref 5–17)
BUN SERPL-MCNC: 20 MG/DL — SIGNIFICANT CHANGE UP (ref 7–23)
CALCIUM SERPL-MCNC: 9.2 MG/DL — SIGNIFICANT CHANGE UP (ref 8.4–10.5)
CHLORIDE SERPL-SCNC: 98 MMOL/L — SIGNIFICANT CHANGE UP (ref 96–108)
CO2 SERPL-SCNC: 23 MMOL/L — SIGNIFICANT CHANGE UP (ref 22–31)
CREAT SERPL-MCNC: 0.85 MG/DL — SIGNIFICANT CHANGE UP (ref 0.5–1.3)
EGFR: 93 ML/MIN/1.73M2 — SIGNIFICANT CHANGE UP
GLUCOSE BLDC GLUCOMTR-MCNC: 190 MG/DL — HIGH (ref 70–99)
GLUCOSE BLDC GLUCOMTR-MCNC: 255 MG/DL — HIGH (ref 70–99)
GLUCOSE BLDC GLUCOMTR-MCNC: 297 MG/DL — HIGH (ref 70–99)
GLUCOSE SERPL-MCNC: 233 MG/DL — HIGH (ref 70–99)
HCT VFR BLD CALC: 28.2 % — LOW (ref 39–50)
HGB BLD-MCNC: 9.2 G/DL — LOW (ref 13–17)
MCHC RBC-ENTMCNC: 28.8 PG — SIGNIFICANT CHANGE UP (ref 27–34)
MCHC RBC-ENTMCNC: 32.6 GM/DL — SIGNIFICANT CHANGE UP (ref 32–36)
MCV RBC AUTO: 88.4 FL — SIGNIFICANT CHANGE UP (ref 80–100)
NRBC # BLD: 0 /100 WBCS — SIGNIFICANT CHANGE UP (ref 0–0)
PLATELET # BLD AUTO: 237 K/UL — SIGNIFICANT CHANGE UP (ref 150–400)
POTASSIUM SERPL-MCNC: 4.5 MMOL/L — SIGNIFICANT CHANGE UP (ref 3.5–5.3)
POTASSIUM SERPL-SCNC: 4.5 MMOL/L — SIGNIFICANT CHANGE UP (ref 3.5–5.3)
RBC # BLD: 3.19 M/UL — LOW (ref 4.2–5.8)
RBC # FLD: 14.3 % — SIGNIFICANT CHANGE UP (ref 10.3–14.5)
SODIUM SERPL-SCNC: 135 MMOL/L — SIGNIFICANT CHANGE UP (ref 135–145)
WBC # BLD: 8.34 K/UL — SIGNIFICANT CHANGE UP (ref 3.8–10.5)
WBC # FLD AUTO: 8.34 K/UL — SIGNIFICANT CHANGE UP (ref 3.8–10.5)

## 2024-08-07 PROCEDURE — 71045 X-RAY EXAM CHEST 1 VIEW: CPT | Mod: 26

## 2024-08-07 PROCEDURE — 71260 CT THORAX DX C+: CPT | Mod: 26

## 2024-08-07 RX ORDER — AMLODIPINE BESYLATE 2.5 MG/1
5 TABLET ORAL DAILY
Refills: 0 | Status: DISCONTINUED | OUTPATIENT
Start: 2024-08-07 | End: 2024-08-08

## 2024-08-07 RX ORDER — LORATADINE 10 MG
17 TABLET,DISINTEGRATING ORAL
Qty: 119 | Refills: 0
Start: 2024-08-07 | End: 2024-08-13

## 2024-08-07 RX ORDER — ALBUMIN HUMAN 25 %
250 INTRAVENOUS SOLUTION INTRAVENOUS ONCE
Refills: 0 | Status: COMPLETED | OUTPATIENT
Start: 2024-08-07 | End: 2024-08-07

## 2024-08-07 RX ORDER — PANTOPRAZOLE SODIUM 20 MG/1
1 TABLET, DELAYED RELEASE ORAL
Qty: 30 | Refills: 0
Start: 2024-08-07 | End: 2024-09-05

## 2024-08-07 RX ORDER — SENNOSIDES 8.6 MG/1
2 TABLET ORAL
Qty: 14 | Refills: 0
Start: 2024-08-07 | End: 2024-08-13

## 2024-08-07 RX ORDER — ACETAMINOPHEN 500 MG
2 TABLET ORAL
Qty: 112 | Refills: 0
Start: 2024-08-07 | End: 2024-08-20

## 2024-08-07 RX ADMIN — ENOXAPARIN SODIUM 40 MILLIGRAM(S): 120 INJECTION SUBCUTANEOUS at 16:55

## 2024-08-07 RX ADMIN — IPRATROPIUM BROMIDE AND ALBUTEROL SULFATE 3 MILLILITER(S): 2.5; .5 SOLUTION RESPIRATORY (INHALATION) at 11:19

## 2024-08-07 RX ADMIN — Medication 1: at 16:54

## 2024-08-07 RX ADMIN — Medication 3: at 07:36

## 2024-08-07 RX ADMIN — Medication 3: at 11:19

## 2024-08-07 RX ADMIN — Medication 125 MILLILITER(S): at 09:49

## 2024-08-07 RX ADMIN — PANTOPRAZOLE SODIUM 40 MILLIGRAM(S): 20 TABLET, DELAYED RELEASE ORAL at 07:36

## 2024-08-07 RX ADMIN — IPRATROPIUM BROMIDE AND ALBUTEROL SULFATE 3 MILLILITER(S): 2.5; .5 SOLUTION RESPIRATORY (INHALATION) at 00:59

## 2024-08-07 RX ADMIN — IPRATROPIUM BROMIDE AND ALBUTEROL SULFATE 3 MILLILITER(S): 2.5; .5 SOLUTION RESPIRATORY (INHALATION) at 16:55

## 2024-08-07 RX ADMIN — AMLODIPINE BESYLATE 5 MILLIGRAM(S): 2.5 TABLET ORAL at 09:43

## 2024-08-07 RX ADMIN — IPRATROPIUM BROMIDE AND ALBUTEROL SULFATE 3 MILLILITER(S): 2.5; .5 SOLUTION RESPIRATORY (INHALATION) at 07:36

## 2024-08-07 NOTE — DISCHARGE NOTE PROVIDER - NSDCQMAMI_CARD_ALL_CORE
Date: 05/10/2023    Reason for Genetic Testing:  Griselda Thao is a 27-year-old female who had a recent genetic counseling appointment for coordination of genetic testing.  Specifically, her father was found to have a pathogenic variant in the TTR gene associated with hereditary amyloidosis.  Griselda had targeted testing for this familial TTR variant which was recently completed by [facility].  On behalf of Rhonda Apodaca [facility] who is out of the office today, I called Griselda to disclose her results.    Genetic Test Results:  TTR gene analysis, [facility], 04/29/2023:  NEGATIVE      The familial TTR pathogenic variant was NOT detected in Griselda's sample.  Additionally, no other pathogenic variants were identified elsewhere in the TTR gene.    Summary & Recommendations:  1) Targeted testing for the familial TTR variant was negative for Griselda.  This means that she is not at increased risk for developing TTR-associated familial amyloidosis.  Because Griselda did not inherit the TTR variant from her father, Griselda's future children should not be at increased risk for inheriting / developing TTR-associated familial amyloidosis based on her family history.      2) Griselda was appreciative of the update and relieved to learn about her negative result.  We assisted Griselda in obtaining a copy of her test result via the secure [facility] portal.    3) Griselda was encouraged to contact us any time if we can be of further assistance.      Tiana Valdez MS, [facility]  Licensed Genetic Counselor  [facility] [place]  Phone: [phone]      
No

## 2024-08-07 NOTE — DISCHARGE NOTE NURSING/CASE MANAGEMENT/SOCIAL WORK - PATIENT PORTAL LINK FT
You can access the FollowMyHealth Patient Portal offered by [facility] by registering at the following website: [email or web address]. By joining [employer]’s FollowMyHealth portal, you will also be able to view your health information using other applications (apps) compatible with our system.

## 2024-08-07 NOTE — DISCHARGE NOTE PROVIDER - CARE PROVIDERS DIRECT ADDRESSES
,[email or web address]

## 2024-08-07 NOTE — DISCHARGE NOTE PROVIDER - NSDCFUSCHEDAPPT_GEN_ALL_CORE_FT
Marcelo Larose  [facility] Physician [facility]  [address]  Scheduled Appointment: 08/15/2024    Raymundo Elaine  [facility] Physician [facility]  [address]  Scheduled Appointment: 08/15/2024     Ramone Werner  [facility] Physician [facility]  [address]  Scheduled Appointment: 08/15/2024    Marcelo Larose  [facility] Physician [facility]  [address]  Scheduled Appointment: 08/15/2024    Raymundo Elaine  [facility] Physician [facility]  [address]  Scheduled Appointment: 08/15/2024

## 2024-08-07 NOTE — DISCHARGE NOTE PROVIDER - HOSPITAL COURSE
Patient discussed on morning rounds with Dr. Werner  Operation Date:   S/p Bronchoscopy w/ medistinoscopy which was aborted due to intraop bleeding (8/5/24)   S/p EBUS w/ Pulmonary (8/6/24)   Primary Surgeon/Attending MD:   Referring Physician: Dr.Joseph Warren   _ _ _ _ _ _ _ _ _ _ _ _   HOSPITAL COURSE:   70 yo M w/ PMHx of GERD, HTN, T2DM, and HLD referred by Dr. 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 Patient's mass was    _ _ _ _ _ _ _ _ _ _ _ _   DISCHARGE PHYSICAL EXAM:     _ _ _ _ _ _ _ _ _ _ _ _   REMOVAL CHECKLIST:         [ ] Epicardial wires         [ ] Stitches/tie downs,   If no, why?          [ ] PICC/Midline,   If no, why?    _ _ _ _ _ _ _ _ _ _ _ _   MEDICATION DISCHARGE CHECKLIST     CABG         [ ] Aspirin, [  ] Contraindicated, Reason:         [ ] Plavix, [  ] Contraindicated, Reason:         [ ] Statin, [  ] Contraindicated, Reason:         [ ] Lasix , [  ] Contraindicated, Reason:              Duration:          [ ] Beta-Blocker, [  ] Contraindicated, Reason:     MIDCAB/PCI         [ ] Aspirin, [  ] Contraindicated, Reason:         [ ] Plavix, [  ] Contraindicated, Reason:         [ ] Statin, [  ] Contraindicated, Reason:         [ ] Lasix , [  ] Contraindicated, Reason:              Duration:          [ ] Beta-Blocker, [  ] Contraindicated, Reason:         Cardiac Rehab contraindicated due to recent cardiac surgery.         Surgical Valve         [ ] Aspirin, [  ] Contraindicated, Reason:         [ ] Lasix, [  ] Contraindicated, Reason:              Duration:          [ ] Beta-Blocker, [  ] Contraindicated, Reason:         TAVR         [ ] Aspirin, [  ] Contraindicated, Reason:         [ ] Plavix, [ ] Contraindicated, Reason:         PCI         [ ] Aspirin, [  ] Contraindicated, Reason:         [ ] Plavix, [ ] Contraindicated, Reason:         [ ] Statin, [  ] Contraindicated, Reason:         [  ] Cardiac rehab, [  ] Contraindication, Reason:         Anticoagulation         [ ] NOAC – Name, [ ] Reason:               Cost/Insurance barriers addressed: YES/NO          [ ] Coumadin, Indication:                INR Goal:               Follow up:   _ _ _ _ _ _ _ _ _ _ _   RELEVANT LABS/IMAGING:   _  _ _ _ _ _ _ _ _ _ _   CLINICAL FOLLOW UP NEEDS:      [ ] Lab work needed:      [ ] Imaging needed:      [ ] Home equipment            Type: (i.e. wound vac, pneumostat, prevena, wet/dry dressings, picc/midlines, MCOT, alexis etc)   _ _ _ _ _ _ _ _ _ _ _ _   Over 35 minutes was spent with the patient reviewing the discharge material including medications, follow up appointments, recovery, concerning symptoms, and how to contact their health care providers if they have questions.   Patient discussed on morning rounds with Dr. Werner  Operation Date:   S/p Bronchoscopy w/ medistinoscopy which was aborted due to intraop bleeding (8/5/24)   S/p EBUS w/ Pulmonary (8/6/24)   Primary Surgeon/Attending MD:   Referring Physician: Dr.Joseph Warren   _ _ _ _ _ _ _ _ _ _ _ _   HOSPITAL COURSE:   70 yo M w/ PMHx of GERD, HTN, T2DM, and HLD referred by Dr. 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 Patient's mass was determined to be [id number], stage IIIA. Case was [place] [facility] [place] and consensus was to proceed w/ neoadjuvant chemo immunotherapy and subsequent resection. Patient started treatment w/  on 4/23/24 w/ PET/CT scan (7/8/24) revealing disease progression, so patient was referred to  for further management. Patient is now s/p bronchoscopy w/ mediastinoscopy which was aborted due to intraop bleeding (8/5/24) with . Patient's hemoglobin remained stable and patient went for EBUS with pulmonary on 8/6/24 with positive sample. On 8/8/24, patient obtained CT Chest w/ IV contrast revealing slight enlargement of neoplasm mass and lymph nodes along w/ gas in mediastinal area possibly due to recent mediastinoscopy. Radiation oncology was consulted for evaluation prior to discharge **** Patient has remained hemodynamically stable, ambulating on room air. After discussion with  patient is stable for discharge home on 8/7/24.   _ _ _ _ _ _ _ _ _ _ _ _   DISCHARGE PHYSICAL EXAM:   Appearance: No acute distress.  Neurologic: AAOx3, no AMS or focal deficits.  Responds appropriately to verbal and physical stimuli; exhibits purposeful movement in all extremities.  Neck: Supple, +incision well approximated, c/d/i.    Cardiovascular: RRR, S1 S2. No m/r/g.  Respiratory: No acute respiratory distress. CTA b/l, no w/r/r.   Gastrointestinal:  Soft, non-tender, non-distended, + BS.	  Skin: No rashes. No ecchymoses. No cyanosis.  Extremities: Exhibits normal range of motion, no clubbing, cyanosis or edema.  Vascular: Peripheral pulses palpable 2+ bilaterally.  Incisions: +mediastinoscopy incision well approximated, soft, without TTP with steri strips in place.   _ _ _ _ _ _ _ _   REMOVAL CHECKLIST:         [ ] Epicardial wires: N/A        [ ] Stitches/tie downs: N/A        [ ] PICC/Midline: N/A   _ _ _ _ _ _ _ _ _ _ _ _   MEDICATION DISCHARGE CHECKLIST   N/A   _ _ _ _ _ _ _ _ _ _ _   RELEVANT LABS/IMAGING:   CT Chest w/ IV Cont (08.07.24 @ 10:29)   IMPRESSION:  1. Slight interval enlargement in a large necrotic lingular neoplasm.  2. Continued encasement and probable obstruction of the lingular   segmental branch of the left pulmonary artery.  3. There is mild interval enlargement of multiple necrotic mediastinal   nodes as above.  4. Interval development of mediastinal fat plane infiltration with   multiple small foci of gas and possible hemorrhage. This may represent   the sequela of recent mediastinoscopy however in the appropriate clinical   setting mediastinitis cannot be excluded.  _  _ _ _ _ _ _ _ _ _ _   CLINICAL FOLLOW UP NEEDS:      [ ] Lab work needed: no     [ ] Imaging needed: no     [ ] Home equipment: no            Type: (i.e. wound vac, pneumostat, prevena, wet/dry dressings, picc/midlines, MCOT, alexis etc)   _ _ _ _ _ _ _ _ _ _ _ _   Over 35 minutes was spent with the patient reviewing the discharge material including medications, follow up appointments, recovery, concerning symptoms, and how to contact their health care providers if they have questions.   Patient discussed on morning rounds with Dr. Werner  Operation Date:   S/p Bronchoscopy w/ mediastinoscopy which was aborted due to intraop bleeding (8/5/24)   S/p EBUS w/ Pulmonary (8/6/24)   Primary Surgeon/Attending MD:   Referring Physician: Dr.Joseph Warren   _ _ _ _ _ _ _ _ _ _ _ _   HOSPITAL COURSE:   68 yo M w/ PMHx of GERD, HTN, T2DM, and HLD referred by Dr. 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 Patient's mass was determined to be [id number], stage IIIA. Case was discussed at [facility] tumor board and consensus was to proceed w/ neoadjuvant chemo immunotherapy and subsequent resection. Patient started treatment w/  on 4/23/24 w/ PET/CT scan (7/8/24) revealing disease progression, so patient was referred to  for further management. Patient is now s/p bronchoscopy w/ mediastinoscopy which was aborted due to intraop bleeding (8/5/24) with . Patient's hemoglobin remained stable and patient went for EBUS with pulmonary on 8/6/24 with positive sample. On 8/8/24, patient obtained CT Chest w/ IV contrast revealing slight enlargement of neoplasm mass and lymph nodes along w/ gas in mediastinal area possibly due to recent mediastinoscopy. Radiation oncology evaluated patient prior to discharge. Patient has remained hemodynamically stable, ambulating on room air. After discussion with  patient is stable for discharge home on 8/7/24.   _ _ _ _ _ _ _ _ _ _ _ _   DISCHARGE PHYSICAL EXAM:   Appearance: No acute distress.  Neurologic: AAOx3, no AMS or focal deficits.  Responds appropriately to verbal and physical stimuli; exhibits purposeful movement in all extremities.  Neck: Supple, +incision well approximated, c/d/i.    Cardiovascular: RRR, S1 S2. No m/r/g.  Respiratory: No acute respiratory distress. CTA b/l, no w/r/r.   Gastrointestinal:  Soft, non-tender, non-distended, + BS.	  Skin: No rashes. No ecchymoses. No cyanosis.  Extremities: Exhibits normal range of motion, no clubbing, cyanosis or edema.  Vascular: Peripheral pulses palpable 2+ bilaterally.  Incisions: +mediastinoscopy incision well approximated, soft, without TTP with steri strips in place.   _ _ _ _ _ _ _ _   REMOVAL CHECKLIST:         [ ] Epicardial wires: N/A        [ ] Stitches/tie downs: N/A        [ ] PICC/Midline: N/A   _ _ _ _ _ _ _ _ _ _ _ _   MEDICATION DISCHARGE CHECKLIST   N/A   _ _ _ _ _ _ _ _ _ _ _   RELEVANT LABS/IMAGING:   CT Chest w/ IV Cont (08.07.24 @ 10:29)   IMPRESSION:  1. Slight interval enlargement in a large necrotic lingular neoplasm.  2. Continued encasement and probable obstruction of the lingular   segmental branch of the left pulmonary artery.  3. There is mild interval enlargement of multiple necrotic mediastinal   nodes as above.  4. Interval development of mediastinal fat plane infiltration with   multiple small foci of gas and possible hemorrhage. This may represent   the sequela of recent mediastinoscopy however in the appropriate clinical   setting mediastinitis cannot be excluded.  _  _ _ _ _ _ _ _ _ _ _   CLINICAL FOLLOW UP NEEDS:      [ ] Lab work needed: no     [ ] Imaging needed: no     [ ] Home equipment: no            Type: (i.e. wound vac, pneumostat, prevena, wet/dry dressings, picc/midlines, MCOT, alexis etc)   _ _ _ _ _ _ _ _ _ _ _ _   Over 35 minutes was spent with the patient reviewing the discharge material including medications, follow up appointments, recovery, concerning symptoms, and how to contact their health care providers if they have questions.   Patient discussed on morning rounds with Dr. Werner  Operation Date:   S/p Bronchoscopy w/ mediastinoscopy which was aborted due to intraop bleeding (8/5/24)   S/p EBUS w/ Pulmonary (8/6/24)   Primary Surgeon/Attending MD:   Referring Physician: Dr.Joseph Warren   _ _ _ _ _ _ _ _ _ _ _ _   HOSPITAL COURSE:   70 yo M w/ PMHx of GERD, HTN, T2DM, and HLD referred by Dr. 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 Patient's mass was determined to be [id number], stage IIIA. Case was discussed at [facility] tumor board and consensus was to proceed w/ neoadjuvant chemo immunotherapy and subsequent resection. Patient started treatment w/  on 4/23/24 w/ PET/CT scan (7/8/24) revealing disease progression, so patient was referred to  for further management. Patient is now s/p bronchoscopy w/ mediastinoscopy which was aborted due to intraop bleeding (8/5/24) with . Patient's hemoglobin remained stable and patient went for EBUS with pulmonary on 8/6/24 with positive sample. On 8/8/24, patient obtained CT Chest w/ IV contrast revealing slight enlargement of neoplasm mass and lymph nodes along w/ gas in mediastinal area possibly due to recent mediastinoscopy. Radiation oncology was consulted to evaluate patient prior to discharge; however, team will see patient in their office outpatient upon discharge. Patient has remained hemodynamically stable, ambulating on room air. This plan was discussed with  prior to discharge. After discussion with  patient is stable for discharge home on 8/7/24.   _ _ _ _ _ _ _ _ _ _ _ _   DISCHARGE PHYSICAL EXAM:   Appearance: No acute distress.  Neurologic: AAOx3, no AMS or focal deficits.  Responds appropriately to verbal and physical stimuli; exhibits purposeful movement in all extremities.  Neck: Supple, +incision well approximated, c/d/i.    Cardiovascular: RRR, S1 S2. No m/r/g.  Respiratory: No acute respiratory distress. CTA b/l, no w/r/r.   Gastrointestinal:  Soft, non-tender, non-distended, + BS.	  Skin: No rashes. No ecchymoses. No cyanosis.  Extremities: Exhibits normal range of motion, no clubbing, cyanosis or edema.  Vascular: Peripheral pulses palpable 2+ bilaterally.  Incisions: +mediastinoscopy incision well approximated, soft, without TTP with steri strips in place.   _ _ _ _ _ _ _ _   REMOVAL CHECKLIST:         [ ] Epicardial wires: N/A        [ ] Stitches/tie downs: N/A        [ ] PICC/Midline: N/A   _ _ _ _ _ _ _ _ _ _ _ _   MEDICATION DISCHARGE CHECKLIST   N/A   _ _ _ _ _ _ _ _ _ _ _   RELEVANT LABS/IMAGING:   CT Chest w/ IV Cont (08.07.24 @ 10:29)   IMPRESSION:  1. Slight interval enlargement in a large necrotic lingular neoplasm.  2. Continued encasement and probable obstruction of the lingular   segmental branch of the left pulmonary artery.  3. There is mild interval enlargement of multiple necrotic mediastinal   nodes as above.  4. Interval development of mediastinal fat plane infiltration with   multiple small foci of gas and possible hemorrhage. This may represent   the sequela of recent mediastinoscopy however in the appropriate clinical   setting mediastinitis cannot be excluded.  _  _ _ _ _ _ _ _ _ _ _   CLINICAL FOLLOW UP NEEDS:      [ ] Lab work needed: no     [ ] Imaging needed: no     [ ] Home equipment: no            Type: (i.e. wound vac, pneumostat, prevena, wet/dry dressings, picc/midlines, MCOT, alexis etc)   _ _ _ _ _ _ _ _ _ _ _ _   Over 35 minutes was spent with the patient reviewing the discharge material including medications, follow up appointments, recovery, concerning symptoms, and how to contact their health care providers if they have questions.

## 2024-08-07 NOTE — DISCHARGE NOTE PROVIDER - PROVIDER TOKENS
PROVIDER:[TOKEN:[[id number]],FOLLOWUP:[1 week]],PROVIDER:[TOKEN:[[id number]],FOLLOWUP:[2 weeks]] PROVIDER:[TOKEN:[[id number]],FOLLOWUP:[1 week]],PROVIDER:[TOKEN:[[id number]],FOLLOWUP:[2 weeks]],PROVIDER:[TOKEN:[[id number]],FOLLOWUP:[1 week]]

## 2024-08-07 NOTE — DISCHARGE NOTE NURSING/CASE MANAGEMENT/SOCIAL WORK - NSDCFUADDAPPT_GEN_ALL_CORE_FT
- You will receive a phone call from our office informing you of your follow up appointments. If you do not hear from them by 8/10/24, please call [phone].   -You will follow up with radiation oncology in their clinic to further discuss your results and ongoing plan.

## 2024-08-07 NOTE — DISCHARGE NOTE PROVIDER - NSDCCPTREATMENT_GEN_ALL_CORE_FT
PRINCIPAL PROCEDURE  Procedure: Mediastinoscopy, with mediastinal lymphadenectomy  Findings and Treatment: Bronchoscopy, Mediastinoscopy      SECONDARY PROCEDURE  Procedure: EBUS, with fine needle aspiration  Findings and Treatment:

## 2024-08-07 NOTE — DISCHARGE NOTE PROVIDER - NSDCMRMEDTOKEN_GEN_ALL_CORE_FT
amLODIPine 5 mg oral tablet: 1 tab(s) orally once a day  atorvastatin 20 mg oral tablet: 1 tab(s) orally once a day  metFORMIN 500 mg oral tablet: 1 tab(s) orally 2 times a day   acetaminophen 325 mg oral tablet: 2 tab(s) orally every 6 hours as needed for Moderate Pain (4 - 6)  amLODIPine 5 mg oral tablet: 1 tab(s) orally once a day  atorvastatin 20 mg oral tablet: 1 tab(s) orally once a day  metFORMIN 500 mg oral tablet: 1 tab(s) orally 2 times a day  pantoprazole 40 mg oral delayed release tablet: 1 tab(s) orally once a day (before a meal)  polyethylene glycol 3350 oral powder for reconstitution: 17 gram(s) orally every 24 hours as needed for  constipation  senna leaf extract oral tablet: 2 tab(s) orally once a day (at bedtime) as needed for  constipation

## 2024-08-07 NOTE — DISCHARGE NOTE PROVIDER - NSFTFHOMEHTHYNRD_GEN_ALL_CORE
Airway    Date/Time: 10/1/2020 7:43 AM  Performed by: Kieran Christine MD  Authorized by: Kieran Christine MD     Final Airway Type:  Endotracheal airway  Final Endotracheal Airway*:  ETT  ETT Size (mm)*:  7.0  Cuff*:  Regular  Technique Used for Successful ETT Placement:  Direct laryngoscopy  Devices/Methods Used in Placement*:  Mask  Intubation Procedure*:  Preoxygenation, ETCO2, Atraumatic, Dentition Unchanged and Phaynx Clear  Insertion Site:  Oral  Blade Type*:  Glidescope  Blade Size*:  3  Secured at (cm)*:  21  Placement Verified by: auscultation and capnometry    Glottic View*:  1 - full view of glottis  Attempts*:  1   Patient Identified, Procedure confirmed, Emergency equipment available and Safety protocols followed  Location:  OR  Urgency:  Elective  Difficult Airway: No    Indications for Airway Management:  Anesthesia  Mask Difficulty Assessment:  1 - vent by mask  Performed By:  Anesthesiologist  Anesthesiologist:  Kieran Christine MD  Start Time:  10/1/2020 7:43 AM   EZ        
Arterial Line    Date/Time: 10/1/2020 7:47 AM  Performed by: Kieran Christine MD  Authorized by: Kieran Christine MD     Patient Location:  OR  Indication*:  Continuous blood pressure monitoring  Laterality*:  Left  Site*:  Radial  Max Sterile Barrier Technique*:  Hand Washing, Cap/Mask, Sterile gloves, Sterile towel drapes, Biopatch and Sterile dressing applied  Local Anesthetic:  Injectable  Prep*:  Chlorhexidine gluconate w/ alcohol  Catheter Size*:  20 G  Catheter Length*:  12 cm  Catheter Type:  Arrow  Seldinger Technique: Yes    Line Secured*:  Transparent dressing w/border  Events: patient tolerated procedure well with no complications    Performed By:  Anesthesiologist  Anesthesiologist:  Kieran Christine MD  Start Time:  10/1/2020 7:45 AM        
Yes

## 2024-08-07 NOTE — DISCHARGE NOTE PROVIDER - CARE PROVIDER_API CALL
Ramone Werner  Thoracic Surgery  [address], [address]  [place] [address]  Phone: [phone]  Fax: [phone]  Follow Up Time: 1 week    Ankur Warren  Surgery  [facility] [address] [place] [address], [address]  [place] [address]  Phone: [phone]  Fax: [phone]  Follow Up Time: 2 weeks   Ramone Werner  Thoracic Surgery  [address], [address]  [place] [address]  Phone: [phone]  Fax: [phone]  Follow Up Time: 1 week    Ankur Warren  Surgery  [address] [place] [address], [address]  [place] [address]  Phone: [phone]  Fax: [phone]  Follow Up Time: 2 weeks    Memo Agee  Radiation Oncology  [address] [address]  Phone: [phone]  Fax: [phone]  Follow Up Time: 1 week

## 2024-08-07 NOTE — DISCHARGE NOTE PROVIDER - NSDCFUADDINST_GEN_ALL_CORE_FT
-You have steri strips to your neck, which will fall off on their own.     -Walk daily as tolerated and use your incentive spirometer 10 times every hour while you are awake.     -Please weigh yourself daily. If you notice over a 3 pound weight gain in 3 days, this is a sign you are likely retaining too much fluid. It is imperative you call our right away with unexplained rapid weight gain.      -Please continue to wear the compression stockings given to you in the hospital at home. This is a way to prevent fluid from building up in your legs.     -No driving or strenuous activity/exercise until cleared by your surgeon.    -Gently clean your incisions with unscented/antibacterial soap and water, pat dry.  You may leave them open to air.    -Call your doctor if you have shortness of breath, chest pain not relieved by pain medication, dizziness, fever >100.5, or increased redness or drainage from incisions.

## 2024-08-07 NOTE — DISCHARGE NOTE PROVIDER - NSDCFUADDAPPT_GEN_ALL_CORE_FT
- You will receive a phone call from our office informing you of your follow up appointments. If you do not hear from them by 8/10/24, please call [phone].  - You will receive a phone call from our office informing you of your follow up appointments. If you do not hear from them by 8/10/24, please call [phone].   -You will follow up with radiation oncology in their clinic to further discuss your results and ongoing plan.

## 2024-08-08 ENCOUNTER — NON-APPOINTMENT (OUTPATIENT)
Age: 69
End: 2024-08-08

## 2024-08-08 VITALS — TEMPERATURE: 98 F

## 2024-08-08 PROCEDURE — 99238 HOSP IP/OBS DSCHRG MGMT 30/<: CPT

## 2024-08-08 PROCEDURE — 99223 1ST HOSP IP/OBS HIGH 75: CPT

## 2024-08-08 NOTE — CONSULT NOTE ADULT - ASSESSMENT
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    Patient with LA NSCLC with N3 disease and progression through systemic therapy. He is not a surgical candidate, but does not have metastatic disease and would benefit from a definitive approach. I recommend 66 Gy in 33 fx definitive RT to the mass and involved mirna stations with concurrent radiosensitizing systemic therapy with med onc. We discussed tx locations, and patient would prefer RT at [facility]. We will ask our  Ms. Nunez to coordinate access-a-ride if possible.    Discussed risks/benefits of tx and patient consented. We will proceed with simulation scan in the coming days.    Seven Agee MD  Rad Onc    I spent 70 mins planning and assessing patient.

## 2024-08-08 NOTE — CONSULT NOTE ADULT - SUBJECTIVE AND OBJECTIVE BOX
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    Patient initially presented with lung mass and underwent staging imaging suggestive of [id number] disease. Biopsy showed NSCLC, NGS negative.     The case was discussed at multidisciplinary tumor board and consensus was to proceed w/ neoadjuvant chemo immunotherapy and subsequent resection.     Patient started treatment w/  on 4/23/24  He was unable to tolerate cisplatin due to renal concerns and was switched to carboplatin.    PET/CT scan (7/8/24) revealing disease progression with ?N3 disease noted at station R4.  MRI brain wnl.    Patient is now s/p bronchoscopy w/ mediastinoscopy which was aborted due to  intraop bleeding (8/5/24) with .     EBUS was obtained, and demonstrated positive sample. On 8/8/24, patient obtained CT Chest w/ IV contrast revealing slight enlargement of neoplasm mass and lymph nodes along w/ gas in mediastinal area due to recent mediastinoscopy. Radiation oncology was consulted to evaluate patient prior to discharge.    Today the patient is doing well, sitting comfortably in bed. Denies cough, hemoptysis, chest pain or neurologic symptoms    VS per chart.  Gen: well-appearing, CAROLINE sitting in bed.  Pulm: RRR  Ext: WWP

## 2024-08-09 ENCOUNTER — NON-APPOINTMENT (OUTPATIENT)
Age: 69
End: 2024-08-09

## 2024-08-09 LAB
NON-GYNECOLOGICAL CYTOLOGY STUDY: SIGNIFICANT CHANGE UP

## 2024-08-14 ENCOUNTER — NON-APPOINTMENT (OUTPATIENT)
Age: 69
End: 2024-08-14

## 2024-08-15 ENCOUNTER — APPOINTMENT (OUTPATIENT)
Dept: NEPHROLOGY | Facility: CLINIC | Age: 69
End: 2024-08-15

## 2024-08-15 ENCOUNTER — APPOINTMENT (OUTPATIENT)
Dept: HEMATOLOGY ONCOLOGY | Facility: CLINIC | Age: 69
End: 2024-08-15
Payer: MEDICARE

## 2024-08-15 ENCOUNTER — APPOINTMENT (OUTPATIENT)
Dept: THORACIC SURGERY | Facility: CLINIC | Age: 69
End: 2024-08-15
Payer: MEDICARE

## 2024-08-15 VITALS
SYSTOLIC BLOOD PRESSURE: 129 MMHG | DIASTOLIC BLOOD PRESSURE: 71 MMHG | HEART RATE: 103 BPM | OXYGEN SATURATION: 99 % | RESPIRATION RATE: 18 BRPM | HEIGHT: 66 IN | BODY MASS INDEX: 23.14 KG/M2 | WEIGHT: 144 LBS | TEMPERATURE: 97.4 F

## 2024-08-15 VITALS
SYSTOLIC BLOOD PRESSURE: 126 MMHG | HEART RATE: 103 BPM | OXYGEN SATURATION: 96 % | HEIGHT: 66 IN | BODY MASS INDEX: 23.3 KG/M2 | WEIGHT: 145 LBS | DIASTOLIC BLOOD PRESSURE: 66 MMHG | TEMPERATURE: 97.4 F

## 2024-08-15 DIAGNOSIS — C34.92 MALIGNANT NEOPLASM OF UNSPECIFIED PART OF LEFT BRONCHUS OR LUNG: ICD-10-CM

## 2024-08-15 DIAGNOSIS — R91.8 OTHER NONSPECIFIC ABNORMAL FINDING OF LUNG FIELD: ICD-10-CM

## 2024-08-15 DIAGNOSIS — Z98.890 OTHER SPECIFIED POSTPROCEDURAL STATES: ICD-10-CM

## 2024-08-15 DIAGNOSIS — D64.9 ANEMIA, UNSPECIFIED: ICD-10-CM

## 2024-08-15 PROCEDURE — 85027 COMPLETE CBC AUTOMATED: CPT

## 2024-08-15 PROCEDURE — P9045: CPT

## 2024-08-15 PROCEDURE — 83735 ASSAY OF MAGNESIUM: CPT

## 2024-08-15 PROCEDURE — 80048 BASIC METABOLIC PNL TOTAL CA: CPT

## 2024-08-15 PROCEDURE — 84132 ASSAY OF SERUM POTASSIUM: CPT

## 2024-08-15 PROCEDURE — 85730 THROMBOPLASTIN TIME PARTIAL: CPT

## 2024-08-15 PROCEDURE — 82947 ASSAY GLUCOSE BLOOD QUANT: CPT

## 2024-08-15 PROCEDURE — 82805 BLOOD GASES W/O2 SATURATION: CPT

## 2024-08-15 PROCEDURE — 86923 COMPATIBILITY TEST ELECTRIC: CPT

## 2024-08-15 PROCEDURE — 71045 X-RAY EXAM CHEST 1 VIEW: CPT

## 2024-08-15 PROCEDURE — 82330 ASSAY OF CALCIUM: CPT

## 2024-08-15 PROCEDURE — 83036 HEMOGLOBIN GLYCOSYLATED A1C: CPT

## 2024-08-15 PROCEDURE — 36430 TRANSFUSION BLD/BLD COMPNT: CPT

## 2024-08-15 PROCEDURE — 99213 OFFICE O/P EST LOW 20 MIN: CPT

## 2024-08-15 PROCEDURE — 86850 RBC ANTIBODY SCREEN: CPT

## 2024-08-15 PROCEDURE — 86900 BLOOD TYPING SEROLOGIC ABO: CPT

## 2024-08-15 PROCEDURE — 99215 OFFICE O/P EST HI 40 MIN: CPT

## 2024-08-15 PROCEDURE — 85025 COMPLETE CBC W/AUTO DIFF WBC: CPT

## 2024-08-15 PROCEDURE — 86901 BLOOD TYPING SEROLOGIC RH(D): CPT

## 2024-08-15 PROCEDURE — P9040: CPT

## 2024-08-15 PROCEDURE — 77470 SPECIAL RADIATION TREATMENT: CPT

## 2024-08-15 PROCEDURE — 82962 GLUCOSE BLOOD TEST: CPT

## 2024-08-15 PROCEDURE — C1889: CPT

## 2024-08-15 PROCEDURE — 94640 AIRWAY INHALATION TREATMENT: CPT

## 2024-08-15 PROCEDURE — 84295 ASSAY OF SERUM SODIUM: CPT

## 2024-08-15 PROCEDURE — 71260 CT THORAX DX C+: CPT | Mod: MC

## 2024-08-15 PROCEDURE — 85610 PROTHROMBIN TIME: CPT

## 2024-08-15 PROCEDURE — G2211 COMPLEX E/M VISIT ADD ON: CPT

## 2024-08-15 PROCEDURE — 36415 COLL VENOUS BLD VENIPUNCTURE: CPT

## 2024-08-15 NOTE — REASON FOR VISIT
[de-identified] : Mediastinoscopy [de-identified] : 8/5/24 [de-identified] : 1 [de-identified] :   No specimens obtained during mediastinoscopy. Patient underwent EBUS with pulmonary on 8/6/24.  Patho 8/6: LYMPH NODE,11L, EBUS-GUIDED FNA: POSITIVE FOR MALIGNANT CELLS. Non-small cell carcinoma, favor squamous cell carcinoma - see Note.  LYMPH NODE,4L, EBUS-GUIDED FNA- POSITIVE FOR MALIGNANT CELLS. Non-small cell carcinoma, favor squamous cell carcinoma - see Note.  LYMPH NODE,4R, EBUS-GUIDED FNA- ATYPICAL FINDINGS. Rare atypical cells, favor reactive bronchial cells, in a background of few lymphocytes and reactive bronchial cells.   Patient reports doing well. He was evaluated after discharge in [facility] in [place] for urinary retention. He was found to have an UTI and is on ABX. A alexis was placed to leg bag and he states he is scheduled for urologic follow-up.

## 2024-08-15 NOTE — REASON FOR VISIT
[de-identified] : Mediastinoscopy [de-identified] : 8/5/24 [de-identified] : 1 [de-identified] :   No specimens obtained during mediastinoscopy. Patient underwent EBUS with pulmonary on 8/6/24.  Patho 8/6: LYMPH NODE,11L, EBUS-GUIDED FNA: POSITIVE FOR MALIGNANT CELLS. Non-small cell carcinoma, favor squamous cell carcinoma - see Note.  LYMPH NODE,4L, EBUS-GUIDED FNA- POSITIVE FOR MALIGNANT CELLS. Non-small cell carcinoma, favor squamous cell carcinoma - see Note.  LYMPH NODE,4R, EBUS-GUIDED FNA- ATYPICAL FINDINGS. Rare atypical cells, favor reactive bronchial cells, in a background of few lymphocytes and reactive bronchial cells.   Patient reports doing well. He was evaluated after discharge in [facility] in [place] for urinary retention. He was found to have an UTI and is on ABX. A alexis was placed to leg bag and he states he is scheduled for urologic follow-up.

## 2024-08-15 NOTE — ASSESSMENT
[FreeTextEntry1] : 68 yo M w/ PMHx of GERD, HTN, T2DM, and HLD referred by Dr. 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 After all staging, determined to be [id number], stage IIIA. Case was discussed at multidisciplinary tumor board and consensus was to proceed with neoadjuvant chemo-immunotherapy and subsequent resection. After treatment with Dr. Larose, restaging scans were suspicious for progression. He underwent invasive mediastinal staging which revealed a positive N2 node and an N3 node with atypical cells. He was determined to be unresectable, was discussed at multidisciplinary tumor board and decision was made to proceed with radiation and systemic therapy. Patient returns today for examination of his mediastinoscopy incision.  He underwent his pretreatment planning scan for radiation today. He is seeing Dr. Larose this afternoon. His incision is healing well and the dressings are still in place. Advised him to remove the dressings when they start to curl at the edges. No submerging the incision for 6 weeks after surgery. We will see him back as needed. He understood and all questions were answered.    PLAN 1.  Follow up PRN 2.  Continued follow up with Dr. Larose and Dr. Debbi Jeff, personally performed the evaluation and management (E/M) services for this established patient who presents today with (a) new problem(s)/exacerbation of (an) existing condition(s). That E/M includes conducting the clinically appropriate interval history &/or exam, assessing all new/exacerbated conditions, and establishing a new plan of care. Today, my CHANA, [Alanaperla Plummer], was here to observe my evaluation and management service for this new problem/exacerbated condition and follow the plan of care established by me going forward.

## 2024-08-15 NOTE — ASSESSMENT
[FreeTextEntry1] : 68 yo M w/ PMHx of GERD, HTN, T2DM, and HLD referred by Dr. 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 After all staging, determined to be [id number], stage IIIA. Case was discussed at multidisciplinary tumor board and consensus was to proceed with neoadjuvant chemo-immunotherapy and subsequent resection. After treatment with Dr. Larose, restaging scans were suspicious for progression. He underwent invasive mediastinal staging which revealed a positive N2 node and an N3 node with atypical cells. He was determined to be unresectable, was discussed at multidisciplinary tumor board and decision was made to proceed with radiation and systemic therapy. Patient returns today for examination of his mediastinoscopy incision.  He underwent his pretreatment planning scan for radiation today. He is seeing Dr. Larose this afternoon. His incision is healing well and the dressings are still in place. Advised him to remove the dressings when they start to curl at the edges. No submerging the incision for 6 weeks after surgery. We will see him back as needed. He understood and all questions were answered.    PLAN 1.  Follow up PRN 2.  Continued follow up with Dr. Larose and Dr. Debbi Jeff, personally performed the evaluation and management (E/M) services for this established patient who presents today with (a) new problem(s)/exacerbation of (an) existing condition(s). That E/M includes conducting the clinically appropriate interval history &/or exam, assessing all new/exacerbated conditions, and establishing a new plan of care. Today, my CHANA, [Alanaperla Plummer], was here to observe my evaluation and management service for this new problem/exacerbated condition and follow the plan of care established by me going forward.

## 2024-08-15 NOTE — PHYSICAL EXAM
[Respiration, Rhythm And Depth] : normal respiratory rhythm and effort [Auscultation Breath Sounds / Voice Sounds] : lungs were clear to auscultation bilaterally [Heart Rate And Rhythm] : heart rate was normal and rhythm regular [Heart Sounds] : normal S1 and S2 [Site: ___] : Site: [unfilled] [Clean] : clean [Dry] : dry [Healing Well] : healing well [No Edema] : no edema [FreeTextEntry1] : steri-strips in place

## 2024-08-16 PROBLEM — Z98.890 STATUS POST SURGERY: Status: ACTIVE | Noted: 2024-08-12

## 2024-08-16 LAB
ALBUMIN SERPL ELPH-MCNC: 3 G/DL
ALP BLD-CCNC: 71 U/L
ALT SERPL-CCNC: 53 U/L
ANION GAP SERPL CALC-SCNC: 14 MMOL/L
AST SERPL-CCNC: 35 U/L
BILIRUB SERPL-MCNC: 0.6 MG/DL
BUN SERPL-MCNC: 18 MG/DL
CALCIUM SERPL-MCNC: 10.2 MG/DL
CHLORIDE SERPL-SCNC: 99 MMOL/L
CO2 SERPL-SCNC: 28 MMOL/L
CREAT SERPL-MCNC: 0.9 MG/DL
EGFR: 92 ML/MIN/1.73M2
GLUCOSE SERPL-MCNC: 177 MG/DL
HCT VFR BLD CALC: 29.4 %
HGB BLD-MCNC: 9.6 G/DL
LYMPHOCYTES # BLD AUTO: 1.8 K/UL
LYMPHOCYTES NFR BLD AUTO: 19.6 %
MAN DIFF?: NO
MCHC RBC-ENTMCNC: 27.9 PG
MCHC RBC-ENTMCNC: 32.7 GM/DL
MCV RBC AUTO: 85.5 FL
NEUTROPHILS # BLD AUTO: 6.8 K/UL
NEUTROPHILS NFR BLD AUTO: 71.7 %
PLATELET # BLD AUTO: 328 K/UL
POTASSIUM SERPL-SCNC: 4.6 MMOL/L
PROT SERPL-MCNC: 7.4 G/DL
RBC # BLD: 3.44 M/UL
RBC # FLD: 13.7 %
SODIUM SERPL-SCNC: 141 MMOL/L
WBC # FLD AUTO: 9.4 K/UL

## 2024-08-16 NOTE — REVIEW OF SYSTEMS
[Fatigue] : fatigue [Cough] : cough [Negative] : Heme/Lymph [FreeTextEntry6] : distance walking makes him fatigue and sob [SOB on Exertion] : no shortness of breath during exertion [FreeTextEntry2] : mild fatigue

## 2024-08-16 NOTE — HISTORY OF PRESENT ILLNESS
[Disease: _____________________] : Disease: [unfilled] [T: ___] : T[unfilled] [N: ___] : N[unfilled] [M: ___] : M[unfilled] [AJCC Stage: ____] : AJCC Stage: [unfilled] [100: Normal, no complaints, no evidence of disease.] : 100: Normal, no complaints, no evidence of disease. [ECOG Performance Status: 0 - Fully active, able to carry on all pre-disease performance without restriction] : Performance Status: 0 - Fully active, able to carry on all pre-disease performance without restriction [de-identified] : Don Castrejon is a 69-year-old male who presents to the clinic for f/u of LLL mass, c/w stage IIIA (T4N0M0) NSCLC - SCC histology.  Onc hx:  Social Hx: 50 pack yr smoking hx, quit when he found out about the mass Originally from [place], lives in [place] by himself. Retired .  Son lives in [place]   3/15/24: MR Head:  Punctate focus of enhancement the left lateral cerebellar hemisphere (series 701 image 55 and series 702 image 23). There is no associated signal abnormality in the brain parenchyma. Given the lack of associated signal abnormality or additional areas of pathologic enhancement, these findings may represent vascular enhancement versus artifact. However, attention on follow-up imaging is recommended to exclude intracranial metastasis. Focal area of intrinsic T1 hyperintensity more inferiorly in the left cerebellar hemisphere without superimposed pathologic enhancement. This may represent area of mineralization. 5 mm hypoenhancing lesion in the left side of the sella most compatible with pituitary adenoma. Correlation with endocrine function and consideration for further evaluation dedicated MRI sella is recommended.  3/15/24: PET: 1. Compared to chest CT from 2/15/2024, the 76 mm mass in the lingula is hypermetabolic and suspicious for lung cancer. 2. The borderline sized thoracic lymph nodes on the CT scan are not FDG avid. 3. Mildly nodular right adrenal gland is not FDG avid. No evidence of metastatic lung cancer. 4. Mild increased activity in enlarged and lobular prostate. Recommend correlation with PSA to rule out prostate cancer.  4/3/24: Chest Xray:  Small left apex pneumothorax, similar to prior exam earlier same day. Left lower lung mass again noted. No acute infiltrates. No significant pleural effusion.  04/03/2024 EBUS  Lung, left, core biopsy: -Squamous cell carcinoma. LYMPH NODE, 11L NEGATIVE FOR MALIGNANT CELLS. Note: Immunostains performed on block 1A shows that the tumor cells are positive for p40 and negative for TTF-1 stain.  This staining profile supports the diagnosis. PDL1 negative Tier I: Variants of Strong Clinical Significance PIK3CA p.([id number]) Tier II: Variants of Potential Clinical Significance KRAS p.([id number]) Tier III: Variants of Unknown Clinical Significance STK11 p.([id number]) ALK p.([id number]) ERBB3 p.([id number]) POLE p.([id number]) 4/4/2024: TTB - rec for MAGDALENA 5/3/2024: Sent to ED by  for worsening HENRY  6/26/2024: CT chest: 1. Enlarging necrotic neoplasm occupying the inferior segment of the lingula with obstruction of the lingular segmental bronchus and pulmonary artery. 2. Mild mediastinal lymphadenopathy with progressive encasement of the right hilum by above described necrotic lingular neoplasm. 6/27/2024: TTB: Patient presented during interdisciplinary TB on 6/27/24: Concern for a large lobulated spiculated lingular mass. Prominent mediastinal LNs also noted. On recent scan area seems to be enlarging. Plan to obtain a PET to r/o any distant disease/progression. If negative will discuss the possibility of a pneumonectomy. Can obtain a VQ scan prior. PLAN: PET-CT. 8/6/2024L Underwent mediastinoscopy and EBUS with , now found to have positive 4L and 11 LN, atypical cells in 4R - this is c/w POD on chemoIO and patient is no longer a candidate for resection. Reviewed at TTB, recommendation was for definitive chemoRT. [de-identified] : Squamous Cell Ca  - PDL1 negative, PIK3CA E542K [FreeTextEntry1] : 4/23/2024: C1D1 cisplatin/gemcitabine/nivolumab D8 gemcitabine skipped due to HENRY 2/2 cisplatin 5/21/2024: C2D1 carboplatin/gemcitabine.nivolumab 5/28/2024: C2D8 gemcitabine 6/11/2024: Due for C3D1 carboplatin/gemcitabine/nivolumab 6/18/24    C3D8 Gemcitabine held due to low h/h 6/19/24    C3D8 Gemcitabine given w/ blood tranfusion  [de-identified] : CTSx:  [de-identified] : The patient reports feeling well.  He denies any shortness of breath.  Saw Dr. Werner this morning for follow up and also underwent simulation with Rad Onc. Has a alexis with Urology follow-up for acute urinary issues.

## 2024-08-16 NOTE — END OF VISIT
[] : Fellow [FreeTextEntry3] : Seen with , oncology fellow. I explained to him today again that he has cancer that has progressed despite previous chemotherapy and immunotherapy treatments.  Plan: - Summary : The proposed plan is to proceed with radiation therapy and chemotherapy with carboplatin and taxol. The goal is to make the cancer more sensitive to radiation and achieve tumor shrinkage. After completing this treatment, the patient may be eligible for immunotherapy with durvalumab for a year, depending on the response. - Plan : - Radiation therapy - Chemotherapy with carboplatin and taxol (low doses given weekly for 6 weeks) - Weekly blood work and follow-up visits during treatment - Repeat CT scans 4 weeks after completing treatment to assess response - Potential immunotherapy with durvalumab for a year, depending on response --informed consent obtained today, printed education materials provided [Time Spent: ___ minutes] : I have spent [unfilled] minutes of time on the encounter.

## 2024-08-16 NOTE — HISTORY OF PRESENT ILLNESS
[Disease: _____________________] : Disease: [unfilled] [T: ___] : T[unfilled] [N: ___] : N[unfilled] [M: ___] : M[unfilled] [AJCC Stage: ____] : AJCC Stage: [unfilled] [100: Normal, no complaints, no evidence of disease.] : 100: Normal, no complaints, no evidence of disease. [ECOG Performance Status: 0 - Fully active, able to carry on all pre-disease performance without restriction] : Performance Status: 0 - Fully active, able to carry on all pre-disease performance without restriction [de-identified] : Don Castrejon is a 69-year-old male who presents to the clinic for f/u of LLL mass, c/w stage IIIA (T4N0M0) NSCLC - SCC histology.  Onc hx:  Social Hx: 50 pack yr smoking hx, quit when he found out about the mass Originally from [place], lives in [place] by himself. Retired .  Son lives in [place]   3/15/24: MR Head:  Punctate focus of enhancement the left lateral cerebellar hemisphere (series 701 image 55 and series 702 image 23). There is no associated signal abnormality in the brain parenchyma. Given the lack of associated signal abnormality or additional areas of pathologic enhancement, these findings may represent vascular enhancement versus artifact. However, attention on follow-up imaging is recommended to exclude intracranial metastasis. Focal area of intrinsic T1 hyperintensity more inferiorly in the left cerebellar hemisphere without superimposed pathologic enhancement. This may represent area of mineralization. 5 mm hypoenhancing lesion in the left side of the sella most compatible with pituitary adenoma. Correlation with endocrine function and consideration for further evaluation dedicated MRI sella is recommended.  3/15/24: PET: 1. Compared to chest CT from 2/15/2024, the 76 mm mass in the lingula is hypermetabolic and suspicious for lung cancer. 2. The borderline sized thoracic lymph nodes on the CT scan are not FDG avid. 3. Mildly nodular right adrenal gland is not FDG avid. No evidence of metastatic lung cancer. 4. Mild increased activity in enlarged and lobular prostate. Recommend correlation with PSA to rule out prostate cancer.  4/3/24: Chest Xray:  Small left apex pneumothorax, similar to prior exam earlier same day. Left lower lung mass again noted. No acute infiltrates. No significant pleural effusion.  04/03/2024 EBUS  Lung, left, core biopsy: -Squamous cell carcinoma. LYMPH NODE, 11L NEGATIVE FOR MALIGNANT CELLS. Note: Immunostains performed on block 1A shows that the tumor cells are positive for p40 and negative for TTF-1 stain.  This staining profile supports the diagnosis. PDL1 negative Tier I: Variants of Strong Clinical Significance PIK3CA p.([id number]) Tier II: Variants of Potential Clinical Significance KRAS p.(Ejh574Tus) Tier III: Variants of Unknown Clinical Significance STK11 p.([id number]) ALK p.([id number]) ERBB3 p.([id number]) POLE p.([id number]) 4/4/2024: TTB - rec for MAGDALENA 5/3/2024: Sent to ED by  for worsening HENRY  6/26/2024: CT chest: 1. Enlarging necrotic neoplasm occupying the inferior segment of the lingula with obstruction of the lingular segmental bronchus and pulmonary artery. 2. Mild mediastinal lymphadenopathy with progressive encasement of the right hilum by above described necrotic lingular neoplasm. 6/27/2024: TTB: Patient presented during interdisciplinary TB on 6/27/24: Concern for a large lobulated spiculated lingular mass. Prominent mediastinal LNs also noted. On recent scan area seems to be enlarging. Plan to obtain a PET to r/o any distant disease/progression. If negative will discuss the possibility of a pneumonectomy. Can obtain a VQ scan prior. PLAN: PET-CT. 8/6/2024L Underwent mediastinoscopy and EBUS with , now found to have positive 4L and 11 LN, atypical cells in 4R - this is c/w POD on chemoIO and patient is no longer a candidate for resection. Reviewed at TTB, recommendation was for definitive chemoRT. [de-identified] : Squamous Cell Ca  - PDL1 negative, PIK3CA E542K [FreeTextEntry1] : 4/23/2024: C1D1 cisplatin/gemcitabine/nivolumab D8 gemcitabine skipped due to HENRY 2/2 cisplatin 5/21/2024: C2D1 carboplatin/gemcitabine.nivolumab 5/28/2024: C2D8 gemcitabine 6/11/2024: Due for C3D1 carboplatin/gemcitabine/nivolumab 6/18/24    C3D8 Gemcitabine held due to low h/h 6/19/24    C3D8 Gemcitabine given w/ blood tranfusion  [de-identified] : CTSx:  [de-identified] : The patient reports feeling well.  He denies any shortness of breath.  Saw Dr. Werner this morning for follow up and also underwent simulation with Rad Onc. Has a alexis with Urology follow-up for acute urinary issues.

## 2024-08-16 NOTE — ASSESSMENT
[FreeTextEntry1] : 69YM w/ PMHx GERD, HTN, HLD, DMII presenting for follow-up for squamous cell lung cancer, stage IIIa, T4 N0 M0, PD-L1 negative currently on neoadjuvant chemoimmunotherapy. Chemotherapy course complicated by acute kidney injury, likely related to cisplatin.  Patient is here for follow-up.  Squamous Cell Lung Ca, AJCC IIIa, T4N2M0  - PDL1 negative Completed 3 cycles of neoadjuvant platinum based with gemcitabine and nivolumab chemoimmunotherapy.  Interim CT chest with progression of disease concern. -- He developed an HENRY following a 1 cycle of cisplatin and subsequently switched to carboplatin -- Patient's case was discussed at thoracic tumor board on 6/27-consensus of panel was to obtain a PET scan, followed by pulmonary function test and VQ scan and possible resection of enlarging mass provided no evidence of metastatic disease We discussed tumor board recommendations during visit in detail with patient and son present during the visit.  All questions answered.  Scans report and images reviewed. -- underwent mediastinoscopy 8/5, EBUS 8/6 with SCC detected in LN 11L and 4L which upstages him to N2 -- His case was re-discussed on Thoracic Tumor Board 8/8/24, with the plan to proceed with CCRT with carbo/taxol. He is planned for 33 fx and underwent simulation this morning 8/15/24. -- Discussed the treatment schedule and potential adverse effects of Carbo/Taxol in detail. Patient's concerns and questions were addressed. Consent was obtained. Treatment tentatively scheduled to start 8/29/24 -- Labs reviewed today  Pituitary adenoma --will refer to Dr.D'Amico after therapy for lung ca complete

## 2024-08-16 NOTE — HISTORY OF PRESENT ILLNESS
[Disease: _____________________] : Disease: [unfilled] [T: ___] : T[unfilled] [N: ___] : N[unfilled] [M: ___] : M[unfilled] [AJCC Stage: ____] : AJCC Stage: [unfilled] [100: Normal, no complaints, no evidence of disease.] : 100: Normal, no complaints, no evidence of disease. [ECOG Performance Status: 0 - Fully active, able to carry on all pre-disease performance without restriction] : Performance Status: 0 - Fully active, able to carry on all pre-disease performance without restriction [de-identified] : Don Castrejon is a 69-year-old male who presents to the clinic for f/u of LLL mass, c/w stage IIIA (T4N0M0) NSCLC - SCC histology.  Onc hx:  Social Hx: 50 pack yr smoking hx, quit when he found out about the mass Originally from [place], lives in [place] by himself. Retired .  Son lives in [place]   3/15/24: MR Head:  Punctate focus of enhancement the left lateral cerebellar hemisphere (series 701 image 55 and series 702 image 23). There is no associated signal abnormality in the brain parenchyma. Given the lack of associated signal abnormality or additional areas of pathologic enhancement, these findings may represent vascular enhancement versus artifact. However, attention on follow-up imaging is recommended to exclude intracranial metastasis. Focal area of intrinsic T1 hyperintensity more inferiorly in the left cerebellar hemisphere without superimposed pathologic enhancement. This may represent area of mineralization. 5 mm hypoenhancing lesion in the left side of the sella most compatible with pituitary adenoma. Correlation with endocrine function and consideration for further evaluation dedicated MRI sella is recommended.  3/15/24: PET: 1. Compared to chest CT from 2/15/2024, the 76 mm mass in the lingula is hypermetabolic and suspicious for lung cancer. 2. The borderline sized thoracic lymph nodes on the CT scan are not FDG avid. 3. Mildly nodular right adrenal gland is not FDG avid. No evidence of metastatic lung cancer. 4. Mild increased activity in enlarged and lobular prostate. Recommend correlation with PSA to rule out prostate cancer.  4/3/24: Chest Xray:  Small left apex pneumothorax, similar to prior exam earlier same day. Left lower lung mass again noted. No acute infiltrates. No significant pleural effusion.  04/03/2024 EBUS  Lung, left, core biopsy: -Squamous cell carcinoma. LYMPH NODE, 11L NEGATIVE FOR MALIGNANT CELLS. Note: Immunostains performed on block 1A shows that the tumor cells are positive for p40 and negative for TTF-1 stain.  This staining profile supports the diagnosis. PDL1 negative Tier I: Variants of Strong Clinical Significance PIK3CA p.([id number]) Tier II: Variants of Potential Clinical Significance KRAS p.([id number]) Tier III: Variants of Unknown Clinical Significance STK11 p.([id number]) ALK p.([id number]) ERBB3 p.([id number]) POLE p.([id number]) 4/4/2024: TTB - rec for MAGDALENA 5/3/2024: Sent to ED by  for worsening HENRY  6/26/2024: CT chest: 1. Enlarging necrotic neoplasm occupying the inferior segment of the lingula with obstruction of the lingular segmental bronchus and pulmonary artery. 2. Mild mediastinal lymphadenopathy with progressive encasement of the right hilum by above described necrotic lingular neoplasm. 6/27/2024: TTB: Patient presented during interdisciplinary TB on 6/27/24: Concern for a large lobulated spiculated lingular mass. Prominent mediastinal LNs also noted. On recent scan area seems to be enlarging. Plan to obtain a PET to r/o any distant disease/progression. If negative will discuss the possibility of a pneumonectomy. Can obtain a VQ scan prior. PLAN: PET-CT. 8/6/2024L Underwent mediastinoscopy and EBUS with , now found to have positive 4L and 11 LN, atypical cells in 4R - this is c/w POD on chemoIO and patient is no longer a candidate for resection. Reviewed at TTB, recommendation was for definitive chemoRT. [de-identified] : Squamous Cell Ca  - PDL1 negative, PIK3CA E542K [de-identified] : CTSx:  [FreeTextEntry1] : 4/23/2024: C1D1 cisplatin/gemcitabine/nivolumab D8 gemcitabine skipped due to HENRY 2/2 cisplatin 5/21/2024: C2D1 carboplatin/gemcitabine.nivolumab 5/28/2024: C2D8 gemcitabine 6/11/2024: Due for C3D1 carboplatin/gemcitabine/nivolumab 6/18/24    C3D8 Gemcitabine held due to low h/h 6/19/24    C3D8 Gemcitabine given w/ blood tranfusion  [de-identified] : The patient reports feeling well.  He denies any shortness of breath.  Saw Dr. Werner this morning for follow up and also underwent simulation with Rad Onc. Has a alexis with Urology follow-up for acute urinary issues.

## 2024-08-17 DIAGNOSIS — E11.9 TYPE 2 DIABETES MELLITUS WITHOUT COMPLICATIONS: ICD-10-CM

## 2024-08-17 DIAGNOSIS — J95.61 INTRAOPERATIVE HEMORRHAGE AND HEMATOMA OF A RESPIRATORY SYSTEM ORGAN OR STRUCTURE COMPLICATING A RESPIRATORY SYSTEM PROCEDURE: ICD-10-CM

## 2024-08-17 DIAGNOSIS — R91.8 OTHER NONSPECIFIC ABNORMAL FINDING OF LUNG FIELD: ICD-10-CM

## 2024-08-17 DIAGNOSIS — K21.9 GASTRO-ESOPHAGEAL REFLUX DISEASE WITHOUT ESOPHAGITIS: ICD-10-CM

## 2024-08-17 DIAGNOSIS — C34.92 MALIGNANT NEOPLASM OF UNSPECIFIED PART OF LEFT BRONCHUS OR LUNG: ICD-10-CM

## 2024-08-17 DIAGNOSIS — E78.5 HYPERLIPIDEMIA, UNSPECIFIED: ICD-10-CM

## 2024-08-17 DIAGNOSIS — I10 ESSENTIAL (PRIMARY) HYPERTENSION: ICD-10-CM

## 2024-08-17 DIAGNOSIS — C77.1 SECONDARY AND UNSPECIFIED MALIGNANT NEOPLASM OF INTRATHORACIC LYMPH NODES: ICD-10-CM

## 2024-08-17 DIAGNOSIS — Z92.21 PERSONAL HISTORY OF ANTINEOPLASTIC CHEMOTHERAPY: ICD-10-CM

## 2024-08-17 DIAGNOSIS — D35.2 BENIGN NEOPLASM OF PITUITARY GLAND: ICD-10-CM

## 2024-08-17 DIAGNOSIS — Z79.84 LONG TERM (CURRENT) USE OF ORAL HYPOGLYCEMIC DRUGS: ICD-10-CM

## 2024-08-17 DIAGNOSIS — Z87.891 PERSONAL HISTORY OF NICOTINE DEPENDENCE: ICD-10-CM

## 2024-08-20 ENCOUNTER — NON-APPOINTMENT (OUTPATIENT)
Age: 69
End: 2024-08-20

## 2024-08-26 NOTE — END OF VISIT
[] : Fellow [FreeTextEntry3] : Seen with , oncology fellow. I explained to him today again that he has cancer that has progressed despite previous chemotherapy and immunotherapy treatments.  Plan: - Summary : The proposed plan is to proceed with radiation therapy and chemotherapy with carboplatin and taxol. The goal is to make the cancer more sensitive to radiation and achieve tumor shrinkage. After completing this treatment, the patient may be eligible for immunotherapy with durvalumab for a year, depending on the response. - Plan : - Radiation therapy - Chemotherapy with carboplatin and taxol (low doses given weekly for 6 weeks) - Weekly blood work and follow-up visits during treatment - Repeat CT scans 4 weeks after completing treatment to assess response - Potential immunotherapy with durvalumab for a year, depending on response --informed consent obtained today, printed education materials provided [Time Spent: ___ minutes] : I have spent [unfilled] minutes of time on the encounter which excludes teaching and separately reported services.

## 2024-08-29 ENCOUNTER — OUTPATIENT (OUTPATIENT)
Dept: OUTPATIENT SERVICES | Facility: HOSPITAL | Age: 69
LOS: 1 days | End: 2024-08-29
Payer: MEDICARE

## 2024-08-29 ENCOUNTER — APPOINTMENT (OUTPATIENT)
Dept: INFUSION THERAPY | Facility: CLINIC | Age: 69
End: 2024-08-29

## 2024-08-29 ENCOUNTER — APPOINTMENT (OUTPATIENT)
Dept: HEMATOLOGY ONCOLOGY | Facility: CLINIC | Age: 69
End: 2024-08-29
Payer: MEDICARE

## 2024-08-29 VITALS
TEMPERATURE: 97.8 F | HEART RATE: 105 BPM | DIASTOLIC BLOOD PRESSURE: 73 MMHG | RESPIRATION RATE: 18 BRPM | OXYGEN SATURATION: 98 % | SYSTOLIC BLOOD PRESSURE: 125 MMHG

## 2024-08-29 VITALS
OXYGEN SATURATION: 98 % | SYSTOLIC BLOOD PRESSURE: 125 MMHG | WEIGHT: 143.08 LBS | HEIGHT: 66 IN | HEART RATE: 105 BPM | TEMPERATURE: 98 F | DIASTOLIC BLOOD PRESSURE: 73 MMHG | RESPIRATION RATE: 18 BRPM

## 2024-08-29 VITALS
RESPIRATION RATE: 18 BRPM | TEMPERATURE: 98 F | OXYGEN SATURATION: 99 % | DIASTOLIC BLOOD PRESSURE: 78 MMHG | SYSTOLIC BLOOD PRESSURE: 123 MMHG | HEART RATE: 96 BPM

## 2024-08-29 VITALS — BODY MASS INDEX: 22.98 KG/M2 | WEIGHT: 143 LBS | HEIGHT: 66 IN

## 2024-08-29 DIAGNOSIS — D64.9 ANEMIA, UNSPECIFIED: ICD-10-CM

## 2024-08-29 DIAGNOSIS — C34.92 MALIGNANT NEOPLASM OF UNSPECIFIED PART OF LEFT BRONCHUS OR LUNG: ICD-10-CM

## 2024-08-29 DIAGNOSIS — C32.0 MALIGNANT NEOPLASM OF GLOTTIS: ICD-10-CM

## 2024-08-29 DIAGNOSIS — C80.0 DISSEMINATED MALIGNANT NEOPLASM, UNSPECIFIED: ICD-10-CM

## 2024-08-29 DIAGNOSIS — N17.9 ACUTE KIDNEY FAILURE, UNSPECIFIED: ICD-10-CM

## 2024-08-29 DIAGNOSIS — Z90.49 ACQUIRED ABSENCE OF OTHER SPECIFIED PARTS OF DIGESTIVE TRACT: Chronic | ICD-10-CM

## 2024-08-29 LAB
ALBUMIN SERPL ELPH-MCNC: 3.1 G/DL
ALP BLD-CCNC: 67 U/L
ALT SERPL-CCNC: 27 U/L
ANION GAP SERPL CALC-SCNC: 1 MMOL/L
AST SERPL-CCNC: 16 U/L
BILIRUB SERPL-MCNC: 0.8 MG/DL
BUN SERPL-MCNC: 19 MG/DL
CALCIUM SERPL-MCNC: 10.4 MG/DL
CHLORIDE SERPL-SCNC: 103 MMOL/L
CO2 SERPL-SCNC: 30 MMOL/L
CREAT SERPL-MCNC: 1.1 MG/DL
EGFR: 72 ML/MIN/1.73M2
GLUCOSE SERPL-MCNC: 412 MG/DL
HCT VFR BLD CALC: 30.2 %
HGB BLD-MCNC: 10 G/DL
LYMPHOCYTES # BLD AUTO: 1.5 K/UL
LYMPHOCYTES NFR BLD AUTO: 19.2 %
MAN DIFF?: NO
MCHC RBC-ENTMCNC: 27.9 PG
MCHC RBC-ENTMCNC: 33.1 GM/DL
MCV RBC AUTO: 84.1 FL
NEUTROPHILS # BLD AUTO: 5.7 K/UL
NEUTROPHILS NFR BLD AUTO: 70.3 %
PLATELET # BLD AUTO: 270 K/UL
POTASSIUM SERPL-SCNC: 4.7 MMOL/L
PROT SERPL-MCNC: 7.7 G/DL
RBC # BLD: 3.59 M/UL
RBC # FLD: 13.9 %
SODIUM SERPL-SCNC: 134 MMOL/L
TSH SERPL-ACNC: 1.02 UIU/ML
WBC # FLD AUTO: 8 K/UL

## 2024-08-29 PROCEDURE — 96417 CHEMO IV INFUS EACH ADDL SEQ: CPT

## 2024-08-29 PROCEDURE — 36415 COLL VENOUS BLD VENIPUNCTURE: CPT

## 2024-08-29 PROCEDURE — 96372 THER/PROPH/DIAG INJ SC/IM: CPT

## 2024-08-29 PROCEDURE — 82962 GLUCOSE BLOOD TEST: CPT

## 2024-08-29 PROCEDURE — G2211 COMPLEX E/M VISIT ADD ON: CPT

## 2024-08-29 PROCEDURE — 96375 TX/PRO/DX INJ NEW DRUG ADDON: CPT

## 2024-08-29 PROCEDURE — 99214 OFFICE O/P EST MOD 30 MIN: CPT

## 2024-08-29 PROCEDURE — 96413 CHEMO IV INFUSION 1 HR: CPT

## 2024-08-29 RX ORDER — CARBOPLATIN 10 MG/ML
160 INJECTION, SOLUTION INTRAVENOUS ONCE
Refills: 0 | Status: COMPLETED | OUTPATIENT
Start: 2024-08-29 | End: 2024-08-29

## 2024-08-29 RX ORDER — DEXAMETHASONE 0.75 MG
10 TABLET ORAL ONCE
Refills: 0 | Status: COMPLETED | OUTPATIENT
Start: 2024-08-29 | End: 2024-08-29

## 2024-08-29 RX ORDER — DIPHENHYDRAMINE HCL 50 MG
50 CAPSULE ORAL ONCE
Refills: 0 | Status: COMPLETED | OUTPATIENT
Start: 2024-08-29 | End: 2024-08-29

## 2024-08-29 RX ORDER — FAMOTIDINE 10 MG/ML
20 INJECTION INTRAVENOUS ONCE
Refills: 0 | Status: COMPLETED | OUTPATIENT
Start: 2024-08-29 | End: 2024-08-29

## 2024-08-29 RX ORDER — SODIUM CHLORIDE 9 MG/ML
1000 INJECTION INTRAMUSCULAR; INTRAVENOUS; SUBCUTANEOUS ONCE
Refills: 0 | Status: COMPLETED | OUTPATIENT
Start: 2024-08-29 | End: 2024-08-29

## 2024-08-29 RX ORDER — PALONOSETRON HYDROCHLORIDE 0.25 MG/5ML
0.25 INJECTION INTRAVENOUS ONCE
Refills: 0 | Status: COMPLETED | OUTPATIENT
Start: 2024-08-29 | End: 2024-08-29

## 2024-08-29 RX ORDER — PACLITAXEL 6 MG/ML
87 INJECTION, SOLUTION INTRAVENOUS ONCE
Refills: 0 | Status: COMPLETED | OUTPATIENT
Start: 2024-08-29 | End: 2024-08-29

## 2024-08-29 RX ADMIN — Medication 204 MILLIGRAM(S): at 13:30

## 2024-08-29 RX ADMIN — SODIUM CHLORIDE 1000 MILLILITER(S): 9 INJECTION INTRAMUSCULAR; INTRAVENOUS; SUBCUTANEOUS at 13:15

## 2024-08-29 RX ADMIN — Medication 10 MILLIGRAM(S): at 13:30

## 2024-08-29 RX ADMIN — FAMOTIDINE 20 MILLIGRAM(S): 10 INJECTION INTRAVENOUS at 13:45

## 2024-08-29 RX ADMIN — PALONOSETRON HYDROCHLORIDE 0.25 MILLIGRAM(S): 0.25 INJECTION INTRAVENOUS at 13:50

## 2024-08-29 RX ADMIN — SODIUM CHLORIDE 1000 MILLILITER(S): 9 INJECTION INTRAMUSCULAR; INTRAVENOUS; SUBCUTANEOUS at 12:12

## 2024-08-29 RX ADMIN — CARBOPLATIN 160 MILLIGRAM(S): 10 INJECTION, SOLUTION INTRAVENOUS at 16:15

## 2024-08-29 RX ADMIN — Medication 50 MILLIGRAM(S): at 13:45

## 2024-08-29 RX ADMIN — PACLITAXEL 87 MILLIGRAM(S): 6 INJECTION, SOLUTION INTRAVENOUS at 14:13

## 2024-08-29 RX ADMIN — CARBOPLATIN 160 MILLIGRAM(S): 10 INJECTION, SOLUTION INTRAVENOUS at 15:45

## 2024-08-29 RX ADMIN — Medication 204 MILLIGRAM(S): at 13:15

## 2024-08-29 RX ADMIN — Medication 5 UNIT(S): at 12:25

## 2024-08-29 RX ADMIN — PACLITAXEL 87 MILLIGRAM(S): 6 INJECTION, SOLUTION INTRAVENOUS at 15:45

## 2024-08-29 NOTE — PHARMACOTHERAPY INTERVENTION NOTE - COMMENTS
After received pt's clearance paper from doctor's office, the verifying pharmacist noticed that pt's scr increased from 0.9 (08/15/24) to 1.1 today. The calculated carboplatin dose would be 165 mg, which is more than 10% differences from ordered dose 190 mg.  Dr Larose changed the carboplatin dose to 160mg today.

## 2024-08-30 ENCOUNTER — NON-APPOINTMENT (OUTPATIENT)
Age: 69
End: 2024-08-30

## 2024-08-30 VITALS
DIASTOLIC BLOOD PRESSURE: 72 MMHG | RESPIRATION RATE: 18 BRPM | HEIGHT: 66 IN | TEMPERATURE: 98 F | WEIGHT: 145 LBS | SYSTOLIC BLOOD PRESSURE: 118 MMHG | HEART RATE: 100 BPM | OXYGEN SATURATION: 100 % | BODY MASS INDEX: 23.3 KG/M2

## 2024-08-30 LAB — GLUCOSE BLDC GLUCOMTR-MCNC: 237 MG/DL — HIGH (ref 70–99)

## 2024-08-30 NOTE — HISTORY OF PRESENT ILLNESS
[Disease: _____________________] : Disease: [unfilled] [T: ___] : T[unfilled] [N: ___] : N[unfilled] [M: ___] : M[unfilled] [AJCC Stage: ____] : AJCC Stage: [unfilled] [ECOG Performance Status: 0 - Fully active, able to carry on all pre-disease performance without restriction] : Performance Status: 0 - Fully active, able to carry on all pre-disease performance without restriction [de-identified] : Don Castrejon is a 69-year-old male who presents to the clinic for f/u of LLL mass, c/w stage IIIA (T4N0M0) NSCLC - SCC histology.  Onc hx: 50 pack yr smoking hx, quit when he found out about the mass Originally from [place], lives in [place] by himself. Retired .   3/15/24: MR Head:  Punctate focus of enhancement the left lateral cerebellar hemisphere (series 701 image 55 and series 702 image 23). There is no associated signal abnormality in the brain parenchyma. Given the lack of associated signal abnormality or additional areas of pathologic enhancement, these findings may represent vascular enhancement versus artifact. However, attention on follow-up imaging is recommended to exclude intracranial metastasis. Focal area of intrinsic T1 hyperintensity more inferiorly in the left cerebellar hemisphere without superimposed pathologic enhancement. This may represent area of mineralization. 5 mm hypoenhancing lesion in the left side of the sella most compatible with pituitary adenoma. Correlation with endocrine function and consideration for further evaluation dedicated MRI sella is recommended.  3/15/24: PET: 1. Compared to chest CT from 2/15/2024, the 76 mm mass in the lingula is hypermetabolic and suspicious for lung cancer. 2. The borderline sized thoracic lymph nodes on the CT scan are not FDG avid. 3. Mildly nodular right adrenal gland is not FDG avid. No evidence of metastatic lung cancer. 4. Mild increased activity in enlarged and lobular prostate. Recommend correlation with PSA to rule out prostate cancer.  4/3/24: Chest Xray:  Small left apex pneumothorax, similar to prior exam earlier same day. Left lower lung mass again noted. No acute infiltrates. No significant pleural effusion.  04/03/2024 EBUS  Lung, left, core biopsy: -Squamous cell carcinoma. LYMPH NODE, 11L NEGATIVE FOR MALIGNANT CELLS. Note: Immunostains performed on block 1A shows that the tumor cells are positive for p40 and negative for TTF-1 stain.  This staining profile supports the diagnosis. PDL1 negative Tier I: Variants of Strong Clinical Significance PIK3CA p.([id number]) Tier II: Variants of Potential Clinical Significance KRAS p.([id number]) Tier III: Variants of Unknown Clinical Significance STK11 p.([id number]) ALK p.([id number]) ERBB3 p.([id number]) POLE p.([id number]) 4/4/2024: TTB - rec for MAGDALENA 5/3/2024: Sent to ED by  for worsening HENRY  6/26/2024: CT chest: 1. Enlarging necrotic neoplasm occupying the inferior segment of the lingula with obstruction of the lingular segmental bronchus and pulmonary artery. 2. Mild mediastinal lymphadenopathy with progressive encasement of the right hilum by above described necrotic lingular neoplasm. 6/27/2024: TTB: Patient presented during interdisciplinary TB on 6/27/24: Concern for a large lobulated spiculated lingular mass. Prominent mediastinal LNs also noted. On recent scan area seems to be enlarging. Plan to obtain a PET to r/o any distant disease/progression. If negative will discuss the possibility of a pneumonectomy. Can obtain a VQ scan prior. PLAN: PET-CT. 8/6/2024L Underwent mediastinoscopy and EBUS with , now found to have positive 4L and 11 LN, atypical cells in 4R - this is c/w POD on chemoIO and patient is no longer a candidate for resection. Reviewed at TTB, recommendation was for definitive chemoRT. [de-identified] : Squamous Cell Ca  - PDL1 negative, PIK3CA E542K [de-identified] : CTSx:  [FreeTextEntry1] : 4/23/2024: C1D1 cisplatin/gemcitabine/nivolumab D8 gemcitabine skipped due to HENRY 2/2 cisplatin 5/21/2024: C2D1 carboplatin/gemcitabine.nivolumab 5/28/2024: C2D8 gemcitabine 6/11/2024: Due for C3D1 carboplatin/gemcitabine/nivolumab 6/18/24    C3D8 Gemcitabine held due to low h/h 6/19/24    C3D8 Gemcitabine given w/ blood tranfusion  8/30/24: C1D1 carboplatin/paclitaxel weekly 1/6 [de-identified] : Feels well.  [100: Normal, no complaints, no evidence of disease.] : 100: Normal, no complaints, no evidence of disease.

## 2024-08-30 NOTE — HISTORY OF PRESENT ILLNESS
[Disease: _____________________] : Disease: [unfilled] [T: ___] : T[unfilled] [N: ___] : N[unfilled] [M: ___] : M[unfilled] [AJCC Stage: ____] : AJCC Stage: [unfilled] [ECOG Performance Status: 0 - Fully active, able to carry on all pre-disease performance without restriction] : Performance Status: 0 - Fully active, able to carry on all pre-disease performance without restriction [de-identified] : Don Castrejon is a 69-year-old male who presents to the clinic for f/u of LLL mass, c/w stage IIIA (T4N0M0) NSCLC - SCC histology.  Onc hx: 50 pack yr smoking hx, quit when he found out about the mass Originally from [place], lives in [place] by himself. Retired .   3/15/24: MR Head:  Punctate focus of enhancement the left lateral cerebellar hemisphere (series 701 image 55 and series 702 image 23). There is no associated signal abnormality in the brain parenchyma. Given the lack of associated signal abnormality or additional areas of pathologic enhancement, these findings may represent vascular enhancement versus artifact. However, attention on follow-up imaging is recommended to exclude intracranial metastasis. Focal area of intrinsic T1 hyperintensity more inferiorly in the left cerebellar hemisphere without superimposed pathologic enhancement. This may represent area of mineralization. 5 mm hypoenhancing lesion in the left side of the sella most compatible with pituitary adenoma. Correlation with endocrine function and consideration for further evaluation dedicated MRI sella is recommended.  3/15/24: PET: 1. Compared to chest CT from 2/15/2024, the 76 mm mass in the lingula is hypermetabolic and suspicious for lung cancer. 2. The borderline sized thoracic lymph nodes on the CT scan are not FDG avid. 3. Mildly nodular right adrenal gland is not FDG avid. No evidence of metastatic lung cancer. 4. Mild increased activity in enlarged and lobular prostate. Recommend correlation with PSA to rule out prostate cancer.  4/3/24: Chest Xray:  Small left apex pneumothorax, similar to prior exam earlier same day. Left lower lung mass again noted. No acute infiltrates. No significant pleural effusion.  04/03/2024 EBUS  Lung, left, core biopsy: -Squamous cell carcinoma. LYMPH NODE, 11L NEGATIVE FOR MALIGNANT CELLS. Note: Immunostains performed on block 1A shows that the tumor cells are positive for p40 and negative for TTF-1 stain.  This staining profile supports the diagnosis. PDL1 negative Tier I: Variants of Strong Clinical Significance PIK3CA p.([id number]) Tier II: Variants of Potential Clinical Significance KRAS p.([id number]) Tier III: Variants of Unknown Clinical Significance STK11 p.([id number]) ALK p.([id number]) ERBB3 p.([id number]) POLE p.([id number]) 4/4/2024: TTB - rec for MAGDALENA 5/3/2024: Sent to ED by  for worsening HENRY  6/26/2024: CT chest: 1. Enlarging necrotic neoplasm occupying the inferior segment of the lingula with obstruction of the lingular segmental bronchus and pulmonary artery. 2. Mild mediastinal lymphadenopathy with progressive encasement of the right hilum by above described necrotic lingular neoplasm. 6/27/2024: TTB: Patient presented during interdisciplinary TB on 6/27/24: Concern for a large lobulated spiculated lingular mass. Prominent mediastinal LNs also noted. On recent scan area seems to be enlarging. Plan to obtain a PET to r/o any distant disease/progression. If negative will discuss the possibility of a pneumonectomy. Can obtain a VQ scan prior. PLAN: PET-CT. 8/6/2024L Underwent mediastinoscopy and EBUS with , now found to have positive 4L and 11 LN, atypical cells in 4R - this is c/w POD on chemoIO and patient is no longer a candidate for resection. Reviewed at TTB, recommendation was for definitive chemoRT. [de-identified] : Squamous Cell Ca  - PDL1 negative, PIK3CA E542K [de-identified] : CTSx:  [FreeTextEntry1] : 4/23/2024: C1D1 cisplatin/gemcitabine/nivolumab D8 gemcitabine skipped due to HENRY 2/2 cisplatin 5/21/2024: C2D1 carboplatin/gemcitabine.nivolumab 5/28/2024: C2D8 gemcitabine 6/11/2024: Due for C3D1 carboplatin/gemcitabine/nivolumab 6/18/24    C3D8 Gemcitabine held due to low h/h 6/19/24    C3D8 Gemcitabine given w/ blood tranfusion  8/30/24: C1D1 carboplatin/paclitaxel weekly 1/6 [de-identified] : Feels well.  [100: Normal, no complaints, no evidence of disease.] : 100: Normal, no complaints, no evidence of disease.

## 2024-08-30 NOTE — REVIEW OF SYSTEMS
[Fatigue] : fatigue [Cough] : cough [Negative] : Heme/Lymph [SOB on Exertion] : no shortness of breath during exertion [FreeTextEntry2] : mild fatigue

## 2024-08-30 NOTE — ASSESSMENT
[FreeTextEntry1] : 69YM w/ PMHx GERD, HTN, HLD, DMII presenting for follow-up for squamous cell lung cancer, stage IIIa, T4 N0 M0, PD-L1 negative currently on neoadjuvant chemoimmunotherapy. Chemotherapy course complicated by acute kidney injury, likely related to cisplatin.  Patient is here for follow-up.  Squamous Cell Lung Ca, AJCC IIIa, T4N2M0  - PDL1 negative Completed 3 cycles of neoadjuvant platinum based with gemcitabine and nivolumab chemoimmunotherapy.  Interim CT chest with progression of disease concern. -- He developed an HENRY following a 1 cycle of cisplatin and subsequently switched to carboplatin -- Patient's case was discussed at thoracic tumor board on 6/27-consensus of panel was to obtain a PET scan, followed by pulmonary function test and VQ scan and possible resection of enlarging mass provided no evidence of metastatic disease -- underwent mediastinoscopy 8/5, EBUS 8/6 with SCC detected in LN 11L and 4L which upstages him to N2 -- His case was re-discussed on Thoracic Tumor Board 8/8/24, with the plan to proceed with CCRT with carbo/taxol. He is planned for 33 fx  -- ordered CBC, CMP, TSH -- Labs reviewed today, proceed with C1D1 of carboplatin AUC2, paclitaxel 50 mg/m2 --patient is on intensive treatment with carboplatin/paclitaxel concurrent with RT requiring intensive monitoring for toxicity with weekly CBC, CMP and H&P  Pituitary adenoma --will refer to Dr.D'Amico after therapy for lung ca complete

## 2024-08-30 NOTE — HISTORY OF PRESENT ILLNESS
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 Werner for radiation to the Left Lung.   8/8/24: Consultation while inpatient   8/30/24: OTV  400cGy/6600cGy, 2/33fx to the Left lung. Patient reports he is feeling well. Denies fatigue. Appetite is good. He denies CP/SOB, cough, or hemoptysis. Plan to continue radiation.    History of Present Illness  _________________________________  Don Castrejon is a 68 yo M w/ pituitary adenoma being observed as well as cT4N3 NSCLC (squamous cell carcinoma) of the LEFT lung and mediastinum s/p induction systemic therapy   (gem/nivo x3, cis/gem/nivo x1) with progression of disease.     Patient initially presented with lung mass and underwent staging imaging suggestive of rQ7H0S1 disease. Biopsy showed NSCLC, NGS negative.   The case was discussed at multidisciplinary tumor board and consensus was to proceed w/ neoadjuvant chemo immunotherapy and subsequent resection.    Patient started treatment w/  on 4/23/24  He was unable to tolerate cisplatin due to renal concerns and was switched to  carboplatin.    PET/CT scan (7/8/24) revealing disease progression with ?N3 disease noted at station R4.  MRI brain wnl.    Patient is now s/p bronchoscopy w/ mediastinoscopy which was aborted due to intraop bleeding (8/5/24) with .    EBUS was obtained, and demonstrated positive sample. On 8/8/24, patient obtained CT Chest w/ IV contrast revealing slight enlargement of neoplasm mass and lymph nodes along w/ gas in mediastinal area due to recent mediastinoscopy. Radiation oncology was consulted to evaluate patient prior to discharge.

## 2024-09-04 ENCOUNTER — NON-APPOINTMENT (OUTPATIENT)
Age: 69
End: 2024-09-04

## 2024-09-04 VITALS
HEART RATE: 115 BPM | RESPIRATION RATE: 18 BRPM | WEIGHT: 145 LBS | TEMPERATURE: 98 F | DIASTOLIC BLOOD PRESSURE: 72 MMHG | HEIGHT: 66 IN | SYSTOLIC BLOOD PRESSURE: 107 MMHG | OXYGEN SATURATION: 99 % | BODY MASS INDEX: 23.3 KG/M2

## 2024-09-04 DIAGNOSIS — K59.00 CONSTIPATION, UNSPECIFIED: ICD-10-CM

## 2024-09-04 RX ORDER — FAMOTIDINE 10 MG/ML
20 INJECTION INTRAVENOUS ONCE
Refills: 0 | Status: COMPLETED | OUTPATIENT
Start: 2024-09-05 | End: 2024-09-05

## 2024-09-04 RX ORDER — SENNOSIDES 8.6 MG/1
8.6 CAPSULE, GELATIN COATED ORAL
Qty: 30 | Refills: 1 | Status: ACTIVE | COMMUNITY
Start: 2024-09-04 | End: 1900-01-01

## 2024-09-04 RX ORDER — PACLITAXEL 6 MG/ML
87 INJECTION, SOLUTION INTRAVENOUS ONCE
Refills: 0 | Status: COMPLETED | OUTPATIENT
Start: 2024-09-05 | End: 2024-09-05

## 2024-09-04 RX ORDER — DEXAMETHASONE 0.75 MG
10 TABLET ORAL ONCE
Refills: 0 | Status: COMPLETED | OUTPATIENT
Start: 2024-09-05 | End: 2024-09-05

## 2024-09-04 RX ORDER — PALONOSETRON HYDROCHLORIDE 0.25 MG/5ML
0.25 INJECTION INTRAVENOUS ONCE
Refills: 0 | Status: COMPLETED | OUTPATIENT
Start: 2024-09-05 | End: 2024-09-05

## 2024-09-04 NOTE — REVIEW OF SYSTEMS
[Nausea: Grade 0] : Nausea: Grade 0 [Vomiting: Grade 0] : Vomiting: Grade 0 [Fatigue: Grade 0] : Fatigue: Grade 0 [Urinary Incontinence: Grade 0] : Urinary Incontinence: Grade 0  [Urinary Retention: Grade 0] : Urinary Retention: Grade 0 [Lethargy: Grade 0] : Lethargy: Grade 0 [Anxiety: Grade 0] : Anxiety: Grade 0  [Depression: Grade 0] : Depression: Grade 0 [Cough: Grade 0] : Cough: Grade 0 [Dyspnea: Grade 0] : Dyspnea: Grade 0 [Hoarseness: Grade 0] : Hoarseness: Grade 0 [Voice Alteration: Grade 0] : Voice Alteration: Grade 0 [Skin Induration: Grade 0] : Skin Induration: Grade 0 [Dermatitis Radiation: Grade 0] : Dermatitis Radiation: Grade 0 [Constipation: Grade 2 - Persistent symptoms with regular use of laxatives or enemas; limiting instrumental ADL] : Constipation: Grade 2 - Persistent symptoms with regular use of laxatives or enemas; limiting instrumental ADL [Hypoxia: Grade 0] : Hypoxia: Grade 0

## 2024-09-04 NOTE — HISTORY OF PRESENT ILLNESS
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 Werner for radiation to the Left Lung.   9/4/24: OTV 800cGy/6600cGy, 4/33fx to the Left lung. Today patient is feeling well, denies fatigue or changes in appetite. No SOB with exertion or at rest. Able to tolerate ADLs independetntly. Weight is stable. Patient reports some aching pain to right side unresolved with Aleeve taken at night but he believes the pain is due to the cold. Negative for cough, phlegm, hemoptysis. Negative for acid reflux, n/v. Denies pain or skin irritation at therapy site. Patient is on concurrent chemotherapy. Reports constipation x4 days; Senna HS ordered. He is staying with his son in [place], [place] during his treatments. Continue radiation therapy.  8/30/24: OTV  400cGy/6600cGy, 2/33fx to the Left lung. Patient reports he is feeling well. Denies fatigue. Appetite is good. He denies CP/SOB, cough, or hemoptysis. Plan to continue radiation.    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     Patient initially presented with lung mass and underwent staging imaging suggestive of [id number] disease. Biopsy showed NSCLC, NGS negative.   The case was discussed at multidisciplinary tumor board and consensus was to proceed w/ neoadjuvant chemo immunotherapy and subsequent resection.    Patient started treatment w/  on 4/23/24  He was unable to tolerate cisplatin due to renal concerns and was switched to  carboplatin.    PET/CT scan (7/8/24) revealing disease progression with ?N3 disease noted at station R4.  MRI brain wnl.    Patient is now s/p bronchoscopy w/ mediastinoscopy which was aborted due to intraop bleeding (8/5/24) with .    EBUS was obtained, and demonstrated positive sample. On 8/8/24, patient obtained CT Chest w/ IV contrast revealing slight enlargement of neoplasm mass and lymph nodes along w/ gas in mediastinal area due to recent mediastinoscopy. Radiation oncology was consulted to evaluate patient prior to discharge.    67

## 2024-09-04 NOTE — DISEASE MANAGEMENT
[Clinical] : TNM Stage: c [TTNM] : 4 [NTNM] : 3 [MTNM] : x [de-identified] : 800cGy [de-identified] : [id number] [de-identified] : Left lung

## 2024-09-04 NOTE — VITALS
[Maximal Pain Intensity: 2/10] : 2/10 [Pain Description/Quality: ___] : Pain description/quality: [unfilled] [Pain Duration: ___] : Pain duration: [unfilled] [Pain Location: ___] : Pain Location: [unfilled] [OTC] : OTC [90: Able to carry normal activity; minor signs or symptoms of disease.] : 90: Able to carry normal activity; minor signs or symptoms of disease.  [Pain Interferes with ADLs] : Pain does not interfere with activities of daily living [ECOG Performance Status: 1 - Restricted in physically strenuous activity but ambulatory and able to carry out work of a light or sedentary nature] : Performance Status: 1 - Restricted in physically strenuous activity but ambulatory and able to carry out work of a light or sedentary nature, e.g., light house work, office work

## 2024-09-04 NOTE — HISTORY OF PRESENT ILLNESS
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 Werner for radiation to the Left Lung.   9/4/24: OTV 800cGy/6600cGy, 4/33fx to the Left lung. Today patient is feeling well, denies fatigue or changes in appetite. No SOB with exertion or at rest. Able to tolerate ADLs independetntly. Weight is stable. Patient reports some aching pain to right side unresolved with Aleeve taken at night but he believes the pain is due to the cold. Negative for cough, phlegm, hemoptysis. Negative for acid reflux, n/v. Denies pain or skin irritation at therapy site. Patient is on concurrent chemotherapy. Reports constipation x4 days; Senna HS ordered. He is staying with his son in [place], [place] during his treatments. Continue radiation therapy.  8/30/24: OTV  400cGy/6600cGy, 2/33fx to the Left lung. Patient reports he is feeling well. Denies fatigue. Appetite is good. He denies CP/SOB, cough, or hemoptysis. Plan to continue radiation.    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     Patient initially presented with lung mass and underwent staging imaging suggestive of [id number] disease. Biopsy showed NSCLC, NGS negative.   The case was discussed at multidisciplinary tumor board and consensus was to proceed w/ neoadjuvant chemo immunotherapy and subsequent resection.    Patient started treatment w/  on 4/23/24  He was unable to tolerate cisplatin due to renal concerns and was switched to  carboplatin.    PET/CT scan (7/8/24) revealing disease progression with ?N3 disease noted at station R4.  MRI brain wnl.    Patient is now s/p bronchoscopy w/ mediastinoscopy which was aborted due to intraop bleeding (8/5/24) with .    EBUS was obtained, and demonstrated positive sample. On 8/8/24, patient obtained CT Chest w/ IV contrast revealing slight enlargement of neoplasm mass and lymph nodes along w/ gas in mediastinal area due to recent mediastinoscopy. Radiation oncology was consulted to evaluate patient prior to discharge.

## 2024-09-04 NOTE — DISEASE MANAGEMENT
[Clinical] : TNM Stage: c [TTNM] : 4 [MTNM] : x [NTNM] : 3 [de-identified] : 800cGy [de-identified] : [id number] [de-identified] : Left lung

## 2024-09-05 ENCOUNTER — APPOINTMENT (OUTPATIENT)
Dept: INFUSION THERAPY | Facility: CLINIC | Age: 69
End: 2024-09-05

## 2024-09-05 ENCOUNTER — OUTPATIENT (OUTPATIENT)
Dept: OUTPATIENT SERVICES | Facility: HOSPITAL | Age: 69
LOS: 1 days | End: 2024-09-05
Payer: MEDICARE

## 2024-09-05 VITALS
TEMPERATURE: 98 F | OXYGEN SATURATION: 94 % | SYSTOLIC BLOOD PRESSURE: 96 MMHG | DIASTOLIC BLOOD PRESSURE: 60 MMHG | RESPIRATION RATE: 18 BRPM | HEART RATE: 108 BPM

## 2024-09-05 VITALS
HEIGHT: 66 IN | WEIGHT: 145.06 LBS | OXYGEN SATURATION: 97 % | TEMPERATURE: 98 F | RESPIRATION RATE: 18 BRPM | HEART RATE: 100 BPM | DIASTOLIC BLOOD PRESSURE: 69 MMHG | SYSTOLIC BLOOD PRESSURE: 110 MMHG

## 2024-09-05 DIAGNOSIS — C34.92 MALIGNANT NEOPLASM OF UNSPECIFIED PART OF LEFT BRONCHUS OR LUNG: ICD-10-CM

## 2024-09-05 DIAGNOSIS — Z90.49 ACQUIRED ABSENCE OF OTHER SPECIFIED PARTS OF DIGESTIVE TRACT: Chronic | ICD-10-CM

## 2024-09-05 LAB
ALBUMIN SERPL ELPH-MCNC: 3.4 G/DL — SIGNIFICANT CHANGE UP (ref 3.3–5)
ALP SERPL-CCNC: 58 U/L — SIGNIFICANT CHANGE UP (ref 40–120)
ALT FLD-CCNC: 19 U/L — SIGNIFICANT CHANGE UP (ref 10–45)
ANION GAP SERPL CALC-SCNC: 1 MMOL/L — LOW (ref 5–17)
AST SERPL-CCNC: 20 U/L — SIGNIFICANT CHANGE UP (ref 10–40)
BILIRUB SERPL-MCNC: 1.1 MG/DL — SIGNIFICANT CHANGE UP (ref 0.2–1.2)
BUN SERPL-MCNC: 24 MG/DL — HIGH (ref 7–23)
CALCIUM SERPL-MCNC: 10.6 MG/DL — HIGH (ref 8.4–10.5)
CHLORIDE SERPL-SCNC: 101 MMOL/L — SIGNIFICANT CHANGE UP (ref 96–108)
CO2 SERPL-SCNC: 30 MMOL/L — SIGNIFICANT CHANGE UP (ref 22–31)
CREAT SERPL-MCNC: 0.9 MG/DL — SIGNIFICANT CHANGE UP (ref 0.5–1.3)
EGFR: 92 ML/MIN/1.73M2 — SIGNIFICANT CHANGE UP
GLUCOSE SERPL-MCNC: 176 MG/DL — HIGH (ref 70–99)
HCT VFR BLD CALC: 29.3 % — LOW (ref 39–50)
HGB BLD-MCNC: 9.9 G/DL — LOW (ref 13–17)
LYMPHOCYTES # BLD AUTO: 1.3 K/UL — SIGNIFICANT CHANGE UP (ref 1–3.3)
LYMPHOCYTES # BLD AUTO: 20.8 % — SIGNIFICANT CHANGE UP (ref 13–44)
MCHC RBC-ENTMCNC: 27.6 PG — SIGNIFICANT CHANGE UP (ref 27–34)
MCHC RBC-ENTMCNC: 33.8 GM/DL — SIGNIFICANT CHANGE UP (ref 32–36)
MCV RBC AUTO: 81.6 FL — SIGNIFICANT CHANGE UP (ref 80–100)
NEUTROPHILS # BLD AUTO: 4.4 K/UL — SIGNIFICANT CHANGE UP (ref 1.8–7.4)
NEUTROPHILS NFR BLD AUTO: 68.6 % — SIGNIFICANT CHANGE UP (ref 43–77)
PLATELET # BLD AUTO: 284 K/UL — SIGNIFICANT CHANGE UP (ref 150–400)
POTASSIUM SERPL-MCNC: 5.4 MMOL/L — HIGH (ref 3.5–5.3)
POTASSIUM SERPL-SCNC: 5.4 MMOL/L — HIGH (ref 3.5–5.3)
PROT SERPL-MCNC: 7.3 G/DL — SIGNIFICANT CHANGE UP (ref 6–8.3)
RBC # BLD: 3.59 M/UL — LOW (ref 4.2–5.8)
RBC # FLD: 13.3 % — SIGNIFICANT CHANGE UP (ref 10.3–14.5)
SODIUM SERPL-SCNC: 132 MMOL/L — LOW (ref 135–145)
WBC # BLD: 6.4 K/UL — SIGNIFICANT CHANGE UP (ref 3.8–10.5)
WBC # FLD AUTO: 6.4 K/UL — SIGNIFICANT CHANGE UP (ref 3.8–10.5)

## 2024-09-05 PROCEDURE — 96417 CHEMO IV INFUS EACH ADDL SEQ: CPT

## 2024-09-05 PROCEDURE — 96415 CHEMO IV INFUSION ADDL HR: CPT

## 2024-09-05 PROCEDURE — 85025 COMPLETE CBC W/AUTO DIFF WBC: CPT

## 2024-09-05 PROCEDURE — 96413 CHEMO IV INFUSION 1 HR: CPT

## 2024-09-05 PROCEDURE — 80053 COMPREHEN METABOLIC PANEL: CPT

## 2024-09-05 PROCEDURE — 96375 TX/PRO/DX INJ NEW DRUG ADDON: CPT

## 2024-09-05 PROCEDURE — 36415 COLL VENOUS BLD VENIPUNCTURE: CPT

## 2024-09-05 RX ORDER — CARBOPLATIN 10 MG/ML
190 INJECTION, SOLUTION INTRAVENOUS ONCE
Refills: 0 | Status: COMPLETED | OUTPATIENT
Start: 2024-09-05 | End: 2024-09-05

## 2024-09-05 RX ORDER — DIPHENHYDRAMINE HCL 50 MG
25 CAPSULE ORAL ONCE
Refills: 0 | Status: COMPLETED | OUTPATIENT
Start: 2024-09-05 | End: 2024-09-05

## 2024-09-05 RX ORDER — SODIUM CHLORIDE 9 MG/ML
1000 INJECTION INTRAMUSCULAR; INTRAVENOUS; SUBCUTANEOUS ONCE
Refills: 0 | Status: COMPLETED | OUTPATIENT
Start: 2024-09-05 | End: 2024-09-05

## 2024-09-05 RX ADMIN — CARBOPLATIN 190 MILLIGRAM(S): 10 INJECTION, SOLUTION INTRAVENOUS at 18:35

## 2024-09-05 RX ADMIN — FAMOTIDINE 20 MILLIGRAM(S): 10 INJECTION INTRAVENOUS at 15:26

## 2024-09-05 RX ADMIN — CARBOPLATIN 190 MILLIGRAM(S): 10 INJECTION, SOLUTION INTRAVENOUS at 18:05

## 2024-09-05 RX ADMIN — SODIUM CHLORIDE 1000 MILLILITER(S): 9 INJECTION INTRAMUSCULAR; INTRAVENOUS; SUBCUTANEOUS at 18:00

## 2024-09-05 RX ADMIN — Medication 10 MILLIGRAM(S): at 15:00

## 2024-09-05 RX ADMIN — PACLITAXEL 87 MILLIGRAM(S): 6 INJECTION, SOLUTION INTRAVENOUS at 16:08

## 2024-09-05 RX ADMIN — SODIUM CHLORIDE 1000 MILLILITER(S): 9 INJECTION INTRAMUSCULAR; INTRAVENOUS; SUBCUTANEOUS at 17:11

## 2024-09-05 RX ADMIN — PACLITAXEL 87 MILLIGRAM(S): 6 INJECTION, SOLUTION INTRAVENOUS at 18:04

## 2024-09-05 RX ADMIN — Medication 25 MILLIGRAM(S): at 17:22

## 2024-09-05 RX ADMIN — Medication 204 MILLIGRAM(S): at 15:27

## 2024-09-05 RX ADMIN — PALONOSETRON HYDROCHLORIDE 0.25 MILLIGRAM(S): 0.25 INJECTION INTRAVENOUS at 15:25

## 2024-09-05 NOTE — CHART NOTE - NSCHARTNOTEFT_GEN_A_CORE
Patient presents for 2nd dose of taxol/ carbo today. After taxol was titrated to full dose, he reported nausea and dizziness. Infusion stopped and vitals checked with BP noted to be in 80s systolic. Shortly after infusion being stopped, he reported symptoms resolving and BP improved to 100s systolic. He did not receive benadryl today as he did not have any reactions week 1; therefore, given 25mg benadryl and infusion resumed. Will monitor closely. Oncology team informed.

## 2024-09-05 NOTE — PROVIDER CONTACT NOTE (OTHER) - SITUATION
Patient receiving second dose of paclitaxel running full rate after 2 titrations without reaction. @ 1710 pt c/o dizziness and feeling hot. Infusion stopped at 1710.

## 2024-09-10 RX ORDER — FAMOTIDINE 10 MG/ML
20 INJECTION INTRAVENOUS ONCE
Refills: 0 | Status: COMPLETED | OUTPATIENT
Start: 2024-09-12 | End: 2024-09-12

## 2024-09-10 RX ORDER — DIPHENHYDRAMINE HCL 50 MG
50 CAPSULE ORAL ONCE
Refills: 0 | Status: COMPLETED | OUTPATIENT
Start: 2024-09-12 | End: 2024-09-12

## 2024-09-10 RX ORDER — DEXAMETHASONE 0.75 MG
10 TABLET ORAL ONCE
Refills: 0 | Status: COMPLETED | OUTPATIENT
Start: 2024-09-12 | End: 2024-09-12

## 2024-09-10 RX ORDER — PALONOSETRON HYDROCHLORIDE 0.25 MG/5ML
0.25 INJECTION INTRAVENOUS ONCE
Refills: 0 | Status: COMPLETED | OUTPATIENT
Start: 2024-09-12 | End: 2024-09-12

## 2024-09-10 NOTE — REVIEW OF SYSTEMS
[Constipation: Grade 2 - Persistent symptoms with regular use of laxatives or enemas; limiting instrumental ADL] : Constipation: Grade 2 - Persistent symptoms with regular use of laxatives or enemas; limiting instrumental ADL [Nausea: Grade 0] : Nausea: Grade 0 [Vomiting: Grade 0] : Vomiting: Grade 0 [Fatigue: Grade 0] : Fatigue: Grade 0 [Urinary Incontinence: Grade 0] : Urinary Incontinence: Grade 0  [Urinary Retention: Grade 0] : Urinary Retention: Grade 0 [Lethargy: Grade 0] : Lethargy: Grade 0 [Anxiety: Grade 0] : Anxiety: Grade 0  [Depression: Grade 0] : Depression: Grade 0 [Cough: Grade 0] : Cough: Grade 0 [Dyspnea: Grade 0] : Dyspnea: Grade 0 [Hoarseness: Grade 0] : Hoarseness: Grade 0 [Hypoxia: Grade 0] : Hypoxia: Grade 0 [Voice Alteration: Grade 0] : Voice Alteration: Grade 0 [Skin Induration: Grade 0] : Skin Induration: Grade 0 [Dermatitis Radiation: Grade 0] : Dermatitis Radiation: Grade 0

## 2024-09-11 ENCOUNTER — NON-APPOINTMENT (OUTPATIENT)
Age: 69
End: 2024-09-11

## 2024-09-11 VITALS
TEMPERATURE: 98.3 F | HEART RATE: 113 BPM | SYSTOLIC BLOOD PRESSURE: 113 MMHG | OXYGEN SATURATION: 100 % | DIASTOLIC BLOOD PRESSURE: 70 MMHG | WEIGHT: 145.9 LBS | BODY MASS INDEX: 23.55 KG/M2

## 2024-09-12 ENCOUNTER — OUTPATIENT (OUTPATIENT)
Dept: OUTPATIENT SERVICES | Facility: HOSPITAL | Age: 69
LOS: 1 days | End: 2024-09-12
Payer: MEDICARE

## 2024-09-12 ENCOUNTER — APPOINTMENT (OUTPATIENT)
Dept: HEMATOLOGY ONCOLOGY | Facility: CLINIC | Age: 69
End: 2024-09-12
Payer: MEDICARE

## 2024-09-12 ENCOUNTER — APPOINTMENT (OUTPATIENT)
Dept: INFUSION THERAPY | Facility: CLINIC | Age: 69
End: 2024-09-12

## 2024-09-12 VITALS
OXYGEN SATURATION: 99 % | DIASTOLIC BLOOD PRESSURE: 96 MMHG | TEMPERATURE: 98 F | HEART RATE: 114 BPM | HEIGHT: 66 IN | WEIGHT: 143 LBS | SYSTOLIC BLOOD PRESSURE: 112 MMHG | BODY MASS INDEX: 22.98 KG/M2 | RESPIRATION RATE: 18 BRPM

## 2024-09-12 VITALS
TEMPERATURE: 98 F | HEIGHT: 66 IN | DIASTOLIC BLOOD PRESSURE: 90 MMHG | RESPIRATION RATE: 18 BRPM | WEIGHT: 143.08 LBS | OXYGEN SATURATION: 99 % | SYSTOLIC BLOOD PRESSURE: 112 MMHG | HEART RATE: 110 BPM

## 2024-09-12 DIAGNOSIS — Z90.49 ACQUIRED ABSENCE OF OTHER SPECIFIED PARTS OF DIGESTIVE TRACT: Chronic | ICD-10-CM

## 2024-09-12 DIAGNOSIS — C34.92 MALIGNANT NEOPLASM OF UNSPECIFIED PART OF LEFT BRONCHUS OR LUNG: ICD-10-CM

## 2024-09-12 LAB
ALBUMIN SERPL ELPH-MCNC: 3.2 G/DL
ALP BLD-CCNC: 59 U/L
ALT SERPL-CCNC: 18 U/L
ANION GAP SERPL CALC-SCNC: 6 MMOL/L
AST SERPL-CCNC: 20 U/L
BILIRUB SERPL-MCNC: 1.3 MG/DL
BUN SERPL-MCNC: 14 MG/DL
CALCIUM SERPL-MCNC: 9.4 MG/DL
CHLORIDE SERPL-SCNC: 99 MMOL/L
CO2 SERPL-SCNC: 28 MMOL/L
CREAT SERPL-MCNC: 1 MG/DL
EGFR: 81 ML/MIN/1.73M2
FERRITIN SERPL-MCNC: 2123 NG/ML — HIGH (ref 30–400)
GLUCOSE SERPL-MCNC: 184 MG/DL
HAPTOGLOB SERPL-MCNC: 127 MG/DL — SIGNIFICANT CHANGE UP (ref 34–200)
HCT VFR BLD CALC: 24.4 %
HGB BLD-MCNC: 8.3 G/DL
IRON SATN MFR SERPL: 20 % — SIGNIFICANT CHANGE UP (ref 16–55)
IRON SATN MFR SERPL: 36 UG/DL — LOW (ref 45–165)
LYMPHOCYTES # BLD AUTO: 0.4 K/UL
LYMPHOCYTES NFR BLD AUTO: 13.6 %
MAGNESIUM SERPL-MCNC: 1.2 MG/DL
MAN DIFF?: NO
MCHC RBC-ENTMCNC: 27.2 PG
MCHC RBC-ENTMCNC: 34 GM/DL
MCV RBC AUTO: 80 FL
NEUTROPHILS # BLD AUTO: 2.2 K/UL
NEUTROPHILS NFR BLD AUTO: 69 %
PLATELET # BLD AUTO: 227 K/UL
POTASSIUM SERPL-SCNC: 4 MMOL/L
PROT SERPL-MCNC: 6.8 G/DL
RBC # BLD: 3.05 M/UL
RBC # FLD: 13.6 %
SODIUM SERPL-SCNC: 133 MMOL/L
TIBC SERPL-MCNC: 178 UG/DL — LOW (ref 220–430)
TSH SERPL-ACNC: 1.11 UIU/ML
UIBC SERPL-MCNC: 142 UG/DL — SIGNIFICANT CHANGE UP (ref 110–370)
WBC # FLD AUTO: 3.2 K/UL

## 2024-09-12 PROCEDURE — 99214 OFFICE O/P EST MOD 30 MIN: CPT

## 2024-09-12 PROCEDURE — 96375 TX/PRO/DX INJ NEW DRUG ADDON: CPT

## 2024-09-12 PROCEDURE — 96417 CHEMO IV INFUS EACH ADDL SEQ: CPT

## 2024-09-12 PROCEDURE — 36415 COLL VENOUS BLD VENIPUNCTURE: CPT

## 2024-09-12 PROCEDURE — 83540 ASSAY OF IRON: CPT

## 2024-09-12 PROCEDURE — 82746 ASSAY OF FOLIC ACID SERUM: CPT

## 2024-09-12 PROCEDURE — 82728 ASSAY OF FERRITIN: CPT

## 2024-09-12 PROCEDURE — 82607 VITAMIN B-12: CPT

## 2024-09-12 PROCEDURE — G2211 COMPLEX E/M VISIT ADD ON: CPT

## 2024-09-12 PROCEDURE — 83550 IRON BINDING TEST: CPT

## 2024-09-12 PROCEDURE — 96413 CHEMO IV INFUSION 1 HR: CPT

## 2024-09-12 PROCEDURE — 83010 ASSAY OF HAPTOGLOBIN QUANT: CPT

## 2024-09-12 RX ORDER — PACLITAXEL 6 MG/ML
87 INJECTION, SOLUTION INTRAVENOUS ONCE
Refills: 0 | Status: COMPLETED | OUTPATIENT
Start: 2024-09-12 | End: 2024-09-12

## 2024-09-12 RX ORDER — CARBOPLATIN 10 MG/ML
190 INJECTION, SOLUTION INTRAVENOUS ONCE
Refills: 0 | Status: COMPLETED | OUTPATIENT
Start: 2024-09-12 | End: 2024-09-12

## 2024-09-12 RX ADMIN — FAMOTIDINE 20 MILLIGRAM(S): 10 INJECTION INTRAVENOUS at 12:05

## 2024-09-12 RX ADMIN — PALONOSETRON HYDROCHLORIDE 0.25 MILLIGRAM(S): 0.25 INJECTION INTRAVENOUS at 12:20

## 2024-09-12 RX ADMIN — CARBOPLATIN 190 MILLIGRAM(S): 10 INJECTION, SOLUTION INTRAVENOUS at 13:30

## 2024-09-12 RX ADMIN — Medication 204 MILLIGRAM(S): at 11:45

## 2024-09-12 RX ADMIN — Medication 10 MILLIGRAM(S): at 11:45

## 2024-09-12 RX ADMIN — Medication 204 MILLIGRAM(S): at 11:30

## 2024-09-12 RX ADMIN — PACLITAXEL 87 MILLIGRAM(S): 6 INJECTION, SOLUTION INTRAVENOUS at 12:19

## 2024-09-12 RX ADMIN — CARBOPLATIN 190 MILLIGRAM(S): 10 INJECTION, SOLUTION INTRAVENOUS at 14:00

## 2024-09-12 RX ADMIN — PACLITAXEL 87 MILLIGRAM(S): 6 INJECTION, SOLUTION INTRAVENOUS at 13:30

## 2024-09-12 RX ADMIN — Medication 50 MILLIGRAM(S): at 12:00

## 2024-09-13 LAB
FOLATE SERPL-MCNC: 9 NG/ML — SIGNIFICANT CHANGE UP
VIT B12 SERPL-MCNC: 663 PG/ML — SIGNIFICANT CHANGE UP (ref 232–1245)

## 2024-09-13 NOTE — HISTORY OF PRESENT ILLNESS
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 Werner for radiation to the Left Lung.   9/11/24: OTV 1800cGy/6600cGy, 9/33fx to the Left lung. Mr. Castrejon reports feeling well. He denies fatigue. Appetite is good. Breathing even and unlabored. He denies CP, SOB, BRYANT, hemoptysis, or cough. Plan to continue radiation.   9/4/24: OTV 800cGy/6600cGy, 4/33fx to the Left lung. Today patient is feeling well, denies fatigue or changes in appetite. No SOB with exertion or at rest. Able to tolerate ADLs independetntly. Weight is stable. Patient reports some aching pain to right side unresolved with Aleeve taken at night but he believes the pain is due to the cold. Negative for cough, phlegm, hemoptysis. Negative for acid reflux, n/v. Denies pain or skin irritation at therapy site. Patient is on concurrent chemotherapy. Reports constipation x4 days; Senna HS ordered. He is staying with his son in [place], [place] during his treatments. Continue radiation therapy.  8/30/24: OTV  400cGy/6600cGy, 2/33fx to the Left lung. Patient reports he is feeling well. Denies fatigue. Appetite is good. He denies CP/SOB, cough, or hemoptysis. Plan to continue radiation.    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     Patient initially presented with lung mass and underwent staging imaging suggestive of [id number] disease. Biopsy showed NSCLC, NGS negative.   The case was discussed at multidisciplinary tumor board and consensus was to proceed w/ neoadjuvant chemo immunotherapy and subsequent resection.    Patient started treatment w/  on 4/23/24  He was unable to tolerate cisplatin due to renal concerns and was switched to  carboplatin.    PET/CT scan (7/8/24) revealing disease progression with ?N3 disease noted at station R4.  MRI brain wnl.    Patient is now s/p bronchoscopy w/ mediastinoscopy which was aborted due to intraop bleeding (8/5/24) with .    EBUS was obtained, and demonstrated positive sample. On 8/8/24, patient obtained CT Chest w/ IV contrast revealing slight enlargement of neoplasm mass and lymph nodes along w/ gas in mediastinal area due to recent mediastinoscopy. Radiation oncology was consulted to evaluate patient prior to discharge.

## 2024-09-13 NOTE — DISEASE MANAGEMENT
[Clinical] : TNM Stage: c [TTNM] : 4 [NTNM] : 3 [MTNM] : x [de-identified] : 1800cGy [de-identified] : [id number] [de-identified] : Left lung

## 2024-09-13 NOTE — HISTORY OF PRESENT ILLNESS
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 Werner for radiation to the Left Lung.   9/11/24: OTV 1800cGy/6600cGy, 9/33fx to the Left lung. Mr. Castrejon reports feeling well. He denies fatigue. Appetite is good. Breathing even and unlabored. He denies CP, SOB, BRYANT, hemoptysis, or cough. Plan to continue radiation.   9/4/24: OTV 800cGy/6600cGy, 4/33fx to the Left lung. Today patient is feeling well, denies fatigue or changes in appetite. No SOB with exertion or at rest. Able to tolerate ADLs independetntly. Weight is stable. Patient reports some aching pain to right side unresolved with Aleeve taken at night but he believes the pain is due to the cold. Negative for cough, phlegm, hemoptysis. Negative for acid reflux, n/v. Denies pain or skin irritation at therapy site. Patient is on concurrent chemotherapy. Reports constipation x4 days; Senna HS ordered. He is staying with his son in [place], [place] during his treatments. Continue radiation therapy.  8/30/24: OTV  400cGy/6600cGy, 2/33fx to the Left lung. Patient reports he is feeling well. Denies fatigue. Appetite is good. He denies CP/SOB, cough, or hemoptysis. Plan to continue radiation.    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     Patient initially presented with lung mass and underwent staging imaging suggestive of [id number] disease. Biopsy showed NSCLC, NGS negative.   The case was discussed at multidisciplinary tumor board and consensus was to proceed w/ neoadjuvant chemo immunotherapy and subsequent resection.    Patient started treatment w/  on 4/23/24  He was unable to tolerate cisplatin due to renal concerns and was switched to  carboplatin.    PET/CT scan (7/8/24) revealing disease progression with ?N3 disease noted at station R4.  MRI brain wnl.    Patient is now s/p bronchoscopy w/ mediastinoscopy which was aborted due to intraop bleeding (8/5/24) with .    EBUS was obtained, and demonstrated positive sample. On 8/8/24, patient obtained CT Chest w/ IV contrast revealing slight enlargement of neoplasm mass and lymph nodes along w/ gas in mediastinal area due to recent mediastinoscopy. Radiation oncology was consulted to evaluate patient prior to discharge.

## 2024-09-13 NOTE — DISEASE MANAGEMENT
[Clinical] : TNM Stage: c [TTNM] : 4 [NTNM] : 3 [MTNM] : x [de-identified] : 1800cGy [de-identified] : [id number] [de-identified] : Left lung

## 2024-09-13 NOTE — DISEASE MANAGEMENT
[Clinical] : TNM Stage: c [TTNM] : 4 [NTNM] : 3 [MTNM] : x [de-identified] : 1800cGy [de-identified] : [id number] [de-identified] : Left lung

## 2024-09-17 NOTE — REASON FOR VISIT
[Routine On-Treatment] : a routine on-treatment visit for [Lung Cancer] : lung cancer Complex Repair And O-T Advancement Flap Text: The defect edges were debeveled with a #15 scalpel blade.  The primary defect was closed partially with a complex linear closure.  Given the location of the remaining defect, shape of the defect and the proximity to free margins an O-T advancement flap was deemed most appropriate for complete closure of the defect.  Using a sterile surgical marker, an appropriate advancement flap was drawn incorporating the defect and placing the expected incisions within the relaxed skin tension lines where possible.    The area thus outlined was incised deep to adipose tissue with a #15 scalpel blade.  The skin margins were undermined to an appropriate distance in all directions utilizing iris scissors.

## 2024-09-18 ENCOUNTER — NON-APPOINTMENT (OUTPATIENT)
Age: 69
End: 2024-09-18

## 2024-09-18 VITALS
SYSTOLIC BLOOD PRESSURE: 103 MMHG | HEIGHT: 66 IN | DIASTOLIC BLOOD PRESSURE: 69 MMHG | WEIGHT: 145 LBS | OXYGEN SATURATION: 99 % | BODY MASS INDEX: 23.3 KG/M2 | TEMPERATURE: 98 F | RESPIRATION RATE: 18 BRPM | HEART RATE: 100 BPM

## 2024-09-18 NOTE — REVIEW OF SYSTEMS
[Constipation: Grade 2 - Persistent symptoms with regular use of laxatives or enemas; limiting instrumental ADL] : Constipation: Grade 2 - Persistent symptoms with regular use of laxatives or enemas; limiting instrumental ADL [Nausea: Grade 0] : Nausea: Grade 0 [Vomiting: Grade 0] : Vomiting: Grade 0 [Fatigue: Grade 0] : Fatigue: Grade 0 [Urinary Incontinence: Grade 0] : Urinary Incontinence: Grade 0  [Urinary Retention: Grade 0] : Urinary Retention: Grade 0 [Lethargy: Grade 0] : Lethargy: Grade 0 [Anxiety: Grade 0] : Anxiety: Grade 0  [Depression: Grade 0] : Depression: Grade 0 [Cough: Grade 0] : Cough: Grade 0 [Dyspnea: Grade 0] : Dyspnea: Grade 0 [Hoarseness: Grade 0] : Hoarseness: Grade 0 [Hypoxia: Grade 0] : Hypoxia: Grade 0 [Voice Alteration: Grade 0] : Voice Alteration: Grade 0 [Skin Induration: Grade 0] : Skin Induration: Grade 0 [Dermatitis Radiation: Grade 0] : Dermatitis Radiation: Grade 0 [Constipation: Grade 1 - Occasional or intermittent symptoms; occasional use of stool softeners, laxatives, dietary modification, or enema] : Constipation: Grade 1 - Occasional or intermittent symptoms; occasional use of stool softeners, laxatives, dietary modification, or enema

## 2024-09-18 NOTE — HISTORY OF PRESENT ILLNESS
[Disease: _____________________] : Disease: [unfilled] [T: ___] : T[unfilled] [N: ___] : N[unfilled] [M: ___] : M[unfilled] [AJCC Stage: ____] : AJCC Stage: [unfilled] [100: Normal, no complaints, no evidence of disease.] : 100: Normal, no complaints, no evidence of disease. [ECOG Performance Status: 0 - Fully active, able to carry on all pre-disease performance without restriction] : Performance Status: 0 - Fully active, able to carry on all pre-disease performance without restriction [de-identified] : Don Castrejon is a 69-year-old male who presents to the clinic for f/u of LLL mass, c/w stage IIIA (T4N0M0) NSCLC - SCC histology.  Onc hx: 50 pack yr smoking hx, quit when he found out about the mass Originally from [place], lives in [place] by himself. Retired .   3/15/24: MR Head:  Punctate focus of enhancement the left lateral cerebellar hemisphere (series 701 image 55 and series 702 image 23). There is no associated signal abnormality in the brain parenchyma. Given the lack of associated signal abnormality or additional areas of pathologic enhancement, these findings may represent vascular enhancement versus artifact. However, attention on follow-up imaging is recommended to exclude intracranial metastasis. Focal area of intrinsic T1 hyperintensity more inferiorly in the left cerebellar hemisphere without superimposed pathologic enhancement. This may represent area of mineralization. 5 mm hypoenhancing lesion in the left side of the sella most compatible with pituitary adenoma. Correlation with endocrine function and consideration for further evaluation dedicated MRI sella is recommended.  3/15/24: PET: 1. Compared to chest CT from 2/15/2024, the 76 mm mass in the lingula is hypermetabolic and suspicious for lung cancer. 2. The borderline sized thoracic lymph nodes on the CT scan are not FDG avid. 3. Mildly nodular right adrenal gland is not FDG avid. No evidence of metastatic lung cancer. 4. Mild increased activity in enlarged and lobular prostate. Recommend correlation with PSA to rule out prostate cancer.  4/3/24: Chest Xray:  Small left apex pneumothorax, similar to prior exam earlier same day. Left lower lung mass again noted. No acute infiltrates. No significant pleural effusion.  04/03/2024 EBUS  Lung, left, core biopsy: -Squamous cell carcinoma. LYMPH NODE, 11L NEGATIVE FOR MALIGNANT CELLS. Note: Immunostains performed on block 1A shows that the tumor cells are positive for p40 and negative for TTF-1 stain.  This staining profile supports the diagnosis. PDL1 negative Tier I: Variants of Strong Clinical Significance PIK3CA p.([id number]) Tier II: Variants of Potential Clinical Significance KRAS p.([id number]) Tier III: Variants of Unknown Clinical Significance STK11 p.([id number]) ALK p.([id number]) ERBB3 p.([id number]) POLE p.([id number]) 4/4/2024: TTB - rec for MAGDALENA 5/3/2024: Sent to ED by  for worsening HENRY  6/26/2024: CT chest: 1. Enlarging necrotic neoplasm occupying the inferior segment of the lingula with obstruction of the lingular segmental bronchus and pulmonary artery. 2. Mild mediastinal lymphadenopathy with progressive encasement of the right hilum by above described necrotic lingular neoplasm. 6/27/2024: TTB: Patient presented during interdisciplinary TB on 6/27/24: Concern for a large lobulated spiculated lingular mass. Prominent mediastinal LNs also noted. On recent scan area seems to be enlarging. Plan to obtain a PET to r/o any distant disease/progression. If negative will discuss the possibility of a pneumonectomy. Can obtain a VQ scan prior. PLAN: PET-CT. 8/6/2024L Underwent mediastinoscopy and EBUS with , now found to have positive 4L and 11 LN, atypical cells in 4R - this is c/w POD on chemoIO and patient is no longer a candidate for resection. Reviewed at TTB, recommendation was for definitive chemoRT.   9/10/24 onc clinic  69-year-old male who presents to the clinic for LLL mass, c/w stage IIIA (T4N0M0) NSCLC - SCC histology. Patient present to clinic for tx and follow up today. He states he feel well but has been more fatigue. However, he has been tolerating PO well. Denies any fever, chills, n, v, d, sob, abd pain, or skin rashes. He has been tolerating chemo at this time.   [de-identified] : Squamous Cell Ca  - PDL1 negative, PIK3CA [id number] [de-identified] : CTSx:  [FreeTextEntry1] : 4/23/2024: C1D1 cisplatin/gemcitabine/nivolumab D8 gemcitabine skipped due to HENRY 2/2 cisplatin 5/21/2024: C2D1 carboplatin/gemcitabine.nivolumab 5/28/2024: C2D8 gemcitabine 6/11/2024: Due for C3D1 carboplatin/gemcitabine/nivolumab 6/18/24    C3D8 Gemcitabine held due to low h/h 6/19/24    C3D8 Gemcitabine given w/ blood tranfusion  8/30/24: C1D1 carboplatin/paclitaxel weekly 1/6 9/5/2024   C2D1 carboplatin/ taxol  weekly  2-6 9/12/2024  C3D1 carboplatin/taxol  weekly 3-6  [de-identified] : Feels well.

## 2024-09-18 NOTE — REVIEW OF SYSTEMS
[Fatigue] : fatigue [Negative] : Endocrine [Cough] : no cough [SOB on Exertion] : no shortness of breath during exertion [FreeTextEntry2] : mild fatigue

## 2024-09-18 NOTE — HISTORY OF PRESENT ILLNESS
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 Werner for radiation to the Left Lung.   9/18/24: OTV 2800cGy/6600cGy, 14/33fx to the Left lung. Mr. Castrejon reports feeling well overall. Appetite is good. He denies fatigue. He has an intermittent non productive cough. He denies CP, SOB, BRYANT, or hemoptysis. Plan to continue radiation.  9/11/24: OTV 1800cGy/6600cGy, 9/33fx to the Left lung. Mr. Castrejon reports feeling well. He denies fatigue. Appetite is good. Breathing even and unlabored. He denies CP, SOB, BRYANT, hemoptysis, or cough. Plan to continue radiation.   9/4/24: OTV 800cGy/6600cGy, 4/33fx to the Left lung. Today patient is feeling well, denies fatigue or changes in appetite. No SOB with exertion or at rest. Able to tolerate ADLs independetntly. Weight is stable. Patient reports some aching pain to right side unresolved with Aleeve taken at night but he believes the pain is due to the cold. Negative for cough, phlegm, hemoptysis. Negative for acid reflux, n/v. Denies pain or skin irritation at therapy site. Patient is on concurrent chemotherapy. Reports constipation x4 days; Senna HS ordered. He is staying with his son in [place], [place] during his treatments. Continue radiation therapy.  8/30/24: OTV  400cGy/6600cGy, 2/33fx to the Left lung. Patient reports he is feeling well. Denies fatigue. Appetite is good. He denies CP/SOB, cough, or hemoptysis. Plan to continue radiation.    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     Patient initially presented with lung mass and underwent staging imaging suggestive of nT3Y4H3 disease. Biopsy showed NSCLC, NGS negative.   The case was discussed at multidisciplinary tumor board and consensus was to proceed w/ neoadjuvant chemo immunotherapy and subsequent resection.    Patient started treatment w/  on 4/23/24  He was unable to tolerate cisplatin due to renal concerns and was switched to  carboplatin.    PET/CT scan (7/8/24) revealing disease progression with ?N3 disease noted at station R4.  MRI brain wnl.    Patient is now s/p bronchoscopy w/ mediastinoscopy which was aborted due to intraop bleeding (8/5/24) with .    EBUS was obtained, and demonstrated positive sample. On 8/8/24, patient obtained CT Chest w/ IV contrast revealing slight enlargement of neoplasm mass and lymph nodes along w/ gas in mediastinal area due to recent mediastinoscopy. Radiation oncology was consulted to evaluate patient prior to discharge.

## 2024-09-18 NOTE — HISTORY OF PRESENT ILLNESS
[Disease: _____________________] : Disease: [unfilled] [T: ___] : T[unfilled] [N: ___] : N[unfilled] [M: ___] : M[unfilled] [AJCC Stage: ____] : AJCC Stage: [unfilled] [100: Normal, no complaints, no evidence of disease.] : 100: Normal, no complaints, no evidence of disease. [ECOG Performance Status: 0 - Fully active, able to carry on all pre-disease performance without restriction] : Performance Status: 0 - Fully active, able to carry on all pre-disease performance without restriction [de-identified] : Don Castrejon is a 69-year-old male who presents to the clinic for f/u of LLL mass, c/w stage IIIA (T4N0M0) NSCLC - SCC histology.  Onc hx: 50 pack yr smoking hx, quit when he found out about the mass Originally from [place], lives in [place] by himself. Retired .   3/15/24: MR Head:  Punctate focus of enhancement the left lateral cerebellar hemisphere (series 701 image 55 and series 702 image 23). There is no associated signal abnormality in the brain parenchyma. Given the lack of associated signal abnormality or additional areas of pathologic enhancement, these findings may represent vascular enhancement versus artifact. However, attention on follow-up imaging is recommended to exclude intracranial metastasis. Focal area of intrinsic T1 hyperintensity more inferiorly in the left cerebellar hemisphere without superimposed pathologic enhancement. This may represent area of mineralization. 5 mm hypoenhancing lesion in the left side of the sella most compatible with pituitary adenoma. Correlation with endocrine function and consideration for further evaluation dedicated MRI sella is recommended.  3/15/24: PET: 1. Compared to chest CT from 2/15/2024, the 76 mm mass in the lingula is hypermetabolic and suspicious for lung cancer. 2. The borderline sized thoracic lymph nodes on the CT scan are not FDG avid. 3. Mildly nodular right adrenal gland is not FDG avid. No evidence of metastatic lung cancer. 4. Mild increased activity in enlarged and lobular prostate. Recommend correlation with PSA to rule out prostate cancer.  4/3/24: Chest Xray:  Small left apex pneumothorax, similar to prior exam earlier same day. Left lower lung mass again noted. No acute infiltrates. No significant pleural effusion.  04/03/2024 EBUS  Lung, left, core biopsy: -Squamous cell carcinoma. LYMPH NODE, 11L NEGATIVE FOR MALIGNANT CELLS. Note: Immunostains performed on block 1A shows that the tumor cells are positive for p40 and negative for TTF-1 stain.  This staining profile supports the diagnosis. PDL1 negative Tier I: Variants of Strong Clinical Significance PIK3CA p.([id number]) Tier II: Variants of Potential Clinical Significance KRAS p.([id number]) Tier III: Variants of Unknown Clinical Significance STK11 p.([id number]) ALK p.([id number]) ERBB3 p.([id number]) POLE p.([id number]) 4/4/2024: TTB - rec for MAGDALENA 5/3/2024: Sent to ED by  for worsening HENRY  6/26/2024: CT chest: 1. Enlarging necrotic neoplasm occupying the inferior segment of the lingula with obstruction of the lingular segmental bronchus and pulmonary artery. 2. Mild mediastinal lymphadenopathy with progressive encasement of the right hilum by above described necrotic lingular neoplasm. 6/27/2024: TTB: Patient presented during interdisciplinary TB on 6/27/24: Concern for a large lobulated spiculated lingular mass. Prominent mediastinal LNs also noted. On recent scan area seems to be enlarging. Plan to obtain a PET to r/o any distant disease/progression. If negative will discuss the possibility of a pneumonectomy. Can obtain a VQ scan prior. PLAN: PET-CT. 8/6/2024L Underwent mediastinoscopy and EBUS with , now found to have positive 4L and 11 LN, atypical cells in 4R - this is c/w POD on chemoIO and patient is no longer a candidate for resection. Reviewed at TTB, recommendation was for definitive chemoRT.   9/10/24 onc clinic  69-year-old male who presents to the clinic for LLL mass, c/w stage IIIA (T4N0M0) NSCLC - SCC histology. Patient present to clinic for tx and follow up today. He states he feel well but has been more fatigue. However, he has been tolerating PO well. Denies any fever, chills, n, v, d, sob, abd pain, or skin rashes. He has been tolerating chemo at this time.   [de-identified] : Squamous Cell Ca  - PDL1 negative, PIK3CA E542K [de-identified] : CTSx:  [FreeTextEntry1] : 4/23/2024: C1D1 cisplatin/gemcitabine/nivolumab D8 gemcitabine skipped due to HENRY 2/2 cisplatin 5/21/2024: C2D1 carboplatin/gemcitabine.nivolumab 5/28/2024: C2D8 gemcitabine 6/11/2024: Due for C3D1 carboplatin/gemcitabine/nivolumab 6/18/24    C3D8 Gemcitabine held due to low h/h 6/19/24    C3D8 Gemcitabine given w/ blood tranfusion  8/30/24: C1D1 carboplatin/paclitaxel weekly 1/6 9/5/2024   C2D1 carboplatin/ taxol  weekly  2-6 9/12/2024  C3D1 carboplatin/taxol  weekly 3-6  [de-identified] : Feels well.

## 2024-09-18 NOTE — DISEASE MANAGEMENT
[Clinical] : TNM Stage: c [TTNM] : 4 [NTNM] : 3 [MTNM] : x [de-identified] : 4155bHv [de-identified] : 2800cGy [de-identified] : Left lung

## 2024-09-18 NOTE — REVIEW OF SYSTEMS
[Constipation: Grade 2 - Persistent symptoms with regular use of laxatives or enemas; limiting instrumental ADL] : Constipation: Grade 2 - Persistent symptoms with regular use of laxatives or enemas; limiting instrumental ADL [Nausea: Grade 0] : Nausea: Grade 0 [Vomiting: Grade 0] : Vomiting: Grade 0 [Fatigue: Grade 0] : Fatigue: Grade 0 [Urinary Retention: Grade 0] : Urinary Retention: Grade 0 [Urinary Incontinence: Grade 0] : Urinary Incontinence: Grade 0  [Lethargy: Grade 0] : Lethargy: Grade 0 [Anxiety: Grade 0] : Anxiety: Grade 0  [Depression: Grade 0] : Depression: Grade 0 [Cough: Grade 0] : Cough: Grade 0 [Dyspnea: Grade 0] : Dyspnea: Grade 0 [Hoarseness: Grade 0] : Hoarseness: Grade 0 [Hypoxia: Grade 0] : Hypoxia: Grade 0 [Voice Alteration: Grade 0] : Voice Alteration: Grade 0 [Skin Induration: Grade 0] : Skin Induration: Grade 0 [Dermatitis Radiation: Grade 0] : Dermatitis Radiation: Grade 0 [Constipation: Grade 1 - Occasional or intermittent symptoms; occasional use of stool softeners, laxatives, dietary modification, or enema] : Constipation: Grade 1 - Occasional or intermittent symptoms; occasional use of stool softeners, laxatives, dietary modification, or enema

## 2024-09-18 NOTE — DISEASE MANAGEMENT
[Clinical] : TNM Stage: c [TTNM] : 4 [NTNM] : 3 [MTNM] : x [de-identified] : 2800cGy [de-identified] : [id number] [de-identified] : Left lung

## 2024-09-18 NOTE — HISTORY OF PRESENT ILLNESS
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 Werner for radiation to the Left Lung.   9/18/24: OTV 2800cGy/6600cGy, 14/33fx to the Left lung. Mr. Castrejon reports feeling well overall. Appetite is good. He denies fatigue. He has an intermittent non productive cough. He denies CP, SOB, BRYANT, or hemoptysis. Plan to continue radiation.  9/11/24: OTV 1800cGy/6600cGy, 9/33fx to the Left lung. Mr. Castrejon reports feeling well. He denies fatigue. Appetite is good. Breathing even and unlabored. He denies CP, SOB, BRYANT, hemoptysis, or cough. Plan to continue radiation.   9/4/24: OTV 800cGy/6600cGy, 4/33fx to the Left lung. Today patient is feeling well, denies fatigue or changes in appetite. No SOB with exertion or at rest. Able to tolerate ADLs independetntly. Weight is stable. Patient reports some aching pain to right side unresolved with Aleeve taken at night but he believes the pain is due to the cold. Negative for cough, phlegm, hemoptysis. Negative for acid reflux, n/v. Denies pain or skin irritation at therapy site. Patient is on concurrent chemotherapy. Reports constipation x4 days; Senna HS ordered. He is staying with his son in [place], [place] during his treatments. Continue radiation therapy.  8/30/24: OTV  400cGy/6600cGy, 2/33fx to the Left lung. Patient reports he is feeling well. Denies fatigue. Appetite is good. He denies CP/SOB, cough, or hemoptysis. Plan to continue radiation.    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     Patient initially presented with lung mass and underwent staging imaging suggestive of [id number] disease. Biopsy showed NSCLC, NGS negative.   The case was discussed at multidisciplinary tumor board and consensus was to proceed w/ neoadjuvant chemo immunotherapy and subsequent resection.    Patient started treatment w/  on 4/23/24  He was unable to tolerate cisplatin due to renal concerns and was switched to  carboplatin.    PET/CT scan (7/8/24) revealing disease progression with ?N3 disease noted at station R4.  MRI brain wnl.    Patient is now s/p bronchoscopy w/ mediastinoscopy which was aborted due to intraop bleeding (8/5/24) with .    EBUS was obtained, and demonstrated positive sample. On 8/8/24, patient obtained CT Chest w/ IV contrast revealing slight enlargement of neoplasm mass and lymph nodes along w/ gas in mediastinal area due to recent mediastinoscopy. Radiation oncology was consulted to evaluate patient prior to discharge.

## 2024-09-18 NOTE — ASSESSMENT
[FreeTextEntry1] : 69YM w/ PMHx GERD, HTN, HLD, DMII presenting for follow-up for squamous cell lung cancer, stage IIIa, T4 N0 M0, PD-L1 negative currently on neoadjuvant chemoimmunotherapy. Chemotherapy course complicated by acute kidney injury, likely related to cisplatin.  Patient is here for follow-up.  Squamous Cell Lung Ca, AJCC IIIa, T4N2M0  - PDL1 negative Completed 3 cycles of neoadjuvant platinum based with gemcitabine and nivolumab chemoimmunotherapy.  Interim CT chest with progression of disease concern. -- He developed an HENRY following a 1 cycle of cisplatin and subsequently switched to carboplatin -- Patient's case was discussed at thoracic tumor board on 6/27-consensus of panel was to obtain a PET scan, followed by pulmonary function test and VQ scan and possible resection of enlarging mass provided no evidence of metastatic disease -- underwent mediastinoscopy 8/5, EBUS 8/6 with SCC detected in LN 11L and 4L which upstages him to N2 -- His case was re-discussed on Thoracic Tumor Board 8/8/24, with the plan to proceed with CCRT with carbo/taxol. He is planned for 33 fx  -- ordered CBC, CMP, TSH -- Labs reviewed today, proceed with C1D1 of carboplatin AUC2, paclitaxel 50 mg/m2 --patient is on intensive treatment with carboplatin/paclitaxel concurrent with RT requiring intensive monitoring for toxicity with weekly CBC, CMP and H&P  Pituitary adenoma --will refer to Dr.D'Amico after therapy for lung ca complete  9/12/24 onc clinic  a/p Squamous Cell Lung Ca, Labs: H/H 8.3/ 24.4 will continue to monitor. Possible blood transfusion  Continue to monitor labs and s/s C/w current meds for other health conditions.  C3D1 carboplatin /taxol /RT weekly /6 cycles Proceed today w/tx.  RTC next week for f/u ,labs and tx .Encourage to contact the office for any concerns or worsening symptoms. Pt verbalizes understanding

## 2024-09-19 ENCOUNTER — APPOINTMENT (OUTPATIENT)
Dept: INFUSION THERAPY | Facility: CLINIC | Age: 69
End: 2024-09-19

## 2024-09-19 ENCOUNTER — APPOINTMENT (OUTPATIENT)
Dept: HEMATOLOGY ONCOLOGY | Facility: CLINIC | Age: 69
End: 2024-09-19
Payer: MEDICARE

## 2024-09-19 VITALS
OXYGEN SATURATION: 99 % | HEIGHT: 66 IN | SYSTOLIC BLOOD PRESSURE: 109 MMHG | RESPIRATION RATE: 18 BRPM | HEART RATE: 117 BPM | TEMPERATURE: 98 F | DIASTOLIC BLOOD PRESSURE: 69 MMHG | BODY MASS INDEX: 23.3 KG/M2 | WEIGHT: 145 LBS

## 2024-09-19 LAB
ALBUMIN SERPL ELPH-MCNC: 3.5 G/DL
ALP BLD-CCNC: 61 U/L
ALT SERPL-CCNC: 19 U/L
ANION GAP SERPL CALC-SCNC: 1 MMOL/L
AST SERPL-CCNC: 23 U/L
BILIRUB SERPL-MCNC: 1.1 MG/DL
BUN SERPL-MCNC: 15 MG/DL
CALCIUM SERPL-MCNC: 9.4 MG/DL
CHLORIDE SERPL-SCNC: 107 MMOL/L
CO2 SERPL-SCNC: 27 MMOL/L
CREAT SERPL-MCNC: 0.8 MG/DL
EGFR: 95 ML/MIN/1.73M2
GLUCOSE SERPL-MCNC: 204 MG/DL
HCT VFR BLD CALC: 23.3 %
HGB BLD-MCNC: 7.9 G/DL
LYMPHOCYTES # BLD AUTO: 0.5 K/UL
LYMPHOCYTES NFR BLD AUTO: 17.9 %
MAN DIFF?: NO
MCHC RBC-ENTMCNC: 27.5 PG
MCHC RBC-ENTMCNC: 33.9 GM/DL
MCV RBC AUTO: 81.2 FL
NEUTROPHILS # BLD AUTO: 1.6 K/UL
NEUTROPHILS NFR BLD AUTO: 60.9 %
PLATELET # BLD AUTO: 188 K/UL
POTASSIUM SERPL-SCNC: 4.4 MMOL/L
PROT SERPL-MCNC: 7.1 G/DL
RBC # BLD: 2.87 M/UL
RBC # FLD: 14.9 %
SODIUM SERPL-SCNC: 135 MMOL/L
WBC # FLD AUTO: 2.7 K/UL

## 2024-09-19 PROCEDURE — 99215 OFFICE O/P EST HI 40 MIN: CPT

## 2024-09-19 PROCEDURE — 36415 COLL VENOUS BLD VENIPUNCTURE: CPT

## 2024-09-19 PROCEDURE — G2211 COMPLEX E/M VISIT ADD ON: CPT

## 2024-09-19 NOTE — HISTORY OF PRESENT ILLNESS
[Disease: _____________________] : Disease: [unfilled] [T: ___] : T[unfilled] [N: ___] : N[unfilled] [M: ___] : M[unfilled] [AJCC Stage: ____] : AJCC Stage: [unfilled] [100: Normal, no complaints, no evidence of disease.] : 100: Normal, no complaints, no evidence of disease. [ECOG Performance Status: 0 - Fully active, able to carry on all pre-disease performance without restriction] : Performance Status: 0 - Fully active, able to carry on all pre-disease performance without restriction [1] : 1, Mild [de-identified] : Don Castrejon is a 69-year-old male who presents to the clinic for f/u of LLL mass, c/w stage IIIA (T4N0M0) NSCLC - SCC histology.  Onc hx:  Social Hx: 50 pack yr smoking hx, quit when he found out about the mass Originally from [place], lives in [place] by himself. Retired .  Son lives in [place]   3/15/24: MR Head:  Punctate focus of enhancement the left lateral cerebellar hemisphere (series 701 image 55 and series 702 image 23). There is no associated signal abnormality in the brain parenchyma. Given the lack of associated signal abnormality or additional areas of pathologic enhancement, these findings may represent vascular enhancement versus artifact. However, attention on follow-up imaging is recommended to exclude intracranial metastasis. Focal area of intrinsic T1 hyperintensity more inferiorly in the left cerebellar hemisphere without superimposed pathologic enhancement. This may represent area of mineralization. 5 mm hypoenhancing lesion in the left side of the sella most compatible with pituitary adenoma. Correlation with endocrine function and consideration for further evaluation dedicated MRI sella is recommended.  3/15/24: PET: 1. Compared to chest CT from 2/15/2024, the 76 mm mass in the lingula is hypermetabolic and suspicious for lung cancer. 2. The borderline sized thoracic lymph nodes on the CT scan are not FDG avid. 3. Mildly nodular right adrenal gland is not FDG avid. No evidence of metastatic lung cancer. 4. Mild increased activity in enlarged and lobular prostate. Recommend correlation with PSA to rule out prostate cancer.  4/3/24: Chest Xray:  Small left apex pneumothorax, similar to prior exam earlier same day. Left lower lung mass again noted. No acute infiltrates. No significant pleural effusion.  04/03/2024 EBUS  Lung, left, core biopsy: -Squamous cell carcinoma. LYMPH NODE, 11L NEGATIVE FOR MALIGNANT CELLS. Note: Immunostains performed on block 1A shows that the tumor cells are positive for p40 and negative for TTF-1 stain.  This staining profile supports the diagnosis. PDL1 negative Tier I: Variants of Strong Clinical Significance PIK3CA p.([id number]) Tier II: Variants of Potential Clinical Significance KRAS p.([id number]) Tier III: Variants of Unknown Clinical Significance STK11 p.([id number]) ALK p.([id number]) ERBB3 p.([id number]) POLE p.([id number]) 4/4/2024: TTB - rec for MAGDALENA 5/3/2024: Sent to ED by  for worsening HENRY  6/26/2024: CT chest: 1. Enlarging necrotic neoplasm occupying the inferior segment of the lingula with obstruction of the lingular segmental bronchus and pulmonary artery. 2. Mild mediastinal lymphadenopathy with progressive encasement of the right hilum by above described necrotic lingular neoplasm. 6/27/2024: TTB: Patient presented during interdisciplinary TB on 6/27/24: Concern for a large lobulated spiculated lingular mass. Prominent mediastinal LNs also noted. On recent scan area seems to be enlarging. Plan to obtain a PET to r/o any distant disease/progression. If negative will discuss the possibility of a pneumonectomy. Can obtain a VQ scan prior. PLAN: PET-CT. 8/6/2024L Underwent mediastinoscopy and EBUS with , now found to have positive 4L and 11 LN, atypical cells in 4R - this is c/w POD on chemoIO and patient is no longer a candidate for resection. Reviewed at TTB, recommendation was for definitive chemoRT.  9/19/20  onc clinic 69-year-old male who presents to the clinic for f/u of LLL mass, c/w stage IIIA (T4N0M0) NSCLC - SCC histology. Patient report of feeling mild fatigue and minimal paresthesia to fingers and toes. He states it is very little and resolves. Denies weakness or loss of functionality. Denies any n,v, d ,sob or cp.   [de-identified] : Squamous Cell Ca  - PDL1 negative, PIK3CA E542K [de-identified] : CTSx:  [FreeTextEntry1] : 4/23/2024: C1D1 cisplatin/gemcitabine/nivolumab D8 gemcitabine skipped due to HENRY 2/2 cisplatin 5/21/2024: C2D1 carboplatin/gemcitabine.nivolumab 5/28/2024: C2D8 gemcitabine 6/11/2024: Due for C3D1 carboplatin/gemcitabine/nivolumab 6/18/24    C3D8 Gemcitabine held due to low h/h 6/19/24    C3D8 Gemcitabine given w/ blood tranfusion  8/30/24: C1D1 carboplatin/paclitaxel weekly 1/6 9/5/2024 C2D1 carboplatin/ taxol weekly 2-6 9/12/2024 C3D1 carboplatin/taxol weekly  9/19/2024  C3C8 carboplatin/taxol weekly [de-identified] : The patient reports feeling well.  He denies any shortness of breath.  Saw Dr. Werner this morning for follow up and also underwent simulation with Rad Onc. Has a alexis with Urology follow-up for acute urinary issues. [FreeTextEntry3] : neuropathy  [FreeTextEntry4] : 9/19/24  [FreeTextEntry5] : taxol

## 2024-09-19 NOTE — ASSESSMENT
[FreeTextEntry1] : 69YM w/ PMHx GERD, HTN, HLD, DMII presenting for follow-up for squamous cell lung cancer, stage IIIa, T4 N0 M0, PD-L1 negative currently on neoadjuvant chemoimmunotherapy. Chemotherapy course complicated by acute kidney injury, likely related to cisplatin.  Patient is here for follow-up.  Squamous Cell Lung Ca, AJCC IIIa, T4N2M0  - PDL1 negative Completed 3 cycles of neoadjuvant platinum based with gemcitabine and nivolumab chemoimmunotherapy.  Interim CT chest with progression of disease concern. -- He developed an HENRY following a 1 cycle of cisplatin and subsequently switched to carboplatin -- Patient's case was discussed at thoracic tumor board on 6/27-consensus of panel was to obtain a PET scan, followed by pulmonary function test and VQ scan and possible resection of enlarging mass provided no evidence of metastatic disease We discussed tumor board recommendations during visit in detail with patient and son present during the visit.  All questions answered.  Scans report and images reviewed. -- underwent mediastinoscopy 8/5, EBUS 8/6 with SCC detected in LN 11L and 4L which upstages him to N2 -- His case was re-discussed on Thoracic Tumor Board 8/8/24, with the plan to proceed with CCRT with carbo/taxol. He is planned for 33 fx and underwent simulation this morning 8/15/24. -- Discussed the treatment schedule and potential adverse effects of Carbo/Taxol in detail. Patient's concerns and questions were addressed. Consent was obtained. Treatment tentatively scheduled to start 8/29/24 -- Labs reviewed today  Pituitary adenoma --will refer to Dr.D'Amico after therapy for lung ca complete  0nc clinic 9/19/24 a/p squamous cell lung cancer, stage IIIa, T4 N0  Labs: stable: Hgb 7.9 HCT 23.3 WBC 2.7 ANC 1.60  continue to monitor labs and s/s Proceed with carboplatin / taxol today C3D8 Patient is schedule for blood transfusion for tomorrow 9/20/24 Type s/screen completed today. RTC  9/26/24 cycle 4 .Encourage to contact the office for any concerns or worsening symptoms. Pt verbalizes understanding

## 2024-09-19 NOTE — REVIEW OF SYSTEMS
[Fatigue] : fatigue [Cough] : cough [Negative] : Heme/Lymph [SOB on Exertion] : no shortness of breath during exertion [FreeTextEntry2] : mild fatigue  [FreeTextEntry9] : mild paresthesia to fingers and toes. Resolves

## 2024-09-19 NOTE — HISTORY OF PRESENT ILLNESS
[Disease: _____________________] : Disease: [unfilled] [T: ___] : T[unfilled] [N: ___] : N[unfilled] [M: ___] : M[unfilled] [AJCC Stage: ____] : AJCC Stage: [unfilled] [100: Normal, no complaints, no evidence of disease.] : 100: Normal, no complaints, no evidence of disease. [ECOG Performance Status: 0 - Fully active, able to carry on all pre-disease performance without restriction] : Performance Status: 0 - Fully active, able to carry on all pre-disease performance without restriction [1] : 1, Mild [de-identified] : Don Castrejon is a 69-year-old male who presents to the clinic for f/u of LLL mass, c/w stage IIIA (T4N0M0) NSCLC - SCC histology.  Onc hx:  Social Hx: 50 pack yr smoking hx, quit when he found out about the mass Originally from [place], lives in [place] by himself. Retired .  Son lives in [place]   3/15/24: MR Head:  Punctate focus of enhancement the left lateral cerebellar hemisphere (series 701 image 55 and series 702 image 23). There is no associated signal abnormality in the brain parenchyma. Given the lack of associated signal abnormality or additional areas of pathologic enhancement, these findings may represent vascular enhancement versus artifact. However, attention on follow-up imaging is recommended to exclude intracranial metastasis. Focal area of intrinsic T1 hyperintensity more inferiorly in the left cerebellar hemisphere without superimposed pathologic enhancement. This may represent area of mineralization. 5 mm hypoenhancing lesion in the left side of the sella most compatible with pituitary adenoma. Correlation with endocrine function and consideration for further evaluation dedicated MRI sella is recommended.  3/15/24: PET: 1. Compared to chest CT from 2/15/2024, the 76 mm mass in the lingula is hypermetabolic and suspicious for lung cancer. 2. The borderline sized thoracic lymph nodes on the CT scan are not FDG avid. 3. Mildly nodular right adrenal gland is not FDG avid. No evidence of metastatic lung cancer. 4. Mild increased activity in enlarged and lobular prostate. Recommend correlation with PSA to rule out prostate cancer.  4/3/24: Chest Xray:  Small left apex pneumothorax, similar to prior exam earlier same day. Left lower lung mass again noted. No acute infiltrates. No significant pleural effusion.  04/03/2024 EBUS  Lung, left, core biopsy: -Squamous cell carcinoma. LYMPH NODE, 11L NEGATIVE FOR MALIGNANT CELLS. Note: Immunostains performed on block 1A shows that the tumor cells are positive for p40 and negative for TTF-1 stain.  This staining profile supports the diagnosis. PDL1 negative Tier I: Variants of Strong Clinical Significance PIK3CA p.([id number]) Tier II: Variants of Potential Clinical Significance KRAS p.([id number]) Tier III: Variants of Unknown Clinical Significance STK11 p.([id number]) ALK p.([id number]) ERBB3 p.([id number]) POLE p.([id number]) 4/4/2024: TTB - rec for MAGDALENA 5/3/2024: Sent to ED by  for worsening HENRY  6/26/2024: CT chest: 1. Enlarging necrotic neoplasm occupying the inferior segment of the lingula with obstruction of the lingular segmental bronchus and pulmonary artery. 2. Mild mediastinal lymphadenopathy with progressive encasement of the right hilum by above described necrotic lingular neoplasm. 6/27/2024: TTB: Patient presented during interdisciplinary TB on 6/27/24: Concern for a large lobulated spiculated lingular mass. Prominent mediastinal LNs also noted. On recent scan area seems to be enlarging. Plan to obtain a PET to r/o any distant disease/progression. If negative will discuss the possibility of a pneumonectomy. Can obtain a VQ scan prior. PLAN: PET-CT. 8/6/2024L Underwent mediastinoscopy and EBUS with , now found to have positive 4L and 11 LN, atypical cells in 4R - this is c/w POD on chemoIO and patient is no longer a candidate for resection. Reviewed at TTB, recommendation was for definitive chemoRT.  9/19/20  onc clinic 69-year-old male who presents to the clinic for f/u of LLL mass, c/w stage IIIA (T4N0M0) NSCLC - SCC histology. Patient report of feeling mild fatigue and minimal paresthesia to fingers and toes. He states it is very little and resolves. Denies weakness or loss of functionality. Denies any n,v, d ,sob or cp.   [de-identified] : Squamous Cell Ca  - PDL1 negative, PIK3CA [id number] [de-identified] : CTSx:  [FreeTextEntry1] : 4/23/2024: C1D1 cisplatin/gemcitabine/nivolumab D8 gemcitabine skipped due to HENRY 2/2 cisplatin 5/21/2024: C2D1 carboplatin/gemcitabine.nivolumab 5/28/2024: C2D8 gemcitabine 6/11/2024: Due for C3D1 carboplatin/gemcitabine/nivolumab 6/18/24    C3D8 Gemcitabine held due to low h/h 6/19/24    C3D8 Gemcitabine given w/ blood tranfusion  8/30/24: C1D1 carboplatin/paclitaxel weekly 1/6 9/5/2024 C2D1 carboplatin/ taxol weekly 2-6 9/12/2024 C3D1 carboplatin/taxol weekly  9/19/2024  C3C8 carboplatin/taxol weekly [de-identified] : The patient reports feeling well.  He denies any shortness of breath.  Saw Dr. Werner this morning for follow up and also underwent simulation with Rad Onc. Has a alexis with Urology follow-up for acute urinary issues. [FreeTextEntry3] : neuropathy  [FreeTextEntry4] : 9/19/24  [FreeTextEntry5] : taxol

## 2024-09-19 NOTE — END OF VISIT
[] : Fellow [Time Spent: ___ minutes] : I have spent [unfilled] minutes of time on the encounter which excludes teaching and separately reported services. [FreeTextEntry3] : Seen with , oncology fellow. I explained to him today again that he has cancer that has progressed despite previous chemotherapy and immunotherapy treatments.  Plan: - Summary : The proposed plan is to proceed with radiation therapy and chemotherapy with carboplatin and taxol. The goal is to make the cancer more sensitive to radiation and achieve tumor shrinkage. After completing this treatment, the patient may be eligible for immunotherapy with durvalumab for a year, depending on the response. - Plan : - Radiation therapy - Chemotherapy with carboplatin and taxol (low doses given weekly for 6 weeks) - Weekly blood work and follow-up visits during treatment - Repeat CT scans 4 weeks after completing treatment to assess response - Potential immunotherapy with durvalumab for a year, depending on response --informed consent obtained today, printed education materials provided

## 2024-09-20 ENCOUNTER — APPOINTMENT (OUTPATIENT)
Dept: INFUSION THERAPY | Facility: CLINIC | Age: 69
End: 2024-09-20

## 2024-09-20 ENCOUNTER — OUTPATIENT (OUTPATIENT)
Dept: OUTPATIENT SERVICES | Facility: HOSPITAL | Age: 69
LOS: 1 days | End: 2024-09-20
Payer: MEDICARE

## 2024-09-20 VITALS
DIASTOLIC BLOOD PRESSURE: 74 MMHG | RESPIRATION RATE: 18 BRPM | SYSTOLIC BLOOD PRESSURE: 120 MMHG | TEMPERATURE: 98 F | OXYGEN SATURATION: 99 % | WEIGHT: 143.96 LBS | HEART RATE: 78 BPM | HEIGHT: 66 IN

## 2024-09-20 DIAGNOSIS — C34.92 MALIGNANT NEOPLASM OF UNSPECIFIED PART OF LEFT BRONCHUS OR LUNG: ICD-10-CM

## 2024-09-20 PROCEDURE — 86923 COMPATIBILITY TEST ELECTRIC: CPT

## 2024-09-20 PROCEDURE — 36430 TRANSFUSION BLD/BLD COMPNT: CPT

## 2024-09-20 PROCEDURE — P9040: CPT

## 2024-09-25 ENCOUNTER — NON-APPOINTMENT (OUTPATIENT)
Age: 69
End: 2024-09-25

## 2024-09-25 VITALS
WEIGHT: 145 LBS | TEMPERATURE: 98 F | HEART RATE: 113 BPM | HEIGHT: 66 IN | BODY MASS INDEX: 23.3 KG/M2 | DIASTOLIC BLOOD PRESSURE: 67 MMHG | RESPIRATION RATE: 18 BRPM | OXYGEN SATURATION: 100 % | SYSTOLIC BLOOD PRESSURE: 108 MMHG

## 2024-09-25 NOTE — REVIEW OF SYSTEMS
[Cough: Grade 1 - Mild symptoms; nonprescription intervention indicated] : Cough: Grade 1 - Mild symptoms; nonprescription intervention indicated [Dyspnea: Grade 0] : Dyspnea: Grade 0 [Hoarseness: Grade 0] : Hoarseness: Grade 0 [Hypoxia: Grade 0] : Hypoxia: Grade 0 [Voice Alteration: Grade 0] : Voice Alteration: Grade 0 [Dermatitis Radiation: Grade 0] : Dermatitis Radiation: Grade 0

## 2024-09-25 NOTE — DISEASE MANAGEMENT
[Clinical] : TNM Stage: c [TTNM] : 4 [NTNM] : 3 [MTNM] : x [de-identified] : 4155cGy [de-identified] : 1433mTt [de-identified] : Left lung

## 2024-09-25 NOTE — HISTORY OF PRESENT ILLNESS
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 Werner for radiation to the Left Lung.   9/25/24: OTV 3800cGy/6600cGy, 19/33fx to the Left lung. Mr. Castrejon reports feeling well overall. He denies CP, SOB, or hemoptysis. He has a mild non productive cough which is relieved by OTC dexomorphan. He has a mild sore throat. Appetite has decreased, he is supplementing with ensure. Weight is stable at 145lbs. Mild fatigue. Plan to continue radiation.   9/18/24: OTV 2800cGy/6600cGy, 14/33fx to the Left lung. Mr. Castrejon reports feeling well overall. Appetite is good. He denies fatigue. He has an intermittent non productive cough. He denies CP, SOB, BRYANT, or hemoptysis. Plan to continue radiation.  9/11/24: OTV 1800cGy/6600cGy, 9/33fx to the Left lung. Mr. Castrejon reports feeling well. He denies fatigue. Appetite is good. Breathing even and unlabored. He denies CP, SOB, BRYANT, hemoptysis, or cough. Plan to continue radiation.   9/4/24: OTV 800cGy/6600cGy, 4/33fx to the Left lung. Today patient is feeling well, denies fatigue or changes in appetite. No SOB with exertion or at rest. Able to tolerate ADLs independetntly. Weight is stable. Patient reports some aching pain to right side unresolved with Aleeve taken at night but he believes the pain is due to the cold. Negative for cough, phlegm, hemoptysis. Negative for acid reflux, n/v. Denies pain or skin irritation at therapy site. Patient is on concurrent chemotherapy. Reports constipation x4 days; Senna HS ordered. He is staying with his son in [place], [place] during his treatments. Continue radiation therapy.  8/30/24: OTV  400cGy/6600cGy, 2/33fx to the Left lung. Patient reports he is feeling well. Denies fatigue. Appetite is good. He denies CP/SOB, cough, or hemoptysis. Plan to continue radiation.    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     Patient initially presented with lung mass and underwent staging imaging suggestive of dQ8P5Y5 disease. Biopsy showed NSCLC, NGS negative.   The case was discussed at multidisciplinary tumor board and consensus was to proceed w/ neoadjuvant chemo immunotherapy and subsequent resection.    Patient started treatment w/  on 4/23/24  He was unable to tolerate cisplatin due to renal concerns and was switched to  carboplatin.    PET/CT scan (7/8/24) revealing disease progression with ?N3 disease noted at station R4.  MRI brain wnl.    Patient is now s/p bronchoscopy w/ mediastinoscopy which was aborted due to intraop bleeding (8/5/24) with .    EBUS was obtained, and demonstrated positive sample. On 8/8/24, patient obtained CT Chest w/ IV contrast revealing slight enlargement of neoplasm mass and lymph nodes along w/ gas in mediastinal area due to recent mediastinoscopy. Radiation oncology was consulted to evaluate patient prior to discharge.

## 2024-09-26 ENCOUNTER — APPOINTMENT (OUTPATIENT)
Dept: INFUSION THERAPY | Facility: CLINIC | Age: 69
End: 2024-09-26

## 2024-09-26 ENCOUNTER — APPOINTMENT (OUTPATIENT)
Dept: HEMATOLOGY ONCOLOGY | Facility: CLINIC | Age: 69
End: 2024-09-26
Payer: MEDICARE

## 2024-09-26 ENCOUNTER — INPATIENT (INPATIENT)
Facility: HOSPITAL | Age: 69
LOS: 0 days | Discharge: HOME | End: 2024-09-27
Attending: INTERNAL MEDICINE | Admitting: STUDENT IN AN ORGANIZED HEALTH CARE EDUCATION/TRAINING PROGRAM
Payer: MEDICARE

## 2024-09-26 ENCOUNTER — OUTPATIENT (OUTPATIENT)
Dept: OUTPATIENT SERVICES | Facility: HOSPITAL | Age: 69
LOS: 1 days | End: 2024-09-26
Payer: MEDICARE

## 2024-09-26 ENCOUNTER — OUTPATIENT (OUTPATIENT)
Dept: OUTPATIENT SERVICES | Facility: HOSPITAL | Age: 69
LOS: 1 days | End: 2024-09-26

## 2024-09-26 VITALS
BODY MASS INDEX: 22.98 KG/M2 | SYSTOLIC BLOOD PRESSURE: 111 MMHG | HEART RATE: 115 BPM | WEIGHT: 143 LBS | DIASTOLIC BLOOD PRESSURE: 64 MMHG | HEIGHT: 66 IN | RESPIRATION RATE: 18 BRPM | OXYGEN SATURATION: 98 % | TEMPERATURE: 97.8 F

## 2024-09-26 VITALS
WEIGHT: 143.08 LBS | DIASTOLIC BLOOD PRESSURE: 64 MMHG | SYSTOLIC BLOOD PRESSURE: 111 MMHG | HEIGHT: 66 IN | TEMPERATURE: 98 F | RESPIRATION RATE: 18 BRPM | OXYGEN SATURATION: 99 % | HEART RATE: 89 BPM

## 2024-09-26 VITALS
TEMPERATURE: 98 F | SYSTOLIC BLOOD PRESSURE: 114 MMHG | DIASTOLIC BLOOD PRESSURE: 56 MMHG | OXYGEN SATURATION: 94 % | HEIGHT: 66 IN | WEIGHT: 154.98 LBS | HEART RATE: 118 BPM | RESPIRATION RATE: 17 BRPM

## 2024-09-26 DIAGNOSIS — C34.92 MALIGNANT NEOPLASM OF UNSPECIFIED PART OF LEFT BRONCHUS OR LUNG: ICD-10-CM

## 2024-09-26 DIAGNOSIS — Z90.49 ACQUIRED ABSENCE OF OTHER SPECIFIED PARTS OF DIGESTIVE TRACT: Chronic | ICD-10-CM

## 2024-09-26 PROBLEM — D70.1 LEUKOPENIA DUE TO ANTINEOPLASTIC CHEMOTHERAPY: Status: ACTIVE | Noted: 2024-09-26

## 2024-09-26 LAB
ALBUMIN SERPL ELPH-MCNC: 3.1 G/DL
ALBUMIN SERPL ELPH-MCNC: 3.2 G/DL — LOW (ref 3.3–5)
ALP BLD-CCNC: 71 U/L
ALP SERPL-CCNC: 81 U/L — SIGNIFICANT CHANGE UP (ref 40–120)
ALT FLD-CCNC: 16 U/L — SIGNIFICANT CHANGE UP (ref 10–45)
ALT SERPL-CCNC: 21 U/L
ANION GAP SERPL CALC-SCNC: 11 MMOL/L — SIGNIFICANT CHANGE UP (ref 5–17)
ANION GAP SERPL CALC-SCNC: 9 MMOL/L
ANISOCYTOSIS BLD QL: SLIGHT — SIGNIFICANT CHANGE UP
APPEARANCE UR: CLEAR — SIGNIFICANT CHANGE UP
APTT BLD: 26.4 SEC — SIGNIFICANT CHANGE UP (ref 24.5–35.6)
AST SERPL-CCNC: 13 U/L — SIGNIFICANT CHANGE UP (ref 10–40)
AST SERPL-CCNC: 21 U/L
BASOPHILS # BLD AUTO: 0.01 K/UL — SIGNIFICANT CHANGE UP (ref 0–0.2)
BASOPHILS NFR BLD AUTO: 0.9 % — SIGNIFICANT CHANGE UP (ref 0–2)
BILIRUB SERPL-MCNC: 0.7 MG/DL — SIGNIFICANT CHANGE UP (ref 0.2–1.2)
BILIRUB SERPL-MCNC: 1.1 MG/DL
BILIRUB UR-MCNC: NEGATIVE — SIGNIFICANT CHANGE UP
BUN SERPL-MCNC: 16 MG/DL
BUN SERPL-MCNC: 19 MG/DL — SIGNIFICANT CHANGE UP (ref 7–23)
CALCIUM SERPL-MCNC: 8.1 MG/DL — LOW (ref 8.4–10.5)
CALCIUM SERPL-MCNC: 9.5 MG/DL
CHLORIDE SERPL-SCNC: 101 MMOL/L — SIGNIFICANT CHANGE UP (ref 96–108)
CHLORIDE SERPL-SCNC: 102 MMOL/L
CO2 SERPL-SCNC: 19 MMOL/L — LOW (ref 22–31)
CO2 SERPL-SCNC: 26 MMOL/L
COLOR SPEC: YELLOW — SIGNIFICANT CHANGE UP
CREAT SERPL-MCNC: 0.7 MG/DL — SIGNIFICANT CHANGE UP (ref 0.5–1.3)
CREAT SERPL-MCNC: 1 MG/DL
DACRYOCYTES BLD QL SMEAR: SLIGHT — SIGNIFICANT CHANGE UP
DIFF PNL FLD: NEGATIVE — SIGNIFICANT CHANGE UP
EGFR: 81 ML/MIN/1.73M2
EGFR: 99 ML/MIN/1.73M2 — SIGNIFICANT CHANGE UP
EOSINOPHIL # BLD AUTO: 0 K/UL — SIGNIFICANT CHANGE UP (ref 0–0.5)
EOSINOPHIL NFR BLD AUTO: 0 % — SIGNIFICANT CHANGE UP (ref 0–6)
FLUAV AG NPH QL: SIGNIFICANT CHANGE UP
FLUBV AG NPH QL: SIGNIFICANT CHANGE UP
GLUCOSE SERPL-MCNC: 155 MG/DL
GLUCOSE SERPL-MCNC: 303 MG/DL — HIGH (ref 70–99)
GLUCOSE UR QL: 500 MG/DL
HCT VFR BLD CALC: 20.8 % — CRITICAL LOW (ref 39–50)
HCT VFR BLD CALC: 22.4 %
HGB BLD-MCNC: 6.9 G/DL — CRITICAL LOW (ref 13–17)
HGB BLD-MCNC: 7.5 G/DL
HYPOCHROMIA BLD QL: SLIGHT — SIGNIFICANT CHANGE UP
INR BLD: 1.14 — SIGNIFICANT CHANGE UP (ref 0.85–1.16)
KETONES UR-MCNC: ABNORMAL MG/DL
LACTATE SERPL-SCNC: 1.4 MMOL/L — SIGNIFICANT CHANGE UP (ref 0.5–2)
LEUKOCYTE ESTERASE UR-ACNC: NEGATIVE — SIGNIFICANT CHANGE UP
LYMPHOCYTES # BLD AUTO: 0.12 K/UL — LOW (ref 1–3.3)
LYMPHOCYTES # BLD AUTO: 0.3 K/UL
LYMPHOCYTES # BLD AUTO: 7.8 % — LOW (ref 13–44)
LYMPHOCYTES NFR BLD AUTO: 15.5 %
MACROCYTES BLD QL: SLIGHT — SIGNIFICANT CHANGE UP
MAN DIFF?: NO
MANUAL SMEAR VERIFICATION: SIGNIFICANT CHANGE UP
MCHC RBC-ENTMCNC: 27.4 PG — SIGNIFICANT CHANGE UP (ref 27–34)
MCHC RBC-ENTMCNC: 27.9 PG
MCHC RBC-ENTMCNC: 33.2 GM/DL — SIGNIFICANT CHANGE UP (ref 32–36)
MCHC RBC-ENTMCNC: 33.5 GM/DL
MCV RBC AUTO: 82.5 FL — SIGNIFICANT CHANGE UP (ref 80–100)
MCV RBC AUTO: 83.3 FL
METAMYELOCYTES # FLD: 2.6 % — HIGH (ref 0–0)
MICROCYTES BLD QL: SLIGHT — SIGNIFICANT CHANGE UP
MONOCYTES # BLD AUTO: 0.05 K/UL — SIGNIFICANT CHANGE UP (ref 0–0.9)
MONOCYTES NFR BLD AUTO: 3.5 % — SIGNIFICANT CHANGE UP (ref 2–14)
MYELOCYTES NFR BLD: 0.9 % — HIGH (ref 0–0)
NEUTROPHILS # BLD AUTO: 1.3 K/UL — LOW (ref 1.8–7.4)
NEUTROPHILS # BLD AUTO: 1.6 K/UL
NEUTROPHILS NFR BLD AUTO: 71 %
NEUTROPHILS NFR BLD AUTO: 84.3 % — HIGH (ref 43–77)
NITRITE UR-MCNC: NEGATIVE — SIGNIFICANT CHANGE UP
OVALOCYTES BLD QL SMEAR: SLIGHT — SIGNIFICANT CHANGE UP
PH UR: 6 — SIGNIFICANT CHANGE UP (ref 5–8)
PLAT MORPH BLD: ABNORMAL
PLATELET # BLD AUTO: 126 K/UL — LOW (ref 150–400)
PLATELET # BLD AUTO: 144 K/UL
POIKILOCYTOSIS BLD QL AUTO: SLIGHT — SIGNIFICANT CHANGE UP
POLYCHROMASIA BLD QL SMEAR: SLIGHT — SIGNIFICANT CHANGE UP
POTASSIUM SERPL-MCNC: 4.2 MMOL/L — SIGNIFICANT CHANGE UP (ref 3.5–5.3)
POTASSIUM SERPL-SCNC: 4.2 MMOL/L — SIGNIFICANT CHANGE UP (ref 3.5–5.3)
POTASSIUM SERPL-SCNC: 4.5 MMOL/L
PROT SERPL-MCNC: 7 G/DL — SIGNIFICANT CHANGE UP (ref 6–8.3)
PROT SERPL-MCNC: 7.6 G/DL
PROT UR-MCNC: SIGNIFICANT CHANGE UP MG/DL
PROTHROM AB SERPL-ACNC: 13.3 SEC — SIGNIFICANT CHANGE UP (ref 9.9–13.4)
RBC # BLD: 2.52 M/UL — LOW (ref 4.2–5.8)
RBC # BLD: 2.69 M/UL
RBC # FLD: 16.5 % — HIGH (ref 10.3–14.5)
RBC # FLD: 16.6 %
RBC BLD AUTO: ABNORMAL
RSV RNA NPH QL NAA+NON-PROBE: SIGNIFICANT CHANGE UP
SARS-COV-2 RNA SPEC QL NAA+PROBE: SIGNIFICANT CHANGE UP
SCHISTOCYTES BLD QL AUTO: SLIGHT — SIGNIFICANT CHANGE UP
SODIUM SERPL-SCNC: 131 MMOL/L — LOW (ref 135–145)
SODIUM SERPL-SCNC: 137 MMOL/L
SP GR SPEC: 1.02 — SIGNIFICANT CHANGE UP (ref 1–1.03)
SPHEROCYTES BLD QL SMEAR: SLIGHT — SIGNIFICANT CHANGE UP
UROBILINOGEN FLD QL: 1 MG/DL — SIGNIFICANT CHANGE UP (ref 0.2–1)
WBC # BLD: 1.54 K/UL — LOW (ref 3.8–10.5)
WBC # FLD AUTO: 1.54 K/UL — LOW (ref 3.8–10.5)
WBC # FLD AUTO: 2.2 K/UL

## 2024-09-26 PROCEDURE — 99215 OFFICE O/P EST HI 40 MIN: CPT

## 2024-09-26 PROCEDURE — G2211 COMPLEX E/M VISIT ADD ON: CPT

## 2024-09-26 PROCEDURE — 86901 BLOOD TYPING SEROLOGIC RH(D): CPT

## 2024-09-26 PROCEDURE — 96413 CHEMO IV INFUSION 1 HR: CPT

## 2024-09-26 PROCEDURE — 99291 CRITICAL CARE FIRST HOUR: CPT

## 2024-09-26 PROCEDURE — 86850 RBC ANTIBODY SCREEN: CPT

## 2024-09-26 PROCEDURE — 96375 TX/PRO/DX INJ NEW DRUG ADDON: CPT

## 2024-09-26 PROCEDURE — 93010 ELECTROCARDIOGRAM REPORT: CPT

## 2024-09-26 PROCEDURE — 99222 1ST HOSP IP/OBS MODERATE 55: CPT

## 2024-09-26 PROCEDURE — 36415 COLL VENOUS BLD VENIPUNCTURE: CPT

## 2024-09-26 PROCEDURE — 71045 X-RAY EXAM CHEST 1 VIEW: CPT | Mod: 26

## 2024-09-26 PROCEDURE — 36430 TRANSFUSION BLD/BLD COMPNT: CPT

## 2024-09-26 PROCEDURE — 96367 TX/PROPH/DG ADDL SEQ IV INF: CPT

## 2024-09-26 PROCEDURE — 82962 GLUCOSE BLOOD TEST: CPT

## 2024-09-26 PROCEDURE — 86900 BLOOD TYPING SEROLOGIC ABO: CPT

## 2024-09-26 RX ORDER — CARBOPLATIN 450 MG/45ML
130 INJECTION, SOLUTION INTRAVENOUS ONCE
Refills: 0 | Status: COMPLETED | OUTPATIENT
Start: 2024-09-26 | End: 2024-09-26

## 2024-09-26 RX ORDER — PACLITAXEL 6 MG/ML
70 VIAL (ML) INTRAVENOUS ONCE
Refills: 0 | Status: COMPLETED | OUTPATIENT
Start: 2024-09-26 | End: 2024-09-26

## 2024-09-26 RX ORDER — METHYLPREDNISOLONE ACETATE 80 MG/ML
125 INJECTION, SUSPENSION INTRA-ARTICULAR; INTRALESIONAL; INTRAMUSCULAR; SOFT TISSUE ONCE
Refills: 0 | Status: COMPLETED | OUTPATIENT
Start: 2024-09-26 | End: 2024-09-26

## 2024-09-26 RX ORDER — SODIUM CHLORIDE 0.9 % (FLUSH) 0.9 %
1000 SYRINGE (ML) INJECTION ONCE
Refills: 0 | Status: COMPLETED | OUTPATIENT
Start: 2024-09-26 | End: 2024-09-26

## 2024-09-26 RX ORDER — DIPHENHYDRAMINE HCL 12.5MG/5ML
50 LIQUID (ML) ORAL ONCE
Refills: 0 | Status: COMPLETED | OUTPATIENT
Start: 2024-09-26 | End: 2024-09-26

## 2024-09-26 RX ORDER — FAMOTIDINE 40 MG
20 TABLET ORAL ONCE
Refills: 0 | Status: COMPLETED | OUTPATIENT
Start: 2024-09-26 | End: 2024-09-26

## 2024-09-26 RX ORDER — PALONOSETRON 0.25 MG/5ML
0.25 INJECTION, SOLUTION INTRAVENOUS ONCE
Refills: 0 | Status: COMPLETED | OUTPATIENT
Start: 2024-09-26 | End: 2024-09-26

## 2024-09-26 RX ADMIN — PALONOSETRON 0.25 MILLIGRAM(S): 0.25 INJECTION, SOLUTION INTRAVENOUS at 16:30

## 2024-09-26 RX ADMIN — METHYLPREDNISOLONE ACETATE 125 MILLIGRAM(S): 80 INJECTION, SUSPENSION INTRA-ARTICULAR; INTRALESIONAL; INTRAMUSCULAR; SOFT TISSUE at 18:20

## 2024-09-26 RX ADMIN — Medication 1000 MILLILITER(S): at 18:25

## 2024-09-26 RX ADMIN — Medication 204 MILLIGRAM(S): at 15:50

## 2024-09-26 RX ADMIN — Medication 10 MILLIGRAM(S): at 16:05

## 2024-09-26 RX ADMIN — Medication 70 MILLIGRAM(S): at 16:46

## 2024-09-26 RX ADMIN — Medication 2000 MILLILITER(S): at 18:20

## 2024-09-26 RX ADMIN — Medication 204 MILLIGRAM(S): at 16:05

## 2024-09-26 RX ADMIN — Medication 50 MILLIGRAM(S): at 18:20

## 2024-09-26 RX ADMIN — Medication 1000 MILLILITER(S): at 21:28

## 2024-09-26 RX ADMIN — Medication 50 MILLIGRAM(S): at 16:20

## 2024-09-26 RX ADMIN — Medication 20 MILLIGRAM(S): at 16:25

## 2024-09-26 NOTE — ED PROVIDER NOTE - PHYSICAL EXAMINATION
GEN: Well appearing, well developed, awake, alert, oriented to person, place, time/situation and in no apparent distress. NTAF  ENT: Airway patent, Nasal mucosa clear. Mouth with normal mucosa.  EYES: Clear bilaterally. PERRL, EOMI  RESPIRATORY: Breathing comfortably with normal RR. no hypoxia or resp distress.  CARDIAC: Regular rate and rhythm, no M/R/G  ABDOMEN: Soft, nontender   MSK: Range of motion is not limited, no deformities noted.  NEURO: Alert and oriented, no focal deficits.  SKIN: Skin  color pale for race, warm, dry and intact. No evidence of rash.  PSYCH: Alert and oriented to person, place, time/situation. normal mood and affect. no apparent risk to self or others.

## 2024-09-26 NOTE — ED ADULT TRIAGE NOTE - CHIEF COMPLAINT QUOTE
Pt presents to ED C/O syncope episode with + LOC while receiving chemo. Denies fall/ injury. EMS states, "They think it was an allergic reaction he's had a mild reaction in the past, they gave him benadryl and solumedrol at [facility], his hemoglobin is 7.5 he was due for transfusion". Pt has IV by EMS NS running on arrival. Pt denies rash, throat swelling, speaking full sentences, protecting own airway. EKG/ BG in progress.

## 2024-09-26 NOTE — ED PROVIDER NOTE - OBJECTIVE STATEMENT
69 with a PMhx of lung ca on chemo who presents from [facility] for syncope episode with + LOC while receiving chemo. Denies fall/ injury. EMS states, "They think it was an allergic reaction he's had a mild reaction in the past, they gave him benadryl and solumedrol at [facility], his hemoglobin is 7.5 he was due for transfusion" 69M PMHx of GERD, HTN, T2DM, and HLD, COLETTE mass (SCLC, nL4A4L0, stage IIIA) on chemo and radiation who presents from [facility] for syncope episode with + LOC while receiving chemo. Pt states chemo started at 4:50pm and at 5:15pm he felt hot and sweaty and reportedly fainted in chair. No fall/ injury. EMS states, "They think it was an allergic reaction he's had a mild reaction in the past, they gave him benadryl and solumedrol at [facility], his hemoglobin is 7.5 he was due for transfusion." Pt admits to decreased PO intake d/t loss of taste and appetite from chemo. He feels generally weak. No f/c, no cp/sob, no n/v, no bleeding, no black or blood stool. He did not complete course of chemo today and also states he is due for another session of radiation tomorrow at 11am at [facility].

## 2024-09-26 NOTE — ED ADULT NURSE NOTE - NSICDXPASTMEDICALHX_GEN_ALL_CORE_FT
PAST MEDICAL HISTORY:  HENRY (acute kidney injury)     H/O: obesity     History of diabetes mellitus     History of gastroesophageal reflux (GERD)     History of high cholesterol     History of hypertension     History of persistent cough     Mass of lingula of lung     Squamous cell lung cancer

## 2024-09-26 NOTE — ED PROVIDER NOTE - CLINICAL SUMMARY MEDICAL DECISION MAKING FREE TEXT BOX
69M PMHx of GERD, HTN, T2DM, and HLD, COLETTE mass (SCLC, pS2X3X3, stage IIIA) on chemo and radiation who presents from [facility] for syncope episode with + LOC while receiving chemo. Pt states chemo started at 4:50pm and at 5:15pm he felt hot and sweaty and reportedly fainted in chair. No fall/ injury. EMS states, "They think it was an allergic reaction he's had a mild reaction in the past, they gave him benadryl and solumedrol at [facility], his hemoglobin is 7.5 he was due for transfusion." Pt admits to decreased PO intake d/t loss of taste and appetite from chemo. He feels generally weak. No f/c, no cp/sob, no n/v, no bleeding, no black or blood stool. He did not complete course of chemo today and also states he is due for another session of radiation tomorrow at 11am at [facility].   VS notable for tachycardia, afebrile, normotensive, no resp distress, nontoxic appearing.   EKG sinus tach to 108, no ischemia. Suspect syncope d/t chemo reaction vs hypovolemia/dehydration.   Plan for labs, CXR, UA. 1L NS ordered (pt reportedly received 2L NS PTA).   labs show hgb 6.9, WBC 1.94, pt was consented and 1U PRBCs ordered  Pt hyperglycemia to 300s, IVFs given .   UA and CXR neg for infection  Pt will be admitted to [facility] for further mgmt. 69M PMHx of GERD, HTN, T2DM, and HLD, COLETTE mass (SCLC, cR0V6X4, stage IIIA) on chemo and radiation who presents from [facility] for syncope episode with + LOC while receiving chemo. Pt states chemo started at 4:50pm and at 5:15pm he felt hot and sweaty and reportedly fainted in chair. No fall/ injury. EMS states, "They think it was an allergic reaction he's had a mild reaction in the past, they gave him benadryl and solumedrol at [facility], his hemoglobin is 7.5 he was due for transfusion." Pt admits to decreased PO intake d/t loss of taste and appetite from chemo. He feels generally weak. No f/c, no cp/sob, no n/v, no bleeding, no black or blood stool. He did not complete course of chemo today and also states he is due for another session of radiation tomorrow at 11am at [facility].   VS notable for tachycardia, afebrile, normotensive, no resp distress, nontoxic appearing.   EKG sinus tach to 108, no ischemia. Suspect syncope d/t chemo reaction vs hypovolemia/dehydration.   Plan for labs, CXR, UA. 1L NS ordered (pt reportedly received 2L NS PTA).   labs show hgb 6.9, WBC 1.94, pt was consented and 1U PRBCs ordered  Pt hyperglycemia to 300s, IVFs given .   UA and CXR neg for infection  Pt will be admitted to [facility] for further mgmt.  D/w ozzie, will send blood cx and put on neutropenia precautions. CBC likely 2/2 chemo, no fever or evidence of infection to suggest sepsis but [facility] will monitor and determine abx use if needed.

## 2024-09-26 NOTE — ED ADULT NURSE NOTE - PAIN: PRESENCE, MLM
Patient:   MIRI MOLINA            MRN: [facility]-[id number]            FIN: [id number]              Age:   17 years     Sex:  FEMALE     :  01   Associated Diagnoses:   None   Author:   ANNALISE SERRANO       HPI: 16 yo  at 40 1/7 weeks admitted in active, spontaneous labor. Patient denies any headaches, blurry vision, RUQ pain or N/V. SHe has been checking her BP at home daily and states pressures have been 120's/80's. She also denies any positive COVID symptoms such as SOB, taste of sense and smell, chest pain, cough, core throat or fevers. She has not had any contact with a positive person and partner states he tested negative.  On 2020 patient was seen in triage for elevated BPs, she was admitted for collection of 24 hour urine with 337 g protein and diagnosed with preeclampsia at 37+6 weeks. Induction of labor was recommended at that time, patient declined and signed out Against Medical Advice. Plan was made for twice weekly BPPs and weekly preeclampsia labs. Phone calls have been made daily by CNM team to follow up on symptoms, plan of care and to recommend  induction. Patient has continued to decline IOL. She has been also been non-compliant with recommendation for twice weekly BPP and repeat preeclampsia labs. She was seen last on 20 for BPP. She has been receiving telehealth visitsx for prenatal care since testing positive for COVID on 7/3/20.  DAMEON 20 by LMP of 10/12/19  Prenatal Course: Onset of care at 13 wks with CNMs at [place] x 13 visits.   #,  wt on 20 236#,  TWG 46#. BMI 41. Received TDaP and flu vaccines antenatally.  PNI:  1. Preeclmapsia without severe features diagnosed at 37+6 weeks   2. Gestational Thrombocytopenia  3. Prepregnancy BMI of 34 with 46# weight gain   4. Covid Positive, asymptomatic  5. Influenze B during pregnancy, treated with Tamiflu  6. Teen pregnancy   PN Labs:   A+, Antibody negative  Rubella Immune  Varicella  Immune  HBSAG neg  TP NR x 2 HIV NR x2  H/H 11.5/34.3 --> 12.9/38.3  Platelets 151 --> 119 on 2020  Urine culture negative  GC/CT negative  1 hr   GBBS Negative  Declined genetic testing  US:  20+3: normal anatomy with suboptimal cardiac views, fundal placenta  25+1: EFW 50%, normal cardiac views, fundal placenta  33+1: EFW 2040g, KHARI 15.3, appropriate growth, vertex  37+2: EFW 2975 g, KHARI 10.8  Allergies: NKDA  OBHx: Primigravida  GYNHx: +BV during pregnancy - treated  Meds: PNV   PMHx: Denies  PSHx: Denies  Social: FOB involved/supportive, no substance abuse  Family: PGF and Father - HTN   O:  Vitals between:   2020 19:34:21   TO   2020 19:34:21                   LAST RESULT MINIMUM MAXIMUM  Temperature 36.8 36.8 36.8  Heart Rate 101 101 101  Respiratory Rate 18 18 18  NISBP           135 135 135  NIDBP           97 97 97  NIMBP           110 110 110  Gen: yelling in pain when she gets a contraction, otherwise AAO, NAD  HEENT: grossly WNL  CV: RRR  Pulm: CTA bilaterally  Abd: soft, non-tender  [place]: 7#, difficult due to adipose   SVE: /-2  EXT: +1 LE edema  FHT: 140 baseline/mod orion/+accels/-decels  [place]: q 2-3 min  BSUS: vertex by BSUS confirmed  A:   16yo  at 40 1/7 weeks in spontaneous labor  pre-eclampsia without SF dx'd on 7/3/20, will resend labs   COVID positive on 7/3/20- repeat test sent for this admision  gestational thrombocytopenia  FHT cat 1  GBBS neg  P: Labs sent for pre-eclampsia and admission  Pt declines epidural, will give labor support  Discussed diagnosis of pre-eclampsia and frequent close monitoring  Discussed  separation vs staying with mother if patient tests positive again, patient declines separation  Will consult with OB chief when labs result and will co-manage labor if BP's remain mildly elevated, if sever requiring anti-hypertensives, will transfer care to MD service  Anticipate   JUAN Mireles, AGUILA  Labs resulted. AST 49,  platelets 118. Other labs WNL for pregnancy. /96. Discussed with Dr. Tan. Will hold magnesium for now unless she develops symptioms or severe features. Will continue to co-manage and anticipate .  JUAN Mireles, AGUILA  Correction: patient is rubella Non-immune   denies pain/discomfort (Rating = 0)

## 2024-09-26 NOTE — ED ADULT NURSE NOTE - OBJECTIVE STATEMENT
pt is a 69 y.o. c/o syncope episode before blood transfusion, pt is ax4, on room air, pt currently has lung cancer, pt is ax4, on room air. no other concerns aer noted.

## 2024-09-26 NOTE — ED ADULT NURSE NOTE - CHIEF COMPLAINT QUOTE
Pt presents to ED C/O syncope episode with + LOC while receiving chemo. Denies fall/ injury. EMS states, "They think it was an allergic reaction he's had a mild reaction in the past, they gave him benadryl and solumedrol at [facility], his hemoglobin is 7.5 he was due for transfusion". Pt has IV by EMS NS running on arrival. Pt denies rash, throat swelling, speaking full sentences, protecting own airway. EKG/ BG in progress.

## 2024-09-26 NOTE — CHART NOTE - NSCHARTNOTEFT_GEN_A_CORE
Patient presents to [facility] for his 5th cycle of taxol/ carbo today. He had a mild reaction during 2nd cycle of taxol notable for hypotension and dizziness; improved on subsequent cycles after benadryl added back to premeds. Patient received 10 min of infusion today and noted feeling warm with chills and dizziness. Vitals notable for BP 80s/50s and was tachycardic 130s. He then endorsed nausea and vomited x 1. During emesis pt briefly synopsized for about 20 seconds. He was given 125mg Methylpred and 50mg Benadryl along with 1L IVF bolus and RRT was called. His FS noted to be 305. Vitals improved to BP 120s/ 80s and HR 110s. Patient symptoms improving however still endorsing dizziness at time of EMS arrival. Of note, his Hg today was 7.5 and was planned for 1u PRBCs tomorrow 9/27.     Discussed with is oncologic team and will go to [facility] ER for ongoing management/observation and transfusion tonight.

## 2024-09-26 NOTE — ED PROVIDER NOTE - ATTESTATION, MLM
<-- Click to add NO significant Past Surgical History I have reviewed and confirmed nurses' notes for patient's medications, allergies, medical history, and surgical history.

## 2024-09-26 NOTE — ED ADULT NURSE REASSESSMENT NOTE - NS ED NURSE REASSESS COMMENT FT1
pt with hx of lung ca on chemoRT presenting with ?syncopal episode and +LOC.  received in NAD, a&ox3.  vitals stable.  labs collected. awaiting MD white.  will monitor.

## 2024-09-26 NOTE — ED ADULT TRIAGE NOTE - NSWEIGHTCALCTOOLDRUG_GEN_A_CORE
Detail Level: Detailed
Show Spray Paint Technique Variable?: Yes
Spray Paint Text: The liquid nitrogen was applied to the skin utilizing a spray paint frosting technique.
Post-Care Instructions: I reviewed with the patient in detail post-care instructions. Patient is to wear sunprotection, and avoid picking at any of the treated lesions. Pt may apply Vaseline to crusted or scabbing areas.
Add 52 Modifier (Optional): no
Consent: The patient's consent was obtained including but not limited to risks of crusting, scabbing, blistering, scarring, darker or lighter pigmentary change, recurrence, incomplete removal and infection.
Medical Necessity Information: It is in your best interest to select a reason for this procedure from the list below. All of these items fulfill various CMS LCD requirements except the new and changing color options.
Number Of Freeze-Thaw Cycles: 1 freeze-thaw cycle
Medical Necessity Clause: This procedure was medically necessary because the lesions that were treated were:
 used
Duration Of Freeze Thaw-Cycle (Seconds): 2

## 2024-09-26 NOTE — CHART NOTE - NSCHARTNOTESELECT_GEN_ALL_CORE
Patient: Isabell Cisse Age: 82 year old   MRN: [id number] Attending: Dr. Daniel     Date of Visit: February 21, 2020    PAIN MEDICINE CLINIC PROCEDURE NOTE    ATTENDING CLINICIAN:    Jairo Daniel MD    ASSISTANT CLINICIAN:  Willy De Paz MD (pain medicine fellow)    PREPROCEDURE DIAGNOSES:  1. Lumbar facet arthropathy   2. Chronic low back pain     POSTPROCEDURE DIAGNOSES:  1. Lumbar facet arthropathy   2. Chronic low back pain     PROCEDURE(S) PERFORMED:  1.  Bilateral Lumbar 1 and Lumbar 2 medial branch nerve nerve blocks.   2.  Fluoroscopic guidance for the above procedures    ANESTHESIA:  Local    MEDICATIONS ADMINISTERED  Bupivacaine 0.75% 2 mL (0.5mL/site)    COMPLICATIONS:  None.    INDICATIONS:    Isabell Cisse is a 82 year old female with a history of low back pain secondary to lumbar facet joint arthopathy .  The patient stated that she was in their usual state of health and denied recent anticoagulant use or recent infections.  Therefore, the plan is to perform above mentioned procedure.     Procedure Details:  Written informed consent was obtained and saved in the electronic medical record, after the risks, benefits, and alternatives were discussed with the patient.  All of the patient s questions were answered.  The patient was brought to the procedure room, where she was identified by name, medical record number and date of birth.      The patient was placed in the prone position on the procedure room table.  All pressure points were checked and comfortably padded.  Routine monitors were placed.  Vital signs were stable.  A formal time-out procedure was performed, as per protocol, including patient name, title of procedure, and site of procedure, and all in the room concurred.    A chlorhexidine prep was completed followed by sterile draping per standard procedure.    Utilizing an AP view, the 12th thoracic and 1st lumbar vertebrae were identified.  With this landmark, the second and  Infusion Reaction third lumbar vertebrae were identified and the fluoroscope was then obliqued towards the patient's left in order to identify the pedicles of the L2, and L3 vertebrae.  Due to significant post surgical changes in the patient's anatomy the L3 medial branch nerve at the L4 pedicle was not attempted.  The targets were recognized at the junction of the transverse process and the superior articular process. Skin and subcutaneous tissues overlying this area were anesthetized with a 1% lidocaine. Under intermittent fluoroscopic guidance, a 3.5 inch spinal needle was directed through the skin and subcutaneous tissues until osseous contact was achieved.  Needles WERE placed on the CONTRALATERAL side utilizing the exact technique as described above at the same levels..  The final needle position was verified in AP and lateral views.  At that point, the stylets were removed and after aspiration was negative at each site, the above listed injectate was injected equally among the placed needles.    Light pressure was held at the puncture sites to prevent ecchymosis and oozing.  The patient's skin was cleansed, and hemostasis was confirmed.  Band-aids were applied to the needle injection sites.      Condition:    The patient tolerated the procedure well and was monitored for approximately 15 minutes afterward in the post procedure area.  There were no immediate post procedure complications noted.  The patient was then discharged to home as per protocol.     Patient condition  stable.    Preprocedure pain score: 8/10  Postprocedure pain score: 5/10    Jairo Daniel MD    Department of Anesthesiology  Pain Management Division

## 2024-09-26 NOTE — ED PROVIDER NOTE - CRITICAL CARE ATTENDING CONTRIBUTION TO CARE
Upon my evaluation, this patient had a high probability of imminent or life-threatening deterioration due to   syncope, anemia, lung cancer  which required my direct attention, intervention, and personal management.    I have personally provided the above minutes of critical care time exclusive of time spent on separately billable procedures. Time includes review of laboratory data, radiology results, discussion with consultants, and monitoring for potential decompensation. Interventions were performed as documented above.

## 2024-09-27 ENCOUNTER — TRANSCRIPTION ENCOUNTER (OUTPATIENT)
Age: 69
End: 2024-09-27

## 2024-09-27 ENCOUNTER — APPOINTMENT (OUTPATIENT)
Dept: INFUSION THERAPY | Facility: CLINIC | Age: 69
End: 2024-09-27

## 2024-09-27 VITALS
OXYGEN SATURATION: 96 % | SYSTOLIC BLOOD PRESSURE: 109 MMHG | HEART RATE: 100 BPM | DIASTOLIC BLOOD PRESSURE: 65 MMHG | TEMPERATURE: 98 F | RESPIRATION RATE: 17 BRPM

## 2024-09-27 DIAGNOSIS — R55 SYNCOPE AND COLLAPSE: ICD-10-CM

## 2024-09-27 DIAGNOSIS — T80.90XA UNSPECIFIED COMPLICATION FOLLOWING INFUSION AND THERAPEUTIC INJECTION, INITIAL ENCOUNTER: ICD-10-CM

## 2024-09-27 DIAGNOSIS — Z29.9 ENCOUNTER FOR PROPHYLACTIC MEASURES, UNSPECIFIED: ICD-10-CM

## 2024-09-27 DIAGNOSIS — E78.5 HYPERLIPIDEMIA, UNSPECIFIED: ICD-10-CM

## 2024-09-27 DIAGNOSIS — K21.9 GASTRO-ESOPHAGEAL REFLUX DISEASE WITHOUT ESOPHAGITIS: ICD-10-CM

## 2024-09-27 DIAGNOSIS — C34.90 MALIGNANT NEOPLASM OF UNSPECIFIED PART OF UNSPECIFIED BRONCHUS OR LUNG: ICD-10-CM

## 2024-09-27 DIAGNOSIS — I10 ESSENTIAL (PRIMARY) HYPERTENSION: ICD-10-CM

## 2024-09-27 DIAGNOSIS — E11.9 TYPE 2 DIABETES MELLITUS WITHOUT COMPLICATIONS: ICD-10-CM

## 2024-09-27 DIAGNOSIS — D61.818 OTHER PANCYTOPENIA: ICD-10-CM

## 2024-09-27 LAB
A1C WITH ESTIMATED AVERAGE GLUCOSE RESULT: 7.2 % — HIGH (ref 4–5.6)
ALBUMIN SERPL ELPH-MCNC: 2.8 G/DL — LOW (ref 3.3–5)
ALP SERPL-CCNC: 71 U/L — SIGNIFICANT CHANGE UP (ref 40–120)
ALT FLD-CCNC: 16 U/L — SIGNIFICANT CHANGE UP (ref 10–45)
ANION GAP SERPL CALC-SCNC: 9 MMOL/L — SIGNIFICANT CHANGE UP (ref 5–17)
ANISOCYTOSIS BLD QL: SIGNIFICANT CHANGE UP
AST SERPL-CCNC: 14 U/L — SIGNIFICANT CHANGE UP (ref 10–40)
BASOPHILS # BLD AUTO: 0 K/UL — SIGNIFICANT CHANGE UP (ref 0–0.2)
BASOPHILS NFR BLD AUTO: 0 % — SIGNIFICANT CHANGE UP (ref 0–2)
BILIRUB SERPL-MCNC: 1 MG/DL — SIGNIFICANT CHANGE UP (ref 0.2–1.2)
BLD GP AB SCN SERPL QL: NEGATIVE — SIGNIFICANT CHANGE UP
BUN SERPL-MCNC: 21 MG/DL — SIGNIFICANT CHANGE UP (ref 7–23)
CALCIUM SERPL-MCNC: 7.9 MG/DL — LOW (ref 8.4–10.5)
CHLORIDE SERPL-SCNC: 106 MMOL/L — SIGNIFICANT CHANGE UP (ref 96–108)
CO2 SERPL-SCNC: 20 MMOL/L — LOW (ref 22–31)
CREAT SERPL-MCNC: 0.79 MG/DL — SIGNIFICANT CHANGE UP (ref 0.5–1.3)
EGFR: 96 ML/MIN/1.73M2 — SIGNIFICANT CHANGE UP
EOSINOPHIL # BLD AUTO: 0 K/UL — SIGNIFICANT CHANGE UP (ref 0–0.5)
EOSINOPHIL NFR BLD AUTO: 0 % — SIGNIFICANT CHANGE UP (ref 0–6)
ESTIMATED AVERAGE GLUCOSE: 160 MG/DL — HIGH (ref 68–114)
GIANT PLATELETS BLD QL SMEAR: PRESENT — SIGNIFICANT CHANGE UP
GLUCOSE BLDC GLUCOMTR-MCNC: 260 MG/DL — HIGH (ref 70–99)
GLUCOSE BLDC GLUCOMTR-MCNC: 305 MG/DL — HIGH (ref 70–99)
GLUCOSE BLDC GLUCOMTR-MCNC: 331 MG/DL — HIGH (ref 70–99)
GLUCOSE SERPL-MCNC: 244 MG/DL — HIGH (ref 70–99)
HCT VFR BLD CALC: 24.2 % — LOW (ref 39–50)
HGB BLD-MCNC: 8.1 G/DL — LOW (ref 13–17)
HYPOCHROMIA BLD QL: SLIGHT — SIGNIFICANT CHANGE UP
LYMPHOCYTES # BLD AUTO: 0.08 K/UL — LOW (ref 1–3.3)
LYMPHOCYTES # BLD AUTO: 4.4 % — LOW (ref 13–44)
MACROCYTES BLD QL: SLIGHT — SIGNIFICANT CHANGE UP
MAGNESIUM SERPL-MCNC: 1.5 MG/DL — LOW (ref 1.6–2.6)
MANUAL SMEAR VERIFICATION: SIGNIFICANT CHANGE UP
MCHC RBC-ENTMCNC: 28.3 PG — SIGNIFICANT CHANGE UP (ref 27–34)
MCHC RBC-ENTMCNC: 33.5 GM/DL — SIGNIFICANT CHANGE UP (ref 32–36)
MCV RBC AUTO: 84.6 FL — SIGNIFICANT CHANGE UP (ref 80–100)
METAMYELOCYTES # FLD: 0.9 % — HIGH (ref 0–0)
MICROCYTES BLD QL: SIGNIFICANT CHANGE UP
MONOCYTES # BLD AUTO: 0.19 K/UL — SIGNIFICANT CHANGE UP (ref 0–0.9)
MONOCYTES NFR BLD AUTO: 10.6 % — SIGNIFICANT CHANGE UP (ref 2–14)
NEUTROPHILS # BLD AUTO: 1.52 K/UL — LOW (ref 1.8–7.4)
NEUTROPHILS NFR BLD AUTO: 83.2 % — HIGH (ref 43–77)
NEUTS BAND # BLD: 0.9 % — SIGNIFICANT CHANGE UP (ref 0–8)
OVALOCYTES BLD QL SMEAR: SLIGHT — SIGNIFICANT CHANGE UP
PHOSPHATE SERPL-MCNC: 3.7 MG/DL — SIGNIFICANT CHANGE UP (ref 2.5–4.5)
PLAT MORPH BLD: ABNORMAL
PLATELET # BLD AUTO: 121 K/UL — LOW (ref 150–400)
POIKILOCYTOSIS BLD QL AUTO: SIGNIFICANT CHANGE UP
POLYCHROMASIA BLD QL SMEAR: SLIGHT — SIGNIFICANT CHANGE UP
POTASSIUM SERPL-MCNC: 4.6 MMOL/L — SIGNIFICANT CHANGE UP (ref 3.5–5.3)
POTASSIUM SERPL-SCNC: 4.6 MMOL/L — SIGNIFICANT CHANGE UP (ref 3.5–5.3)
PROT SERPL-MCNC: 6.5 G/DL — SIGNIFICANT CHANGE UP (ref 6–8.3)
RBC # BLD: 2.86 M/UL — LOW (ref 4.2–5.8)
RBC # FLD: 17.1 % — HIGH (ref 10.3–14.5)
RBC BLD AUTO: ABNORMAL
RH IG SCN BLD-IMP: POSITIVE — SIGNIFICANT CHANGE UP
SCHISTOCYTES BLD QL AUTO: SLIGHT — SIGNIFICANT CHANGE UP
SODIUM SERPL-SCNC: 135 MMOL/L — SIGNIFICANT CHANGE UP (ref 135–145)
SPHEROCYTES BLD QL SMEAR: SLIGHT — SIGNIFICANT CHANGE UP
WBC # BLD: 1.81 K/UL — LOW (ref 3.8–10.5)
WBC # FLD AUTO: 1.81 K/UL — LOW (ref 3.8–10.5)

## 2024-09-27 PROCEDURE — 99223 1ST HOSP IP/OBS HIGH 75: CPT | Mod: GC

## 2024-09-27 RX ORDER — ALCOHOL ANTISEPTIC PADS
25 PADS, MEDICATED (EA) TOPICAL ONCE
Refills: 0 | Status: DISCONTINUED | OUTPATIENT
Start: 2024-09-27 | End: 2024-09-27

## 2024-09-27 RX ORDER — INSULIN LISPRO 100/ML
VIAL (ML) SUBCUTANEOUS AT BEDTIME
Refills: 0 | Status: DISCONTINUED | OUTPATIENT
Start: 2024-09-27 | End: 2024-09-27

## 2024-09-27 RX ORDER — INSULIN LISPRO 100/ML
VIAL (ML) SUBCUTANEOUS
Refills: 0 | Status: DISCONTINUED | OUTPATIENT
Start: 2024-09-27 | End: 2024-09-27

## 2024-09-27 RX ORDER — INSULIN LISPRO 100/ML
VIAL (ML) SUBCUTANEOUS ONCE
Refills: 0 | Status: COMPLETED | OUTPATIENT
Start: 2024-09-27 | End: 2024-09-27

## 2024-09-27 RX ORDER — SODIUM CHLORIDE IRRIG SOLUTION 0.9 %
1000 SOLUTION, IRRIGATION IRRIGATION
Refills: 0 | Status: DISCONTINUED | OUTPATIENT
Start: 2024-09-27 | End: 2024-09-27

## 2024-09-27 RX ORDER — PANTOPRAZOLE SODIUM 40 MG/1
40 TABLET, DELAYED RELEASE ORAL
Refills: 0 | Status: DISCONTINUED | OUTPATIENT
Start: 2024-09-27 | End: 2024-09-27

## 2024-09-27 RX ORDER — ALCOHOL ANTISEPTIC PADS
15 PADS, MEDICATED (EA) TOPICAL ONCE
Refills: 0 | Status: DISCONTINUED | OUTPATIENT
Start: 2024-09-27 | End: 2024-09-27

## 2024-09-27 RX ORDER — SENNOSIDES 8.6 MG
2 TABLET ORAL AT BEDTIME
Refills: 0 | Status: DISCONTINUED | OUTPATIENT
Start: 2024-09-27 | End: 2024-09-27

## 2024-09-27 RX ORDER — ALCOHOL ANTISEPTIC PADS
12.5 PADS, MEDICATED (EA) TOPICAL ONCE
Refills: 0 | Status: DISCONTINUED | OUTPATIENT
Start: 2024-09-27 | End: 2024-09-27

## 2024-09-27 RX ORDER — GLUCAGON INJECTION, SOLUTION 0.5 MG/.1ML
1 INJECTION, SOLUTION SUBCUTANEOUS ONCE
Refills: 0 | Status: DISCONTINUED | OUTPATIENT
Start: 2024-09-27 | End: 2024-09-27

## 2024-09-27 RX ORDER — ATORVASTATIN CALCIUM 10 MG/1
20 TABLET, FILM COATED ORAL AT BEDTIME
Refills: 0 | Status: DISCONTINUED | OUTPATIENT
Start: 2024-09-27 | End: 2024-09-27

## 2024-09-27 RX ORDER — MAGNESIUM SULFATE 500 MG/ML
4 VIAL (ML) INJECTION ONCE
Refills: 0 | Status: COMPLETED | OUTPATIENT
Start: 2024-09-27 | End: 2024-09-27

## 2024-09-27 RX ORDER — ACETAMINOPHEN 325 MG
650 TABLET ORAL EVERY 6 HOURS
Refills: 0 | Status: DISCONTINUED | OUTPATIENT
Start: 2024-09-27 | End: 2024-09-27

## 2024-09-27 RX ADMIN — Medication 650 MILLIGRAM(S): at 06:16

## 2024-09-27 RX ADMIN — Medication 8: at 13:17

## 2024-09-27 RX ADMIN — PANTOPRAZOLE SODIUM 40 MILLIGRAM(S): 40 TABLET, DELAYED RELEASE ORAL at 06:16

## 2024-09-27 RX ADMIN — Medication 650 MILLIGRAM(S): at 07:59

## 2024-09-27 RX ADMIN — Medication 25 GRAM(S): at 10:55

## 2024-09-27 RX ADMIN — Medication 6: at 09:10

## 2024-09-27 NOTE — END OF VISIT
[] : Fellow [Time Spent: ___ minutes] : I have spent [unfilled] minutes of time on the encounter which excludes teaching and separately reported services. [FreeTextEntry3] : Seen with , oncology fellow. --tolerating chemoRT well, except for anemia needing blood transfusions --no role for Procrit during chemoRT per NCCN guidelines --ordered CBC, CMP, type and screen --noted anemia today with hgb 7.5 and fatigue -- given symptomatic anemia will give him one unit PRBC in infusion tomorrow as no slots for transfusion today --mild neuropathy, grade 1 intermittent --RTC in 2 wks, check labs again next week and transfuse again if needed --dose reduce to taxol 40 mg/m2 and carboplatin AUC 1.5 due to downtrending WBC --patient is on treatment with carboplatin/taxol requiring intensive monitoring for toxicity with CBC, CMP weekly --high risk due to one or more chronic illnesses with severe side effects from treatment

## 2024-09-27 NOTE — PROGRESS NOTE ADULT - PROBLEM SELECTOR PLAN 6
Hx GERD  -PPX:  Protonix  -PO Diet: NPO for EBUS, then resume diet   -Bowel regimen: miralax/senna Hx GERD    -PPX:  Protonix  -Bowel regimen: miralax/senna

## 2024-09-27 NOTE — H&P ADULT - NSHPLABSRESULTS_GEN_ALL_CORE
6.9    1.54  )-----------( 126      ( 26 Sep 2024 20:35 )             20.8         131[L]  |  101  |  19  ----------------------------<  303[H]  4.2   |  19[L]  |  0.70  Ca    8.1[L]      26 Sep 2024 20:35  TPro  7.0  /  Alb  3.2[L]  /  TBili  0.7  /  DBili  x   /  AST  13  /  ALT  16  /  AlkPhos  81    Urinalysis Basic - ( 26 Sep 2024 21:18 )  Color: Yellow / Appearance: Clear / S.016 / pH: x  Gluc: x / Ketone: Trace mg/dL  / Bili: Negative / Urobili: 1.0 mg/dL   Blood: x / Protein: Trace mg/dL / Nitrite: Negative   Leuk Esterase: Negative / RBC: x / WBC x   Sq Epi: x / Non Sq Epi: x / Bacteria: x  PT/INR - ( 26 Sep 2024 20:35 )   PT: 13.3 sec;   INR: 1.14     PTT - ( 26 Sep 2024 20:35 )  PTT:26.4 sec  CAPILLARY BLOOD GLUCOSE  POCT Blood Glucose.: 343 mg/dL (26 Sep 2024 18:41)    Xray Chest 1 View AP/PA:   ACC: [id number] EXAM:  XR CHEST AP OR PA 1V   ORDERED BY: DEBORAH GARCIA   PROCEDURE DATE:  2024      INTERPRETATION:  TECHNIQUE: Single portable view of the chest.  COMPARISON:  CT chest dated 2024  CLINICAL HISTORY: syncope  FINDINGS:  Single frontal view of the chest demonstrates 6.7 x 5.6 cm left lingula   neoplasm, unchanged. The cardiomediastinal silhouette is unremarkable. No   acute osseous abnormalities.  IMPRESSION: Left lingular neoplasm, unchanged  PARUL SPANN MD; Attending Radiologist  This document has been electronically signed. Sep 26 2024 11:03PM (24 @ 20:23)

## 2024-09-27 NOTE — PROGRESS NOTE ADULT - PROBLEM SELECTOR PLAN 8
Home med Lipitor 20mg qHS  -c/w Lipitor 20 mg qhs Home med Lipitor 20mg qHS    -c/w Lipitor 20 mg qhs

## 2024-09-27 NOTE — PROGRESS NOTE ADULT - PROBLEM SELECTOR PLAN 2
Patient synopsized briefly at chemotherapy session. No prodromal symptoms, less like seizure, TTE 7/31/24 shows EF 55-60%, no wall motion abnormalities, trivial pericardial effusion. Etiology likely vasovagal episode, but rule out cardiac vs. orthostatic origin.  -orthostatic BPs  -consider repeat TTE Patient synopsized briefly at chemotherapy session. No prodromal symptoms, less like seizure, TTE 7/31/24 shows EF 55-60%, no wall motion abnormalities, trivial pericardial effusion. Etiology likely vasovagal episode, but rule out cardiac vs. orthostatic origin.  S/p 1u pRBC, s/p 1L NS    -F/u orthostatics   -consider repeat TTE

## 2024-09-27 NOTE — CONSULT NOTE ADULT - ATTENDING COMMENTS
Mr. 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 He has been transfused and is feeling back to baseline. He denies any head injury, focal deficits, or ongoing issues. No risk of IO-associated endocrine disorder (no exposure at this point in treatment) and overall feels appropriate for discharge. Likely chemotherapy associated acute on chronic anemia secondary to marrow suppression. Discussed red flag symptoms appropriate for re-presentation but overall appropriate for discharge at this time. Will have exceedingly close followup with his medical oncologist in outpatient setting.     Manoj Gonsalez

## 2024-09-27 NOTE — DISCHARGE NOTE PROVIDER - NSDCMRMEDTOKEN_GEN_ALL_CORE_FT
acetaminophen 325 mg oral tablet: 2 tab(s) orally every 6 hours as needed for Moderate Pain (4 - 6)  amLODIPine 5 mg oral tablet: 1 tab(s) orally once a day  atorvastatin 20 mg oral tablet: 1 tab(s) orally once a day  metFORMIN 500 mg oral tablet: 1 tab(s) orally 2 times a day  pantoprazole 40 mg oral delayed release tablet: 1 tab(s) orally once a day (before a meal)  pantoprazole 40 mg oral delayed release tablet: 1 tab(s) orally once a day  polyethylene glycol 3350 oral powder for reconstitution: 17 gram(s) orally every 24 hours as needed for  constipation  senna leaf extract oral tablet: 2 tab(s) orally once a day (at bedtime) as needed for  constipation   amLODIPine 5 mg oral tablet: 1 tab(s) orally once a day  atorvastatin 20 mg oral tablet: 1 tab(s) orally once a day  metFORMIN 500 mg oral tablet: 1 tab(s) orally 2 times a day  pantoprazole 40 mg oral delayed release tablet: 1 tab(s) orally once a day  polyethylene glycol 3350 oral powder for reconstitution: 17 gram(s) orally every 24 hours as needed for  constipation  senna leaf extract oral tablet: 2 tab(s) orally once a day (at bedtime) as needed for  constipation

## 2024-09-27 NOTE — DISCHARGE NOTE PROVIDER - NSDCCPCAREPLAN_GEN_ALL_CORE_FT
PRINCIPAL DISCHARGE DIAGNOSIS  Diagnosis: Syncope  Assessment and Plan of Treatment: Vasovagal syncope (say "vax-qnr-NYR-paula DOMINGUEZSTAK-hez-ika") is sudden dizziness or fainting that can be set off by things such as pain, stress, fear, or trauma. You may sweat or feel light-headed, sick to your stomach, or tingly. The problem causes the heart rate to slow and the blood vessels to widen, or dilate, for a short time. When this happens, blood pools in the lower body, and less blood goes to the brain. We believe this is what you experienced while undergoing chemotherapy. You were also found to have low hemoglobin, or anemia, which may have contributed towards your symptoms. While in the hospital, you received blood and fluids. Please be sure to follow up with your oncologist, Dr. Larose, next week.      SECONDARY DISCHARGE DIAGNOSES  Diagnosis: Lung cancer  Assessment and Plan of Treatment:     Diagnosis: Anemia  Assessment and Plan of Treatment:      PRINCIPAL DISCHARGE DIAGNOSIS  Diagnosis: Syncope  Assessment and Plan of Treatment: Vasovagal syncope (say "rtb-ocf-OKW-paula DOMINGUEZWWZB-qvr-mdf") is sudden dizziness or fainting that can be set off by things such as pain, stress, fear, or trauma. You may sweat or feel light-headed, sick to your stomach, or tingly. The problem causes the heart rate to slow and the blood vessels to widen, or dilate, for a short time. When this happens, blood pools in the lower body, and less blood goes to the brain. We believe this is what you experienced while undergoing chemotherapy. You were also found to have low hemoglobin, or anemia, which may have contributed towards your symptoms. While in the hospital, you received blood and fluids. Please be sure to follow up with your oncologist, Dr. Larose, next week.

## 2024-09-27 NOTE — H&P ADULT - PROBLEM SELECTOR PLAN 2
Hx GERD  -PPX:  Protonix  -PO Diet: NPO for EBUS, then resume diet   - LR 75cc/hr while npo  -Bowel regimen: miralax/senna Endocrine:  DM2  -A1c: 6.3: ISS  -TSH:  1.4 Patient synopsized briefly at chemotherapy session. No prodromal symptoms, less like seizure, TTE 7/31/24 shows EF 55-60%, no wall motion abnormalities, trivial pericardial effusion. Etiology likely vasovagal episode, but rule out cardiac vs. orthostatic origin.  -orthostatic BPs  -consider repeat TTE

## 2024-09-27 NOTE — H&P ADULT - ASSESSMENT
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

## 2024-09-27 NOTE — DISCHARGE NOTE PROVIDER - NSDCFUSCHEDAPPT_GEN_ALL_CORE_FT
[facility]  AMBCHEMO  [address]  Scheduled Appointment: 09/27/2024    Marcelo Larose  [facility]  HEMONC [address]  Scheduled Appointment: 10/01/2024    [facility]  AMBCHEMO  [address]  Scheduled Appointment: 10/03/2024    Marcelo Laorse  [facility]  HEMONC [address]  Scheduled Appointment: 10/10/2024    [facility]  AMBCHEMO  [address]  Scheduled Appointment: 10/10/2024     Marcelo Larose  [facility]  HEMONC [address]  Scheduled Appointment: 10/01/2024    [facility]  AMBCHEMO  [address]  Scheduled Appointment: 10/03/2024    Marcelo Larose  [facility]  HEMONC [address]  Scheduled Appointment: 10/10/2024    [facility]  AMBCHEMO  [address]  Scheduled Appointment: 10/10/2024

## 2024-09-27 NOTE — DISCHARGE NOTE PROVIDER - CARE PROVIDERS DIRECT ADDRESSES
myself
Therese Lemus PA-C
Maryland Line PA
[email or web address]
Bebe Luna

## 2024-09-27 NOTE — PATIENT PROFILE ADULT - FUNCTIONAL ASSESSMENT - BASIC MOBILITY 6.
4-calculated by average/Not able to assess (calculate score using [facility] averaging method)

## 2024-09-27 NOTE — END OF VISIT
[] : Fellow [FreeTextEntry3] : Seen with , oncology fellow. --tolerating chemoRT well, except for anemia needing blood transfusions --no role for Procrit during chemoRT per NCCN guidelines --ordered CBC, CMP, type and screen --noted anemia today with hgb 7.5 and fatigue -- given symptomatic anemia will give him one unit PRBC in infusion tomorrow as no slots for transfusion today --mild neuropathy, grade 1 intermittent --RTC in 2 wks, check labs again next week and transfuse again if needed --dose reduce to taxol 40 mg/m2 and carboplatin AUC 1.5 due to downtrending WBC --patient is on treatment with carboplatin/taxol requiring intensive monitoring for toxicity with CBC, CMP weekly --high risk due to one or more chronic illnesses with severe side effects from treatment

## 2024-09-27 NOTE — H&P ADULT - HISTORY OF PRESENT ILLNESS
CC: reaction to chemotherapy  HPI  Denies fevers, chills, cough, chest pain, dyspnea, nausea, headache, diarrhea, sick contactschange in urinary or bowel habits      In the ED:    - VS: Tmax: , HR:  , BP:  , RR:  , O2:    %     - Pertinent Labs:     - Imaging: CXR: CT: US: Cath: EKG:     - Treatment/interventions:     PMHx:   PSHx:  Meds: See med rec  Allergies:  Social: see below    CC: reaction to chemotherapy  HPI:   Patient went for his 5th cycle of taxol/ carbo on 9/26/24. He had a mild reaction during 2nd cycle of taxol notable for hypotension and dizziness; improved on subsequent cycles after benadryl added back to premeds. Patient received 10 min of infusion on 9/26/24 and noted feeling warm with chills and dizziness. Vitals notable for BP 80s/50s and was tachycardic 130s. He then endorsed nausea and vomited x 1. During emesis pt briefly synopsized for about 20 seconds. He was given 125mg Methylpred and 50mg Benadryl along with 1L IVF bolus and RRT was called. His FS noted to be 305. Vitals improved to BP 120s/ 80s and HR 110s. Patient symptoms improving however still endorsing dizziness at time of EMS arrival. Of note, his Hg today was 7.5 and was planned for 1u PRBCs tomorrow 9/27. At     Denies fevers, chills, cough, chest pain, dyspnea, nausea, headache, diarrhea, sick contacts, change in urinary or bowel habits    In the ED:    -ED vitals: afeb 97.9, , /71, 99% RA, RR 18    - Pertinent Labs:  WBC 1.54, Hg 6.9, Hct 20.8, Neutrophil% 84.3, Na 131, Glucose 303, Ca 8.1, Albumin 3.2, ferritin 2123, U/A +Glucose, RVP -, COVID -    - Imaging: CXR: Single frontal view of the chest demonstrates 6.7 x 5.6 cm left lingula neoplasm, unchanged. The cardiomediastinal silhouette is unremarkable. No acute osseous abnormalities.    - Treatment/interventions: 1 u pRBC    PMHx:   PSHx:  Meds: See med rec  Allergies:   Social: see below  CC: reaction to chemotherapy  HPI:  Patient went for his 5th cycle of taxol/ carbo on 9/26/24. He had a mild reaction during 2nd cycle of taxol notable for hypotension and dizziness; improved on subsequent cycles after benadryl added back to premeds. Patient received 10 min of infusion on 9/26/24 and noted feeling warm with chills and dizziness. Vitals notable for BP 80s/50s and was tachycardic 130s. He then endorsed nausea and vomited x 1. During emesis pt briefly synopsized for about 20 seconds. He was given 125mg Methylpred and 50mg Benadryl along with 1L IVF bolus and RRT was called. His FS noted to be 305. Vitals improved to BP 120s/ 80s and HR 110s. Patient symptoms improving however still endorsing dizziness at time of EMS arrival. Of note, his Hg today was 7.5 and was planned for 1u PRBCs tomorrow 9/27. Denies fevers, chills, cough, chest pain, dyspnea, nausea, headache, diarrhea, sick contacts, change in urinary or bowel habits    In the ED:    -ED vitals: afeb 97.9, , /71, 99% RA, RR 18    - Pertinent Labs:  WBC 1.54, Hg 6.9, Hct 20.8, Neutrophil% 84.3, Na 131, Glucose 303, Ca 8.1, Albumin 3.2, ferritin 2123, U/A +Glucose, RVP -, COVID -    - Imaging: CXR: Single frontal view of the chest demonstrates 6.7 x 5.6 cm left lingula neoplasm, unchanged. The cardiomediastinal silhouette is unremarkable. No acute osseous abnormalities.    - Treatment/interventions: 1 u pRBC    PMHx: see below  PSHx: see below  Meds: See med rec  Allergies: NKDA  Social: see below  CC: reaction to chemotherapy  HPI:  Patient went for his 5th cycle of taxol/ carbo on 9/26/24. He had a mild reaction during 2nd cycle of taxol notable for hypotension and dizziness; improved on subsequent cycles after benadryl added back to premeds. Patient received 10 min of infusion on 9/26/24 and noted feeling warm with chills and dizziness. Vitals notable for BP 80s/50s and was tachycardic 130s. He then endorsed nausea and vomited x 1. During emesis pt briefly synopsized for about 20 seconds. He was given 125mg Methylpred and 50mg Benadryl along with 1L IVF bolus and RRT was called. His FS noted to be 305. Vitals improved to BP 120s/ 80s and HR 110s. Patient symptoms improving however still endorsing dizziness at time of EMS arrival. Of note, his Hg today was 7.5 and was planned for 1u PRBCs tomorrow 9/27. However at admission to [facility], his Hg was 6.9, likely dilutional, but patient given 1 u pRBC at admission. Denies fevers, chills, cough, chest pain, dyspnea, nausea, headache, diarrhea, sick contacts, change in urinary or bowel habits    In the ED:    -ED vitals: afeb 97.9, , /71, 99% RA, RR 18    - Pertinent Labs:  WBC 1.54, Hg 6.9, Hct 20.8, Neutrophil% 84.3, Na 131, Glucose 303, Ca 8.1, Albumin 3.2, ferritin 2123, U/A +Glucose, RVP -, COVID -    - Imaging: CXR: Single frontal view of the chest demonstrates 6.7 x 5.6 cm left lingula neoplasm, unchanged. The cardiomediastinal silhouette is unremarkable. No acute osseous abnormalities.    - Treatment/interventions: 1 u pRBC    PMHx: see below  PSHx: see below  Meds: See med rec  Allergies: NKDA  Social: see below

## 2024-09-27 NOTE — CONSULT NOTE ADULT - ASSESSMENT
CATHERINE APONTE is a 69y Male with PMH GERD, HTN, HLD, DMII, squamous cell lung cancer, stage IIIa, T4 N0 M0, PD-L1 negative s/p neoadjuvant chemoimmunotherapy finished 6/19/24, s/p mediastinoscopy and EBUS with Dr. Werner 8/6/2024, and now on adjuvant chemotherapy carboplatin/taxol, who presents for syncopal episode after latest treatment. Oncology consulted given history of lung cancer and anemia.     #Hx of Squamous Cell Lung Cancer   #Anemia  - s/p neoadjuvant chemoimmunotherapy with upstaging to T4N2 after mediastinoscopy now on adjuvant chemotherapy C5 carbo/taxol, though presented after syncopal event after cycle 5 treatment  - no current signs of bleeding, appropriate response after Hgb improved  - patient to follow up outpatient with Dr. Larose - has follow up October 1 2024    Discussed with Dr. Gonsalez. Recommendations are considered final after attending attestation.

## 2024-09-27 NOTE — H&P ADULT - PROBLEM SELECTOR PLAN 7
Continue lipitor 20mg qHS Home med Lipitor 20mg qHS  -c/w lipitor 20 mg qhs Norvasc 5mg at home, plan to resume after procedure, presently normotensive  -restart norvasc 5 mg after orthostatics Norvasc 5mg at home, plan to resume after procedure, presently normotensive  -restart Norvasc 5 mg after orthostatics

## 2024-09-27 NOTE — DISCHARGE NOTE PROVIDER - HOSPITAL COURSE
#Discharge: do not delete    Patient is a 70yo M w/ PMHx of HTN, GERD, T2DM, HLD, NSLC COLETTE mass (determined to be uC2V6P9, stage IIIA), started on neoadjuvent chemo on 4/23/24 with  w/ PET scan 7/8/24 showing progression of disease    Hospital course (by problem):   #Pancytopenia.   At admission, pancytopenia with Plts 126, Hg 6.9, WBC 1.24. No active signs of bleeding. No symptomatic anemia of fatigue, pallor.   S/p 1 unit pRBC, Hb this AM 8.1  WBC 1.81 Plt 121    - transfuse if Hb < 7  - maintain active type and screen  - transfuse if platelets <50 with bleeding or platelets <10.     Problem/Plan - 2:  ·  Problem: Syncope.   ·  Plan: Patient synopsized briefly at chemotherapy session. No prodromal symptoms, less like seizure, TTE 7/31/24 shows EF 55-60%, no wall motion abnormalities, trivial pericardial effusion. Etiology likely vasovagal episode, but rule out cardiac vs. orthostatic origin.  S/p 1u pRBC, s/p 1L NS    -F/u orthostatics   -consider repeat TTE.     Problem/Plan - 3:  ·  Problem: Infusion reaction.   ·  Plan: Pt experienced infusion reaction to chemotherapy on 9/26 and has past infusion reactions as well to chemotherapy. S/p 125 methylpred IV and benadryl 25 mg.    -CTM  - f/u heme/onc recs --> ?inpatient chemotherapy vs. outpatient to manage reactions.     Problem/Plan - 4:  ·  Problem: Non-small cell lung cancer (NSCLC).   ·  Plan: LLL mass, c/w stage IIIA (T4N0M0) NSCLC - SCC histology. S/p taxol chemotherapy and radiation session today, but had brief syncopal episode with subsequent tachycardia.   Pt follows with Dr. Larose, has undergone 5 sessions of chemotherapy thus far, to undergo 33 sessions of radiation     - c/w radiation while inpatient  - f/u heme/onc recs.     Problem/Plan - 5:  ·  Problem: Diabetes.   ·  Plan: T2DM, home med metformin 500 mg BID  A1c 7.2%     - c/w moderate sliding scale  - monitor FS.     Problem/Plan - 6:  ·  Problem: GERD (gastroesophageal reflux disease).   ·  Plan: Hx GERD    -PPX:  Protonix  -Bowel regimen: miralax/senna.     Problem/Plan - 7:  ·  Problem: Hypertension.   ·  Plan: Norvasc 5mg at home, plan to resume after procedure, presently normotensive    -restart Norvasc 5 mg after orthostatics.     Problem/Plan - 8:  ·  Problem: Hyperlipidemia.   ·  Plan: Home med Lipitor 20mg qHS    -c/w Lipitor 20 mg qhs.      Patient was discharged to: (home/[facility]/acute rehab/hospice, etc, and with what services – home health PT/RN? Home O2?)    New medications:   Changes to old medications:  Medications that were stopped:    Items to follow up as outpatient:    Physical exam at the time of discharge:       #Discharge: do not delete    Patient is a 70yo M w/ PMHx of HTN, GERD, T2DM, HLD, NSLC COLETTE mass (determined to be T4N0M0, stage IIIA), started on neoadjuvent chemo on 4/23/24 with  w/ PET scan 7/8/24 showing progression of disease, presenting after having syncopal episode during chemotherapy. Pt felt chills, dizziness, vomited, then loss consciousness for abot 20 seconds. He was given methylrpednisolone 125mg, benadryl 50mg, 1L IVF. Vitals imprved, but pt was sent to the ED for futher w/u. Pt also found to have Hb of 6.9 on admission, as well as WBC 1.54. Pt received 1u pRBC, which brought Hb to 8.1. Heme/onc was consulted, discussion of inpatient chemo vs. outpatient occurred. Determined that pt can have chemo outpatient. Syncope likely vasovagal iso chemotherapy as pt has negative orthostatics, recent cardiac echo from July with no abnormalities. Pt to follow up with heme/onc next week.    Hospital course (by problem):   #Pancytopenia.   At admission, pancytopenia with Plts 126, Hg 6.9, WBC 1.24. No active signs of bleeding. No symptomatic anemia of fatigue, pallor.   S/p 1 unit pRBC, Hb this AM 8.1, WBC 1.81 Plt 121  - pt to follow up with heme/onc outpatient    #Syncope.    Patient synopsized briefly at chemotherapy session. No prodromal symptoms, less like seizure, TTE 7/31/24 shows EF 55-60%, no wall motion abnormalities, trivial pericardial effusion. Etiology likely vasovagal episode, but rule out cardiac vs. orthostatic origin.  Pt has no hx of syncope  S/p 1u pRBC, s/p 1L NS  - Orthostatics negative    #Infusion reaction.   Pt experienced infusion reaction to chemotherapy on 9/26 and has past infusion reactions as well to chemotherapy. S/p 125 methylpred IV and benadryl 25 mg.  - f/u heme/onc  - pt to have chemotherapy done outpatient    #Non-small cell lung cancer (NSCLC).   LLL mass, c/w stage IIIA (T4N0M0) NSCLC - SCC histology. S/p taxol chemotherapy and radiation session today, but had brief syncopal episode with subsequent tachycardia.   Pt follows with Dr. Larose, has undergone 5 sessions of chemotherapy thus far, to undergo 33 sessions of radiation   - s/p radiation while inpatient  - f/u heme/onc     #Diabetes.   T2DM, home med metformin 500 mg BID  A1c 7.2%   - c/w metformin    #GERD (gastroesophageal reflux disease).    Hx GERD  -c/w Protonix    #Hypertension.   Norvasc 5mg at home, plan to resume after procedure, presently normotensive  -resume Norvasc 5 mg at home    #Hyperlipidemia.   Home med Lipitor 20mg qHS  -c/w Lipitor 20 mg qhs.    Patient was discharged to: home    New medications: none  Changes to old medications: none  Medications that were stopped: none    Items to follow up as outpatient: heme/onc    Physical exam at the time of discharge:    General: NAD  HEENT: NC/AT; PERRL, anicteric sclera; MMM  Neck: supple  Cardiovascular: +S1/S2, RRR  Respiratory: CTA B/L; no W/R/R  Gastrointestinal: soft, NT/ND; +BSx4  Extremities: WWP; no edema, clubbing or cyanosis  Vascular: 2+ radial, DP/PT pulses B/L  Neurological: AAOx3; no focal deficits  Psychiatric: pleasant mood and affect  Dermatologic: no appreciable wounds or damage to the skin

## 2024-09-27 NOTE — H&P ADULT - PROBLEM SELECTOR PLAN 5
F:  E:  N:  Dvt ppx:  Full code Continue lipitor 20mg qHS Hx GERD  -PPX:  Protonix  -PO Diet: NPO for EBUS, then resume diet   - LR 75cc/hr while npo  -Bowel regimen: miralax/senna Hx GERD  -PPX:  Protonix  -PO Diet: NPO for EBUS, then resume diet   -Bowel regimen: miralax/senna t2DM, home med metformin 500 mg BID  -A1c: 6.3: ISS  -TSH:  1.4  -moderate sliding scale  -f/u A1c in AM

## 2024-09-27 NOTE — DISCHARGE NOTE NURSING/CASE MANAGEMENT/SOCIAL WORK - PATIENT PORTAL LINK FT
You can access the FollowMyHealth Patient Portal offered by [facility] by registering at the following website: [email or web address]. By joining [facility]’s FollowMyHealth portal, you will also be able to view your health information using other applications (apps) compatible with our system.

## 2024-09-27 NOTE — HISTORY OF PRESENT ILLNESS
[Disease: _____________________] : Disease: [unfilled] [T: ___] : T[unfilled] [N: ___] : N[unfilled] [M: ___] : M[unfilled] [AJCC Stage: ____] : AJCC Stage: [unfilled] [1] : 1, Mild [100: Normal, no complaints, no evidence of disease.] : 100: Normal, no complaints, no evidence of disease. [ECOG Performance Status: 0 - Fully active, able to carry on all pre-disease performance without restriction] : Performance Status: 0 - Fully active, able to carry on all pre-disease performance without restriction [de-identified] : Don Castrejon is a 69-year-old male who presents to the clinic for f/u of LLL mass, c/w stage IIIB (T4N2M0) NSCLC - SCC histology.  Onc hx:  Social Hx: 50 pack yr smoking hx, quit when he found out about the mass Originally from [place], lives in [place] by himself. Retired .  Son lives in [place]   3/15/24: MR Head:  Punctate focus of enhancement the left lateral cerebellar hemisphere (series 701 image 55 and series 702 image 23). There is no associated signal abnormality in the brain parenchyma. Given the lack of associated signal abnormality or additional areas of pathologic enhancement, these findings may represent vascular enhancement versus artifact. However, attention on follow-up imaging is recommended to exclude intracranial metastasis. Focal area of intrinsic T1 hyperintensity more inferiorly in the left cerebellar hemisphere without superimposed pathologic enhancement. This may represent area of mineralization. 5 mm hypoenhancing lesion in the left side of the sella most compatible with pituitary adenoma. Correlation with endocrine function and consideration for further evaluation dedicated MRI sella is recommended.  3/15/24: PET: 1. Compared to chest CT from 2/15/2024, the 76 mm mass in the lingula is hypermetabolic and suspicious for lung cancer. 2. The borderline sized thoracic lymph nodes on the CT scan are not FDG avid. 3. Mildly nodular right adrenal gland is not FDG avid. No evidence of metastatic lung cancer. 4. Mild increased activity in enlarged and lobular prostate. Recommend correlation with PSA to rule out prostate cancer.  4/3/24: Chest Xray:  Small left apex pneumothorax, similar to prior exam earlier same day. Left lower lung mass again noted. No acute infiltrates. No significant pleural effusion.  04/03/2024 EBUS  Lung, left, core biopsy: -Squamous cell carcinoma. LYMPH NODE, 11L NEGATIVE FOR MALIGNANT CELLS. Note: Immunostains performed on block 1A shows that the tumor cells are positive for p40 and negative for TTF-1 stain.  This staining profile supports the diagnosis. PDL1 negative Tier I: Variants of Strong Clinical Significance PIK3CA p.([id number]) Tier II: Variants of Potential Clinical Significance KRAS p.([id number]) Tier III: Variants of Unknown Clinical Significance STK11 p.([id number]) ALK p.([id number]) ERBB3 p.([id number]) POLE p.([id number]) 4/4/2024: TTB - rec for MAGDALENA 5/3/2024: Sent to ED by  for worsening HENRY  6/26/2024: CT chest: 1. Enlarging necrotic neoplasm occupying the inferior segment of the lingula with obstruction of the lingular segmental bronchus and pulmonary artery. 2. Mild mediastinal lymphadenopathy with progressive encasement of the right hilum by above described necrotic lingular neoplasm. 6/27/2024: TTB: Patient presented during interdisciplinary TB on 6/27/24: Concern for a large lobulated spiculated lingular mass. Prominent mediastinal LNs also noted. On recent scan area seems to be enlarging. Plan to obtain a PET to r/o any distant disease/progression. If negative will discuss the possibility of a pneumonectomy. Can obtain a VQ scan prior. PLAN: PET-CT. 8/6/2024L Underwent mediastinoscopy and EBUS with , now found to have positive 4L and 11 LN, atypical cells in 4R - this is c/w POD on chemoIO and patient is no longer a candidate for resection. Reviewed at TTB, recommendation was for definitive chemoRT. [de-identified] : Squamous Cell Ca  - PDL1 negative, PIK3CA E542K [de-identified] : CTSx:  [FreeTextEntry1] : 4/23/2024: C1D1 cisplatin/gemcitabine/nivolumab D8 gemcitabine skipped due to HENRY 2/2 cisplatin 5/21/2024: C2D1 carboplatin/gemcitabine.nivolumab 5/28/2024: C2D8 gemcitabine 6/11/2024: Due for C3D1 carboplatin/gemcitabine/nivolumab 6/18/24    C3D8 Gemcitabine held due to low h/h 6/19/24    C3D8 Gemcitabine given w/ blood tranfusion  8/30/24: C1D1 carboplatin/paclitaxel weekly 1/7 9/5/2024 C2D1 carboplatin/ taxol weekly 2/7 9/12/2024 C3D1 carboplatin/taxol weekly 3/7 9/19/2024  C4D1 carboplatin/taxol weekly 4/7 9/26/2024 C5D1 carboplatin AUC 1.5 /taxol 40 mg/m2 weekly 5/7 [de-identified] : The patient reports feeling well.  He denies any shortness of breath.  Reports reduced appetite due to altered taste, but is making an effort to eat. [FreeTextEntry3] : neuropathy  [FreeTextEntry4] : 9/19/24  [FreeTextEntry5] : taxol

## 2024-09-27 NOTE — H&P ADULT - ATTENDING COMMENTS
Pt is 70 yo man with SCLC, admitted after syncopal episode while receiving chemotherapy. Pt was noted to be hypotensive and anemic. Responded well to IV fluids and RBC transfusion.   As discussed with Oncology team, pt is ready for discharge home and outpatient follow up with oncologist.

## 2024-09-27 NOTE — H&P ADULT - PROBLEM SELECTOR PROBLEM 3
Prophylactic measure Hypertension GERD (gastroesophageal reflux disease) Non-small cell lung cancer (NSCLC) Infusion reaction

## 2024-09-27 NOTE — H&P ADULT - PROBLEM SELECTOR PLAN 4
Continue lipitor 20mg qHS Norvasc 5mg at home, plan to resume after procedure, presently normotensive Endocrine:  DM2  -A1c: 6.3: ISS  -TSH:  1.4  -moderate sliding scale t2DM, home med metformin 500 mg BID  -A1c: 6.3: ISS  -TSH:  1.4  -moderate sliding scale  -f/u A1c in AM LLL mass, c/w stage IIIA (T4N0M0) NSCLC - SCC histology. S/p taxol chemotherapy session today, but had brief syncopal episode with subsequent tachycardia.   -f/u heme/onc recs

## 2024-09-27 NOTE — HISTORY OF PRESENT ILLNESS
[Disease: _____________________] : Disease: [unfilled] [T: ___] : T[unfilled] [N: ___] : N[unfilled] [M: ___] : M[unfilled] [AJCC Stage: ____] : AJCC Stage: [unfilled] [1] : 1, Mild [100: Normal, no complaints, no evidence of disease.] : 100: Normal, no complaints, no evidence of disease. [ECOG Performance Status: 0 - Fully active, able to carry on all pre-disease performance without restriction] : Performance Status: 0 - Fully active, able to carry on all pre-disease performance without restriction [de-identified] : Don Castrejon is a 69-year-old male who presents to the clinic for f/u of LLL mass, c/w stage IIIB (T4N2M0) NSCLC - SCC histology.  Onc hx:  Social Hx: 50 pack yr smoking hx, quit when he found out about the mass Originally from [place], lives in [place] by himself. Retired .  Son lives in [place]   3/15/24: MR Head:  Punctate focus of enhancement the left lateral cerebellar hemisphere (series 701 image 55 and series 702 image 23). There is no associated signal abnormality in the brain parenchyma. Given the lack of associated signal abnormality or additional areas of pathologic enhancement, these findings may represent vascular enhancement versus artifact. However, attention on follow-up imaging is recommended to exclude intracranial metastasis. Focal area of intrinsic T1 hyperintensity more inferiorly in the left cerebellar hemisphere without superimposed pathologic enhancement. This may represent area of mineralization. 5 mm hypoenhancing lesion in the left side of the sella most compatible with pituitary adenoma. Correlation with endocrine function and consideration for further evaluation dedicated MRI sella is recommended.  3/15/24: PET: 1. Compared to chest CT from 2/15/2024, the 76 mm mass in the lingula is hypermetabolic and suspicious for lung cancer. 2. The borderline sized thoracic lymph nodes on the CT scan are not FDG avid. 3. Mildly nodular right adrenal gland is not FDG avid. No evidence of metastatic lung cancer. 4. Mild increased activity in enlarged and lobular prostate. Recommend correlation with PSA to rule out prostate cancer.  4/3/24: Chest Xray:  Small left apex pneumothorax, similar to prior exam earlier same day. Left lower lung mass again noted. No acute infiltrates. No significant pleural effusion.  04/03/2024 EBUS  Lung, left, core biopsy: -Squamous cell carcinoma. LYMPH NODE, 11L NEGATIVE FOR MALIGNANT CELLS. Note: Immunostains performed on block 1A shows that the tumor cells are positive for p40 and negative for TTF-1 stain.  This staining profile supports the diagnosis. PDL1 negative Tier I: Variants of Strong Clinical Significance PIK3CA p.([id number]) Tier II: Variants of Potential Clinical Significance KRAS p.([id number]) Tier III: Variants of Unknown Clinical Significance STK11 p.([id number]) ALK p.([id number]) ERBB3 p.([id number]) POLE p.([id number]) 4/4/2024: TTB - rec for MAGDALENA 5/3/2024: Sent to ED by  for worsening HENRY  6/26/2024: CT chest: 1. Enlarging necrotic neoplasm occupying the inferior segment of the lingula with obstruction of the lingular segmental bronchus and pulmonary artery. 2. Mild mediastinal lymphadenopathy with progressive encasement of the right hilum by above described necrotic lingular neoplasm. 6/27/2024: TTB: Patient presented during interdisciplinary TB on 6/27/24: Concern for a large lobulated spiculated lingular mass. Prominent mediastinal LNs also noted. On recent scan area seems to be enlarging. Plan to obtain a PET to r/o any distant disease/progression. If negative will discuss the possibility of a pneumonectomy. Can obtain a VQ scan prior. PLAN: PET-CT. 8/6/2024L Underwent mediastinoscopy and EBUS with , now found to have positive 4L and 11 LN, atypical cells in 4R - this is c/w POD on chemoIO and patient is no longer a candidate for resection. Reviewed at TTB, recommendation was for definitive chemoRT. [de-identified] : Squamous Cell Ca  - PDL1 negative, PIK3CA E542K [de-identified] : CTSx:  [FreeTextEntry1] : 4/23/2024: C1D1 cisplatin/gemcitabine/nivolumab D8 gemcitabine skipped due to HENRY 2/2 cisplatin 5/21/2024: C2D1 carboplatin/gemcitabine.nivolumab 5/28/2024: C2D8 gemcitabine 6/11/2024: Due for C3D1 carboplatin/gemcitabine/nivolumab 6/18/24    C3D8 Gemcitabine held due to low h/h 6/19/24    C3D8 Gemcitabine given w/ blood tranfusion  8/30/24: C1D1 carboplatin/paclitaxel weekly 1/7 9/5/2024 C2D1 carboplatin/ taxol weekly 2/7 9/12/2024 C3D1 carboplatin/taxol weekly 3/7 9/19/2024  C4D1 carboplatin/taxol weekly 4/7 9/26/2024 C5D1 carboplatin AUC 1.5 /taxol 40 mg/m2 weekly 5/7 [de-identified] : The patient reports feeling well.  He denies any shortness of breath.  Reports reduced appetite due to altered taste, but is making an effort to eat. [FreeTextEntry3] : neuropathy  [FreeTextEntry4] : 9/19/24  [FreeTextEntry5] : taxol

## 2024-09-27 NOTE — H&P ADULT - PROBLEM SELECTOR PLAN 1
Endocrine:  DM2  -A1c: 6.3: ISS  -TSH:  1.4 LLL mass, c/w stage IIIA (T4N0M0) NSCLC - SCC histology At admission, neutropenia with Plts 126, Hg 6.9, WBC 1.24. No active signs of bleeding. No symptomatic anemia of fatigue, pallor.   -maintain active type and screen  -s/p 1 u pRBC At admission, neutropenia with Plts 126, Hg 6.9, WBC 1.24. No active signs of bleeding. No symptomatic anemia of fatigue, pallor.   -maintain active type and screen  -s/p 1 u pRBC  -transfuse if platelets <50 with bleeding or platelets <10 At admission, pancytopenia with Plts 126, Hg 6.9, WBC 1.24. No active signs of bleeding. No symptomatic anemia of fatigue, pallor.   -maintain active type and screen  -s/p 1 u pRBC  -transfuse if platelets <50 with bleeding or platelets <10

## 2024-09-27 NOTE — PATIENT PROFILE ADULT - FUNCTIONAL SCREEN CURRENT LEVEL: COMMUNICATION, MLM
Winlevi Counseling:  I discussed with the patient the risks of topical clascoterone including but not limited to erythema, scaling, itching, and stinging. Patient voiced their understanding. 0 = understands/communicates without difficulty

## 2024-09-27 NOTE — H&P ADULT - NSHPPHYSICALEXAM_GEN_ALL_CORE
.  VITAL SIGNS:  T(C): 36.6 (09-26-24 @ 23:25), Max: 36.8 (09-26-24 @ 21:52)  T(F): 97.9 (09-26-24 @ 23:25), Max: 98.3 (09-26-24 @ 21:52)  HR: 102 (09-26-24 @ 23:25) (89 - 118)  BP: 111/71 (09-26-24 @ 23:25) (108/63 - 128/75)  BP(mean): --  RR: 18 (09-26-24 @ 23:25) (16 - 18)  SpO2: 99% (09-26-24 @ 23:25) (94% - 99%)  Wt(kg): --    PHYSICAL EXAM:  Constitutional: Patient is in no acute distress, resting comfortably in bed.  HEENT: Atraumatic/normocephalic. PERRL, EOMI, anicteric sclera, no nasal discharge; uvula midline, no oropharyngeal erythema or exudates; mucous membranes moist.   Neck: supple; no JVD or thyromegaly.  Respiratory: Clear to auscultation bilaterally; no Wheezing/Crackles/Ronchi, no accessory muscle use.   Cardiac: Regular rate and rhythm, S1/S2; no Murmur/Rub/Gallop; PMI non-displaced.  Gastrointestinal: abdomen soft, non-tender and non-distended; no rebound or guarding; Normoactive bowel sounds.   Back: spine midline, no bony tenderness or step-offs; no CVAT B/L  Extremities: Warm and Well perfused, no clubbing or cyanosis; no peripheral edema  Musculoskeletal: Normal ROM in upper and lower extremities; no joint swelling, tenderness or erythema  Vascular: 2+ radial, Dorsalis pedis and posterior tibial pulses bilaterally.  Dermatologic: skin warm, dry and intact; no rashes, wounds, or scars  Lymphatic: no submandibular or cervical LAD  Neurologic: AAOx3; CNII-XII grossly intact; no focal deficits  Psychiatric: affect and characteristics of appearance, verbalizations, behaviors are appropriate .  VITAL SIGNS:  T(C): 36.6 (09-26-24 @ 23:25), Max: 36.8 (09-26-24 @ 21:52)  T(F): 97.9 (09-26-24 @ 23:25), Max: 98.3 (09-26-24 @ 21:52)  HR: 102 (09-26-24 @ 23:25) (89 - 118)  BP: 111/71 (09-26-24 @ 23:25) (108/63 - 128/75)  BP(mean): --  RR: 18 (09-26-24 @ 23:25) (16 - 18)  SpO2: 99% (09-26-24 @ 23:25) (94% - 99%)  Wt(kg): --    PHYSICAL EXAM:  Constitutional: Patient is in no acute distress, resting comfortably in bed.  HEENT: Atraumatic/normocephalic. PERRL, EOMI, anicteric sclera  Neck: supple  Respiratory: Clear to auscultation bilaterally; no Wheezing/Crackles/Ronchi, no accessory muscle use.   Cardiac: Regular rate and rhythm, S1/S2  Gastrointestinal: abdomen soft, non-tender and non-distended; no rebound or guarding; Normoactive bowel sounds.   Back: no CVAT B/L  Extremities: Warm and Well perfused, no peripheral edema  Musculoskeletal: Normal ROM in upper and lower extremities  Vascular: 2+ radial, Dorsalis pedis and posterior tibial pulses bilaterally.  Dermatologic: skin warm, dry and intact; no rashes, wounds, or scars  Lymphatic: no submandibular or cervical LAD  Neurologic: AAOx3; CNII-XII grossly intact; no focal deficits  Psychiatric: affect and characteristics of appearance, verbalizations, behaviors are appropriate VITAL SIGNS:  T(C): 36.6 (09-26-24 @ 23:25), Max: 36.8 (09-26-24 @ 21:52)  T(F): 97.9 (09-26-24 @ 23:25), Max: 98.3 (09-26-24 @ 21:52)  HR: 102 (09-26-24 @ 23:25) (89 - 118)  BP: 111/71 (09-26-24 @ 23:25) (108/63 - 128/75)  BP(mean): --  RR: 18 (09-26-24 @ 23:25) (16 - 18)  SpO2: 99% (09-26-24 @ 23:25) (94% - 99%)  Wt(kg): --    PHYSICAL EXAM:  Constitutional: Patient is in no acute distress, resting comfortably in bed.  HEENT: Atraumatic/normocephalic. PERRL, EOMI, anicteric sclera  Neck: supple  Respiratory: Clear to auscultation bilaterally; no Wheezing/Crackles/Ronchi, no accessory muscle use.   Cardiac: Regular rate and rhythm, S1/S2  Gastrointestinal: abdomen soft, non-tender and non-distended; no rebound or guarding; Normoactive bowel sounds.   Back: no CVAT B/L  Extremities: Warm and Well perfused, no peripheral edema  Musculoskeletal: Normal ROM in upper and lower extremities  Vascular: 2+ radial, Dorsalis pedis and posterior tibial pulses bilaterally.  Dermatologic: skin warm, dry and intact; no rashes, wounds, or scars  Lymphatic: no submandibular or cervical LAD  Neurologic: AAOx3; CNII-XII grossly intact; no focal deficits  Psychiatric: affect and characteristics of appearance, verbalizations, behaviors are appropriate

## 2024-09-27 NOTE — H&P ADULT - PROBLEM SELECTOR PLAN 3
F:  E:  N:  Dvt ppx:  Full code Norvasc 5mg at home, plan to resume after procedure, presently normotensive Hx GERD  -PPX:  Protonix  -PO Diet: NPO for EBUS, then resume diet   - LR 75cc/hr while npo  -Bowel regimen: miralax/senna LLL mass, c/w stage IIIA (T4N0M0) NSCLC - SCC histology LLL mass, c/w stage IIIA (T4N0M0) NSCLC - SCC histology. S/p LLL mass, c/w stage IIIA (T4N0M0) NSCLC - SCC histology. S/p taxol chemotherapy session today, but had brief syncopal episode with subsequent tachycardia.   -f/u heme/onc recs Pt experienced infusion reaction to chemotherapy on 9/26 and has past infusion reactions as well to chemotherapy. S/p 125 methylpred IV and benadryl 25 mg.  -CTM, f/u heme/onc recs

## 2024-09-27 NOTE — H&P ADULT - PROBLEM SELECTOR PLAN 8
F:  E:  N:  Dvt ppx:  Full code F: 1 L NS bolus  E: Replete K>4, Mg>2  N: CC diet  Dvt ppx: SCDs  Full code Home med Lipitor 20mg qHS  -c/w lipitor 20 mg qhs Home med Lipitor 20mg qHS  -c/w Lipitor 20 mg qhs

## 2024-09-27 NOTE — ASSESSMENT
[FreeTextEntry1] : 69YM w/ PMHx GERD, HTN, HLD, DMII presenting for follow-up for squamous cell lung cancer, stage IIIb, T4 N2 M0, PD-L1 negative currently on neoadjuvant chemoimmunotherapy. Chemotherapy course complicated by acute kidney injury, likely related to cisplatin.  Patient is here for follow-up.  Squamous Cell Lung Ca, AJCC IIIa, T4N2M0  - PDL1 negative with interim progression of disease to N2, which upstages him to IIIb Completed 3 cycles of neoadjuvant platinum based with gemcitabine and nivolumab chemoimmunotherapy.  Interim CT chest with progression of disease concern. -- He developed an HENRY following a 1 cycle of cisplatin and subsequently switched to carboplatin -- Patient's case was discussed at thoracic tumor board on 6/27-consensus of panel was to obtain a PET scan, followed by pulmonary function test and VQ scan and possible resection of enlarging mass provided no evidence of metastatic disease We discussed tumor board recommendations during visit in detail with patient and son present during the visit.  All questions answered.  Scans report and images reviewed. -- underwent mediastinoscopy 8/5, EBUS 8/6 with SCC detected in LN 11L and 4L which upstages him to N2 -- His case was re-discussed on Thoracic Tumor Board 8/8/24, with the plan to proceed with CCRT with carbo/taxol.  --Labs reviewed today: anemia again noted Hb 7.5 --Patient cleared for treatment today with the following changes: Paclitaxel dose reduced 40mg/m2, total dose 70mg Carboplatin dose reduced to AUC 1.5, total dose 130mg -- Type and screen obtained, and patient is scheduled to receive 1U pRBCs tomorrow 9/27/24  Pituitary adenoma --will refer to Dr.D'Amico after therapy for lung ca complete

## 2024-09-27 NOTE — DISCHARGE NOTE PROVIDER - CARE PROVIDER_API CALL
Marcelo Larose  Medical Oncology  [address]  [place] [address]  Phone: [phone]  Fax: [phone]  Follow Up Time:

## 2024-09-27 NOTE — HISTORY OF PRESENT ILLNESS
[Disease: _____________________] : Disease: [unfilled] [T: ___] : T[unfilled] [N: ___] : N[unfilled] [M: ___] : M[unfilled] [AJCC Stage: ____] : AJCC Stage: [unfilled] [1] : 1, Mild [100: Normal, no complaints, no evidence of disease.] : 100: Normal, no complaints, no evidence of disease. [ECOG Performance Status: 0 - Fully active, able to carry on all pre-disease performance without restriction] : Performance Status: 0 - Fully active, able to carry on all pre-disease performance without restriction [de-identified] : Don Castrejon is a 69-year-old male who presents to the clinic for f/u of LLL mass, c/w stage IIIB (T4N2M0) NSCLC - SCC histology.  Onc hx:  Social Hx: 50 pack yr smoking hx, quit when he found out about the mass Originally from [place], lives in [place] by himself. Retired .  Son lives in [place]   3/15/24: MR Head:  Punctate focus of enhancement the left lateral cerebellar hemisphere (series 701 image 55 and series 702 image 23). There is no associated signal abnormality in the brain parenchyma. Given the lack of associated signal abnormality or additional areas of pathologic enhancement, these findings may represent vascular enhancement versus artifact. However, attention on follow-up imaging is recommended to exclude intracranial metastasis. Focal area of intrinsic T1 hyperintensity more inferiorly in the left cerebellar hemisphere without superimposed pathologic enhancement. This may represent area of mineralization. 5 mm hypoenhancing lesion in the left side of the sella most compatible with pituitary adenoma. Correlation with endocrine function and consideration for further evaluation dedicated MRI sella is recommended.  3/15/24: PET: 1. Compared to chest CT from 2/15/2024, the 76 mm mass in the lingula is hypermetabolic and suspicious for lung cancer. 2. The borderline sized thoracic lymph nodes on the CT scan are not FDG avid. 3. Mildly nodular right adrenal gland is not FDG avid. No evidence of metastatic lung cancer. 4. Mild increased activity in enlarged and lobular prostate. Recommend correlation with PSA to rule out prostate cancer.  4/3/24: Chest Xray:  Small left apex pneumothorax, similar to prior exam earlier same day. Left lower lung mass again noted. No acute infiltrates. No significant pleural effusion.  04/03/2024 EBUS  Lung, left, core biopsy: -Squamous cell carcinoma. LYMPH NODE, 11L NEGATIVE FOR MALIGNANT CELLS. Note: Immunostains performed on block 1A shows that the tumor cells are positive for p40 and negative for TTF-1 stain.  This staining profile supports the diagnosis. PDL1 negative Tier I: Variants of Strong Clinical Significance PIK3CA p.([id number]) Tier II: Variants of Potential Clinical Significance KRAS p.([id number]) Tier III: Variants of Unknown Clinical Significance STK11 p.([id number]) ALK p.([id number]) ERBB3 p.([id number]) POLE p.([id number]) 4/4/2024: TTB - rec for MAGDALENA 5/3/2024: Sent to ED by  for worsening HENRY  6/26/2024: CT chest: 1. Enlarging necrotic neoplasm occupying the inferior segment of the lingula with obstruction of the lingular segmental bronchus and pulmonary artery. 2. Mild mediastinal lymphadenopathy with progressive encasement of the right hilum by above described necrotic lingular neoplasm. 6/27/2024: TTB: Patient presented during interdisciplinary TB on 6/27/24: Concern for a large lobulated spiculated lingular mass. Prominent mediastinal LNs also noted. On recent scan area seems to be enlarging. Plan to obtain a PET to r/o any distant disease/progression. If negative will discuss the possibility of a pneumonectomy. Can obtain a VQ scan prior. PLAN: PET-CT. 8/6/2024L Underwent mediastinoscopy and EBUS with , now found to have positive 4L and 11 LN, atypical cells in 4R - this is c/w POD on chemoIO and patient is no longer a candidate for resection. Reviewed at TTB, recommendation was for definitive chemoRT. [de-identified] : Squamous Cell Ca  - PDL1 negative, PIK3CA [id number] [de-identified] : CTSx:  [FreeTextEntry1] : 4/23/2024: C1D1 cisplatin/gemcitabine/nivolumab D8 gemcitabine skipped due to HENRY 2/2 cisplatin 5/21/2024: C2D1 carboplatin/gemcitabine.nivolumab 5/28/2024: C2D8 gemcitabine 6/11/2024: Due for C3D1 carboplatin/gemcitabine/nivolumab 6/18/24    C3D8 Gemcitabine held due to low h/h 6/19/24    C3D8 Gemcitabine given w/ blood tranfusion  8/30/24: C1D1 carboplatin/paclitaxel weekly 1/7 9/5/2024 C2D1 carboplatin/ taxol weekly 2/7 9/12/2024 C3D1 carboplatin/taxol weekly 3/7 9/19/2024  C4D1 carboplatin/taxol weekly 4/7 9/26/2024 C5D1 carboplatin AUC 1.5 /taxol 40 mg/m2 weekly 5/7 [de-identified] : The patient reports feeling well.  He denies any shortness of breath.  Reports reduced appetite due to altered taste, but is making an effort to eat. [FreeTextEntry3] : neuropathy  [FreeTextEntry4] : 9/19/24  [FreeTextEntry5] : taxol

## 2024-09-27 NOTE — PROGRESS NOTE ADULT - PROBLEM SELECTOR PLAN 7
Norvasc 5mg at home, plan to resume after procedure, presently normotensive  -restart Norvasc 5 mg after orthostatics Norvasc 5mg at home, plan to resume after procedure, presently normotensive    -restart Norvasc 5 mg after orthostatics

## 2024-09-27 NOTE — PROGRESS NOTE ADULT - PROBLEM SELECTOR PLAN 3
Pt experienced infusion reaction to chemotherapy on 9/26 and has past infusion reactions as well to chemotherapy. S/p 125 methylpred IV and benadryl 25 mg.  -CTM, f/u heme/onc recs Pt experienced infusion reaction to chemotherapy on 9/26 and has past infusion reactions as well to chemotherapy. S/p 125 methylpred IV and benadryl 25 mg.    -CTM  - f/u heme/onc recs --> ?inpatient chemotherapy vs. outpatient to manage reactions

## 2024-09-27 NOTE — H&P ADULT - PROBLEM SELECTOR PLAN 6
F:  E:  N:  Dvt ppx:  Full code Norvasc 5mg at home, plan to resume after procedure, presently normotensive Norvasc 5mg at home, plan to resume after procedure, presently normotensive  -restart norvasc 5 mg after orthostatics Hx GERD  -PPX:  Protonix  -PO Diet: NPO for EBUS, then resume diet   -Bowel regimen: miralax/senna Hx GERD  -PPX:  Protonix  -Bowel regimen: miralax/senna

## 2024-09-27 NOTE — PROGRESS NOTE ADULT - PROBLEM SELECTOR PLAN 1
At admission, pancytopenia with Plts 126, Hg 6.9, WBC 1.24. No active signs of bleeding. No symptomatic anemia of fatigue, pallor.   -maintain active type and screen  -s/p 1 u pRBC  -transfuse if platelets <50 with bleeding or platelets <10 At admission, pancytopenia with Plts 126, Hg 6.9, WBC 1.24. No active signs of bleeding. No symptomatic anemia of fatigue, pallor.   S/p 1 unit pRBC, Hb this AM 8.1  WBC 1.81 Plt 121    - transfuse if Hb < 7  - maintain active type and screen  - transfuse if platelets <50 with bleeding or platelets <10

## 2024-09-27 NOTE — CONSULT NOTE ADULT - SUBJECTIVE AND OBJECTIVE BOX
Hematology Oncology Consult Note     CATHERINE APONTE is a 69y Male with PMH GERD, HTN, HLD, DMII, squamous cell lung cancer, stage IIIa, T4 N0 M0, PD-L1 negative s/p neoadjuvant chemoimmunotherapy finished 6/19/24, s/p mediastinoscopy and EBUS with Dr. Werner 8/6/2024, and now on adjuvant chemotherapy carboplatin/taxol, who presents for syncopal episode after latest treatment. Oncology consulted given history of lung cancer and anemia.     Patient noted to have Hgb 6.9, s/p transfusion pRBC 9/26/24 with response to Hgb 8.1    Treatment History  4/23/2024: C1D1 cisplatin/gemcitabine/nivolumab  D8 gemcitabine skipped due to HENRY 2/2 cisplatin  5/21/2024: C2D1 carboplatin/gemcitabine.nivolumab  5/28/2024: C2D8 gemcitabine  6/11/2024: Due for C3D1 carboplatin/gemcitabine/nivolumab  6/18/24 C3D8 Gemcitabine held due to low h/h  6/19/24 C3D8 Gemcitabine given w/ blood tranfusion  8/30/24: C1D1 carboplatin/paclitaxel weekly 1/7  9/5/2024 C2D1 carboplatin/ taxol weekly 2/7 9/12/2024 C3D1 carboplatin/taxol weekly 3/7 9/19/2024 C4D1 carboplatin/taxol weekly 4/7 9/26/2024 C5D1 carboplatin AUC 1.5 /taxol 40 mg/m2 weekly 5/7.      PAST MEDICAL & SURGICAL HISTORY:  History of hypertension      History of high cholesterol      History of gastroesophageal reflux (GERD)      History of diabetes mellitus      History of persistent cough      Mass of lingula of lung      HENRY (acute kidney injury)      Squamous cell lung cancer      H/O: obesity      S/P appendectomy          Allergies:  No Known Allergies      Medications:        Social History:    FAMILY HISTORY:  No pertinent family history in first degree relatives        PHYSICAL EXAM:    T(F): 97.7 (09-27-24 @ 11:20), Max: 98.3 (09-26-24 @ 21:52)  HR: 100 (09-27-24 @ 11:20) (89 - 118)  BP: 109/65 (09-27-24 @ 11:20) (108/63 - 128/75)  RR: 17 (09-27-24 @ 11:20) (16 - 18)  SpO2: 96% (09-27-24 @ 11:20) (94% - 99%)  Wt(kg): --    Daily Height in cm: 167.64 (26 Sep 2024 18:52)    Daily     Gen: well developed, well nourished, comfortable  HEENT: normocephalic/atraumatic, no conjunctival pallor, no scleral icterus, no oral thrush/mucosal bleeding/mucositis  Neck: supple, no masses, no JVD  Breasts: deferred  Back: nontender  Cardiovascular: RR, nl S1S2, no murmurs/rubs/gallops  Respiratory: clear air entry b/l  Gastrointestinal: BS+, soft, NT/ND, no masses, no splenomegaly, no hepatomegaly, no evidence for ascites  Genitourinary: deferred  Rectal: deferred  Extremities: no clubbing/cyanosis, no edema, no calf tenderness  Vascular:  DP/PT 2+ b/l  Neurological: CN 2-12 grossly intact, no focal deficits  Skin: no rash on visible skin  Lymph Nodes:  no cervical/supraclavicular LAD, no axillary/groin LAD  Musculoskeletal:  full ROM  Psychiatric:  mood stable            Labs:                          8.1    1.81  )-----------( 121      ( 27 Sep 2024 05:30 )             24.2     CBC Full  -  ( 27 Sep 2024 05:30 )  WBC Count : 1.81 K/uL  RBC Count : 2.86 M/uL  Hemoglobin : 8.1 g/dL  Hematocrit : 24.2 %  Platelet Count - Automated : 121 K/uL  Mean Cell Volume : 84.6 fl  Mean Cell Hemoglobin : 28.3 pg  Mean Cell Hemoglobin Concentration : 33.5 gm/dL  Auto Neutrophil # : 1.52 K/uL  Auto Lymphocyte # : 0.08 K/uL  Auto Monocyte # : 0.19 K/uL  Auto Eosinophil # : 0.00 K/uL  Auto Basophil # : 0.00 K/uL  Auto Neutrophil % : 83.2 %  Auto Lymphocyte % : 4.4 %  Auto Monocyte % : 10.6 %  Auto Eosinophil % : 0.0 %  Auto Basophil % : 0.0 %    PT/INR - ( 26 Sep 2024 20:35 )   PT: 13.3 sec;   INR: 1.14          PTT - ( 26 Sep 2024 20:35 )  PTT:26.4 sec    09-27    135  |  106  |  21  ----------------------------<  244[H]  4.6   |  20[L]  |  0.79    Ca    7.9[L]      27 Sep 2024 05:30  Phos  3.7     09-27  Mg     1.5     09-27    TPro  6.5  /  Alb  2.8[L]  /  TBili  1.0  /  DBili  x   /  AST  14  /  ALT  16  /  AlkPhos  71  09-27      Urinalysis Basic - ( 27 Sep 2024 05:30 )    Color: x / Appearance: x / SG: x / pH: x  Gluc: 244 mg/dL / Ketone: x  / Bili: x / Urobili: x   Blood: x / Protein: x / Nitrite: x   Leuk Esterase: x / RBC: x / WBC x   Sq Epi: x / Non Sq Epi: x / Bacteria: x        Other Labs:    Cultures:    Pathology:    Imaging Studies:

## 2024-09-27 NOTE — PROGRESS NOTE ADULT - PROBLEM SELECTOR PLAN 5
t2DM, home med metformin 500 mg BID  -A1c: 6.3: ISS  -TSH:  1.4  -moderate sliding scale  -f/u A1c in AM T2DM, home med metformin 500 mg BID  A1c 7.2%     - c/w moderate sliding scale  - monitor FS

## 2024-09-27 NOTE — PROGRESS NOTE ADULT - PROBLEM SELECTOR PLAN 4
LLL mass, c/w stage IIIA (T4N0M0) NSCLC - SCC histology. S/p taxol chemotherapy session today, but had brief syncopal episode with subsequent tachycardia.   -f/u heme/onc recs LLL mass, c/w stage IIIA (T4N0M0) NSCLC - SCC histology. S/p taxol chemotherapy and radiation session today, but had brief syncopal episode with subsequent tachycardia.   Pt follows with Dr. Larose, has undergone 5 sessions of chemotherapy thus far, to undergo 33 sessions of radiation     - c/w radiation while inpatient  - f/u heme/onc recs

## 2024-09-27 NOTE — PROGRESS NOTE ADULT - SUBJECTIVE AND OBJECTIVE BOX
OVERNIGHT EVENTS:    SUBJECTIVE / INTERVAL HPI: Patient seen and examined at bedside.       PHYSICAL EXAM:    General: NAD  HEENT: NC/AT; PERRL, anicteric sclera; MMM  Neck: supple  Cardiovascular: +S1/S2, RRR  Respiratory: CTA B/L; no W/R/R  Gastrointestinal: soft, NT/ND; +BSx4  Extremities: WWP; no edema, clubbing or cyanosis  Vascular: 2+ radial, DP/PT pulses B/L  Neurological: AAOx3; no focal deficits  Psychiatric: pleasant mood and affect  Dermatologic: no appreciable wounds or damage to the skin    VITAL SIGNS:  Vital Signs Last 24 Hrs  T(C): 36.7 (27 Sep 2024 06:16), Max: 36.8 (26 Sep 2024 21:52)  T(F): 98.1 (27 Sep 2024 06:16), Max: 98.3 (26 Sep 2024 21:52)  HR: 97 (27 Sep 2024 06:16) (89 - 118)  BP: 113/70 (27 Sep 2024 06:16) (108/63 - 128/75)  BP(mean): 85 (27 Sep 2024 06:16) (85 - 85)  RR: 18 (27 Sep 2024 06:16) (16 - 18)  SpO2: 98% (27 Sep 2024 06:16) (94% - 99%)    Parameters below as of 27 Sep 2024 06:16  Patient On (Oxygen Delivery Method): room air          MEDICATIONS:  MEDICATIONS  (STANDING):  acetaminophen     Tablet .. 650 milliGRAM(s) Oral every 6 hours  atorvastatin 20 milliGRAM(s) Oral at bedtime  dextrose 5%. 1000 milliLiter(s) (50 mL/Hr) IV Continuous <Continuous>  dextrose 5%. 1000 milliLiter(s) (100 mL/Hr) IV Continuous <Continuous>  dextrose 50% Injectable 12.5 Gram(s) IV Push once  dextrose 50% Injectable 25 Gram(s) IV Push once  dextrose 50% Injectable 25 Gram(s) IV Push once  glucagon  Injectable 1 milliGRAM(s) IntraMuscular once  insulin lispro (ADMELOG) corrective regimen sliding scale   SubCutaneous at bedtime  pantoprazole    Tablet 40 milliGRAM(s) Oral before breakfast    MEDICATIONS  (PRN):  dextrose Oral Gel 15 Gram(s) Oral once PRN Blood Glucose LESS THAN 70 milliGRAM(s)/deciliter  polyethylene glycol 3350 17 Gram(s) Oral every 24 hours PRN for constipation  senna 2 Tablet(s) Oral at bedtime PRN for constipation      ALLERGIES:  Allergies    No Known Allergies    Intolerances        LABS:                        6.9    1.54  )-----------( 126      ( 26 Sep 2024 20:35 )             20.8         131[L]  |  101  |  19  ----------------------------<  303[H]  4.2   |  19[L]  |  0.70    Ca    8.1[L]      26 Sep 2024 20:35    TPro  7.0  /  Alb  3.2[L]  /  TBili  0.7  /  DBili  x   /  AST  13  /  ALT  16  /  AlkPhos  81      PT/INR - ( 26 Sep 2024 20:35 )   PT: 13.3 sec;   INR: 1.14          PTT - ( 26 Sep 2024 20:35 )  PTT:26.4 sec  Urinalysis Basic - ( 26 Sep 2024 21:18 )    Color: Yellow / Appearance: Clear / S.016 / pH: x  Gluc: x / Ketone: Trace mg/dL  / Bili: Negative / Urobili: 1.0 mg/dL   Blood: x / Protein: Trace mg/dL / Nitrite: Negative   Leuk Esterase: Negative / RBC: x / WBC x   Sq Epi: x / Non Sq Epi: x / Bacteria: x      CAPILLARY BLOOD GLUCOSE      POCT Blood Glucose.: 343 mg/dL (26 Sep 2024 18:41)      RADIOLOGY & ADDITIONAL TESTS: Reviewed. SUBJECTIVE / INTERVAL HPI: Patient seen and examined at bedside. Pt resting comfortably, denies any acute complaints. States he only felt dizziness/weakness during chemotherapy, otherwise he has never had any syncope. Denies hx of seizures. Pt believes he had a reaction during chemotherapy this time because his hemoglobin was low. Denies fevers, chills, HA, chest pain, SOB, n/v/d/c, abd pain, swelling in extremities. ROS otherwise negative.      PHYSICAL EXAM:    General: NAD  HEENT: NC/AT; PERRL, anicteric sclera; MMM  Neck: supple  Cardiovascular: +S1/S2, RRR  Respiratory: CTA B/L; no W/R/R  Gastrointestinal: soft, NT/ND; +BSx4  Extremities: WWP; no edema, clubbing or cyanosis  Vascular: 2+ radial, DP/PT pulses B/L  Neurological: AAOx3; no focal deficits  Psychiatric: pleasant mood and affect  Dermatologic: no appreciable wounds or damage to the skin    VITAL SIGNS:  Vital Signs Last 24 Hrs  T(C): 36.7 (27 Sep 2024 06:16), Max: 36.8 (26 Sep 2024 21:52)  T(F): 98.1 (27 Sep 2024 06:16), Max: 98.3 (26 Sep 2024 21:52)  HR: 97 (27 Sep 2024 06:16) (89 - 118)  BP: 113/70 (27 Sep 2024 06:16) (108/63 - 128/75)  BP(mean): 85 (27 Sep 2024 06:16) (85 - 85)  RR: 18 (27 Sep 2024 06:16) (16 - 18)  SpO2: 98% (27 Sep 2024 06:16) (94% - 99%)    Parameters below as of 27 Sep 2024 06:16  Patient On (Oxygen Delivery Method): room air          MEDICATIONS:  MEDICATIONS  (STANDING):  acetaminophen     Tablet .. 650 milliGRAM(s) Oral every 6 hours  atorvastatin 20 milliGRAM(s) Oral at bedtime  dextrose 5%. 1000 milliLiter(s) (50 mL/Hr) IV Continuous <Continuous>  dextrose 5%. 1000 milliLiter(s) (100 mL/Hr) IV Continuous <Continuous>  dextrose 50% Injectable 12.5 Gram(s) IV Push once  dextrose 50% Injectable 25 Gram(s) IV Push once  dextrose 50% Injectable 25 Gram(s) IV Push once  glucagon  Injectable 1 milliGRAM(s) IntraMuscular once  insulin lispro (ADMELOG) corrective regimen sliding scale   SubCutaneous at bedtime  pantoprazole    Tablet 40 milliGRAM(s) Oral before breakfast    MEDICATIONS  (PRN):  dextrose Oral Gel 15 Gram(s) Oral once PRN Blood Glucose LESS THAN 70 milliGRAM(s)/deciliter  polyethylene glycol 3350 17 Gram(s) Oral every 24 hours PRN for constipation  senna 2 Tablet(s) Oral at bedtime PRN for constipation      ALLERGIES:  Allergies    No Known Allergies    Intolerances        LABS:                        6.9    1.54  )-----------( 126      ( 26 Sep 2024 20:35 )             20.8     09-    131[L]  |  101  |  19  ----------------------------<  303[H]  4.2   |  19[L]  |  0.70    Ca    8.1[L]      26 Sep 2024 20:35    TPro  7.0  /  Alb  3.2[L]  /  TBili  0.7  /  DBili  x   /  AST  13  /  ALT  16  /  AlkPhos  81  09-    PT/INR - ( 26 Sep 2024 20:35 )   PT: 13.3 sec;   INR: 1.14          PTT - ( 26 Sep 2024 20:35 )  PTT:26.4 sec  Urinalysis Basic - ( 26 Sep 2024 21:18 )    Color: Yellow / Appearance: Clear / S.016 / pH: x  Gluc: x / Ketone: Trace mg/dL  / Bili: Negative / Urobili: 1.0 mg/dL   Blood: x / Protein: Trace mg/dL / Nitrite: Negative   Leuk Esterase: Negative / RBC: x / WBC x   Sq Epi: x / Non Sq Epi: x / Bacteria: x      CAPILLARY BLOOD GLUCOSE      POCT Blood Glucose.: 343 mg/dL (26 Sep 2024 18:41)      RADIOLOGY & ADDITIONAL TESTS: Reviewed.

## 2024-10-01 ENCOUNTER — APPOINTMENT (OUTPATIENT)
Dept: HEMATOLOGY ONCOLOGY | Facility: CLINIC | Age: 69
End: 2024-10-01
Payer: MEDICARE

## 2024-10-01 VITALS
TEMPERATURE: 97.1 F | DIASTOLIC BLOOD PRESSURE: 64 MMHG | SYSTOLIC BLOOD PRESSURE: 111 MMHG | BODY MASS INDEX: 23.3 KG/M2 | OXYGEN SATURATION: 99 % | RESPIRATION RATE: 18 BRPM | WEIGHT: 145 LBS | HEIGHT: 66 IN | HEART RATE: 111 BPM

## 2024-10-01 DIAGNOSIS — T45.1X5A AGRANULOCYTOSIS SECONDARY TO CANCER CHEMOTHERAPY: ICD-10-CM

## 2024-10-01 DIAGNOSIS — D70.1 AGRANULOCYTOSIS SECONDARY TO CANCER CHEMOTHERAPY: ICD-10-CM

## 2024-10-01 LAB
ALBUMIN SERPL ELPH-MCNC: 3.1 G/DL
ALP BLD-CCNC: 75 U/L
ALT SERPL-CCNC: 18 U/L
ANION GAP SERPL CALC-SCNC: 6 MMOL/L
AST SERPL-CCNC: 17 U/L
BILIRUB SERPL-MCNC: 1.1 MG/DL
BUN SERPL-MCNC: 16 MG/DL
CALCIUM SERPL-MCNC: 9.7 MG/DL
CHLORIDE SERPL-SCNC: 101 MMOL/L
CO2 SERPL-SCNC: 28 MMOL/L
CREAT SERPL-MCNC: 1 MG/DL
EGFR: 81 ML/MIN/1.73M2
GLUCOSE SERPL-MCNC: 192 MG/DL
HCT VFR BLD CALC: 25.8 %
HGB BLD-MCNC: 8.7 G/DL
LYMPHOCYTES # BLD AUTO: 0.3 K/UL
LYMPHOCYTES NFR BLD AUTO: 9.9 %
MAN DIFF?: NO
MCHC RBC-ENTMCNC: 28.8 PG
MCHC RBC-ENTMCNC: 33.7 GM/DL
MCV RBC AUTO: 85.4 FL
NEUTROPHILS # BLD AUTO: 1.9 K/UL
NEUTROPHILS NFR BLD AUTO: 67.2 %
PLATELET # BLD AUTO: 123 K/UL
POTASSIUM SERPL-SCNC: 4.3 MMOL/L
PROT SERPL-MCNC: 7 G/DL
RBC # BLD: 3.02 M/UL
RBC # FLD: 19.4 %
SODIUM SERPL-SCNC: 135 MMOL/L
WBC # FLD AUTO: 2.9 K/UL

## 2024-10-01 PROCEDURE — G2211 COMPLEX E/M VISIT ADD ON: CPT

## 2024-10-01 PROCEDURE — 99214 OFFICE O/P EST MOD 30 MIN: CPT

## 2024-10-01 RX ORDER — DEXAMETHASONE 4 MG/1
4 TABLET ORAL
Qty: 10 | Refills: 3 | Status: ACTIVE | COMMUNITY
Start: 2024-10-01 | End: 1900-01-01

## 2024-10-01 NOTE — REVIEW OF SYSTEMS
[Fatigue] : fatigue [Negative] : Heme/Lymph [SOB on Exertion] : no shortness of breath during exertion [FreeTextEntry2] : mild fatigue  [FreeTextEntry9] : mild paresthesia to fingers and toes. Resolves

## 2024-10-01 NOTE — ASSESSMENT
[FreeTextEntry1] : 69YM w/ PMHx GERD, HTN, HLD, DMII presenting for follow-up for squamous cell lung cancer, stage IIIa, T4 N0 M0, PD-L1 negative currently on concurrent chemoRT with carboplatin/Taxol.  Squamous Cell Lung Ca, AJCC IIIa, T4N2M0  - PDL1 negative Completed 3 cycles of neoadjuvant platinum based with gemcitabine and nivolumab chemoimmunotherapy.  Interim CT chest with progression of disease concern. -- He developed an HENRY following a 1 cycle of cisplatin and subsequently switched to carboplatin -- Patient's case was discussed at thoracic tumor board on 6/27-consensus of panel was to obtain a PET scan, followed by pulmonary function test and VQ scan and possible resection of enlarging mass provided no evidence of metastatic disease We discussed tumor board recommendations during visit in detail with patient and son present during the visit.  All questions answered.  Scans report and images reviewed. -- underwent mediastinoscopy 8/5, EBUS 8/6 with SCC detected in LN 11L and 4L which upstages him to N2 -- His case was re-discussed on Thoracic Tumor Board 8/8/24, with the plan to proceed with CCRT with carbo/taxol.  --Ordered CBC, CMP --Patient cleared for treatment this Thursday: Paclitaxel dose reduced 40mg/m2, total dose 70mg Carboplatin dose reduced to AUC 1.5, total dose 130mg Prescription was sent for dexamethasone 20 mg 12 hours before chemotherapy, and 6 hours before chemotherapy to be taken at home given infusion reaction last time --He will likely need a blood transfusion next week, will obtain a type and screen next visit as well -- Leukopenia noted, patient cleared for treatment this week  Pituitary adenoma --will refer to Dr.D'Amico after therapy for lung ca complete

## 2024-10-01 NOTE — DISEASE MANAGEMENT
[Clinical] : TNM Stage: c [NTNM] : 3 [TTNM] : 4 [MTNM] : x [de-identified] : [id number] [de-identified] : [id number] [de-identified] : Left lung

## 2024-10-01 NOTE — HISTORY OF PRESENT ILLNESS
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 Werner for radiation to the Left Lung.   10/2/24:  OTV 4800cGy/6600cGy, 24/33fx to the Left lung.  9/25/24: OTV 3800cGy/6600cGy, 19/33fx to the Left lung. Mr. Castrejon reports feeling well overall. He denies CP, SOB, or hemoptysis. He has a mild non productive cough which is relieved by OTC dexomorphan. He has a mild sore throat. Appetite has decreased, he is supplementing with ensure. Weight is stable at 145lbs. Mild fatigue. Plan to continue radiation.   9/18/24: OTV 2800cGy/6600cGy, 14/33fx to the Left lung. Mr. Castrejon reports feeling well overall. Appetite is good. He denies fatigue. He has an intermittent non productive cough. He denies CP, SOB, BRYANT, or hemoptysis. Plan to continue radiation.  9/11/24: OTV 1800cGy/6600cGy, 9/33fx to the Left lung. Mr. Castrejon reports feeling well. He denies fatigue. Appetite is good. Breathing even and unlabored. He denies CP, SOB, BRYANT, hemoptysis, or cough. Plan to continue radiation.   9/4/24: OTV 800cGy/6600cGy, 4/33fx to the Left lung. Today patient is feeling well, denies fatigue or changes in appetite. No SOB with exertion or at rest. Able to tolerate ADLs independetntly. Weight is stable. Patient reports some aching pain to right side unresolved with Aleeve taken at night but he believes the pain is due to the cold. Negative for cough, phlegm, hemoptysis. Negative for acid reflux, n/v. Denies pain or skin irritation at therapy site. Patient is on concurrent chemotherapy. Reports constipation x4 days; Senna HS ordered. He is staying with his son in [place], [place] during his treatments. Continue radiation therapy.  8/30/24: OTV  400cGy/6600cGy, 2/33fx to the Left lung. Patient reports he is feeling well. Denies fatigue. Appetite is good. He denies CP/SOB, cough, or hemoptysis. Plan to continue radiation.    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     Patient initially presented with lung mass and underwent staging imaging suggestive of [id number] disease. Biopsy showed NSCLC, NGS negative.   The case was discussed at multidisciplinary tumor board and consensus was to proceed w/ neoadjuvant chemo immunotherapy and subsequent resection.    Patient started treatment w/  on 4/23/24  He was unable to tolerate cisplatin due to renal concerns and was switched to  carboplatin.    PET/CT scan (7/8/24) revealing disease progression with ?N3 disease noted at station R4.  MRI brain wnl.    Patient is now s/p bronchoscopy w/ mediastinoscopy which was aborted due to intraop bleeding (8/5/24) with .    EBUS was obtained, and demonstrated positive sample. On 8/8/24, patient obtained CT Chest w/ IV contrast revealing slight enlargement of neoplasm mass and lymph nodes along w/ gas in mediastinal area due to recent mediastinoscopy. Radiation oncology was consulted to evaluate patient prior to discharge.

## 2024-10-01 NOTE — HISTORY OF PRESENT ILLNESS
[Disease: _____________________] : Disease: [unfilled] [T: ___] : T[unfilled] [N: ___] : N[unfilled] [M: ___] : M[unfilled] [AJCC Stage: ____] : AJCC Stage: [unfilled] [1] : 1, Mild [100: Normal, no complaints, no evidence of disease.] : 100: Normal, no complaints, no evidence of disease. [ECOG Performance Status: 0 - Fully active, able to carry on all pre-disease performance without restriction] : Performance Status: 0 - Fully active, able to carry on all pre-disease performance without restriction [de-identified] : Don Castrejon is a 69-year-old male who presents to the clinic for f/u of LLL mass, c/w stage IIIA (T4N0M0) NSCLC - SCC histology.  Onc hx:  Social Hx: 50 pack yr smoking hx, quit when he found out about the mass Originally from [place], lives in [place] by himself. Retired .  Son lives in [place]   3/15/24: MR Head:  Punctate focus of enhancement the left lateral cerebellar hemisphere (series 701 image 55 and series 702 image 23). There is no associated signal abnormality in the brain parenchyma. Given the lack of associated signal abnormality or additional areas of pathologic enhancement, these findings may represent vascular enhancement versus artifact. However, attention on follow-up imaging is recommended to exclude intracranial metastasis. Focal area of intrinsic T1 hyperintensity more inferiorly in the left cerebellar hemisphere without superimposed pathologic enhancement. This may represent area of mineralization. 5 mm hypoenhancing lesion in the left side of the sella most compatible with pituitary adenoma. Correlation with endocrine function and consideration for further evaluation dedicated MRI sella is recommended.  3/15/24: PET: 1. Compared to chest CT from 2/15/2024, the 76 mm mass in the lingula is hypermetabolic and suspicious for lung cancer. 2. The borderline sized thoracic lymph nodes on the CT scan are not FDG avid. 3. Mildly nodular right adrenal gland is not FDG avid. No evidence of metastatic lung cancer. 4. Mild increased activity in enlarged and lobular prostate. Recommend correlation with PSA to rule out prostate cancer.  4/3/24: Chest Xray:  Small left apex pneumothorax, similar to prior exam earlier same day. Left lower lung mass again noted. No acute infiltrates. No significant pleural effusion.  04/03/2024 EBUS  Lung, left, core biopsy: -Squamous cell carcinoma. LYMPH NODE, 11L NEGATIVE FOR MALIGNANT CELLS. Note: Immunostains performed on block 1A shows that the tumor cells are positive for p40 and negative for TTF-1 stain.  This staining profile supports the diagnosis. PDL1 negative Tier I: Variants of Strong Clinical Significance PIK3CA p.([id number]) Tier II: Variants of Potential Clinical Significance KRAS p.([id number]) Tier III: Variants of Unknown Clinical Significance STK11 p.([id number]) ALK p.([id number]) ERBB3 p.([id number]) POLE p.([id number]) 4/4/2024: TTB - rec for MAGDALENA 5/3/2024: Sent to ED by  for worsening HENRY  6/26/2024: CT chest: 1. Enlarging necrotic neoplasm occupying the inferior segment of the lingula with obstruction of the lingular segmental bronchus and pulmonary artery. 2. Mild mediastinal lymphadenopathy with progressive encasement of the right hilum by above described necrotic lingular neoplasm. 6/27/2024: TTB: Patient presented during interdisciplinary TB on 6/27/24: Concern for a large lobulated spiculated lingular mass. Prominent mediastinal LNs also noted. On recent scan area seems to be enlarging. Plan to obtain a PET to r/o any distant disease/progression. If negative will discuss the possibility of a pneumonectomy. Can obtain a VQ scan prior. PLAN: PET-CT. 8/6/2024L Underwent mediastinoscopy and EBUS with , now found to have positive 4L and 11 LN, atypical cells in 4R - this is c/w POD on chemoIO and patient is no longer a candidate for resection. Reviewed at TTB, recommendation was for definitive chemoRT. 9/26/2024: Had severe infusion reaction needing hospital admission, also for symptomatic anemia.  In the hospital he received 1 unit PRBC, after which he has been feeling much better. [de-identified] : Squamous Cell Ca  - PDL1 negative, PIK3CA E542K [de-identified] : CTSx:  [FreeTextEntry1] : 4/23/2024: C1D1 cisplatin/gemcitabine/nivolumab D8 gemcitabine skipped due to HENRY 2/2 cisplatin 5/21/2024: C2D1 carboplatin/gemcitabine.nivolumab 5/28/2024: C2D8 gemcitabine 6/11/2024: Due for C3D1 carboplatin/gemcitabine/nivolumab 6/18/24    C3D8 Gemcitabine held due to low h/h 6/19/24    C3D8 Gemcitabine given w/ blood tranfusion  8/30/24: C1D1 carboplatin/paclitaxel weekly 1/7 9/5/2024 C2D1 carboplatin/ taxol weekly 2/7 9/12/2024 C3D1 carboplatin/taxol weekly 3/7 9/19/2024  C4D1 carboplatin/taxol weekly 4/7 9/26/2024 C5D1 carboplatin AUC 1.5 /taxol 40 mg/m2 weekly 5/7 10/3/2024: Due for C6D1  [de-identified] : He feels significantly better since getting 1 unit of blood in the hospital. He no longer feels fatigued, denies any shortness of breath or chest pain. [FreeTextEntry3] : neuropathy  [FreeTextEntry4] : 9/19/24  [FreeTextEntry5] : taxol

## 2024-10-01 NOTE — HISTORY OF PRESENT ILLNESS
Patient is off the floor for MRI.    [Disease: _____________________] : Disease: [unfilled] [T: ___] : T[unfilled] [N: ___] : N[unfilled] [M: ___] : M[unfilled] [AJCC Stage: ____] : AJCC Stage: [unfilled] [1] : 1, Mild [100: Normal, no complaints, no evidence of disease.] : 100: Normal, no complaints, no evidence of disease. [ECOG Performance Status: 0 - Fully active, able to carry on all pre-disease performance without restriction] : Performance Status: 0 - Fully active, able to carry on all pre-disease performance without restriction [de-identified] : Don Castrejon is a 69-year-old male who presents to the clinic for f/u of LLL mass, c/w stage IIIA (T4N0M0) NSCLC - SCC histology.  Onc hx:  Social Hx: 50 pack yr smoking hx, quit when he found out about the mass Originally from [place], lives in [place] by himself. Retired .  Son lives in [place]   3/15/24: MR Head:  Punctate focus of enhancement the left lateral cerebellar hemisphere (series 701 image 55 and series 702 image 23). There is no associated signal abnormality in the brain parenchyma. Given the lack of associated signal abnormality or additional areas of pathologic enhancement, these findings may represent vascular enhancement versus artifact. However, attention on follow-up imaging is recommended to exclude intracranial metastasis. Focal area of intrinsic T1 hyperintensity more inferiorly in the left cerebellar hemisphere without superimposed pathologic enhancement. This may represent area of mineralization. 5 mm hypoenhancing lesion in the left side of the sella most compatible with pituitary adenoma. Correlation with endocrine function and consideration for further evaluation dedicated MRI sella is recommended.  3/15/24: PET: 1. Compared to chest CT from 2/15/2024, the 76 mm mass in the lingula is hypermetabolic and suspicious for lung cancer. 2. The borderline sized thoracic lymph nodes on the CT scan are not FDG avid. 3. Mildly nodular right adrenal gland is not FDG avid. No evidence of metastatic lung cancer. 4. Mild increased activity in enlarged and lobular prostate. Recommend correlation with PSA to rule out prostate cancer.  4/3/24: Chest Xray:  Small left apex pneumothorax, similar to prior exam earlier same day. Left lower lung mass again noted. No acute infiltrates. No significant pleural effusion.  04/03/2024 EBUS  Lung, left, core biopsy: -Squamous cell carcinoma. LYMPH NODE, 11L NEGATIVE FOR MALIGNANT CELLS. Note: Immunostains performed on block 1A shows that the tumor cells are positive for p40 and negative for TTF-1 stain.  This staining profile supports the diagnosis. PDL1 negative Tier I: Variants of Strong Clinical Significance PIK3CA p.([id number]) Tier II: Variants of Potential Clinical Significance KRAS p.([id number]) Tier III: Variants of Unknown Clinical Significance STK11 p.([id number]) ALK p.([id number]) ERBB3 p.([id number]) POLE p.([id number]) 4/4/2024: TTB - rec for MAGDALENA 5/3/2024: Sent to ED by  for worsening HENRY  6/26/2024: CT chest: 1. Enlarging necrotic neoplasm occupying the inferior segment of the lingula with obstruction of the lingular segmental bronchus and pulmonary artery. 2. Mild mediastinal lymphadenopathy with progressive encasement of the right hilum by above described necrotic lingular neoplasm. 6/27/2024: TTB: Patient presented during interdisciplinary TB on 6/27/24: Concern for a large lobulated spiculated lingular mass. Prominent mediastinal LNs also noted. On recent scan area seems to be enlarging. Plan to obtain a PET to r/o any distant disease/progression. If negative will discuss the possibility of a pneumonectomy. Can obtain a VQ scan prior. PLAN: PET-CT. 8/6/2024L Underwent mediastinoscopy and EBUS with , now found to have positive 4L and 11 LN, atypical cells in 4R - this is c/w POD on chemoIO and patient is no longer a candidate for resection. Reviewed at TTB, recommendation was for definitive chemoRT. 9/26/2024: Had severe infusion reaction needing hospital admission, also for symptomatic anemia.  In the hospital he received 1 unit PRBC, after which he has been feeling much better. [de-identified] : Squamous Cell Ca  - PDL1 negative, PIK3CA E542K [de-identified] : CTSx:  [FreeTextEntry1] : 4/23/2024: C1D1 cisplatin/gemcitabine/nivolumab D8 gemcitabine skipped due to HENRY 2/2 cisplatin 5/21/2024: C2D1 carboplatin/gemcitabine.nivolumab 5/28/2024: C2D8 gemcitabine 6/11/2024: Due for C3D1 carboplatin/gemcitabine/nivolumab 6/18/24    C3D8 Gemcitabine held due to low h/h 6/19/24    C3D8 Gemcitabine given w/ blood tranfusion  8/30/24: C1D1 carboplatin/paclitaxel weekly 1/7 9/5/2024 C2D1 carboplatin/ taxol weekly 2/7 9/12/2024 C3D1 carboplatin/taxol weekly 3/7 9/19/2024  C4D1 carboplatin/taxol weekly 4/7 9/26/2024 C5D1 carboplatin AUC 1.5 /taxol 40 mg/m2 weekly 5/7 10/3/2024: Due for C6D1  [de-identified] : He feels significantly better since getting 1 unit of blood in the hospital. He no longer feels fatigued, denies any shortness of breath or chest pain. [FreeTextEntry3] : neuropathy  [FreeTextEntry4] : 9/19/24  [FreeTextEntry5] : taxol

## 2024-10-02 ENCOUNTER — NON-APPOINTMENT (OUTPATIENT)
Age: 69
End: 2024-10-02

## 2024-10-02 LAB
CULTURE RESULTS: SIGNIFICANT CHANGE UP
CULTURE RESULTS: SIGNIFICANT CHANGE UP
SPECIMEN SOURCE: SIGNIFICANT CHANGE UP
SPECIMEN SOURCE: SIGNIFICANT CHANGE UP

## 2024-10-03 ENCOUNTER — APPOINTMENT (OUTPATIENT)
Dept: INFUSION THERAPY | Facility: CLINIC | Age: 69
End: 2024-10-03

## 2024-10-03 ENCOUNTER — OUTPATIENT (OUTPATIENT)
Dept: OUTPATIENT SERVICES | Facility: HOSPITAL | Age: 69
LOS: 1 days | End: 2024-10-03
Payer: MEDICARE

## 2024-10-03 VITALS
RESPIRATION RATE: 18 BRPM | HEIGHT: 66 IN | WEIGHT: 139.99 LBS | DIASTOLIC BLOOD PRESSURE: 60 MMHG | SYSTOLIC BLOOD PRESSURE: 97 MMHG | TEMPERATURE: 98 F | HEART RATE: 105 BPM | OXYGEN SATURATION: 99 %

## 2024-10-03 DIAGNOSIS — E11.65 TYPE 2 DIABETES MELLITUS WITH HYPERGLYCEMIA: ICD-10-CM
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
## 2024-10-03 DIAGNOSIS — D63.0 ANEMIA IN NEOPLASTIC DISEASE: ICD-10-CM

## 2024-10-03 DIAGNOSIS — Z11.52 ENCOUNTER FOR SCREENING FOR COVID-19: ICD-10-CM

## 2024-10-03 DIAGNOSIS — E78.5 HYPERLIPIDEMIA, UNSPECIFIED: ICD-10-CM

## 2024-10-03 DIAGNOSIS — I10 ESSENTIAL (PRIMARY) HYPERTENSION: ICD-10-CM

## 2024-10-03 DIAGNOSIS — T45.1X5A ADVERSE EFFECT OF ANTINEOPLASTIC AND IMMUNOSUPPRESSIVE DRUGS, INITIAL ENCOUNTER: ICD-10-CM

## 2024-10-03 DIAGNOSIS — C77.9 SECONDARY AND UNSPECIFIED MALIGNANT NEOPLASM OF LYMPH NODE, UNSPECIFIED: ICD-10-CM

## 2024-10-03 DIAGNOSIS — Z79.633: ICD-10-CM

## 2024-10-03 DIAGNOSIS — D61.810 ANTINEOPLASTIC CHEMOTHERAPY INDUCED PANCYTOPENIA: ICD-10-CM

## 2024-10-03 DIAGNOSIS — R55 SYNCOPE AND COLLAPSE: ICD-10-CM

## 2024-10-03 DIAGNOSIS — C34.92 MALIGNANT NEOPLASM OF UNSPECIFIED PART OF LEFT BRONCHUS OR LUNG: ICD-10-CM

## 2024-10-03 DIAGNOSIS — Z90.49 ACQUIRED ABSENCE OF OTHER SPECIFIED PARTS OF DIGESTIVE TRACT: Chronic | ICD-10-CM

## 2024-10-03 DIAGNOSIS — R11.2 NAUSEA WITH VOMITING, UNSPECIFIED: ICD-10-CM

## 2024-10-03 DIAGNOSIS — Z79.84 LONG TERM (CURRENT) USE OF ORAL HYPOGLYCEMIC DRUGS: ICD-10-CM

## 2024-10-03 DIAGNOSIS — K21.9 GASTRO-ESOPHAGEAL REFLUX DISEASE WITHOUT ESOPHAGITIS: ICD-10-CM

## 2024-10-03 PROCEDURE — 96417 CHEMO IV INFUS EACH ADDL SEQ: CPT

## 2024-10-03 PROCEDURE — 96413 CHEMO IV INFUSION 1 HR: CPT

## 2024-10-03 PROCEDURE — 96375 TX/PRO/DX INJ NEW DRUG ADDON: CPT

## 2024-10-09 ENCOUNTER — NON-APPOINTMENT (OUTPATIENT)
Age: 69
End: 2024-10-09

## 2024-10-09 VITALS
DIASTOLIC BLOOD PRESSURE: 72 MMHG | SYSTOLIC BLOOD PRESSURE: 105 MMHG | HEART RATE: 117 BPM | TEMPERATURE: 98.2 F | WEIGHT: 142 LBS | RESPIRATION RATE: 18 BRPM | HEIGHT: 66 IN | BODY MASS INDEX: 22.82 KG/M2 | OXYGEN SATURATION: 100 %

## 2024-10-10 ENCOUNTER — APPOINTMENT (OUTPATIENT)
Dept: HEMATOLOGY ONCOLOGY | Facility: CLINIC | Age: 69
End: 2024-10-10
Payer: MEDICARE

## 2024-10-10 ENCOUNTER — OUTPATIENT (OUTPATIENT)
Dept: OUTPATIENT SERVICES | Facility: HOSPITAL | Age: 69
LOS: 1 days | End: 2024-10-10
Payer: MEDICARE

## 2024-10-10 ENCOUNTER — APPOINTMENT (OUTPATIENT)
Dept: INFUSION THERAPY | Facility: CLINIC | Age: 69
End: 2024-10-10

## 2024-10-10 VITALS
RESPIRATION RATE: 16 BRPM | OXYGEN SATURATION: 100 % | TEMPERATURE: 98 F | HEIGHT: 66 IN | SYSTOLIC BLOOD PRESSURE: 105 MMHG | WEIGHT: 141.98 LBS | HEART RATE: 100 BPM | DIASTOLIC BLOOD PRESSURE: 72 MMHG

## 2024-10-10 VITALS
RESPIRATION RATE: 18 BRPM | SYSTOLIC BLOOD PRESSURE: 92 MMHG | OXYGEN SATURATION: 97 % | TEMPERATURE: 98 F | DIASTOLIC BLOOD PRESSURE: 53 MMHG | HEART RATE: 110 BPM

## 2024-10-10 DIAGNOSIS — D64.9 ANEMIA, UNSPECIFIED: ICD-10-CM

## 2024-10-10 DIAGNOSIS — C34.92 MALIGNANT NEOPLASM OF UNSPECIFIED PART OF LEFT BRONCHUS OR LUNG: ICD-10-CM

## 2024-10-10 LAB
ALBUMIN SERPL ELPH-MCNC: 3.3 G/DL
ALP BLD-CCNC: 83 U/L
ALT SERPL-CCNC: 19 U/L
ANION GAP SERPL CALC-SCNC: 8 MMOL/L
AST SERPL-CCNC: 24 U/L
BILIRUB SERPL-MCNC: 1.4 MG/DL
BUN SERPL-MCNC: 25 MG/DL
CALCIUM SERPL-MCNC: 9.7 MG/DL
CHLORIDE SERPL-SCNC: 95 MMOL/L
CO2 SERPL-SCNC: 29 MMOL/L
CREAT SERPL-MCNC: 0.6 MG/DL
EGFR: 104 ML/MIN/1.73M2
GLUCOSE SERPL-MCNC: 301 MG/DL
HCT VFR BLD CALC: 24.9 %
HGB BLD-MCNC: 8.7 G/DL
LYMPHOCYTES # BLD AUTO: 0.4 K/UL
LYMPHOCYTES NFR BLD AUTO: 12.9 %
MAN DIFF?: NO
MCHC RBC-ENTMCNC: 29.4 PG
MCHC RBC-ENTMCNC: 34.9 GM/DL
MCV RBC AUTO: 84.1 FL
NEUTROPHILS # BLD AUTO: 2.4 K/UL
NEUTROPHILS NFR BLD AUTO: 83.6 %
PLATELET # BLD AUTO: 176 K/UL
POTASSIUM SERPL-SCNC: 5.1 MMOL/L
PROT SERPL-MCNC: 7.7 G/DL
RBC # BLD: 2.96 M/UL
RBC # FLD: 19.2 %
SODIUM SERPL-SCNC: 132 MMOL/L
WBC # FLD AUTO: 2.9 K/UL

## 2024-10-10 PROCEDURE — 96367 TX/PROPH/DG ADDL SEQ IV INF: CPT

## 2024-10-10 PROCEDURE — 99214 OFFICE O/P EST MOD 30 MIN: CPT

## 2024-10-10 PROCEDURE — 96417 CHEMO IV INFUS EACH ADDL SEQ: CPT

## 2024-10-10 PROCEDURE — 96413 CHEMO IV INFUSION 1 HR: CPT

## 2024-10-10 PROCEDURE — G2211 COMPLEX E/M VISIT ADD ON: CPT

## 2024-10-10 NOTE — PHARMACY COMMUNICATION NOTE - COMMENTS
S/w Dr. Larose regarding carboplatin dose. Patient's SCr is 0.6mg/dL today so [facility] formula yields dose of 172mg. Per Dr. Larose, continue with 130mg today. It is the patient's last cycle.     Also, she is aware of Na 132, gluc 301, bili 1.4. Patient is cleared for treatment.

## 2024-10-14 ENCOUNTER — INPATIENT (INPATIENT)
Facility: HOSPITAL | Age: 69
LOS: 0 days | Discharge: HOME | DRG: 809 | End: 2024-10-15
Attending: INTERNAL MEDICINE | Admitting: STUDENT IN AN ORGANIZED HEALTH CARE EDUCATION/TRAINING PROGRAM
Payer: MEDICARE

## 2024-10-14 ENCOUNTER — NON-APPOINTMENT (OUTPATIENT)
Age: 69
End: 2024-10-14

## 2024-10-14 VITALS
HEIGHT: 66 IN | OXYGEN SATURATION: 100 % | RESPIRATION RATE: 17 BRPM | HEART RATE: 128 BPM | TEMPERATURE: 98 F | WEIGHT: 160.06 LBS | DIASTOLIC BLOOD PRESSURE: 58 MMHG | SYSTOLIC BLOOD PRESSURE: 90 MMHG

## 2024-10-14 DIAGNOSIS — Z29.9 ENCOUNTER FOR PROPHYLACTIC MEASURES, UNSPECIFIED: ICD-10-CM

## 2024-10-14 DIAGNOSIS — Z90.49 ACQUIRED ABSENCE OF OTHER SPECIFIED PARTS OF DIGESTIVE TRACT: Chronic | ICD-10-CM

## 2024-10-14 DIAGNOSIS — D64.9 ANEMIA, UNSPECIFIED: ICD-10-CM

## 2024-10-14 DIAGNOSIS — E87.1 HYPO-OSMOLALITY AND HYPONATREMIA: ICD-10-CM

## 2024-10-14 DIAGNOSIS — R11.2 NAUSEA WITH VOMITING, UNSPECIFIED: ICD-10-CM

## 2024-10-14 DIAGNOSIS — I10 ESSENTIAL (PRIMARY) HYPERTENSION: ICD-10-CM

## 2024-10-14 DIAGNOSIS — C34.90 MALIGNANT NEOPLASM OF UNSPECIFIED PART OF UNSPECIFIED BRONCHUS OR LUNG: ICD-10-CM

## 2024-10-14 DIAGNOSIS — E78.5 HYPERLIPIDEMIA, UNSPECIFIED: ICD-10-CM

## 2024-10-14 DIAGNOSIS — K21.9 GASTRO-ESOPHAGEAL REFLUX DISEASE WITHOUT ESOPHAGITIS: ICD-10-CM

## 2024-10-14 DIAGNOSIS — R42 DIZZINESS AND GIDDINESS: ICD-10-CM

## 2024-10-14 DIAGNOSIS — R06.6 HICCOUGH: ICD-10-CM

## 2024-10-14 DIAGNOSIS — E11.9 TYPE 2 DIABETES MELLITUS WITHOUT COMPLICATIONS: ICD-10-CM

## 2024-10-14 LAB
ALBUMIN SERPL ELPH-MCNC: 3.5 G/DL — SIGNIFICANT CHANGE UP (ref 3.3–5)
ALP SERPL-CCNC: 97 U/L — SIGNIFICANT CHANGE UP (ref 40–120)
ALT FLD-CCNC: 20 U/L — SIGNIFICANT CHANGE UP (ref 10–45)
ANION GAP SERPL CALC-SCNC: 10 MMOL/L — SIGNIFICANT CHANGE UP (ref 5–17)
ANISOCYTOSIS BLD QL: SIGNIFICANT CHANGE UP
AST SERPL-CCNC: 17 U/L — SIGNIFICANT CHANGE UP (ref 10–40)
B-OH-BUTYR SERPL-SCNC: 0.3 MMOL/L — SIGNIFICANT CHANGE UP
BASE EXCESS BLDV CALC-SCNC: 2 MMOL/L — SIGNIFICANT CHANGE UP (ref -2–3)
BASOPHILS # BLD AUTO: 0 K/UL — SIGNIFICANT CHANGE UP (ref 0–0.2)
BASOPHILS NFR BLD AUTO: 0 % — SIGNIFICANT CHANGE UP (ref 0–2)
BILIRUB SERPL-MCNC: 1.2 MG/DL — SIGNIFICANT CHANGE UP (ref 0.2–1.2)
BLD GP AB SCN SERPL QL: NEGATIVE — SIGNIFICANT CHANGE UP
BUN SERPL-MCNC: 20 MG/DL — SIGNIFICANT CHANGE UP (ref 7–23)
CA-I SERPL-SCNC: 1.2 MMOL/L — SIGNIFICANT CHANGE UP (ref 1.15–1.33)
CALCIUM SERPL-MCNC: 9.1 MG/DL — SIGNIFICANT CHANGE UP (ref 8.4–10.5)
CHLORIDE SERPL-SCNC: 97 MMOL/L — SIGNIFICANT CHANGE UP (ref 96–108)
CO2 BLDV-SCNC: 29 MMOL/L — HIGH (ref 22–26)
CO2 SERPL-SCNC: 25 MMOL/L — SIGNIFICANT CHANGE UP (ref 22–31)
CREAT SERPL-MCNC: 0.78 MG/DL — SIGNIFICANT CHANGE UP (ref 0.5–1.3)
EGFR: 96 ML/MIN/1.73M2 — SIGNIFICANT CHANGE UP
EOSINOPHIL # BLD AUTO: 0.07 K/UL — SIGNIFICANT CHANGE UP (ref 0–0.5)
EOSINOPHIL NFR BLD AUTO: 2.6 % — SIGNIFICANT CHANGE UP (ref 0–6)
GAS PNL BLDV: 133 MMOL/L — LOW (ref 136–145)
GAS PNL BLDV: SIGNIFICANT CHANGE UP
GIANT PLATELETS BLD QL SMEAR: PRESENT — SIGNIFICANT CHANGE UP
GLUCOSE BLDC GLUCOMTR-MCNC: 159 MG/DL — HIGH (ref 70–99)
GLUCOSE SERPL-MCNC: 214 MG/DL — HIGH (ref 70–99)
HCO3 BLDV-SCNC: 28 MMOL/L — SIGNIFICANT CHANGE UP (ref 22–29)
HCT VFR BLD CALC: 20.8 % — CRITICAL LOW (ref 39–50)
HCT VFR BLD CALC: 24.8 % — LOW (ref 39–50)
HGB BLD-MCNC: 7 G/DL — CRITICAL LOW (ref 13–17)
HGB BLD-MCNC: 8.5 G/DL — LOW (ref 13–17)
HYPOCHROMIA BLD QL: SLIGHT — SIGNIFICANT CHANGE UP
LYMPHOCYTES # BLD AUTO: 0.31 K/UL — LOW (ref 1–3.3)
LYMPHOCYTES # BLD AUTO: 12.2 % — LOW (ref 13–44)
MAGNESIUM SERPL-MCNC: 1.6 MG/DL — SIGNIFICANT CHANGE UP (ref 1.6–2.6)
MANUAL SMEAR VERIFICATION: SIGNIFICANT CHANGE UP
MCHC RBC-ENTMCNC: 28.9 PG — SIGNIFICANT CHANGE UP (ref 27–34)
MCHC RBC-ENTMCNC: 29.5 PG — SIGNIFICANT CHANGE UP (ref 27–34)
MCHC RBC-ENTMCNC: 33.7 GM/DL — SIGNIFICANT CHANGE UP (ref 32–36)
MCHC RBC-ENTMCNC: 34.3 GM/DL — SIGNIFICANT CHANGE UP (ref 32–36)
MCV RBC AUTO: 86 FL — SIGNIFICANT CHANGE UP (ref 80–100)
MCV RBC AUTO: 86.1 FL — SIGNIFICANT CHANGE UP (ref 80–100)
MICROCYTES BLD QL: SIGNIFICANT CHANGE UP
MONOCYTES # BLD AUTO: 0.11 K/UL — SIGNIFICANT CHANGE UP (ref 0–0.9)
MONOCYTES NFR BLD AUTO: 4.4 % — SIGNIFICANT CHANGE UP (ref 2–14)
NEUTROPHILS # BLD AUTO: 1.98 K/UL — SIGNIFICANT CHANGE UP (ref 1.8–7.4)
NEUTROPHILS NFR BLD AUTO: 79.1 % — HIGH (ref 43–77)
NRBC # BLD: 0 /100 WBCS — SIGNIFICANT CHANGE UP (ref 0–0)
OVALOCYTES BLD QL SMEAR: SLIGHT — SIGNIFICANT CHANGE UP
PCO2 BLDV: 47 MMHG — SIGNIFICANT CHANGE UP (ref 42–55)
PH BLDV: 7.38 — SIGNIFICANT CHANGE UP (ref 7.32–7.43)
PHOSPHATE SERPL-MCNC: 2.8 MG/DL — SIGNIFICANT CHANGE UP (ref 2.5–4.5)
PLAT MORPH BLD: ABNORMAL
PLATELET # BLD AUTO: 133 K/UL — LOW (ref 150–400)
PLATELET # BLD AUTO: 143 K/UL — LOW (ref 150–400)
PO2 BLDV: 50 MMHG — HIGH (ref 25–45)
POIKILOCYTOSIS BLD QL AUTO: SLIGHT — SIGNIFICANT CHANGE UP
POLYCHROMASIA BLD QL SMEAR: SLIGHT — SIGNIFICANT CHANGE UP
POTASSIUM BLDV-SCNC: 3.7 MMOL/L — SIGNIFICANT CHANGE UP (ref 3.5–5.1)
POTASSIUM SERPL-MCNC: 3.7 MMOL/L — SIGNIFICANT CHANGE UP (ref 3.5–5.3)
POTASSIUM SERPL-SCNC: 3.7 MMOL/L — SIGNIFICANT CHANGE UP (ref 3.5–5.3)
PROT SERPL-MCNC: 7.6 G/DL — SIGNIFICANT CHANGE UP (ref 6–8.3)
RBC # BLD: 2.42 M/UL — LOW (ref 4.2–5.8)
RBC # BLD: 2.88 M/UL — LOW (ref 4.2–5.8)
RBC # FLD: 18.9 % — HIGH (ref 10.3–14.5)
RBC # FLD: 18.9 % — HIGH (ref 10.3–14.5)
RBC BLD AUTO: ABNORMAL
RH IG SCN BLD-IMP: POSITIVE — SIGNIFICANT CHANGE UP
SAO2 % BLDV: 80.1 % — SIGNIFICANT CHANGE UP (ref 67–88)
SODIUM SERPL-SCNC: 132 MMOL/L — LOW (ref 135–145)
TSH SERPL-MCNC: 0.69 UIU/ML — SIGNIFICANT CHANGE UP (ref 0.27–4.2)
VARIANT LYMPHS # BLD: 1.7 % — SIGNIFICANT CHANGE UP (ref 0–6)
WBC # BLD: 1.88 K/UL — LOW (ref 3.8–10.5)
WBC # BLD: 2.5 K/UL — LOW (ref 3.8–10.5)
WBC # FLD AUTO: 1.88 K/UL — LOW (ref 3.8–10.5)
WBC # FLD AUTO: 2.5 K/UL — LOW (ref 3.8–10.5)

## 2024-10-14 PROCEDURE — 99233 SBSQ HOSP IP/OBS HIGH 50: CPT

## 2024-10-14 PROCEDURE — 99223 1ST HOSP IP/OBS HIGH 75: CPT

## 2024-10-14 PROCEDURE — 99285 EMERGENCY DEPT VISIT HI MDM: CPT

## 2024-10-14 RX ORDER — ALCOHOL ANTISEPTIC PADS
15 PADS, MEDICATED (EA) TOPICAL ONCE
Refills: 0 | Status: DISCONTINUED | OUTPATIENT
Start: 2024-10-14 | End: 2024-10-15

## 2024-10-14 RX ORDER — ENOXAPARIN SODIUM 150 MG/ML
40 INJECTION SUBCUTANEOUS EVERY 24 HOURS
Refills: 0 | Status: DISCONTINUED | OUTPATIENT
Start: 2024-10-14 | End: 2024-10-15

## 2024-10-14 RX ORDER — ALCOHOL ANTISEPTIC PADS
25 PADS, MEDICATED (EA) TOPICAL ONCE
Refills: 0 | Status: DISCONTINUED | OUTPATIENT
Start: 2024-10-14 | End: 2024-10-15

## 2024-10-14 RX ORDER — INSULIN LISPRO 100/ML
VIAL (ML) SUBCUTANEOUS AT BEDTIME
Refills: 0 | Status: DISCONTINUED | OUTPATIENT
Start: 2024-10-14 | End: 2024-10-15

## 2024-10-14 RX ORDER — GLUCAGON INJECTION, SOLUTION 0.5 MG/.1ML
1 INJECTION, SOLUTION SUBCUTANEOUS ONCE
Refills: 0 | Status: DISCONTINUED | OUTPATIENT
Start: 2024-10-14 | End: 2024-10-15

## 2024-10-14 RX ORDER — SODIUM CHLORIDE 0.9 % (FLUSH) 0.9 %
1000 SYRINGE (ML) INJECTION ONCE
Refills: 0 | Status: COMPLETED | OUTPATIENT
Start: 2024-10-14 | End: 2024-10-14

## 2024-10-14 RX ORDER — SODIUM CHLORIDE IRRIG SOLUTION 0.9 %
1000 SOLUTION, IRRIGATION IRRIGATION
Refills: 0 | Status: DISCONTINUED | OUTPATIENT
Start: 2024-10-14 | End: 2024-10-15

## 2024-10-14 RX ORDER — ONDANSETRON HCL/PF 4 MG/2 ML
4 VIAL (ML) INJECTION ONCE
Refills: 0 | Status: COMPLETED | OUTPATIENT
Start: 2024-10-14 | End: 2024-10-14

## 2024-10-14 RX ORDER — BACLOFEN 100 %
1 POWDER (GRAM) MISCELLANEOUS
Refills: 0 | DISCHARGE

## 2024-10-14 RX ORDER — INSULIN LISPRO 100/ML
VIAL (ML) SUBCUTANEOUS
Refills: 0 | Status: DISCONTINUED | OUTPATIENT
Start: 2024-10-14 | End: 2024-10-15

## 2024-10-14 RX ORDER — METOCLOPRAMIDE HCL 5 MG
10 TABLET ORAL ONCE
Refills: 0 | Status: COMPLETED | OUTPATIENT
Start: 2024-10-14 | End: 2024-10-14

## 2024-10-14 RX ORDER — PANTOPRAZOLE SODIUM 40 MG/1
40 TABLET, DELAYED RELEASE ORAL
Refills: 0 | Status: DISCONTINUED | OUTPATIENT
Start: 2024-10-14 | End: 2024-10-15

## 2024-10-14 RX ORDER — PANTOPRAZOLE SODIUM 40 MG/1
40 TABLET, DELAYED RELEASE ORAL
Refills: 0 | Status: DISCONTINUED | OUTPATIENT
Start: 2024-10-14 | End: 2024-10-14

## 2024-10-14 RX ORDER — ALCOHOL ANTISEPTIC PADS
12.5 PADS, MEDICATED (EA) TOPICAL ONCE
Refills: 0 | Status: DISCONTINUED | OUTPATIENT
Start: 2024-10-14 | End: 2024-10-15

## 2024-10-14 RX ORDER — BACLOFEN 100 %
5 POWDER (GRAM) MISCELLANEOUS ONCE
Refills: 0 | Status: COMPLETED | OUTPATIENT
Start: 2024-10-14 | End: 2024-10-14

## 2024-10-14 RX ORDER — ONDANSETRON HCL/PF 4 MG/2 ML
4 VIAL (ML) INJECTION EVERY 6 HOURS
Refills: 0 | Status: DISCONTINUED | OUTPATIENT
Start: 2024-10-14 | End: 2024-10-15

## 2024-10-14 RX ORDER — ATORVASTATIN CALCIUM 10 MG/1
20 TABLET, FILM COATED ORAL AT BEDTIME
Refills: 0 | Status: DISCONTINUED | OUTPATIENT
Start: 2024-10-14 | End: 2024-10-15

## 2024-10-14 RX ORDER — PANTOPRAZOLE SODIUM 40 MG/1
1 TABLET, DELAYED RELEASE ORAL
Refills: 0 | DISCHARGE

## 2024-10-14 RX ADMIN — Medication 104 MILLIGRAM(S): at 12:24

## 2024-10-14 RX ADMIN — Medication 1000 MILLILITER(S): at 12:25

## 2024-10-14 RX ADMIN — ENOXAPARIN SODIUM 40 MILLIGRAM(S): 150 INJECTION SUBCUTANEOUS at 22:31

## 2024-10-14 RX ADMIN — Medication 4 MILLIGRAM(S): at 14:59

## 2024-10-14 RX ADMIN — Medication 5 MILLIGRAM(S): at 17:24

## 2024-10-14 RX ADMIN — Medication 1000 MILLILITER(S): at 13:29

## 2024-10-14 RX ADMIN — ATORVASTATIN CALCIUM 20 MILLIGRAM(S): 10 TABLET, FILM COATED ORAL at 22:31

## 2024-10-14 NOTE — ED ADULT NURSE REASSESSMENT NOTE - NS ED NURSE REASSESS COMMENT FT1
Receive pt in bed. Pt alert awake and oriented x 4. Pt receiving 1 unit of PRBC's. Pt in no apparent distress. Pt in stable condition.

## 2024-10-14 NOTE — H&P ADULT - PROBLEM SELECTOR PLAN 4
Intake lab w/ Na of 132. Baseline 131-135. Could be hypovolemic hyponatremia given poor po intake vs SIADH given Hx of lung malignancy.   AOx3.    Plan:  - f/u urine studies

## 2024-10-14 NOTE — H&P ADULT - NSHPLABSRESULTS_GEN_ALL_CORE
.  LABS:                         7.0    1.88  )-----------( 133      ( 14 Oct 2024 16:45 )             20.8     10-14    132[L]  |  97  |  20  ----------------------------<  214[H]  3.7   |  25  |  0.78    Ca    9.1      14 Oct 2024 11:46  Phos  2.8     10-14  Mg     1.6     10-14    TPro  7.6  /  Alb  3.5  /  TBili  1.2  /  DBili  x   /  AST  17  /  ALT  20  /  AlkPhos  97  10-14      Urinalysis Basic - ( 14 Oct 2024 11:46 )    Color: x / Appearance: x / SG: x / pH: x  Gluc: 214 mg/dL / Ketone: x  / Bili: x / Urobili: x   Blood: x / Protein: x / Nitrite: x   Leuk Esterase: x / RBC: x / WBC x   Sq Epi: x / Non Sq Epi: x / Bacteria: x                RADIOLOGY, EKG & ADDITIONAL TESTS: Reviewed.

## 2024-10-14 NOTE — ED PROVIDER NOTE - CLINICAL SUMMARY MEDICAL DECISION MAKING FREE TEXT BOX
69M PMH HTN, GERD, T2DM, HLD, lung cancer (chemo Dr. Larose, radiation), pancytopenia (has required PRBC), p/w4d of multiple NBNB NV. Also intermittent hiccups, similar to prior. Also intermittent lightheaded - only w/ standing. Received radiation today and was referred to ED. No other systemic symptoms.   Echo July 2024  Hypotensive, tachycardic, other vitals wnl. Exam as above.   ddx; Likely dehydration/metabolic component. NV likely 2/2 chemo/radiation. Lightheadedness likely 2/2 orthostasis, very unlikely acute significant cardiac pathology.   Labs, IVF/attempt symptom control, reassess.

## 2024-10-14 NOTE — CONSULT NOTE ADULT - NS ATTEST RISK PROBLEM GEN_ALL_CORE FT
Symptomatic anemia - acute illness posing a threat to bodily function  Chronic condition - NSCLC  Extensive data review

## 2024-10-14 NOTE — H&P ADULT - PROBLEM SELECTOR PLAN 2
Patient presenting with 4 days of NBNB N/V 2/2 chemo/radiation.     Plan:  - zofran 4mg prn for nausea #Hypotension  Patient presenting w/ dizziness w/ ambulation likely 2/2 hypotension iso poor po intake and vomiting. Denies vertigo like symptoms.   - Hypotensive in the ED to 90s/50s on admission and improved to SBP 100s-120s after 2L IVF. Mucous membranes mildly dry w/ decrease skin turgor.     Plan:  - f/u orthostatics  - Encourage PO intake  - Consider PT consult if symptoms do not improve w/ IVF and blood.

## 2024-10-14 NOTE — ED PROVIDER NOTE - OBJECTIVE STATEMENT
69M PMH HTN, GERD, T2DM, HLD, lung cancer (chemo Dr. Larose, radiation), pancytopenia (has required PRBC), p/w4d of multiple NBNB NV. Also intermittent hiccups, similar to prior. Also intermittent lightheaded - only w/ standing. Received radiation today and was referred to ED. No other systemic symptoms.   Echo July 2024  Denies HA, f/c, URI symptoms, SOB, CP, abd pain, diarrhea, urinary complaints, black/bloody stool.

## 2024-10-14 NOTE — ED PROVIDER NOTE - CARE PLAN
Principal Discharge DX:	Nausea and vomiting  Secondary Diagnosis:	Hiccups   1 Principal Discharge DX:	Nausea and vomiting  Secondary Diagnosis:	Hiccups  Secondary Diagnosis:	Anemia

## 2024-10-14 NOTE — ED ADULT TRIAGE NOTE - CHIEF COMPLAINT QUOTE
pmhx lung CA s/p last chemo last week and s/p radiation today (last tx tm) sent from clinic for N/V and dizziness x1 week with +orthostatics in clinic.

## 2024-10-14 NOTE — H&P ADULT - PROBLEM SELECTOR PLAN 9
F: None  E: replete K < 4, Mg <2  DVT ppx- Lovenox 40 SQ  Diet- regular  Activity- ambulate as tolerated  Code- Full. Patient w/ Hx of HTN  Home med: Amlodipine 5mg    Plan:  - hold home med iso hypotension

## 2024-10-14 NOTE — H&P ADULT - PROBLEM SELECTOR PLAN 7
Patient w/ Hx of HTN  Home med: Amlodipine 5mg    Plan:  - hold home med iso hypotension Hx of DM. On metformin 500mg q12.    Plan:  - hold metformin  - start ssi   - f/u A1c

## 2024-10-14 NOTE — ED ADULT NURSE REASSESSMENT NOTE - NS ED NURSE REASSESS COMMENT FT1
Patient to receive blood transfusion. Labs reviewed. Patient consent in chart. Patient educated on possible signs of blood transfusion and informed to notify staff if patient were to develop any severe respiratory distress, flank pain, or any other major concerns. Blood product checked with second RN.

## 2024-10-14 NOTE — ED ADULT NURSE NOTE - OBJECTIVE STATEMENT
69y male presents to ED c/o dizziness. Pt has hx of lung cancer and received last chemo treatment last thursday. Since then, pt endorses feeling dizzy, having hiccups, and feeling nauseous. Pt was at pcp today and was noted to by hypertensive with positive orthostatic changes. A&Ox4.

## 2024-10-14 NOTE — H&P ADULT - ATTENDING COMMENTS
68 yo M with PMHx HTN, HLD, DM, GERD, NSCLC (chemotx/XRT), recent admission for likely vasovagal syncope during chemotx (9/26-9/27) px from outpatient XRT with 4-5d hx of nausea/vomiting and inability tolerating PO, found to have Hb 7, admitted for further evaluation and management of sx anemia and failure to thrive.      Initial VS notable for -120s and BP 90/58, improved s/p 2L IVF. EKG reviewed (sinus tachycardia, ). Labs reviewed. Pancytopenia on CBC (similar to prior labs on recent admission). Repeat Hb 7. No signs/sx to suggest bleed at this time. Likely pancytopenia i/s/o malignancy on chemotx. CMP notable for mild hyponatremia 132 and elevated Glu 214.      [ ] 1U pRBC ordered in ED -> Post-transfusion CBC in AM   [ ] PT/PTT/INR   [ ] Hold home Amlodipine   [ ] Orthostatic VS   [ ] Anti-emetic PRN ()   [ ] Nutrition consult   [ ] Heme/Onc consulted in ED (NSCLC)

## 2024-10-14 NOTE — H&P ADULT - PROBLEM SELECTOR PLAN 6
# Squamous Cell Lung Ca, AJCC IIIB, T4N2M0 - PDL1 negative    - s/p 7 cycles of neoadjuvant platinum based with gemcitabine and nivolumab chemoimmunotherapy. Interim CT chest 8/7/24 with progression of disease concern.    - He developed an HENRY following a 1 cycle of cisplatin and subsequently switched to carboplatin    - He underwent mediastinoscopy 8/5, EBUS 8/6 with SCC detected in LN 11L and 4L which upstages him to N2    - PET/CT scan (7/8/24) revealing disease progression with ? N3 disease noted at station R4. MRI brain wnl.    - Last chemotherapy on 10/10- 4 days ago.    - He is also receiving RT -- Last session tomorrow (33 total per patient)    Plan:  - Outpatient follow up  - Apprec additl heme/onc recs

## 2024-10-14 NOTE — CONSULT NOTE ADULT - ASSESSMENT
Mr. Cash Ochoa is a 69YM w/ PMHx GERD, HTN, HLD, DMII, squamous cell lung cancer, stage IIIb, PD-L1 negative currently on concurrent chemoRT with carboplatin/Taxol who presented to the ED with Hiccups, vomiting and dizziness.    # Squamous Cell Lung Ca, AJCC IIIB, T4N2M0 - PDL1 negative   -- s/p 7 cycles of neoadjuvant platinum based with gemcitabine and nivolumab chemoimmunotherapy. Interim CT chest 8/7/24 with progression of disease concern.   -- He developed an HENRY following a 1 cycle of cisplatin and subsequently switched to carboplatin   -- He underwent mediastinoscopy 8/5, EBUS 8/6 with SCC detected in LN 11L and 4L which upstages him to N2   -- PET/CT scan (7/8/24) revealing disease progression with ? N3 disease noted at station R4. MRI brain wnl.   -- Last chemotherapy on 10/10- 4 days ago.   -- He is also receiving RT -- Last session tomorrow (33 total per patient)     # Anemia - 2/2 chemotherapy.   --Hb 8.5 today.   -- Transfuse for Hb < 7.    # Dehydration secondary to vomiting  # Hypotension  - C/w anti-nausea meds and IV hydration per primary team      Recommendations not final until attending attestation

## 2024-10-14 NOTE — ED ADULT NURSE NOTE - NSFALLOOBATTEMPT_ED_ALL_ED
[facility]-  Just stopped 2 days. 3 years. Mildly on back.    Period within 2 weeks.  -Incrased acne.     Salicyclic acid CeraVe    Younger sister.     Azaeliac acid 8% , Clindamyin 1% and Spironolacotne 5% cream   No

## 2024-10-14 NOTE — H&P ADULT - HISTORY OF PRESENT ILLNESS
HPI:    In the ED:  Initial vital signs: T: 98.5F, HR: 128 -> 103, BP: 90/58 -> 115/72, R: 16, SpO2: 100% on RA  ED course:   Labs: WBC 2.5, Hgb 8.5 -> 7.0, Plt 143, Na 132.  CXR: Stable lung neoplasm. No signs of malignancy   EKG:   Medications: Baclofen 10mg, Reglan 10mg x1, Zofran 4mg x1, 2L NS, 1u pRBC   Consults: Heme/Onc    HPI:    In the ED:  Initial vital signs: T: 98.5F, HR: 128 -> 103, BP: 90/58 -> 115/72, R: 16, SpO2: 100% on RA  ED course:   Labs: WBC 2.5, Hgb 8.5 -> 7.0, Plt 143, Na 132.  CXR: Stable lung neoplasm. No signs of malignancy   EK NSR   Medications: Baclofen 10mg, Reglan 10mg x1, Zofran 4mg x1, 2L NS, 1u pRBC   Consults: Heme/Onc    HPI: 68 y/o M w/ PMHx of Lung cancer, DM, HTN, HLD presents to the ED for lightheadedness, generalized weakness, and N/V accompanied w/ recurrent hiccups. Symptoms started after his chemotherapy session on 10/10/24. Endorses having trouble keeping food down and gets lightheaded/dizzy after walking a couple steps. Received radiation today and was having worsening of symptoms and recommended to come to the ED for further evaluation. At the moment hiccups improved and tolerable. Denies fever, chills, abd pain, diarrhea, dysuria, urinary frequency, CP, SOB, HA.     In the ED:  Initial vital signs: T: 98.5F, HR: 128 -> 103, BP: 90/58 -> 115/72, R: 16, SpO2: 100% on RA  ED course: While in the ED patient was resuscitated w/ IVF and repeat Hgb shows drop in Hgb and received a transfusion.   Labs: WBC 2.5, Hgb 8.5 -> 7.0, Plt 143, Na 132.  CXR: Stable lung neoplasm. No signs of malignancy   EK NSR   Medications: Baclofen 10mg, Reglan 10mg x1, Zofran 4mg x1, 2L NS, 1u pRBC   Consults: Heme/Onc    HPI: 68 y/o M w/ PMHx of Lung cancer, DM, HTN, HLD presents to the ED for lightheadedness, generalized weakness, and N/V accompanied w/ recurrent hiccups. Symptoms started after his chemotherapy session on 10/10/24. Endorses having trouble keeping food down and gets lightheaded/dizzy after walking a couple steps. Received radiation today and was having worsening of symptoms and recommended to come to the ED for further evaluation. At the moment hiccups improved and tolerable. Denies fever, chills, abd pain, diarrhea, dysuria, urinary frequency, CP, SOB, HA.     In the ED:  Initial vital signs: T: 98.5F, HR: 128 -> 103, BP: 90/58 -> 115/72, R: 16, SpO2: 100% on RA  ED course: While in the ED patient was resuscitated w/ IVF and repeat Hgb shows drop in Hgb and received a transfusion.   Labs: WBC 2.5, Hgb 8.5 -> 7.0, Plt 143, Na 132.  CXR: Stable lung neoplasm. No worsneng, signs of malignancy   EK NSR   Medications: Baclofen 10mg, Reglan 10mg x1, Zofran 4mg x1, 2L NS, 1u pRBC   Consults: Heme/Onc

## 2024-10-14 NOTE — CONSULT NOTE ADULT - ASSESSMENT
Mr. Cash Ochoa is a 69YM w/ PMHx GERD, HTN, HLD, DMII, squamous cell lung cancer, stage IIIb, PD-L1 negative currently on concurrent chemoRT with carboplatin/Taxol who presented to the ED with Hiccups, vomiting and dizziness.       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 Interim CT chest 8/7/24 with progression of disease concern.    -- He developed an HENRY following a 1 cycle of cisplatin and subsequently switched to carboplatin    -- He underwent mediastinoscopy 8/5, EBUS 8/6 with SCC detected in LN 11L and 4L which upstages him to N2    -- PET/CT scan (7/8/24) revealing disease progression with ? N3 disease noted at station R4. MRI brain wnl.    -- Last chemotherapy on 10/10- 4 days ago.    -- He is also receiving RT -- Last session tomorrow (33 total per patient)      # Anemia - 2/2 chemotherapy.    -- Hb 8.5 today.    -- Transfuse for Hb < 7.       # Dehydration secondary to vomiting   # Hypotension   - C/w anti-nausea meds and IV hydration per primary team         Recommendations not final until attending attestation  Mr. Cash Ochoa is a 69YM w/ PMHx GERD, HTN, HLD, DMII, squamous cell lung cancer, stage IIIb, PD-L1 negative currently on concurrent chemoRT with carboplatin/Taxol who presented to the ED with Hiccups, vomiting and dizziness.       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 Interim CT chest 8/7/24 with progression of disease concern.    -- He developed an HENRY following a 1 cycle of cisplatin and subsequently switched to carboplatin    -- He underwent mediastinoscopy 8/5, EBUS 8/6 with SCC detected in LN 11L and 4L which upstages him to N2    -- PET/CT scan (7/8/24) revealing disease progression with ? N3 disease noted at station R4. MRI brain wnl.    -- Last chemotherapy on 10/10- 4 days ago.    -- He is also receiving RT -- Last session tomorrow (33 total per patient)      # Anemia - 2/2 chemotherapy.    -- Hb 8.5 today.    -- Transfuse for Hb < 7.   -- Please repeat Hb after IV hydration prior to d/c.      # Dehydration secondary to vomiting   # Hypotension   - C/w anti-nausea meds and IV hydration per primary team         Recommendations not final until attending attestation

## 2024-10-14 NOTE — H&P ADULT - PROBLEM SELECTOR PLAN 1
Patient presenting w/ dizziness, likely 2/2 hypotension iso poor po intake, N/V. Patient presenting w/ dizziness, likely 2/2 hypotension iso poor po intake and N/V. Blood pressure on admission 90/58 w/ improvement to -120s and 70s DBP after 2L NS.     Plan:  - #Pancytopenia  Patient presenting w/ chronic anemia likely 2/2 bone marrow suppression from chemotherapy. Also presents w/ leukopenia, and thrombocytopenia.   - Hgb 8.5 on admission and received 2L IVF w/ repeat Hgb 7.0. Decrease Hgb is likely 2/2 dilution. No signs or symptoms of bleeding   - Receiving 1u pRBC    Plan:  - No need for post-transfusion CBC  - Monitor for SS of bleeding  - f/u reticulocyte count.

## 2024-10-14 NOTE — CONSULT NOTE ADULT - ATTENDING COMMENTS
Patient seen while in ER with oncology fellow, Dr.Anamika Cary.  Well known to me patient with stage 2 NSCLC, SCC histology getting definitive chemoRT. He received his last dose of chemotherapy last week.  He is in ED after being sent from [facility] for hypotension and tachycardia.  Noted to be orthostatic in ED.  He has been having poor po intake with hiccups and constant vomiting while at home.  He now received IV hydration and feels much better.  I recommend checking a CBC post hydration as I believe he might have been very hemoconcentrated.  If Hb around 7 please transfuse 1 unit PRBC.  We will cancel his outpatient transfusions tomorrow.  He already has f/u with me scheduled.  I discussed the plan of care with ED attending,  as well. Summary being no clear reason for hiccups right now, suspect diaphragmatic irritation.

## 2024-10-14 NOTE — PATIENT PROFILE ADULT - FOOD INSECURITY
CM/LUCIO Rounding Notes    Current Status:  Significant Events: Increased NGT output, confused, restraints  Clinical barriers to Discharge/Transition: Medical necessity for acute care     Psychosocial Barriers to Discharge/Transition: Family/Patient Barriers, Family/Patient Education  Recommendations (response to barriers): Continue current plan  Planned Discharge Destination: Location not determined at this time     Recommended Referrals: None at this time  Devices/Equipment That Need to Be Arranged for Discharge: None at this time    Prior Admit Status:   Living Situation: Spouse/significant other and residing at House.   Support Systems: Spouse/Significant other  Home Devices/Equipment: None  Mobility Assist Devices: None  Type of Service Prior to Hospitalization: None    Patient/Family Discharge Goal:   Home       Therapy Recommendation for Discharge:   PT: Patient is appropriate for intensive daily Physical Therapy with the oversight of a Physical Medicine and Rehabilitation physician   OT: Patient is appropriate for intensive daily Occupational Therapy with the oversight of a Physical Medicine and Rehabilitation physician   SLP:    Recommended Mobility Equipment for Discharge: TBD    Discharge Plan/Needs:   Recommendations for Discharge: LUCIO/JOHN:  (TBD)       no

## 2024-10-14 NOTE — ED PROVIDER NOTE - PROGRESS NOTE DETAILS
[facility]: WBC 2.5, hgb 8.5, plt 143, Na 132, glucose 214, other labs grossly wnl. Failed initial PO challenge but now tolerating PO. Vitals improved/ Feeling much better but still has intermittent hiccups. Will give baclofen, reassess. Also d/w heme at bedside. [facility]: Heme had requested rpt CBC - WBC slightly decreased to 1.88, hgb to 7, plt to 133. Remains very slightly tachy, improved from admission. Pt feeling much better, no further vomiting, tolerating PO. Still occasional hiccups but improved from prior. Clinically improved while in ED. Rediscussed w/ heme, recommending PRBC. Pt already un ED 7 hours, will admit for further care, PRBC. Updated pt/wife at bedside. Consented for PRBC.

## 2024-10-14 NOTE — H&P ADULT - PROBLEM SELECTOR PLAN 5
Could be iso vagus/phrenic nerve irritation from persistent vomiting.  At this moment hiccups improved.    Plan:  - Start protonix 40mg qd

## 2024-10-14 NOTE — PATIENT PROFILE ADULT - OVER THE PAST TWO WEEKS, HAVE YOU FELT LITTLE INTEREST OR PLEASURE IN DOING THINGS?
Spoke with mom informing her appointment of 12/28/23 needs to be rescheduled due to provider no longer within [facility] as of 8/17/23 and moving out of state. Mom understood and rescheduled with Dr. George.    no

## 2024-10-14 NOTE — H&P ADULT - NSHPSOCIALHISTORY_GEN_ALL_CORE
Occupation:    Tobacco use: Ex--smoker. 1ppd for 50yrs, quit on 03/2024   EtOH use: NOne   Drug use: NOne   Living situation: Home w/ son   Mobility: W/o assitive devices. No mobility issues.

## 2024-10-14 NOTE — CONSULT NOTE ADULT - SUBJECTIVE AND OBJECTIVE BOX
Hematology-Oncology Consult note    Patient is a 69y old  Male who presents with a chief complaint of hiccups, vomiting and dizziness.    HEMATOLOGY/ONCOLOGY HISTORY: Mr. Cash Ochoa is a 69YM w/ PMHx GERD, HTN, HLD, DMII, squamous cell lung cancer, stage IIIb, PD-L1 negative currently on concurrent chemoRT with carboplatin/Taxol.     Today, he presented to clinic following RT with complaints of severe hiccups leading to vomiting since last Wednesday, accompanied by intermittent nausea. He reported an inability to retain any food or fluids and experienced dizziness and lightheadedness.      In the office vitals significant for orthostatic hypotension.. He was advised to go to the ED for further eval and hydration.      In the ED, he is hypotensive 90/58 and tachycardic up to 128. He received reglan and IV hydration.        REVIEW OF SYSTEMS:  All other review of systems is negative unless indicated above.    OBJECTIVE:  T(C): 36.9 (10-14-24 @ 13:43), Max: 36.9 (10-14-24 @ 11:29)  HR: 103 (10-14-24 @ 13:43) (103 - 128)  BP: 115/72 (10-14-24 @ 13:43) (90/58 - 115/72)  RR: 18 (10-14-24 @ 13:43) (17 - 18)  SpO2: 100% (10-14-24 @ 13:43) (100% - 100%)  Daily Height in cm: 167.64 (14 Oct 2024 11:29)    Daily     Physical Exam:  General: in no acute distress  Eyes: Pale conjunctivae  HENT: Dry mucous membranes  Neck: Trachea midline, supple  Lungs: No respiratory distress  Abdomen: Soft, non-tender non-distended; No rebound or guarding  Extremities: No clubbing, cyanosis or edema  Neurological: Alert and oriented x3    Medications:  MEDICATIONS  (STANDING):    MEDICATIONS  (PRN):      Labs:                        8.5    2.50  )-----------( 143      ( 14 Oct 2024 11:46 )             24.8     10-14    132[L]  |  97  |  20  ----------------------------<  214[H]  3.7   |  25  |  0.78    Ca    9.1      14 Oct 2024 11:46  Phos  2.8     10-14  Mg     1.6     10-14    TPro  7.6  /  Alb  3.5  /  TBili  1.2  /  DBili  x   /  AST  17  /  ALT  20  /  AlkPhos  97  10-14      Urinalysis Basic - ( 14 Oct 2024 11:46 )    Color: x / Appearance: x / SG: x / pH: x  Gluc: 214 mg/dL / Ketone: x  / Bili: x / Urobili: x   Blood: x / Protein: x / Nitrite: x   Leuk Esterase: x / RBC: x / WBC x   Sq Epi: x / Non Sq Epi: x / Bacteria: x          Radiology: Reviewed
Hematology-Oncology Consult note    Patient is a 69y old  Male who presents with a chief complaint of     HPI: HPI:      HEMATOLOGY/ONCOLOGY HISTORY: Mr. Cash Ochoa is a 69YM w/ PMHx GERD, HTN, HLD, DMII, squamous cell lung cancer, stage IIIb, PD-L1 negative currently on concurrent chemoRT with carboplatin/Taxol.      Today, he presented to clinic following RT with complaints of severe hiccups leading to vomiting since last Wednesday, accompanied by intermittent nausea. He reported an inability to retain any food or fluids and experienced dizziness and lightheadedness.       In the office vitals are significant for orthostatic hypotension. He was advised to go to the ED for further eval and hydration.       In the ED, he is hypotensive 90/58 and tachycardic up to 128. He received reglan and IV hydration.       REVIEW OF SYSTEMS:  All other review of systems is negative unless indicated above.    OBJECTIVE:  T(C): 36.9 (10-14-24 @ 13:43), Max: 36.9 (10-14-24 @ 11:29)  HR: 103 (10-14-24 @ 13:43) (103 - 128)  BP: 115/72 (10-14-24 @ 13:43) (90/58 - 115/72)  RR: 18 (10-14-24 @ 13:43) (17 - 18)  SpO2: 100% (10-14-24 @ 13:43) (100% - 100%)  Daily Height in cm: 167.64 (14 Oct 2024 11:29)    Daily     Physical Exam:   General: in no acute distress   Eyes: Pale conjunctivae   HENT: Dry mucous membranes   Neck: Trachea midline, supple   Lungs: No respiratory distress   Abdomen: Soft, non-tender non-distended; No rebound or guarding   Extremities: No clubbing, cyanosis or edema     Medications:  MEDICATIONS  (STANDING):    MEDICATIONS  (PRN):      Labs:                        8.5    2.50  )-----------( 143      ( 14 Oct 2024 11:46 )             24.8     10-14    132[L]  |  97  |  20  ----------------------------<  214[H]  3.7   |  25  |  0.78    Ca    9.1      14 Oct 2024 11:46  Phos  2.8     10-14  Mg     1.6     10-14    TPro  7.6  /  Alb  3.5  /  TBili  1.2  /  DBili  x   /  AST  17  /  ALT  20  /  AlkPhos  97  10-14      Urinalysis Basic - ( 14 Oct 2024 11:46 )    Color: x / Appearance: x / SG: x / pH: x  Gluc: 214 mg/dL / Ketone: x  / Bili: x / Urobili: x   Blood: x / Protein: x / Nitrite: x   Leuk Esterase: x / RBC: x / WBC x   Sq Epi: x / Non Sq Epi: x / Bacteria: x          Radiology: Reviewed

## 2024-10-14 NOTE — H&P ADULT - ASSESSMENT
68 y/o M w/ PMHx of inoperable lung CA on chemoradiation, HTN, HLD, and DM presents to the ED for lightheadedness/dizziness and poor PO intake. Found to be anemic now s/p 1u pRBC.. Admitted for further workup and optimization.

## 2024-10-14 NOTE — H&P ADULT - PROBLEM SELECTOR PLAN 3
#Pancytopenia  Patient presenting w/ chronic anemia likely 2/2 chemotherapy induced. Also presents w/ leukopenia, and thrombocytopenia.   - Hgb 8.5 on admission and received 2L IVF w/ repeat Hgb 7.0. Decrease Hgb is likely 2/2 dilutional. No signs or symptoms of bleeding   - Receiving 1u pRNB    Plan:  - No need for post-transfusion CBC  - Monitor for SS of bleeding Presenting with nausea and vomiting after chemotherapy on 10/10/24. Reports vomiting after eating. Chemistry w/ only hyponatremia, otherwise no significant electrolyte derangements or acidosis.  - QTc 465 10/14/24     Plan:  - Zofran 4mg prn for nausea  - Replete electrolytes as needed

## 2024-10-14 NOTE — H&P ADULT - NSHPPHYSICALEXAM_GEN_ALL_CORE
T(C): 36.9 (10-14-24 @ 19:28), Max: 36.9 (10-14-24 @ 11:29)  HR: 110 (10-14-24 @ 19:28) (103 - 128)  BP: 109/64 (10-14-24 @ 19:28) (90/58 - 121/72)  RR: 18 (10-14-24 @ 19:28) (17 - 18)  SpO2: 98% (10-14-24 @ 19:28) (98% - 100%)    General: NAD, laying in bed, speaking in full sentences  HEENT: Slightly dry mucous membranes.   Neck: supple, no JVD  Cardio: Tachycardia, otherwise normal s1s2, no mrg  Resp: lungs CTAB, no cough/wheezes/rales/rhonchi  Abdo: soft, NT, ND, +bowel sounds x4, no organomegaly or palpable mass    : No flank tenderness.   Extremities: WWP, no edema/cyanosis/clubbing   Vasc: 2+ radial and DP pulses b/l  Neuro: A&Ox3  Psych: speech non-pressured, thoughts goal-oriented  Skin: Decrease LE skin turgor   MSK: no joint swelling

## 2024-10-15 ENCOUNTER — TRANSCRIPTION ENCOUNTER (OUTPATIENT)
Age: 69
End: 2024-10-15

## 2024-10-15 ENCOUNTER — APPOINTMENT (OUTPATIENT)
Dept: INFUSION THERAPY | Facility: CLINIC | Age: 69
End: 2024-10-15

## 2024-10-15 VITALS
SYSTOLIC BLOOD PRESSURE: 126 MMHG | DIASTOLIC BLOOD PRESSURE: 76 MMHG | TEMPERATURE: 98 F | HEART RATE: 110 BPM | RESPIRATION RATE: 18 BRPM | OXYGEN SATURATION: 99 %

## 2024-10-15 DIAGNOSIS — R06.6 HICCOUGH: ICD-10-CM

## 2024-10-15 LAB
ALBUMIN SERPL ELPH-MCNC: 2.9 G/DL — LOW (ref 3.3–5)
ALP SERPL-CCNC: 78 U/L — SIGNIFICANT CHANGE UP (ref 40–120)
ALT FLD-CCNC: 16 U/L — SIGNIFICANT CHANGE UP (ref 10–45)
ANION GAP SERPL CALC-SCNC: 9 MMOL/L — SIGNIFICANT CHANGE UP (ref 5–17)
ANISOCYTOSIS BLD QL: SIGNIFICANT CHANGE UP
APPEARANCE UR: CLEAR — SIGNIFICANT CHANGE UP
APTT BLD: 27.9 SEC — SIGNIFICANT CHANGE UP (ref 24.5–35.6)
AST SERPL-CCNC: 13 U/L — SIGNIFICANT CHANGE UP (ref 10–40)
BASOPHILS # BLD AUTO: 0 K/UL — SIGNIFICANT CHANGE UP (ref 0–0.2)
BASOPHILS NFR BLD AUTO: 0 % — SIGNIFICANT CHANGE UP (ref 0–2)
BILIRUB SERPL-MCNC: 1.5 MG/DL — HIGH (ref 0.2–1.2)
BILIRUB UR-MCNC: NEGATIVE — SIGNIFICANT CHANGE UP
BUN SERPL-MCNC: 13 MG/DL — SIGNIFICANT CHANGE UP (ref 7–23)
CALCIUM SERPL-MCNC: 8.2 MG/DL — LOW (ref 8.4–10.5)
CHLORIDE SERPL-SCNC: 102 MMOL/L — SIGNIFICANT CHANGE UP (ref 96–108)
CO2 SERPL-SCNC: 23 MMOL/L — SIGNIFICANT CHANGE UP (ref 22–31)
COLOR SPEC: YELLOW — SIGNIFICANT CHANGE UP
CREAT ?TM UR-MCNC: 56 MG/DL — SIGNIFICANT CHANGE UP
CREAT SERPL-MCNC: 0.73 MG/DL — SIGNIFICANT CHANGE UP (ref 0.5–1.3)
DIFF PNL FLD: NEGATIVE — SIGNIFICANT CHANGE UP
EGFR: 98 ML/MIN/1.73M2 — SIGNIFICANT CHANGE UP
EOSINOPHIL # BLD AUTO: 0.08 K/UL — SIGNIFICANT CHANGE UP (ref 0–0.5)
EOSINOPHIL NFR BLD AUTO: 4.3 % — SIGNIFICANT CHANGE UP (ref 0–6)
GIANT PLATELETS BLD QL SMEAR: PRESENT — SIGNIFICANT CHANGE UP
GLUCOSE BLDC GLUCOMTR-MCNC: 141 MG/DL — HIGH (ref 70–99)
GLUCOSE SERPL-MCNC: 125 MG/DL — HIGH (ref 70–99)
GLUCOSE UR QL: NEGATIVE MG/DL — SIGNIFICANT CHANGE UP
HCT VFR BLD CALC: 23.6 % — LOW (ref 39–50)
HGB BLD-MCNC: 8 G/DL — LOW (ref 13–17)
INR BLD: 1.16 — SIGNIFICANT CHANGE UP (ref 0.85–1.16)
KETONES UR-MCNC: NEGATIVE MG/DL — SIGNIFICANT CHANGE UP
LEUKOCYTE ESTERASE UR-ACNC: NEGATIVE — SIGNIFICANT CHANGE UP
LYMPHOCYTES # BLD AUTO: 0.27 K/UL — LOW (ref 1–3.3)
LYMPHOCYTES # BLD AUTO: 13.9 % — SIGNIFICANT CHANGE UP (ref 13–44)
MAGNESIUM SERPL-MCNC: 1.4 MG/DL — LOW (ref 1.6–2.6)
MANUAL SMEAR VERIFICATION: SIGNIFICANT CHANGE UP
MCHC RBC-ENTMCNC: 29.1 PG — SIGNIFICANT CHANGE UP (ref 27–34)
MCHC RBC-ENTMCNC: 33.9 GM/DL — SIGNIFICANT CHANGE UP (ref 32–36)
MCV RBC AUTO: 85.8 FL — SIGNIFICANT CHANGE UP (ref 80–100)
METAMYELOCYTES # FLD: 0.9 % — HIGH (ref 0–0)
MICROCYTES BLD QL: SIGNIFICANT CHANGE UP
MONOCYTES # BLD AUTO: 0.27 K/UL — SIGNIFICANT CHANGE UP (ref 0–0.9)
MONOCYTES NFR BLD AUTO: 13.9 % — SIGNIFICANT CHANGE UP (ref 2–14)
NEUTROPHILS # BLD AUTO: 1.26 K/UL — LOW (ref 1.8–7.4)
NEUTROPHILS NFR BLD AUTO: 64.4 % — SIGNIFICANT CHANGE UP (ref 43–77)
NITRITE UR-MCNC: NEGATIVE — SIGNIFICANT CHANGE UP
NRBC # BLD: 1 /100 WBCS — HIGH (ref 0–0)
NRBC # BLD: SIGNIFICANT CHANGE UP /100 WBCS (ref 0–0)
OSMOLALITY SERPL: 276 MOSM/KG — LOW (ref 280–301)
OSMOLALITY UR: 473 MOSM/KG — SIGNIFICANT CHANGE UP (ref 300–900)
OVALOCYTES BLD QL SMEAR: SLIGHT — SIGNIFICANT CHANGE UP
PH UR: 7 — SIGNIFICANT CHANGE UP (ref 5–8)
PHOSPHATE SERPL-MCNC: 2.3 MG/DL — LOW (ref 2.5–4.5)
PLAT MORPH BLD: ABNORMAL
PLATELET # BLD AUTO: 139 K/UL — LOW (ref 150–400)
POIKILOCYTOSIS BLD QL AUTO: SLIGHT — SIGNIFICANT CHANGE UP
POLYCHROMASIA BLD QL SMEAR: SLIGHT — SIGNIFICANT CHANGE UP
POTASSIUM SERPL-MCNC: 3.5 MMOL/L — SIGNIFICANT CHANGE UP (ref 3.5–5.3)
POTASSIUM SERPL-SCNC: 3.5 MMOL/L — SIGNIFICANT CHANGE UP (ref 3.5–5.3)
PROT SERPL-MCNC: 6.3 G/DL — SIGNIFICANT CHANGE UP (ref 6–8.3)
PROT UR-MCNC: NEGATIVE MG/DL — SIGNIFICANT CHANGE UP
PROTHROM AB SERPL-ACNC: 13.5 SEC — HIGH (ref 9.9–13.4)
RBC # BLD: 2.75 M/UL — LOW (ref 4.2–5.8)
RBC # BLD: 2.75 M/UL — LOW (ref 4.2–5.8)
RBC # FLD: 17.6 % — HIGH (ref 10.3–14.5)
RBC BLD AUTO: ABNORMAL
RETICS #: 100.4 K/UL — SIGNIFICANT CHANGE UP (ref 25–125)
RETICS/RBC NFR: 3.7 % — HIGH (ref 0.5–2.5)
SODIUM SERPL-SCNC: 134 MMOL/L — LOW (ref 135–145)
SODIUM UR-SCNC: 118 MMOL/L — SIGNIFICANT CHANGE UP
SP GR SPEC: 1.01 — SIGNIFICANT CHANGE UP (ref 1–1.03)
UROBILINOGEN FLD QL: 1 MG/DL — SIGNIFICANT CHANGE UP (ref 0.2–1)
UUN UR-MCNC: 511 MG/DL — SIGNIFICANT CHANGE UP
VARIANT LYMPHS # BLD: 2.6 % — SIGNIFICANT CHANGE UP (ref 0–6)
WBC # BLD: 1.95 K/UL — LOW (ref 3.8–10.5)
WBC # FLD AUTO: 1.95 K/UL — LOW (ref 3.8–10.5)

## 2024-10-15 PROCEDURE — 36430 TRANSFUSION BLD/BLD COMPNT: CPT

## 2024-10-15 PROCEDURE — 36415 COLL VENOUS BLD VENIPUNCTURE: CPT

## 2024-10-15 PROCEDURE — 87637 SARSCOV2&INF A&B&RSV AMP PRB: CPT

## 2024-10-15 PROCEDURE — 82803 BLOOD GASES ANY COMBINATION: CPT

## 2024-10-15 PROCEDURE — 83935 ASSAY OF URINE OSMOLALITY: CPT

## 2024-10-15 PROCEDURE — 86850 RBC ANTIBODY SCREEN: CPT

## 2024-10-15 PROCEDURE — 85027 COMPLETE CBC AUTOMATED: CPT

## 2024-10-15 PROCEDURE — 82330 ASSAY OF CALCIUM: CPT

## 2024-10-15 PROCEDURE — 81003 URINALYSIS AUTO W/O SCOPE: CPT

## 2024-10-15 PROCEDURE — 85045 AUTOMATED RETICULOCYTE COUNT: CPT

## 2024-10-15 PROCEDURE — 86923 COMPATIBILITY TEST ELECTRIC: CPT

## 2024-10-15 PROCEDURE — 71045 X-RAY EXAM CHEST 1 VIEW: CPT

## 2024-10-15 PROCEDURE — 84540 ASSAY OF URINE/UREA-N: CPT

## 2024-10-15 PROCEDURE — P9040: CPT

## 2024-10-15 PROCEDURE — 84132 ASSAY OF SERUM POTASSIUM: CPT

## 2024-10-15 PROCEDURE — 96375 TX/PRO/DX INJ NEW DRUG ADDON: CPT

## 2024-10-15 PROCEDURE — 85025 COMPLETE CBC W/AUTO DIFF WBC: CPT

## 2024-10-15 PROCEDURE — 86900 BLOOD TYPING SEROLOGIC ABO: CPT

## 2024-10-15 PROCEDURE — 87040 BLOOD CULTURE FOR BACTERIA: CPT

## 2024-10-15 PROCEDURE — 82010 KETONE BODYS QUAN: CPT

## 2024-10-15 PROCEDURE — 99285 EMERGENCY DEPT VISIT HI MDM: CPT

## 2024-10-15 PROCEDURE — 84295 ASSAY OF SERUM SODIUM: CPT

## 2024-10-15 PROCEDURE — 85730 THROMBOPLASTIN TIME PARTIAL: CPT

## 2024-10-15 PROCEDURE — 84300 ASSAY OF URINE SODIUM: CPT

## 2024-10-15 PROCEDURE — 93005 ELECTROCARDIOGRAM TRACING: CPT

## 2024-10-15 PROCEDURE — 83735 ASSAY OF MAGNESIUM: CPT

## 2024-10-15 PROCEDURE — 86901 BLOOD TYPING SEROLOGIC RH(D): CPT

## 2024-10-15 PROCEDURE — 84100 ASSAY OF PHOSPHORUS: CPT

## 2024-10-15 PROCEDURE — 84443 ASSAY THYROID STIM HORMONE: CPT

## 2024-10-15 PROCEDURE — 85610 PROTHROMBIN TIME: CPT

## 2024-10-15 PROCEDURE — 99291 CRITICAL CARE FIRST HOUR: CPT | Mod: 25

## 2024-10-15 PROCEDURE — 83036 HEMOGLOBIN GLYCOSYLATED A1C: CPT

## 2024-10-15 PROCEDURE — 83930 ASSAY OF BLOOD OSMOLALITY: CPT

## 2024-10-15 PROCEDURE — 82570 ASSAY OF URINE CREATININE: CPT

## 2024-10-15 PROCEDURE — 83605 ASSAY OF LACTIC ACID: CPT

## 2024-10-15 PROCEDURE — 96374 THER/PROPH/DIAG INJ IV PUSH: CPT

## 2024-10-15 PROCEDURE — 80053 COMPREHEN METABOLIC PANEL: CPT

## 2024-10-15 PROCEDURE — 82962 GLUCOSE BLOOD TEST: CPT

## 2024-10-15 RX ORDER — BACLOFEN 5 MG/1
5 TABLET ORAL 3 TIMES DAILY
Qty: 30 | Refills: 0 | Status: DISCONTINUED | COMMUNITY
Start: 2024-10-15 | End: 2024-10-15

## 2024-10-15 RX ORDER — MAGNESIUM SULFATE 500 MG/ML
2 VIAL (ML) INJECTION ONCE
Refills: 0 | Status: DISCONTINUED | OUTPATIENT
Start: 2024-10-15 | End: 2024-10-15

## 2024-10-15 RX ORDER — POTASSIUM PHOSPHATE, MONOBASIC POTASSIUM PHOSPHATE, DIBASIC 224; 236 MG/ML; MG/ML
15 INJECTION, SOLUTION, CONCENTRATE INTRAVENOUS ONCE
Refills: 0 | Status: DISCONTINUED | OUTPATIENT
Start: 2024-10-15 | End: 2024-10-15

## 2024-10-15 RX ORDER — BACLOFEN 10 MG/1
10 TABLET ORAL 3 TIMES DAILY
Qty: 30 | Refills: 0 | Status: ACTIVE | COMMUNITY
Start: 2024-10-15 | End: 1900-01-01

## 2024-10-15 RX ADMIN — PANTOPRAZOLE SODIUM 40 MILLIGRAM(S): 40 TABLET, DELAYED RELEASE ORAL at 07:01

## 2024-10-15 NOTE — DISCHARGE NOTE PROVIDER - NSDCMRMEDTOKEN_GEN_ALL_CORE_FT
amLODIPine 5 mg oral tablet: 1 tab(s) orally once a day  atorvastatin 20 mg oral tablet: 1 tab(s) orally once a day  Decadron 4 mg oral tablet: 1 tab(s) orally 2 times a day Takes 5tabs qhs the night before chemo and 5 tabs in the AM during chemo.  metFORMIN 500 mg oral tablet: 1 tab(s) orally 2 times a day  polyethylene glycol 3350 oral powder for reconstitution: 17 gram(s) orally every 24 hours as needed for  constipation  senna leaf extract oral tablet: 2 tab(s) orally once a day (at bedtime) as needed for  constipation

## 2024-10-15 NOTE — DISCHARGE NOTE PROVIDER - NSDCCPCAREPLAN_GEN_ALL_CORE_FT
PRINCIPAL DISCHARGE DIAGNOSIS  Diagnosis: Nausea and vomiting  Assessment and Plan of Treatment: Nausea is the feeling that you have an upset stomach or that you are about to vomit. As nausea gets worse, it can lead to vomiting. Vomiting is when stomach contents forcefully come out of your mouth as a result of nausea. Vomiting can make you feel weak and cause you to become dehydrated.  Please be sure to follow up with your outpatient oncologist for management of your nausea and vomitting, which is most likely a side effect of the chemotherapy.

## 2024-10-15 NOTE — PROGRESS NOTE ADULT - SUBJECTIVE AND OBJECTIVE BOX
Patient is a 69y old  Male who presents with a chief complaint of Poor PO intake and anemia (14 Oct 2024 20:33)      HOSPITAL COURSE:     OVERNIGHT EVENTS:    SUBJECTIVE:     ROS: otherwise negative      T(C): 36.9 (10-15-24 @ 05:42), Max: 37 (10-14-24 @ 22:47)  HR: 105 (10-15-24 @ 05:42) (103 - 128)  BP: 118/69 (10-15-24 @ 05:42) (90/58 - 127/78)  RR: 18 (10-15-24 @ 05:42) (16 - 18)  SpO2: 98% (10-15-24 @ 05:42) (98% - 100%)  Wt(kg): --Vital Signs Last 24 Hrs  T(C): 36.9 (15 Oct 2024 05:42), Max: 37 (14 Oct 2024 22:47)  T(F): 98.5 (15 Oct 2024 05:42), Max: 98.6 (14 Oct 2024 22:47)  HR: 105 (15 Oct 2024 05:42) (103 - 128)  BP: 118/69 (15 Oct 2024 05:42) (90/58 - 127/78)  BP(mean): --  RR: 18 (15 Oct 2024 05:42) (16 - 18)  SpO2: 98% (15 Oct 2024 05:42) (98% - 100%)    Parameters below as of 15 Oct 2024 05:42  Patient On (Oxygen Delivery Method): room air        PHYSICAL EXAM:  Constitutional: resting comfortably in bed; NAD  Head: NC/AT  Eyes: PERRL, EOMI, anicteric sclera  ENT: no nasal discharge; MMM  Neck: supple; no JVD or thyromegaly  Respiratory: CTA B/L; no W/R/R, no retractions  Cardiac: +S1/S2; RRR; no M/R/G  Gastrointestinal: soft, NT/ND; no rebound or guarding; +BSx4  Back: spine midline, no bony tenderness or step-offs; no CVAT B/L  Extremities: WWP, no clubbing or cyanosis; no peripheral edema. Capillary refill <2 sec  Musculoskeletal: NROM x4; no joint swelling, tenderness or erythema  Vascular: 2+ radial, DP/PT pulses B/L  Dermatologic: skin warm, dry and intact; no rashes, wounds, or scars  Lymphatic: no submandibular or cervical LAD  Neurologic: AAOx3; CNII-XII grossly intact; no focal deficits  Psychiatric: affect and characteristics of appearance, verbalizations, behaviors are appropriate    LABS:                        7.0    1.88  )-----------( 133      ( 14 Oct 2024 16:45 )             20.8     10-14    132[L]  |  97  |  20  ----------------------------<  214[H]  3.7   |  25  |  0.78    Ca    9.1      14 Oct 2024 11:46  Phos  2.8     10-  Mg     1.6     10-    TPro  7.6  /  Alb  3.5  /  TBili  1.2  /  DBili  x   /  AST  17  /  ALT  20  /  AlkPhos  97  10-14        Urinalysis Basic - ( 15 Oct 2024 04:29 )    Color: Yellow / Appearance: Clear / S.013 / pH: x  Gluc: x / Ketone: Negative mg/dL  / Bili: Negative / Urobili: 1.0 mg/dL   Blood: x / Protein: Negative mg/dL / Nitrite: Negative   Leuk Esterase: Negative / RBC: x / WBC x   Sq Epi: x / Non Sq Epi: x / Bacteria: x      CAPILLARY BLOOD GLUCOSE      POCT Blood Glucose.: 159 mg/dL (14 Oct 2024 22:22)        Urinalysis Basic - ( 15 Oct 2024 04:29 )    Color: Yellow / Appearance: Clear / S.013 / pH: x  Gluc: x / Ketone: Negative mg/dL  / Bili: Negative / Urobili: 1.0 mg/dL   Blood: x / Protein: Negative mg/dL / Nitrite: Negative   Leuk Esterase: Negative / RBC: x / WBC x   Sq Epi: x / Non Sq Epi: x / Bacteria: x        MEDICATIONS  (STANDING):  atorvastatin 20 milliGRAM(s) Oral at bedtime  dextrose 5%. 1000 milliLiter(s) (50 mL/Hr) IV Continuous <Continuous>  dextrose 5%. 1000 milliLiter(s) (100 mL/Hr) IV Continuous <Continuous>  dextrose 50% Injectable 25 Gram(s) IV Push once  dextrose 50% Injectable 12.5 Gram(s) IV Push once  dextrose 50% Injectable 25 Gram(s) IV Push once  enoxaparin Injectable 40 milliGRAM(s) SubCutaneous every 24 hours  glucagon  Injectable 1 milliGRAM(s) IntraMuscular once  insulin lispro (ADMELOG) corrective regimen sliding scale   SubCutaneous three times a day before meals  insulin lispro (ADMELOG) corrective regimen sliding scale   SubCutaneous at bedtime  pantoprazole    Tablet 40 milliGRAM(s) Oral before breakfast    MEDICATIONS  (PRN):  dextrose Oral Gel 15 Gram(s) Oral once PRN Blood Glucose LESS THAN 70 milliGRAM(s)/deciliter  ondansetron Injectable 4 milliGRAM(s) IV Push every 6 hours PRN Nausea and/or Vomiting      RADIOLOGY & ADDITIONAL TESTS: Reviewed

## 2024-10-15 NOTE — DISCHARGE NOTE NURSING/CASE MANAGEMENT/SOCIAL WORK - PATIENT PORTAL LINK FT
You can access the FollowMyHealth Patient Portal offered by [facility] by registering at the following website: [email or web address]. By joining [email or web address]’s FollowMyHealth portal, you will also be able to view your health information using other applications (apps) compatible with our system.

## 2024-10-15 NOTE — DISCHARGE NOTE PROVIDER - ATTENDING DISCHARGE PHYSICAL EXAMINATION:
Patient was seen and examined, discharge summary and medication list reviewed. Time spend on discharge process is >30 min

## 2024-10-15 NOTE — PROGRESS NOTE ADULT - PROBLEM SELECTOR PLAN 1
#Pancytopenia  Patient presenting w/ chronic anemia likely 2/2 bone marrow suppression from chemotherapy. Also presents w/ leukopenia, and thrombocytopenia.   - Hgb 8.5 on admission and received 2L IVF w/ repeat Hgb 7.0. Decrease Hgb is likely 2/2 dilution. No signs or symptoms of bleeding   - Receiving 1u pRBC    Plan:  - No need for post-transfusion CBC  - Monitor for SS of bleeding  - f/u reticulocyte count.

## 2024-10-15 NOTE — DISCHARGE NOTE PROVIDER - HOSPITAL COURSE
#Discharge: do not delete    Patient is 70 yo M/F with past medical history of inoperable lung cancer, DM, HTN, HLD presented with 2 days of nausea/vomiting, lightheadedness, generalized weakness, and poor PO in the setting of a recent chemotherapy setting on 10/10 found to have symptomatic anemia and chemotherapy-induced nausea and vomiting.    Hospital course (by problem; priority based):  #Symptomatic anemia.   Patient presented w/ chronic anemia likely 2/2 bone marrow suppression from chemotherapy. Also presents w/ leukopenia, and thrombocytopenia. Hgb 8.5 on admission and received 2L IVF w/ repeat Hgb 7.0, decrease Hgb was likely 2/2 dilution as no signs or symptoms of bleeding. Regardless, received 1u of pRBCs in ED.  Plan:  - Monitored for clinical response to 1u pRBCs  - Monitored for SS of bleeding    #Dizziness  Patient presented reporting dizziness with ambulation likely 2/2 hypotension in the setting of decreased PO intake, and vomiting. Denied vertigo-like symptoms. Was hypotensive in the ED to 90s/50s on admission with mildly dry mucous membranes and decreased skin turgor; improved to SBP 100s-120s after 2L IVF.   Plan:  -Received     #Chemotherapy-Induced Nausea and Vomitting     Patient was discharged to: Home    New medications: --  Changes to old medications: --  Medications that were stopped: --    Items to follow up as outpatient:     Physical exam at the time of discharge: AAOx3; NAD; RRR, no murmurs; lungs CLAB; abd NT/ND; extremities wwp, no peripheral edema.     Patient was medically optimized, stable and ready for discharge. Plan of care and return precautions were discussed with the patient who verbally stated understanding. On the day of discharge, the patient was seen and examined. Symptoms improved. Vital signs are stable. Labs and imaging reviewed. Patient is medically optimized and hemodynamically stable. Return precautions discussed, medication teach back done, and importance of physician follow up emphasized. The patient verbalized understanding. #Discharge: do not delete    Patient is 68 yo M/F with past medical history of inoperable lung cancer, DM, HTN, HLD presented with 2 days of nausea/vomiting, lightheadedness, generalized weakness, hiccups, and poor PO in the setting of a recent chemotherapy setting on 10/10 found to have symptomatic anemia and chemotherapy-induced nausea and vomiting.    Hospital course (by problem; priority based):  #Symptomatic anemia.   Patient presented w/ chronic anemia likely 2/2 bone marrow suppression from chemotherapy. Also presents w/ leukopenia, and thrombocytopenia. Hgb 8.5 on admission and received 2L IVF w/ repeat Hgb 7.0, decrease Hgb was likely 2/2 dilution as no signs or symptoms of bleeding. Regardless, received 1u of pRBCs in ED.  Plan:  - Monitored for clinical response to 1u pRBCs  - Monitored for SS of bleeding    #Dizziness  Patient presented reporting dizziness with ambulation likely 2/2 hypotension in the setting of decreased PO intake, and vomiting. Denied vertigo-like symptoms. Was hypotensive in the ED to 90s/50s on admission with mildly dry mucous membranes and decreased skin turgor; improved to SBP 100s-120s after 2L IVF.   Plan:  - Received 2L IVF in the ED  - Encouraged PO intake  - Performed orthostatics; results pending    #Chemotherapy-Induced Nausea and Vomitting   Presented with 2 days of nausea/vomiting after chemotherapy on 10/10/24. Reported inability to keep down food on Saturday and Sunday. Chemistry w/ only hyponatremia, otherwise no significant electrolyte derangements or acidosis. QTc 465 10/14/24   Plan:  - Zofran 4mg prn for nausea  - Urine studies for hyponatremia pending; f/u results but baseline 131-135 and likely hypovolemic hyponatremia given poor PO intake.  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  Has had complications of chemotherapy in the past, including HENRY but there were no concerns for that on this admission. He is also receiving radiation therapy and last session is supposed to be today. Follows with oncologist at [facility].  Plan:  - Heme/onc consulted to see if patient is medically optimized for final radiation tx today  - Outpatient follow up      Patient was discharged to: Home    New medications: None  Changes to old medications: None  Medications that were stopped: None    Items to follow up as outpatient: Follow up with oncologist.    Physical exam at the time of discharge: AAOx3; NAD; RRR, no murmurs; lungs CLAB; abd NT/ND; extremities wwp, no peripheral edema.     Patient was medically optimized, stable and ready for discharge. Plan of care and return precautions were discussed with the patient who verbally stated understanding. On the day of discharge, the patient was seen and examined. Symptoms improved. Vital signs are stable. Labs and imaging reviewed. Patient is medically optimized and hemodynamically stable. Return precautions discussed, medication teach back done, and importance of physician follow up emphasized. The patient verbalized understanding. #Discharge: do not delete    Patient is 68 yo M/F with past medical history of inoperable lung cancer, DM, HTN, HLD presented with 2 days of nausea/vomiting, lightheadedness, generalized weakness, hiccups, and poor PO in the setting of a recent chemotherapy setting on 10/10 found to have symptomatic anemia and chemotherapy-induced nausea and vomiting.    Hospital course (by problem; priority based):  #Symptomatic anemia.   Patient presented w/ chronic anemia likely 2/2 bone marrow suppression from chemotherapy. Also presents w/ leukopenia, and thrombocytopenia. Hgb 8.5 on admission and received 2L IVF w/ repeat Hgb 7.0, decrease Hgb was likely 2/2 dilution as no signs or symptoms of bleeding. Regardless, received 1u of pRBCs in ED with appropriate response to around 8.   Plan:  - Monitored for clinical response to 1u pRBCs  - Monitored for SS of bleeding    #Dizziness  Patient presented reporting dizziness with ambulation likely 2/2 hypotension in the setting of decreased PO intake, and vomiting. Denied vertigo-like symptoms. Was hypotensive in the ED to 90s/50s on admission with mildly dry mucous membranes and decreased skin turgor; improved to SBP 100s-120s after 2L IVF.     Plan  - outpatient follow up with heme/onc for chemo side effect management    #Chemotherapy-Induced Nausea and Vomitting   Presented with 2 days of nausea/vomiting after chemotherapy on 10/10/24. Reported inability to keep down food on Saturday and Sunday. Chemistry w/ only hyponatremia to 134, otherwise no significant electrolyte derangements or acidosis. QTc 465 10/14/24. Zofran 4mg prn for nausea. Most likely hypovolemic hyponatremia.     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  Has had complications of chemotherapy in the past, including HENRY but there were no concerns for that on this admission. He is also receiving radiation therapy and last session is supposed to be today. Follows with oncologist at [facility].    Plan:  - Heme/onc consulted to see if patient is medically optimized for final radiation tx today  - Outpatient follow up      Patient was discharged to: Home    New medications: None  Changes to old medications: None  Medications that were stopped: None    Items to follow up as outpatient: Follow up with oncologist.    Physical exam at the time of discharge: AAOx3; NAD; RRR, no murmurs; lungs CLAB; abd NT/ND; extremities wwp, no peripheral edema.     Patient was medically optimized, stable and ready for discharge. Plan of care and return precautions were discussed with the patient who verbally stated understanding. On the day of discharge, the patient was seen and examined. Symptoms improved. Vital signs are stable. Labs and imaging reviewed. Patient is medically optimized and hemodynamically stable. Return precautions discussed, medication teach back done, and importance of physician follow up emphasized. The patient verbalized understanding.

## 2024-10-15 NOTE — DISCHARGE NOTE PROVIDER - NSDCFUSCHEDAPPT_GEN_ALL_CORE_FT
[facility]  AMBCHEMO  [address]  Scheduled Appointment: 10/15/2024    [facility]  CATSCAN  [address]  Scheduled Appointment: 10/31/2024    Marcelo Larose  [facility]  HEMONC [address]  Scheduled Appointment: 11/05/2024    Memo Agee  [facility]  RADMED [address]  Scheduled Appointment: 01/07/2025

## 2024-10-15 NOTE — PROGRESS NOTE ADULT - PROBLEM SELECTOR PLAN 2
#Hypotension  Patient presenting w/ dizziness w/ ambulation likely 2/2 hypotension iso poor po intake and vomiting. Denies vertigo like symptoms.   - Hypotensive in the ED to 90s/50s on admission and improved to SBP 100s-120s after 2L IVF. Mucous membranes mildly dry w/ decrease skin turgor.     Plan:  - f/u orthostatics  - Encourage PO intake  - Consider PT consult if symptoms do not improve w/ IVF and blood.

## 2024-10-17 DIAGNOSIS — R11.10 VOMITING, UNSPECIFIED: ICD-10-CM

## 2024-10-17 RX ORDER — ONDANSETRON 8 MG/1
8 TABLET, ORALLY DISINTEGRATING ORAL
Qty: 120 | Refills: 1 | Status: ACTIVE | COMMUNITY
Start: 2024-10-17 | End: 1900-01-01

## 2024-10-17 RX ORDER — ONDANSETRON 8 MG/1
8 TABLET, ORALLY DISINTEGRATING ORAL EVERY 8 HOURS
Qty: 30 | Refills: 1 | Status: ACTIVE | COMMUNITY
Start: 2024-10-17 | End: 1900-01-01

## 2024-10-20 VITALS
WEIGHT: 132.94 LBS | TEMPERATURE: 100 F | HEART RATE: 134 BPM | SYSTOLIC BLOOD PRESSURE: 119 MMHG | OXYGEN SATURATION: 97 % | DIASTOLIC BLOOD PRESSURE: 79 MMHG | RESPIRATION RATE: 18 BRPM | HEIGHT: 66 IN

## 2024-10-20 LAB
BASE EXCESS BLDV CALC-SCNC: -0.4 MMOL/L — SIGNIFICANT CHANGE UP (ref -2–3)
BASOPHILS # BLD AUTO: 0.01 K/UL — SIGNIFICANT CHANGE UP (ref 0–0.2)
BASOPHILS NFR BLD AUTO: 0.3 % — SIGNIFICANT CHANGE UP (ref 0–2)
CALCIUM SERPL-MCNC: 8.4 MG/DL — SIGNIFICANT CHANGE UP (ref 8.4–10.5)
CO2 BLDV-SCNC: 24 MMOL/L — SIGNIFICANT CHANGE UP (ref 22–26)
CREAT SERPL-MCNC: 0.85 MG/DL — SIGNIFICANT CHANGE UP (ref 0.5–1.3)
EGFR: 93 ML/MIN/1.73M2 — SIGNIFICANT CHANGE UP
EOSINOPHIL # BLD AUTO: 0.04 K/UL — SIGNIFICANT CHANGE UP (ref 0–0.5)
EOSINOPHIL NFR BLD AUTO: 1.4 % — SIGNIFICANT CHANGE UP (ref 0–6)
GLUCOSE SERPL-MCNC: 136 MG/DL — HIGH (ref 70–99)
HCO3 BLDV-SCNC: 23 MMOL/L — SIGNIFICANT CHANGE UP (ref 22–29)
HCT VFR BLD CALC: 24.9 % — LOW (ref 39–50)
HGB BLD-MCNC: 8.5 G/DL — LOW (ref 13–17)
IMM GRANULOCYTES NFR BLD AUTO: 0.7 % — SIGNIFICANT CHANGE UP (ref 0–0.9)
LACTATE SERPL-SCNC: 1 MMOL/L — SIGNIFICANT CHANGE UP (ref 0.5–2)
LYMPHOCYTES # BLD AUTO: 0.37 K/UL — LOW (ref 1–3.3)
LYMPHOCYTES # BLD AUTO: 12.6 % — LOW (ref 13–44)
MCHC RBC-ENTMCNC: 29.9 PG — SIGNIFICANT CHANGE UP (ref 27–34)
MCHC RBC-ENTMCNC: 34.1 GM/DL — SIGNIFICANT CHANGE UP (ref 32–36)
MCV RBC AUTO: 87.7 FL — SIGNIFICANT CHANGE UP (ref 80–100)
MONOCYTES # BLD AUTO: 0.44 K/UL — SIGNIFICANT CHANGE UP (ref 0–0.9)
MONOCYTES NFR BLD AUTO: 15 % — HIGH (ref 2–14)
NEUTROPHILS # BLD AUTO: 2.06 K/UL — SIGNIFICANT CHANGE UP (ref 1.8–7.4)
NEUTROPHILS NFR BLD AUTO: 70 % — SIGNIFICANT CHANGE UP (ref 43–77)
NRBC # BLD: 0 /100 WBCS — SIGNIFICANT CHANGE UP (ref 0–0)
PCO2 BLDV: 33 MMHG — LOW (ref 42–55)
PH BLDV: 7.45 — HIGH (ref 7.32–7.43)
PHOSPHATE SERPL-MCNC: 2.8 MG/DL — SIGNIFICANT CHANGE UP (ref 2.5–4.5)
PLATELET # BLD AUTO: 152 K/UL — SIGNIFICANT CHANGE UP (ref 150–400)
PO2 BLDV: 118 MMHG — HIGH (ref 25–45)
RBC # BLD: 2.84 M/UL — LOW (ref 4.2–5.8)
RBC # FLD: 17.9 % — HIGH (ref 10.3–14.5)
SAO2 % BLDV: 98 % — HIGH (ref 67–88)
WBC # BLD: 2.94 K/UL — LOW (ref 3.8–10.5)
WBC # FLD AUTO: 2.94 K/UL — LOW (ref 3.8–10.5)

## 2024-10-20 PROCEDURE — 71046 X-RAY EXAM CHEST 2 VIEWS: CPT | Mod: 26

## 2024-10-20 PROCEDURE — 99285 EMERGENCY DEPT VISIT HI MDM: CPT

## 2024-10-20 RX ORDER — ACETAMINOPHEN 500 MG
1000 TABLET ORAL ONCE
Refills: 0 | Status: COMPLETED | OUTPATIENT
Start: 2024-10-20 | End: 2024-10-20

## 2024-10-20 RX ORDER — SODIUM CHLORIDE 9 MG/ML
1000 INJECTION, SOLUTION INTRAMUSCULAR; INTRAVENOUS; SUBCUTANEOUS ONCE
Refills: 0 | Status: COMPLETED | OUTPATIENT
Start: 2024-10-20 | End: 2024-10-20

## 2024-10-20 RX ORDER — VANCOMYCIN HYDROCHLORIDE 50 MG/ML
1000 KIT ORAL ONCE
Refills: 0 | Status: COMPLETED | OUTPATIENT
Start: 2024-10-20 | End: 2024-10-20

## 2024-10-20 RX ORDER — PIPERACILLIN AND TAZOBACTAM .5; 4 G/20ML; G/20ML
3.38 INJECTION, POWDER, LYOPHILIZED, FOR SOLUTION INTRAVENOUS ONCE
Refills: 0 | Status: COMPLETED | OUTPATIENT
Start: 2024-10-20 | End: 2024-10-20

## 2024-10-20 RX ADMIN — SODIUM CHLORIDE 1000 MILLILITER(S): 9 INJECTION, SOLUTION INTRAMUSCULAR; INTRAVENOUS; SUBCUTANEOUS at 22:41

## 2024-10-20 RX ADMIN — PIPERACILLIN AND TAZOBACTAM 200 GRAM(S): .5; 4 INJECTION, POWDER, LYOPHILIZED, FOR SOLUTION INTRAVENOUS at 23:00

## 2024-10-20 RX ADMIN — Medication 400 MILLIGRAM(S): at 22:41

## 2024-10-20 RX ADMIN — VANCOMYCIN HYDROCHLORIDE 250 MILLIGRAM(S): KIT at 23:38

## 2024-10-20 NOTE — ED PROVIDER NOTE - CLINICAL SUMMARY MEDICAL DECISION MAKING FREE TEXT BOX
history of inoperable lung cancer (chemo last 10/10 and radiation last 10/15 ), DM, HTN, HLD, admitted from 10/14-10/15/2024 for n/v, inability to tolerate po, symptoms thought to be 2/2 chemo, found to be anemic (transfused 1un). had final chemo treatment on day of discharge. second day after discharge started w hiccups, recurrent n/v. unable to tolerate po and getting more weak. yesterday had fever to 101. today was coughing and persistently vomiting so came for evaluation. history of inoperable lung cancer (chemo last 10/10 and radiation last 10/15 ), DM, HTN, HLD, admitted from 10/14-10/15/2024 for n/v, inability to tolerate po, symptoms thought to be 2/2 chemo, found to be anemic (transfused 1un). second day after discharge started w hiccups, recurrent n/v. unable to tolerate po and getting more weak. yesterday had fever to 101. today was coughing and persistently vomiting so came for evaluation.  pt nontoxic appearing, febrile rectally to 102.9, HR 130s, normotensive, not hypoxic, exam reassuring - no abd tenderness, lungs clear.     febrile and tachycardic. w cough possible viral illness / uri .   possible pneumonia, recent vomiting, consider aspiration pna.   broad infectious workup - r/o uti. possibly related to onc treatments. sent blood cultures.   w ongoing n/v assess for dehydration, electrolyte derangements.   doubt acute intra-abdominal pathology - no abd pain or tenderness, defer ct imaging.   plan - labs w cultures, ua/ucx, cxr, viral swab  empiric abx, fluids, antipyretics  plan for admission

## 2024-10-20 NOTE — ED PROVIDER NOTE - PROGRESS NOTE DETAILS
hgb 8.5 / hct 24.9 - at baseline for pt. . not neutropenic.   hyponatremic to 127 - suspect 2/2 hypovolemia / dec po intake. serum osm and urine studies added.   hypomag 1.4, hypoK 3.2 - ordered for IV repletion.   CXR w/o clear infiltrate.   pending flu / covid and pending UA.   after fluids / tylenol vitals improved - afebrile and HR now 104. normotensive.   admitted for n/v and fever unk origin.

## 2024-10-20 NOTE — ED PROVIDER NOTE - PHYSICAL EXAMINATION
Constitutional : frail, resting in bed in NAD. no acute distress. awake, alert, oriented to person, place, time/situation.  Head : head normocephalic, atraumatic  EENMT : eyes clear bilaterally, PERRL, EOMI. airway patent. dry mucous membranes. neck supple.  Cardiac : tachycardic. No murmur appreciated, no LE edema.  Resp : Breath sounds clear and equal bilaterally. Respirations even and unlabored.   Gastro : abdomen soft, nontender, nondistended. no rebound or guarding. no CVAT.  MSK :  range of motion is not limited, no muscle or joint tenderness  Back : No evidence of trauma. no meningismus.   Vasc : Extremities warm and well perfused. 2+ radial and DP pulses. cap refill <2 seconds  Neuro : Alert and oriented, CNII-XII grossly intact, no motor or sensory deficits.  Skin : Skin normal color for race, warm, dry and intact. No evidence of rash.  Psych : Alert and oriented to person, place, time/situation. normal mood and affect. no apparent risk to self or others.

## 2024-10-20 NOTE — ED PROVIDER NOTE - OBJECTIVE STATEMENT
69 yr old male, history of inoperable lung cancer (chemo last 10/10 and radiation last 10/15 ), DM, HTN, HLD, presents to the Emergency Department w vomiting, cough, fever. pt admitted from 10/14-10/15/2024 for n/v, inability to tolerate po, symptoms thought to be 2/2 chemo, found to be anemic (transfused 1un). had final chemo treatment on day of discharge. was feeling ok at home for first day. second day after discharge started w hiccups, recurrent n/v. unable to tolerate po and getting more weak. yesterday had fever to 101. today was coughing and persistently vomiting so came for evaluation.   no cp, sob, abd pain, diarrhea, urinary symptoms, extremity weakness, headache, dizziness.

## 2024-10-21 ENCOUNTER — INPATIENT (INPATIENT)
Facility: HOSPITAL | Age: 69
LOS: 13 days | Discharge: HOME HEALTH | End: 2024-11-04
Attending: STUDENT IN AN ORGANIZED HEALTH CARE EDUCATION/TRAINING PROGRAM | Admitting: STUDENT IN AN ORGANIZED HEALTH CARE EDUCATION/TRAINING PROGRAM
Payer: MEDICARE

## 2024-10-21 DIAGNOSIS — D64.9 ANEMIA, UNSPECIFIED: ICD-10-CM

## 2024-10-21 DIAGNOSIS — Z90.49 ACQUIRED ABSENCE OF OTHER SPECIFIED PARTS OF DIGESTIVE TRACT: Chronic | ICD-10-CM

## 2024-10-21 DIAGNOSIS — E11.9 TYPE 2 DIABETES MELLITUS WITHOUT COMPLICATIONS: ICD-10-CM

## 2024-10-21 DIAGNOSIS — E78.5 HYPERLIPIDEMIA, UNSPECIFIED: ICD-10-CM

## 2024-10-21 DIAGNOSIS — R13.10 DYSPHAGIA, UNSPECIFIED: ICD-10-CM

## 2024-10-21 DIAGNOSIS — A41.9 SEPSIS, UNSPECIFIED ORGANISM: ICD-10-CM

## 2024-10-21 DIAGNOSIS — R06.6 HICCOUGH: ICD-10-CM

## 2024-10-21 DIAGNOSIS — R50.9 FEVER, UNSPECIFIED: ICD-10-CM

## 2024-10-21 DIAGNOSIS — I10 ESSENTIAL (PRIMARY) HYPERTENSION: ICD-10-CM

## 2024-10-21 DIAGNOSIS — E87.1 HYPO-OSMOLALITY AND HYPONATREMIA: ICD-10-CM

## 2024-10-21 DIAGNOSIS — R65.10 SYSTEMIC INFLAMMATORY RESPONSE SYNDROME (SIRS) OF NON-INFECTIOUS ORIGIN WITHOUT ACUTE ORGAN DYSFUNCTION: ICD-10-CM

## 2024-10-21 DIAGNOSIS — C34.90 MALIGNANT NEOPLASM OF UNSPECIFIED PART OF UNSPECIFIED BRONCHUS OR LUNG: ICD-10-CM

## 2024-10-21 DIAGNOSIS — Z29.9 ENCOUNTER FOR PROPHYLACTIC MEASURES, UNSPECIFIED: ICD-10-CM

## 2024-10-21 LAB
ALBUMIN SERPL ELPH-MCNC: 2.5 G/DL — LOW (ref 3.3–5)
ALBUMIN SERPL ELPH-MCNC: 2.8 G/DL — LOW (ref 3.3–5)
ALBUMIN SERPL ELPH-MCNC: 3 G/DL — LOW (ref 3.3–5)
ALP SERPL-CCNC: 88 U/L — SIGNIFICANT CHANGE UP (ref 40–120)
ALP SERPL-CCNC: 90 U/L — SIGNIFICANT CHANGE UP (ref 40–120)
ALP SERPL-CCNC: 92 U/L — SIGNIFICANT CHANGE UP (ref 40–120)
ALT FLD-CCNC: 32 U/L — SIGNIFICANT CHANGE UP (ref 10–45)
ALT FLD-CCNC: 39 U/L — SIGNIFICANT CHANGE UP (ref 10–45)
ALT FLD-CCNC: SIGNIFICANT CHANGE UP (ref 10–45)
ANION GAP SERPL CALC-SCNC: 10 MMOL/L — SIGNIFICANT CHANGE UP (ref 5–17)
ANION GAP SERPL CALC-SCNC: 11 MMOL/L — SIGNIFICANT CHANGE UP (ref 5–17)
ANION GAP SERPL CALC-SCNC: 13 MMOL/L — SIGNIFICANT CHANGE UP (ref 5–17)
ANISOCYTOSIS BLD QL: SLIGHT — SIGNIFICANT CHANGE UP
APPEARANCE UR: ABNORMAL
APPEARANCE UR: ABNORMAL
AST SERPL-CCNC: 26 U/L — SIGNIFICANT CHANGE UP (ref 10–40)
AST SERPL-CCNC: 35 U/L — SIGNIFICANT CHANGE UP (ref 10–40)
AST SERPL-CCNC: SIGNIFICANT CHANGE UP (ref 10–40)
BASOPHILS # BLD AUTO: 0 K/UL — SIGNIFICANT CHANGE UP (ref 0–0.2)
BASOPHILS # BLD AUTO: 0.01 K/UL — SIGNIFICANT CHANGE UP (ref 0–0.2)
BASOPHILS NFR BLD AUTO: 0 % — SIGNIFICANT CHANGE UP (ref 0–2)
BASOPHILS NFR BLD AUTO: 0.4 % — SIGNIFICANT CHANGE UP (ref 0–2)
BILIRUB SERPL-MCNC: 1.1 MG/DL — SIGNIFICANT CHANGE UP (ref 0.2–1.2)
BILIRUB SERPL-MCNC: 1.1 MG/DL — SIGNIFICANT CHANGE UP (ref 0.2–1.2)
BILIRUB SERPL-MCNC: 1.2 MG/DL — SIGNIFICANT CHANGE UP (ref 0.2–1.2)
BILIRUB UR-MCNC: NEGATIVE — SIGNIFICANT CHANGE UP
BILIRUB UR-MCNC: NEGATIVE — SIGNIFICANT CHANGE UP
BUN SERPL-MCNC: 12 MG/DL — SIGNIFICANT CHANGE UP (ref 7–23)
BUN SERPL-MCNC: 16 MG/DL — SIGNIFICANT CHANGE UP (ref 7–23)
BUN SERPL-MCNC: 19 MG/DL — SIGNIFICANT CHANGE UP (ref 7–23)
CALCIUM SERPL-MCNC: 7.8 MG/DL — LOW (ref 8.4–10.5)
CALCIUM SERPL-MCNC: 8.4 MG/DL — SIGNIFICANT CHANGE UP (ref 8.4–10.5)
CHLORIDE SERPL-SCNC: 93 MMOL/L — LOW (ref 96–108)
CHLORIDE SERPL-SCNC: 96 MMOL/L — SIGNIFICANT CHANGE UP (ref 96–108)
CHLORIDE SERPL-SCNC: 97 MMOL/L — SIGNIFICANT CHANGE UP (ref 96–108)
CO2 SERPL-SCNC: 21 MMOL/L — LOW (ref 22–31)
CO2 SERPL-SCNC: 22 MMOL/L — SIGNIFICANT CHANGE UP (ref 22–31)
CO2 SERPL-SCNC: 23 MMOL/L — SIGNIFICANT CHANGE UP (ref 22–31)
COLOR SPEC: YELLOW — SIGNIFICANT CHANGE UP
COLOR SPEC: YELLOW — SIGNIFICANT CHANGE UP
CREAT ?TM UR-MCNC: 102 MG/DL — SIGNIFICANT CHANGE UP
CREAT SERPL-MCNC: 0.79 MG/DL — SIGNIFICANT CHANGE UP (ref 0.5–1.3)
CREAT SERPL-MCNC: 0.83 MG/DL — SIGNIFICANT CHANGE UP (ref 0.5–1.3)
DACRYOCYTES BLD QL SMEAR: SLIGHT — SIGNIFICANT CHANGE UP
DIFF PNL FLD: NEGATIVE — SIGNIFICANT CHANGE UP
DIFF PNL FLD: NEGATIVE — SIGNIFICANT CHANGE UP
EGFR: 94 ML/MIN/1.73M2 — SIGNIFICANT CHANGE UP
EGFR: 96 ML/MIN/1.73M2 — SIGNIFICANT CHANGE UP
EOSINOPHIL # BLD AUTO: 0 K/UL — SIGNIFICANT CHANGE UP (ref 0–0.5)
EOSINOPHIL # BLD AUTO: 0.07 K/UL — SIGNIFICANT CHANGE UP (ref 0–0.5)
EOSINOPHIL NFR BLD AUTO: 0 % — SIGNIFICANT CHANGE UP (ref 0–6)
EOSINOPHIL NFR BLD AUTO: 2.6 % — SIGNIFICANT CHANGE UP (ref 0–6)
FLUAV AG NPH QL: SIGNIFICANT CHANGE UP
FLUBV AG NPH QL: SIGNIFICANT CHANGE UP
GLUCOSE BLDC GLUCOMTR-MCNC: 142 MG/DL — HIGH (ref 70–99)
GLUCOSE BLDC GLUCOMTR-MCNC: 164 MG/DL — HIGH (ref 70–99)
GLUCOSE BLDC GLUCOMTR-MCNC: 191 MG/DL — HIGH (ref 70–99)
GLUCOSE BLDC GLUCOMTR-MCNC: 193 MG/DL — HIGH (ref 70–99)
GLUCOSE SERPL-MCNC: 132 MG/DL — HIGH (ref 70–99)
GLUCOSE SERPL-MCNC: 139 MG/DL — HIGH (ref 70–99)
GLUCOSE UR QL: NEGATIVE MG/DL — SIGNIFICANT CHANGE UP
GLUCOSE UR QL: NEGATIVE MG/DL — SIGNIFICANT CHANGE UP
HCT VFR BLD CALC: 23.5 % — LOW (ref 39–50)
HCT VFR BLD CALC: 25.4 % — LOW (ref 39–50)
HGB BLD-MCNC: 7.9 G/DL — LOW (ref 13–17)
HGB BLD-MCNC: 8.4 G/DL — LOW (ref 13–17)
HYPOCHROMIA BLD QL: SLIGHT — SIGNIFICANT CHANGE UP
IMM GRANULOCYTES NFR BLD AUTO: 0.7 % — SIGNIFICANT CHANGE UP (ref 0–0.9)
KETONES UR-MCNC: 15 MG/DL
KETONES UR-MCNC: 15 MG/DL
LEUKOCYTE ESTERASE UR-ACNC: NEGATIVE — SIGNIFICANT CHANGE UP
LEUKOCYTE ESTERASE UR-ACNC: NEGATIVE — SIGNIFICANT CHANGE UP
LIDOCAIN IGE QN: 16 U/L — SIGNIFICANT CHANGE UP (ref 7–60)
LYMPHOCYTES # BLD AUTO: 0.12 K/UL — LOW (ref 1–3.3)
LYMPHOCYTES # BLD AUTO: 0.39 K/UL — LOW (ref 1–3.3)
LYMPHOCYTES # BLD AUTO: 14.4 % — SIGNIFICANT CHANGE UP (ref 13–44)
LYMPHOCYTES # BLD AUTO: 4.4 % — LOW (ref 13–44)
MAGNESIUM SERPL-MCNC: 1.4 MG/DL — LOW (ref 1.6–2.6)
MAGNESIUM SERPL-MCNC: 2.1 MG/DL — SIGNIFICANT CHANGE UP (ref 1.6–2.6)
MANUAL SMEAR VERIFICATION: SIGNIFICANT CHANGE UP
MCHC RBC-ENTMCNC: 28.8 PG — SIGNIFICANT CHANGE UP (ref 27–34)
MCHC RBC-ENTMCNC: 30.2 PG — SIGNIFICANT CHANGE UP (ref 27–34)
MCHC RBC-ENTMCNC: 33.1 GM/DL — SIGNIFICANT CHANGE UP (ref 32–36)
MCHC RBC-ENTMCNC: 33.6 GM/DL — SIGNIFICANT CHANGE UP (ref 32–36)
MCV RBC AUTO: 85.8 FL — SIGNIFICANT CHANGE UP (ref 80–100)
MCV RBC AUTO: 91.4 FL — SIGNIFICANT CHANGE UP (ref 80–100)
MICROCYTES BLD QL: SLIGHT — SIGNIFICANT CHANGE UP
MONOCYTES # BLD AUTO: 0.25 K/UL — SIGNIFICANT CHANGE UP (ref 0–0.9)
MONOCYTES # BLD AUTO: 0.39 K/UL — SIGNIFICANT CHANGE UP (ref 0–0.9)
MONOCYTES NFR BLD AUTO: 14.4 % — HIGH (ref 2–14)
MONOCYTES NFR BLD AUTO: 8.9 % — SIGNIFICANT CHANGE UP (ref 2–14)
NEUTROPHILS # BLD AUTO: 1.83 K/UL — SIGNIFICANT CHANGE UP (ref 1.8–7.4)
NEUTROPHILS # BLD AUTO: 2.43 K/UL — SIGNIFICANT CHANGE UP (ref 1.8–7.4)
NEUTROPHILS NFR BLD AUTO: 67.5 % — SIGNIFICANT CHANGE UP (ref 43–77)
NEUTROPHILS NFR BLD AUTO: 85.8 % — HIGH (ref 43–77)
NEUTS BAND # BLD: 0.9 % — SIGNIFICANT CHANGE UP (ref 0–8)
NITRITE UR-MCNC: NEGATIVE — SIGNIFICANT CHANGE UP
NITRITE UR-MCNC: NEGATIVE — SIGNIFICANT CHANGE UP
NRBC # BLD: 0 /100 WBCS — SIGNIFICANT CHANGE UP (ref 0–0)
OSMOLALITY SERPL: 283 MOSM/KG — SIGNIFICANT CHANGE UP (ref 280–301)
OSMOLALITY UR: 354 MOSM/KG — SIGNIFICANT CHANGE UP (ref 300–900)
PH UR: 5.5 — SIGNIFICANT CHANGE UP (ref 5–8)
PH UR: 5.5 — SIGNIFICANT CHANGE UP (ref 5–8)
PHOSPHATE SERPL-MCNC: 3.1 MG/DL — SIGNIFICANT CHANGE UP (ref 2.5–4.5)
PLAT MORPH BLD: ABNORMAL
PLATELET # BLD AUTO: 176 K/UL — SIGNIFICANT CHANGE UP (ref 150–400)
PLATELET # BLD AUTO: 197 K/UL — SIGNIFICANT CHANGE UP (ref 150–400)
POIKILOCYTOSIS BLD QL AUTO: SLIGHT — SIGNIFICANT CHANGE UP
POTASSIUM SERPL-MCNC: 3.2 MMOL/L — LOW (ref 3.5–5.3)
POTASSIUM SERPL-MCNC: 3.4 MMOL/L — LOW (ref 3.5–5.3)
POTASSIUM SERPL-MCNC: 3.6 MMOL/L — SIGNIFICANT CHANGE UP (ref 3.5–5.3)
POTASSIUM SERPL-MCNC: SIGNIFICANT CHANGE UP (ref 3.5–5.3)
POTASSIUM SERPL-SCNC: 3.2 MMOL/L — LOW (ref 3.5–5.3)
POTASSIUM SERPL-SCNC: 3.4 MMOL/L — LOW (ref 3.5–5.3)
POTASSIUM SERPL-SCNC: 3.6 MMOL/L — SIGNIFICANT CHANGE UP (ref 3.5–5.3)
POTASSIUM SERPL-SCNC: SIGNIFICANT CHANGE UP (ref 3.5–5.3)
POTASSIUM UR-SCNC: 25 MMOL/L — SIGNIFICANT CHANGE UP
PROT ?TM UR-MCNC: 33 MG/DL — HIGH (ref 0–12)
PROT SERPL-MCNC: 6.3 G/DL — SIGNIFICANT CHANGE UP (ref 6–8.3)
PROT SERPL-MCNC: 7.2 G/DL — SIGNIFICANT CHANGE UP (ref 6–8.3)
PROT SERPL-MCNC: 7.7 G/DL — SIGNIFICANT CHANGE UP (ref 6–8.3)
PROT UR-MCNC: SIGNIFICANT CHANGE UP MG/DL
PROT UR-MCNC: SIGNIFICANT CHANGE UP MG/DL
PROT/CREAT UR-RTO: 0.3 RATIO — HIGH (ref 0–0.2)
RAPID RVP RESULT: SIGNIFICANT CHANGE UP
RBC # BLD: 2.74 M/UL — LOW (ref 4.2–5.8)
RBC # BLD: 2.78 M/UL — LOW (ref 4.2–5.8)
RBC # FLD: 17.2 % — HIGH (ref 10.3–14.5)
RBC # FLD: 17.8 % — HIGH (ref 10.3–14.5)
RBC BLD AUTO: ABNORMAL
RSV RNA NPH QL NAA+NON-PROBE: SIGNIFICANT CHANGE UP
S PNEUM AG UR QL: NEGATIVE — SIGNIFICANT CHANGE UP
SARS-COV-2 RNA SPEC QL NAA+PROBE: SIGNIFICANT CHANGE UP
SARS-COV-2 RNA SPEC QL NAA+PROBE: SIGNIFICANT CHANGE UP
SCHISTOCYTES BLD QL AUTO: SLIGHT — SIGNIFICANT CHANGE UP
SODIUM SERPL-SCNC: 127 MMOL/L — LOW (ref 135–145)
SODIUM SERPL-SCNC: 128 MMOL/L — LOW (ref 135–145)
SODIUM SERPL-SCNC: 131 MMOL/L — LOW (ref 135–145)
SODIUM UR-SCNC: 28 MMOL/L — SIGNIFICANT CHANGE UP
SP GR SPEC: 1.01 — SIGNIFICANT CHANGE UP (ref 1–1.03)
SP GR SPEC: 1.01 — SIGNIFICANT CHANGE UP (ref 1–1.03)
UROBILINOGEN FLD QL: 2 MG/DL (ref 0.2–1)
UROBILINOGEN FLD QL: 2 MG/DL (ref 0.2–1)
UUN UR-MCNC: 528 MG/DL — SIGNIFICANT CHANGE UP
WBC # BLD: 2.71 K/UL — LOW (ref 3.8–10.5)
WBC # BLD: 2.8 K/UL — LOW (ref 3.8–10.5)
WBC # FLD AUTO: 2.71 K/UL — LOW (ref 3.8–10.5)
WBC # FLD AUTO: 2.8 K/UL — LOW (ref 3.8–10.5)

## 2024-10-21 PROCEDURE — 99223 1ST HOSP IP/OBS HIGH 75: CPT | Mod: GC

## 2024-10-21 PROCEDURE — 99233 SBSQ HOSP IP/OBS HIGH 50: CPT

## 2024-10-21 RX ORDER — ACETAMINOPHEN 500 MG
650 TABLET ORAL EVERY 6 HOURS
Refills: 0 | Status: DISCONTINUED | OUTPATIENT
Start: 2024-10-21 | End: 2024-10-21

## 2024-10-21 RX ORDER — PIPERACILLIN AND TAZOBACTAM .5; 4 G/20ML; G/20ML
3.38 INJECTION, POWDER, LYOPHILIZED, FOR SOLUTION INTRAVENOUS ONCE
Refills: 0 | Status: DISCONTINUED | OUTPATIENT
Start: 2024-10-21 | End: 2024-10-21

## 2024-10-21 RX ORDER — MAGNESIUM, ALUMINUM HYDROXIDE 200-200 MG
30 TABLET,CHEWABLE ORAL EVERY 4 HOURS
Refills: 0 | Status: DISCONTINUED | OUTPATIENT
Start: 2024-10-21 | End: 2024-11-04

## 2024-10-21 RX ORDER — VANCOMYCIN HYDROCHLORIDE 50 MG/ML
1000 KIT ORAL EVERY 12 HOURS
Refills: 0 | Status: DISCONTINUED | OUTPATIENT
Start: 2024-10-21 | End: 2024-10-21

## 2024-10-21 RX ORDER — METOCLOPRAMIDE HCL 10 MG
10 TABLET ORAL ONCE
Refills: 0 | Status: COMPLETED | OUTPATIENT
Start: 2024-10-21 | End: 2024-10-21

## 2024-10-21 RX ORDER — PIPERACILLIN AND TAZOBACTAM .5; 4 G/20ML; G/20ML
3.38 INJECTION, POWDER, LYOPHILIZED, FOR SOLUTION INTRAVENOUS EVERY 8 HOURS
Refills: 0 | Status: DISCONTINUED | OUTPATIENT
Start: 2024-10-21 | End: 2024-10-21

## 2024-10-21 RX ORDER — SENNA 187 MG
2 TABLET ORAL AT BEDTIME
Refills: 0 | Status: DISCONTINUED | OUTPATIENT
Start: 2024-10-21 | End: 2024-11-04

## 2024-10-21 RX ORDER — POTASSIUM PHOSPHATE 236; 224 MG/ML; MG/ML
15 INJECTION, SOLUTION INTRAVENOUS ONCE
Refills: 0 | Status: COMPLETED | OUTPATIENT
Start: 2024-10-21 | End: 2024-10-21

## 2024-10-21 RX ORDER — ACETAMINOPHEN 500 MG
1000 TABLET ORAL ONCE
Refills: 0 | Status: COMPLETED | OUTPATIENT
Start: 2024-10-21 | End: 2024-10-21

## 2024-10-21 RX ORDER — POTASSIUM CHLORIDE 10 MEQ
40 TABLET, EXTENDED RELEASE ORAL ONCE
Refills: 0 | Status: COMPLETED | OUTPATIENT
Start: 2024-10-21 | End: 2024-10-21

## 2024-10-21 RX ORDER — POLYETHYLENE GLYCOL 3350 17 G/17G
17 POWDER, FOR SOLUTION ORAL EVERY 24 HOURS
Refills: 0 | Status: DISCONTINUED | OUTPATIENT
Start: 2024-10-21 | End: 2024-11-04

## 2024-10-21 RX ORDER — ONDANSETRON HYDROCHLORIDE 2 MG/ML
4 INJECTION, SOLUTION INTRAMUSCULAR; INTRAVENOUS EVERY 8 HOURS
Refills: 0 | Status: DISCONTINUED | OUTPATIENT
Start: 2024-10-21 | End: 2024-11-04

## 2024-10-21 RX ORDER — POTASSIUM CHLORIDE 10 MEQ
10 TABLET, EXTENDED RELEASE ORAL ONCE
Refills: 0 | Status: COMPLETED | OUTPATIENT
Start: 2024-10-21 | End: 2024-10-21

## 2024-10-21 RX ORDER — LIDOCAINE HYDROCHLORIDE 40 MG/ML
15 SOLUTION TOPICAL EVERY 8 HOURS
Refills: 0 | Status: DISCONTINUED | OUTPATIENT
Start: 2024-10-21 | End: 2024-11-04

## 2024-10-21 RX ORDER — MELATONIN 5 MG
3 TABLET ORAL AT BEDTIME
Refills: 0 | Status: DISCONTINUED | OUTPATIENT
Start: 2024-10-21 | End: 2024-11-04

## 2024-10-21 RX ORDER — GLUCAGON INJECTION, SOLUTION 1 MG/.2ML
1 INJECTION, SOLUTION SUBCUTANEOUS ONCE
Refills: 0 | Status: DISCONTINUED | OUTPATIENT
Start: 2024-10-21 | End: 2024-11-04

## 2024-10-21 RX ORDER — GABAPENTIN 300 MG/1
300 CAPSULE ORAL EVERY 24 HOURS
Refills: 0 | Status: DISCONTINUED | OUTPATIENT
Start: 2024-10-21 | End: 2024-10-21

## 2024-10-21 RX ORDER — PIPERACILLIN AND TAZOBACTAM .5; 4 G/20ML; G/20ML
3.38 INJECTION, POWDER, LYOPHILIZED, FOR SOLUTION INTRAVENOUS ONCE
Refills: 0 | Status: COMPLETED | OUTPATIENT
Start: 2024-10-21 | End: 2024-10-21

## 2024-10-21 RX ORDER — AMLODIPINE BESYLATE 10 MG
5 TABLET ORAL DAILY
Refills: 0 | Status: DISCONTINUED | OUTPATIENT
Start: 2024-10-21 | End: 2024-11-04

## 2024-10-21 RX ORDER — SODIUM CHLORIDE 9 MG/ML
1000 INJECTION, SOLUTION INTRAMUSCULAR; INTRAVENOUS; SUBCUTANEOUS ONCE
Refills: 0 | Status: COMPLETED | OUTPATIENT
Start: 2024-10-21 | End: 2024-10-21

## 2024-10-21 RX ORDER — MAGNESIUM SULFATE IN 0.9% NACL 2 G/50 ML
2 INTRAVENOUS SOLUTION, PIGGYBACK (ML) INTRAVENOUS ONCE
Refills: 0 | Status: COMPLETED | OUTPATIENT
Start: 2024-10-21 | End: 2024-10-21

## 2024-10-21 RX ORDER — VANCOMYCIN HYDROCHLORIDE 50 MG/ML
1000 KIT ORAL EVERY 12 HOURS
Refills: 0 | Status: DISCONTINUED | OUTPATIENT
Start: 2024-10-21 | End: 2024-10-22

## 2024-10-21 RX ORDER — GABAPENTIN 300 MG/1
100 CAPSULE ORAL EVERY 24 HOURS
Refills: 0 | Status: DISCONTINUED | OUTPATIENT
Start: 2024-10-22 | End: 2024-10-23

## 2024-10-21 RX ORDER — ENOXAPARIN SODIUM 40MG/0.4ML
40 SYRINGE (ML) SUBCUTANEOUS EVERY 24 HOURS
Refills: 0 | Status: DISCONTINUED | OUTPATIENT
Start: 2024-10-21 | End: 2024-10-22

## 2024-10-21 RX ORDER — PIPERACILLIN AND TAZOBACTAM .5; 4 G/20ML; G/20ML
3.38 INJECTION, POWDER, LYOPHILIZED, FOR SOLUTION INTRAVENOUS EVERY 8 HOURS
Refills: 0 | Status: DISCONTINUED | OUTPATIENT
Start: 2024-10-21 | End: 2024-10-23

## 2024-10-21 RX ORDER — MAGNESIUM SULFATE IN 0.9% NACL 2 G/50 ML
2 INTRAVENOUS SOLUTION, PIGGYBACK (ML) INTRAVENOUS ONCE
Refills: 0 | Status: DISCONTINUED | OUTPATIENT
Start: 2024-10-21 | End: 2024-10-21

## 2024-10-21 RX ORDER — INSULIN LISPRO 100/ML
VIAL (ML) SUBCUTANEOUS
Refills: 0 | Status: DISCONTINUED | OUTPATIENT
Start: 2024-10-21 | End: 2024-11-04

## 2024-10-21 RX ADMIN — LIDOCAINE HYDROCHLORIDE 15 MILLILITER(S): 40 SOLUTION TOPICAL at 06:42

## 2024-10-21 RX ADMIN — Medication 100 MILLIEQUIVALENT(S): at 09:16

## 2024-10-21 RX ADMIN — Medication 500 MILLILITER(S): at 16:10

## 2024-10-21 RX ADMIN — Medication 100 MILLILITER(S): at 23:27

## 2024-10-21 RX ADMIN — Medication 40 MILLIEQUIVALENT(S): at 07:06

## 2024-10-21 RX ADMIN — Medication 2: at 13:01

## 2024-10-21 RX ADMIN — Medication 100 MILLILITER(S): at 15:31

## 2024-10-21 RX ADMIN — Medication 1000 MILLIGRAM(S): at 16:30

## 2024-10-21 RX ADMIN — Medication 104 MILLIGRAM(S): at 00:51

## 2024-10-21 RX ADMIN — Medication 2: at 17:36

## 2024-10-21 RX ADMIN — Medication 5 MILLIGRAM(S): at 07:06

## 2024-10-21 RX ADMIN — Medication 2: at 09:15

## 2024-10-21 RX ADMIN — GABAPENTIN 300 MILLIGRAM(S): 300 CAPSULE ORAL at 06:41

## 2024-10-21 RX ADMIN — ONDANSETRON HYDROCHLORIDE 4 MILLIGRAM(S): 2 INJECTION, SOLUTION INTRAMUSCULAR; INTRAVENOUS at 22:44

## 2024-10-21 RX ADMIN — Medication 3 MILLIGRAM(S): at 23:27

## 2024-10-21 RX ADMIN — Medication 400 MILLIGRAM(S): at 15:30

## 2024-10-21 RX ADMIN — ONDANSETRON HYDROCHLORIDE 4 MILLIGRAM(S): 2 INJECTION, SOLUTION INTRAMUSCULAR; INTRAVENOUS at 13:55

## 2024-10-21 RX ADMIN — Medication 40 MILLIGRAM(S): at 09:16

## 2024-10-21 RX ADMIN — Medication 25 GRAM(S): at 00:51

## 2024-10-21 RX ADMIN — Medication 400 MILLIGRAM(S): at 22:14

## 2024-10-21 RX ADMIN — Medication 20 MILLIGRAM(S): at 22:44

## 2024-10-21 RX ADMIN — PIPERACILLIN AND TAZOBACTAM 25 GRAM(S): .5; 4 INJECTION, POWDER, LYOPHILIZED, FOR SOLUTION INTRAVENOUS at 23:27

## 2024-10-21 RX ADMIN — Medication 650 MILLIGRAM(S): at 11:35

## 2024-10-21 RX ADMIN — Medication 1 LOZENGE: at 06:41

## 2024-10-21 RX ADMIN — PIPERACILLIN AND TAZOBACTAM 200 GRAM(S): .5; 4 INJECTION, POWDER, LYOPHILIZED, FOR SOLUTION INTRAVENOUS at 06:42

## 2024-10-21 RX ADMIN — Medication 650 MILLIGRAM(S): at 12:35

## 2024-10-21 RX ADMIN — PIPERACILLIN AND TAZOBACTAM 25 GRAM(S): .5; 4 INJECTION, POWDER, LYOPHILIZED, FOR SOLUTION INTRAVENOUS at 09:16

## 2024-10-21 RX ADMIN — SODIUM CHLORIDE 1000 MILLILITER(S): 9 INJECTION, SOLUTION INTRAMUSCULAR; INTRAVENOUS; SUBCUTANEOUS at 02:00

## 2024-10-21 RX ADMIN — Medication 1000 MILLIGRAM(S): at 22:45

## 2024-10-21 RX ADMIN — Medication 100 MILLILITER(S): at 15:40

## 2024-10-21 RX ADMIN — PIPERACILLIN AND TAZOBACTAM 25 GRAM(S): .5; 4 INJECTION, POWDER, LYOPHILIZED, FOR SOLUTION INTRAVENOUS at 15:08

## 2024-10-21 RX ADMIN — VANCOMYCIN HYDROCHLORIDE 250 MILLIGRAM(S): KIT at 13:46

## 2024-10-21 NOTE — SWALLOW BEDSIDE ASSESSMENT ADULT - NS SPL SWALLOW CLINIC TRIAL FT
No cough, throat clear or change in voice when taking PO trials. At this time, Pt is denying difficulty or pain with swallowing. Reports discomfort when he has hiccups.

## 2024-10-21 NOTE — H&P ADULT - PROBLEM SELECTOR PLAN 6
#pancytopenia   Hgb 8.5, previously 8.0 on 10/15. RDW elevated. Leukopenia. Plt 152. Likely 2/2 chemotx. Pt denies bleeding, bruising. Denies hematuria.  - Monitor H&H  - Active T&S  - Transfuse hgb <7

## 2024-10-21 NOTE — SWALLOW BEDSIDE ASSESSMENT ADULT - SWALLOW EVAL: DIAGNOSIS
Fnxl oropharyngeal swallow with no overt signs of aspiration or other swallowing impairment at this time

## 2024-10-21 NOTE — H&P ADULT - ATTENDING COMMENTS
Pt discussed w AM teaching team - seen and examined at bedside by me.  69M w non-operable lung CA, DM2, HTN, HLD, recent admitted earlier this month for symptomatic anemia and chemo-induced N/V 10/14-15 now w worsening nausea and vomiting w odynophagia and dysphagia of solids>liquids and fever, admitted to due concern for sepsis and on IV Vancomycin and Zosyn    Denies having EGD previously. Reports having nausea and vomiting 30min-1h after eating and drinking. Has not passed BMs in several days d/t decreased PO intake but reports passing flatus.   Exam: Male in NAD on RA, MMM, RRR, nml resp effort, CTAB, Abd soft, NABS, non-tender, alert, moving all extremities. no BLE edema.   Labs: WBC 2.7 w ANC 1830. Hgb 8.4. Na 131. AM . UA negative for LE/Nitrite, RVP negative    #Sepsis w N/V  #Lung Cancer  #HTN - on amlodipine 5  #HLD - on atorva 20  #Leukopenia wo neutropenia  #Normocytic anemia    PPx; SQL  Plan  Pt continues to fever - continue broad spectrum antibiotics at this time w IV Vancomycin and Zosyn. Suspect possibly intra-abdominal source due to frequent nausea and vomiting  Obtain CT C/A/P w IV contrast urgent d/t fever and ongoing nausea/vomiting  SLP to evaluate odynophagia and sore throat w recent N/V  GI C/S due to dysphagia and inability to tolerate solids > liquids over 4-5 days - may need endoscopy.     DISPO: TBD  Above d/w housestaff and Oncology attending - Dr. Marcelo Larose

## 2024-10-21 NOTE — H&P ADULT - PROBLEM SELECTOR PLAN 11
F: S/p 2L NS in ED   E: Replete as necessary K>4 Mg>2  N: CCB diet     DVT Prophylaxis: Lovenox 40mg qd  GI prophylaxis: None   CODE STATUS: FULL F: S/p 2L NS in ED   E: Replete as necessary K>4 Mg>2  N: CCB diet   DVT Prophylaxis: Lovenox 40mg qd  GI prophylaxis: None   CODE STATUS: FULL

## 2024-10-21 NOTE — PATIENT PROFILE ADULT - FALL HARM RISK - RISK INTERVENTIONS

## 2024-10-21 NOTE — PATIENT PROFILE ADULT - NSPROHMSYMPCOND_GEN_A_NUR
Eyes: Negative for visual disturbance. Respiratory: Positive for cough. Cardiovascular: Negative for chest pain and palpitations. Gastrointestinal: Negative for abdominal pain and blood in stool. Genitourinary: Positive for frequency. Negative for hematuria. Musculoskeletal: Positive for arthralgias. Neurological: Positive for headaches. Psychiatric/Behavioral: The patient is nervous/anxious. Objective:   Physical Exam   Constitutional: She is oriented to person, place, and time. HENT:   Mouth/Throat: Oropharynx is clear and moist.   Eyes: Conjunctivae are normal.   Neck: Neck supple. No thyromegaly present. Cardiovascular: Normal rate, regular rhythm and normal heart sounds. Pulmonary/Chest: Effort normal and breath sounds normal.   Abdominal: Soft. She exhibits no distension. There is no tenderness. Musculoskeletal: She exhibits no edema. Lymphadenopathy:     She has no cervical adenopathy. Neurological: She is alert and oriented to person, place, and time. Psychiatric: She has a normal mood and affect. Her behavior is normal. Judgment and thought content normal.       Assessment:      1. Essential hypertension    2. Chronic tension-type headache, intractable    3. Coronary artery disease involving native coronary artery of native heart without angina pectoris    4. Obesity (BMI 30-39. 9)            Plan:      Danna Tom was seen today for discuss medications and follow up after procedure. Diagnoses and all orders for this visit:    Essential hypertension  Continue meds-PRANAV diet  Chronic tension-type headache, intractable  Refer to Dr. Tim Dawn or Dr. Soheila Torres as directed  Coronary artery disease involving native coronary artery of native heart without angina pectoris  Continue meds,Card  Obesity (BMI 30-39. 9)  Lower Carbs-increase activity  Other orders  -     POCT Urinalysis no Micro    See me 2 mos          Huber Smith, DO respiratory cancer/respiratory

## 2024-10-21 NOTE — H&P ADULT - PROBLEM SELECTOR PLAN 5
Hx of squamous cell lung ca, AJCC IIIB, [id number] negative. 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 S/p 7 cycles of neoadjuvant platinum based with gemcitabine and nivolumab chemoimmunotherapy. Follows with Dr. Mueller for RT, s/p last round of RT on 10/15  - Consult heme-onc in am Hx of squamous cell lung ca, AJCC IIIB, [id number] negative. 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 S/p 7 cycles of neoadjuvant platinum based with gemcitabine and nivolumab chemoimmunotherapy. Follows with Dr. Mueller for RT, s/p last round of RT on 10/15.   - NTD

## 2024-10-21 NOTE — SWALLOW BEDSIDE ASSESSMENT ADULT - SLP PERTINENT HISTORY OF CURRENT PROBLEM
69M w/ PMHx of non-operable lung cancer, DM, HTN, HLD recently admitted for symptomatic anemia and chemotherapy-induced nausea and vomiting (10/14-10/15) presenting with nausea, vomiting, and fever. Pt had fever yesterday T 101, today with coughing and persistent vomiting. Pt was discharged on 10/15 and started having intractable hiccups causing him to vomit. Pt says he could not tolerate food. He called his rad-onc (Dr. Mueller) who rx'd him zofran PO which did not relieve his sx. Pt had his last round of RT on 10/17, since then he's had a sore throat and odynophagia

## 2024-10-21 NOTE — H&P ADULT - NSHPLABSRESULTS_GEN_ALL_CORE
.  LABS:                         8.5    2.94  )-----------( 152      ( 20 Oct 2024 22:14 )             24.9     10-21    x   |  x   |  x   ----------------------------<  x   3.2[L]   |  x   |  x     Ca    8.4      20 Oct 2024 22:14  Phos  2.8     10-  Mg     1.4     10-    TPro  7.7  /  Alb  3.0[L]  /  TBili  1.1  /  DBili  x   /  AST  See Note  /  ALT  See Note  /  AlkPhos  88  10-      Urinalysis Basic - ( 21 Oct 2024 02:57 )    Color: Yellow / Appearance: Cloudy / S.013 / pH: x  Gluc: x / Ketone: 15 mg/dL  / Bili: Negative / Urobili: 2.0 mg/dL   Blood: x / Protein: Trace mg/dL / Nitrite: Negative   Leuk Esterase: Negative / RBC: x / WBC x   Sq Epi: x / Non Sq Epi: x / Bacteria: x            Lactate, Blood: 1.0 mmol/L (10-20 @ 22:16)      RADIOLOGY, EKG & ADDITIONAL TESTS: Reviewed.

## 2024-10-21 NOTE — H&P ADULT - PROBLEM SELECTOR PLAN 1
POA. Meeting 2/4 SIRS criteria (T 102.9, ) without clear source. Pt has been coughing over the last 2 days with fever at home T 101. Pt has been hiccuping c/b vomiting. S/p Vanc 1g, Zosyn 3.375g in ED. BCx collected x2 in ED.  - C/w abx as below   - F/u BCx x2  - Reculture for Tmax >100.4 in 48h POA. Meeting 2/4 SIRS criteria (T 102.9, ) without clear source. Pt has been coughing over the last 2 days with fever at home T 101. Pt has been hiccuping c/b vomiting. S/p Vanc 1g, Zosyn 3.375g in ED. BCx collected x2 in ED.  - C/w abx as below   - F/u BCx x2  - Reculture for Tmax >100.4 in 48h    Fever of 102.8F at 11am. Blood cultures collected. Patient HDS, appearing well, AOx3, warm to touch. Unknown source.   - f/u bcx from 10/21 11 am

## 2024-10-21 NOTE — H&P ADULT - PROBLEM SELECTOR PLAN 2
Pt is immunocompromised iso lung cancer and chemo/RT px with 2 days of cough and 1 day of fever. Pt was dc'd on 10/15 and started having intractable hiccups c/b nausea and vomiting. ?Aspiration pna. Cough is dry. Pt denies SOB, CP, abd pain, diarrhea, dysuria, rashes or joint pain. CXR pending read but appears similar to prior w/o infiltrates/consolidations. WBC 2.94 (from 1.95) with increased lymphocyte %. Covid/Flu/RSV negative.  - Collect full RVP  - C/w Vancomycin 1g q12h, check trough before 4th dose  - Zosyn 3.375g q8h  - Check urine strep/legionella, MRSA

## 2024-10-21 NOTE — H&P ADULT - NSHPSOCIALHISTORY_GEN_ALL_CORE
Lives with son in [place]. Quit smoking this March (50 pack year hx). Denies alcohol, illicit substance use.

## 2024-10-21 NOTE — ED ADULT NURSE NOTE - OBJECTIVE STATEMENT
Received patient ambulatory accompanied by  with chief complaint of vomiting. Said complaint started about 5 days, accompanied with poor appetite and body weakness. Reports also of 2 days cough and intermittent fever. PMH of Lung CA.   On PE, AOX4, speaking full sentences without difficulty. Patient not in active cardiac or respiratory distress, no noted neurologic deficits.  No obvious trauma/injury/deformity noted. Patient oriented to ED area. All needs attended. POC reviewed. Placed on continous cardiac monitoring. Fall risk precautions maintained. Purposeful proactive hourly rounding in progress.

## 2024-10-21 NOTE — H&P ADULT - ASSESSMENT
69M w/ PMHx of non-operable lung cancer, DM, HTN, HLD recently admitted for symptomatic anemia and chemotherapy-induced nausea and vomiting (10/14-10/15) presenting with hiccups, nausea, vomiting, and fever admitted to [facility] for further management

## 2024-10-21 NOTE — H&P ADULT - PROBLEM SELECTOR PLAN 4
Pt c/p pain with swallowing since his RT on 10/15, with poor PO intake. Likely 2/2 vomiting vs RT.   - Lozenges  -  Lidocaine jelly Pt c/p pain with swallowing since his RT on 10/15, with poor PO intake. Likely 2/2 vomiting vs RT.  As per bedside swallow assessment: no cough, throat clear or change in voice when taking PO trials. Pt is denying difficulty or pain with swallowing. Reports discomfort when he has hiccups.  GI consult placed for evaluation of new-onset dysphagia/odynophagia. No hx of EGD. As per GI, n/v/dysphagia likely 2/2 RT. Recommends:  - CT neck to evaluate any localized cause in the neck (i.e. abscess) iso fever with unknown source  - CT A/P   - Lozenges or lidocaine jelly for symptoms

## 2024-10-21 NOTE — H&P ADULT - PROBLEM SELECTOR PLAN 3
Pt has had hiccups since his dc last week. Rad-onc started pt on Zofran which did not help. S/p Reglan in ED w/o improvement. Hiccups have caused the pt N/V.  - Trial gabapentin 300mg q24h Pt has had hiccups since his dc last week. Rad-onc started pt on Zofran which did not help. S/p Reglan in ED w/o improvement. Hiccups have caused the pt N/V.  S/P gabapentin 300 mg x1 10/21 AM    - gabapentin 100 mg TID

## 2024-10-21 NOTE — H&P ADULT - PROBLEM SELECTOR PLAN 7
Na 127 on admission, previously 134. Pt denies HA but admits to N/V, which is chronic for him. Has had poor PO intake iso N/V. Drinks gatorade and ensures. No visual disturbances, palpitations.   Corrected Na 128. Calculated serum osm 268. S/p 2L NS in ED  - Fluid restriction 1500ml  - Monitor BMP Na 127 on admission, previously 134. Pt denies HA but admits to N/V, which is chronic for him. Has had poor PO intake iso N/V. Drinks gatorade and ensures. No visual disturbances, palpitations.   Corrected Na 128. Calculated serum osm 268. S/p 2L NS in ED. Na improved to 131 s/p NS 2 L.  - Monitor BMP  - Encourage PO intake

## 2024-10-21 NOTE — H&P ADULT - HISTORY OF PRESENT ILLNESS
69M w/ PMHx of non-operable lung cancer, DM, HTN, HLD recently admitted for symptomatic anemia and chemotherapy-induced nausea and vomiting (10/14-10/15) presenting with nausea, vomiting, and fever. Pt had fever yesterday T 101, today with coughing and persistent vomiting.    ED course:  Vitals - T 102.9,  -> 104, /79, SpO2 97% RA, RR 18  Labs - WBC 2.94, Hgb 8.5, Plt 152, Na 127, K 3.2, Cl 93, CO2 21, BUN/Crt 19/0.85, Mg 1.4, venous pH 7.45, pCO2 33  UA cloudy +2 urobilinogen   Covid/Flu/RSV negative  Imaging - CXR with LLL mass with similar appearance to prior, no infiltrates/consolidations (author read)  Interventions - Ofirmev 1g x1, Mg 2g x1, Reglan 10mg x1, Vancomycin 1g x1, Zosyn 3.375g x1, 1L NS x2 69M w/ PMHx of non-operable lung cancer, DM, HTN, HLD recently admitted for symptomatic anemia and chemotherapy-induced nausea and vomiting (10/14-10/15) presenting with nausea, vomiting, and fever. Pt had fever yesterday T 101, today with coughing and persistent vomiting. Pt was discharged on 10/15 and started having intractable hiccups causing him to vomit. Pt says he could not tolerate food. He called his rad-onc (Dr. Mueller) who rx'd him zofran PO which did not relieve his sx. Pt had his last round of RT on 10/17, since then he's had a sore throat and odynophagia. He began having a cough 2 days ago that is dry. His wife noticed that he felt warm yesterday, checked temp, which was T 101. Pt denies rhinorrhea, SOB, CP, diarrhea, joint paint, rash.     ED course:  Vitals - T 102.9,  -> 104, /79, SpO2 97% RA, RR 18  Labs - WBC 2.94, Hgb 8.5, Plt 152, Na 127, K 3.2, Cl 93, CO2 21, BUN/Crt 19/0.85, Mg 1.4, venous pH 7.45, pCO2 33  UA cloudy +2 urobilinogen   Covid/Flu/RSV negative  EKG with sinus tach 120bpm, normal axis, nonischemic, QTc 457  Imaging - CXR with LLL mass with similar appearance to prior, no infiltrates/consolidations (author read)  Interventions - Ofirmev 1g x1, Mg 2g x1, Reglan 10mg x1, Vancomycin 1g x1, Zosyn 3.375g x1, 1L NS x2 69M w/ PMHx of non-operable lung cancer, DM, HTN, HLD recently admitted for symptomatic anemia and chemotherapy-induced nausea and vomiting (10/14-10/15) presenting with nausea, vomiting, and fever. Pt had fever yesterday T 101, today with coughing and persistent vomiting. Pt was discharged on 10/15 and started having intractable hiccups causing him to vomit. Pt says he could not tolerate food. He called his rad-onc (Dr. Mueller) who rx'd him zofran PO which did not relieve his sx. Pt had his last round of RT on 10/17, since then he's had a sore throat and odynophagia. He began having a cough 2 days ago that is dry. His wife noticed that he felt warm yesterday, checked temp, which was T 101. Pt denies rhinorrhea, SOB, CP, diarrhea, joint paint, rash.     Patient reports that he has never taken gabapentin. He reports that he has had a bronchoscopy in August with [facility] doctors for a biopsy. He has never had an EGD before. Patient reports that he vomits 1-2 hours after consuming food or water. He denies nausea at this time. When patient was found to be febrile with T of 102.8F, patient did not feel feverish, any chills, palpitations, discomfort. Patient appeared well, although he was warm to touch all over his body.     ED course:  Vitals - T 102.9,  -> 104, /79, SpO2 97% RA, RR 18  Labs - WBC 2.94, Hgb 8.5, Plt 152, Na 127, K 3.2, Cl 93, CO2 21, BUN/Crt 19/0.85, Mg 1.4, venous pH 7.45, pCO2 33  UA cloudy +2 urobilinogen   Covid/Flu/RSV negative  EKG with sinus tach 120bpm, normal axis, nonischemic, QTc 457  Imaging - CXR with LLL mass with similar appearance to prior, no infiltrates/consolidations (author read)  Interventions - Ofirmev 1g x1, Mg 2g x1, Reglan 10mg x1, Vancomycin 1g x1, Zosyn 3.375g x1, 1L NS x2

## 2024-10-21 NOTE — H&P ADULT - NSHPPHYSICALEXAM_GEN_ALL_CORE
.  VITAL SIGNS:  T(C): 37.7 (10-21-24 @ 02:15), Max: 39.4 (10-20-24 @ 22:05)  T(F): 99.9 (10-21-24 @ 02:15), Max: 102.9 (10-20-24 @ 22:05)  HR: 104 (10-21-24 @ 02:15) (104 - 134)  BP: 121/76 (10-21-24 @ 02:15) (119/79 - 121/76)  BP(mean): --  RR: 18 (10-21-24 @ 02:15) (18 - 18)  SpO2: 98% (10-21-24 @ 02:15) (97% - 98%)  Wt(kg): --    PHYSICAL EXAM:    Constitutional: WDWN resting comfortably in bed; NAD  Head: NC/AT  Eyes: PERRL, EOMI, anicteric sclera  ENT: no nasal discharge; uvula midline, no oropharyngeal erythema or exudates; MMM  Neck: supple; no JVD or thyromegaly  Respiratory: CTA B/L; no W/R/R, no retractions  Cardiac: +S1/S2; RRR; no M/R/G; PMI non-displaced  Gastrointestinal: abdomen soft, NT/ND; no rebound or guarding; +BSx4  Back: spine midline, no bony tenderness or step-offs; no CVAT B/L  Extremities: WWP, no clubbing or cyanosis; no peripheral edema  Musculoskeletal: NROM x4; no joint swelling, tenderness or erythema  Vascular: 2+ radial, femoral, DP/PT pulses B/L  Dermatologic: skin warm, dry and intact; no rashes, wounds, or scars  Lymphatic: no submandibular or cervical LAD  Neurologic: AAOx3; CNII-XII grossly intact; no focal deficits  Psychiatric: affect and characteristics of appearance, verbalizations, behaviors are appropriate .  VITAL SIGNS:  T(C): 37.7 (10-21-24 @ 02:15), Max: 39.4 (10-20-24 @ 22:05)  T(F): 99.9 (10-21-24 @ 02:15), Max: 102.9 (10-20-24 @ 22:05)  HR: 104 (10-21-24 @ 02:15) (104 - 134)  BP: 121/76 (10-21-24 @ 02:15) (119/79 - 121/76)  BP(mean): --  RR: 18 (10-21-24 @ 02:15) (18 - 18)  SpO2: 98% (10-21-24 @ 02:15) (97% - 98%)  Wt(kg): --    PHYSICAL EXAM:    Constitutional: NAD, intermittently hiccuping   Eyes: PERRL, EOMI, anicteric sclera  Neck: supple; no JVD or thyromegaly, +skin tags  Respiratory: CTA b/l   Cardiac: +S1/S2; RRR; no M/R/G;   Gastrointestinal: abdomen soft, NT/ND; no rebound or guarding;   Back: spine midline, no bony tenderness or step-offs; no CVAT B/L  Extremities: WWP, no clubbing or cyanosis; no peripheral edema  Dermatologic: skin warm, dry and intact; no rashes, wounds, or scars  Lymphatic: no submandibular or cervical LAD  Neurologic: AAOx3; CNII-XII grossly intact; no focal deficits

## 2024-10-22 LAB
ALBUMIN SERPL ELPH-MCNC: 2.1 G/DL — LOW (ref 3.3–5)
ALP SERPL-CCNC: 84 U/L — SIGNIFICANT CHANGE UP (ref 40–120)
ALT FLD-CCNC: 35 U/L — SIGNIFICANT CHANGE UP (ref 10–45)
ANION GAP SERPL CALC-SCNC: 8 MMOL/L — SIGNIFICANT CHANGE UP (ref 5–17)
ANISOCYTOSIS BLD QL: SLIGHT — SIGNIFICANT CHANGE UP
AST SERPL-CCNC: 28 U/L — SIGNIFICANT CHANGE UP (ref 10–40)
BASOPHILS # BLD AUTO: 0 K/UL — SIGNIFICANT CHANGE UP (ref 0–0.2)
BASOPHILS # BLD AUTO: 0.01 K/UL — SIGNIFICANT CHANGE UP (ref 0–0.2)
BASOPHILS NFR BLD AUTO: 0 % — SIGNIFICANT CHANGE UP (ref 0–2)
BASOPHILS NFR BLD AUTO: 0.3 % — SIGNIFICANT CHANGE UP (ref 0–2)
BILIRUB SERPL-MCNC: 1.1 MG/DL — SIGNIFICANT CHANGE UP (ref 0.2–1.2)
BLD GP AB SCN SERPL QL: NEGATIVE — SIGNIFICANT CHANGE UP
BUN SERPL-MCNC: 12 MG/DL — SIGNIFICANT CHANGE UP (ref 7–23)
CALCIUM SERPL-MCNC: 7.4 MG/DL — LOW (ref 8.4–10.5)
CHLORIDE SERPL-SCNC: 96 MMOL/L — SIGNIFICANT CHANGE UP (ref 96–108)
CO2 SERPL-SCNC: 23 MMOL/L — SIGNIFICANT CHANGE UP (ref 22–31)
CREAT SERPL-MCNC: 0.75 MG/DL — SIGNIFICANT CHANGE UP (ref 0.5–1.3)
CULTURE RESULTS: NO GROWTH — SIGNIFICANT CHANGE UP
DACRYOCYTES BLD QL SMEAR: SLIGHT — SIGNIFICANT CHANGE UP
EGFR: 97 ML/MIN/1.73M2 — SIGNIFICANT CHANGE UP
EOSINOPHIL # BLD AUTO: 0.02 K/UL — SIGNIFICANT CHANGE UP (ref 0–0.5)
EOSINOPHIL # BLD AUTO: 0.08 K/UL — SIGNIFICANT CHANGE UP (ref 0–0.5)
EOSINOPHIL NFR BLD AUTO: 0.6 % — SIGNIFICANT CHANGE UP (ref 0–6)
EOSINOPHIL NFR BLD AUTO: 2.7 % — SIGNIFICANT CHANGE UP (ref 0–6)
FERRITIN SERPL-MCNC: 8890 NG/ML — HIGH (ref 30–400)
GIANT PLATELETS BLD QL SMEAR: PRESENT — SIGNIFICANT CHANGE UP
GLUCOSE BLDC GLUCOMTR-MCNC: 100 MG/DL — HIGH (ref 70–99)
GLUCOSE BLDC GLUCOMTR-MCNC: 107 MG/DL — HIGH (ref 70–99)
GLUCOSE BLDC GLUCOMTR-MCNC: 160 MG/DL — HIGH (ref 70–99)
GLUCOSE BLDC GLUCOMTR-MCNC: 90 MG/DL — SIGNIFICANT CHANGE UP (ref 70–99)
GLUCOSE SERPL-MCNC: 129 MG/DL — HIGH (ref 70–99)
HCT VFR BLD CALC: 21.3 % — LOW (ref 39–50)
HCT VFR BLD CALC: 26.7 % — LOW (ref 39–50)
HGB BLD-MCNC: 7 G/DL — CRITICAL LOW (ref 13–17)
HGB BLD-MCNC: 8.9 G/DL — LOW (ref 13–17)
HYPOCHROMIA BLD QL: SLIGHT — SIGNIFICANT CHANGE UP
IMM GRANULOCYTES NFR BLD AUTO: 0.6 % — SIGNIFICANT CHANGE UP (ref 0–0.9)
IRON SATN MFR SERPL: 18 % — SIGNIFICANT CHANGE UP (ref 16–55)
IRON SATN MFR SERPL: 19 UG/DL — LOW (ref 45–165)
LEGIONELLA AG UR QL: NEGATIVE — SIGNIFICANT CHANGE UP
LYMPHOCYTES # BLD AUTO: 0.08 K/UL — LOW (ref 1–3.3)
LYMPHOCYTES # BLD AUTO: 0.31 K/UL — LOW (ref 1–3.3)
LYMPHOCYTES # BLD AUTO: 2.6 % — LOW (ref 13–44)
LYMPHOCYTES # BLD AUTO: 9.1 % — LOW (ref 13–44)
MACROCYTES BLD QL: SLIGHT — SIGNIFICANT CHANGE UP
MAGNESIUM SERPL-MCNC: 1.3 MG/DL — LOW (ref 1.6–2.6)
MANUAL SMEAR VERIFICATION: SIGNIFICANT CHANGE UP
MCHC RBC-ENTMCNC: 28.3 PG — SIGNIFICANT CHANGE UP (ref 27–34)
MCHC RBC-ENTMCNC: 28.7 PG — SIGNIFICANT CHANGE UP (ref 27–34)
MCHC RBC-ENTMCNC: 32.9 GM/DL — SIGNIFICANT CHANGE UP (ref 32–36)
MCHC RBC-ENTMCNC: 33.3 GM/DL — SIGNIFICANT CHANGE UP (ref 32–36)
MCV RBC AUTO: 86.1 FL — SIGNIFICANT CHANGE UP (ref 80–100)
MCV RBC AUTO: 86.2 FL — SIGNIFICANT CHANGE UP (ref 80–100)
MICROCYTES BLD QL: SLIGHT — SIGNIFICANT CHANGE UP
MONOCYTES # BLD AUTO: 0.3 K/UL — SIGNIFICANT CHANGE UP (ref 0–0.9)
MONOCYTES # BLD AUTO: 0.43 K/UL — SIGNIFICANT CHANGE UP (ref 0–0.9)
MONOCYTES NFR BLD AUTO: 14.9 % — HIGH (ref 2–14)
MONOCYTES NFR BLD AUTO: 8.8 % — SIGNIFICANT CHANGE UP (ref 2–14)
MRSA PCR RESULT.: NEGATIVE — SIGNIFICANT CHANGE UP
NEUTROPHILS # BLD AUTO: 2.31 K/UL — SIGNIFICANT CHANGE UP (ref 1.8–7.4)
NEUTROPHILS # BLD AUTO: 2.76 K/UL — SIGNIFICANT CHANGE UP (ref 1.8–7.4)
NEUTROPHILS NFR BLD AUTO: 79.8 % — HIGH (ref 43–77)
NEUTROPHILS NFR BLD AUTO: 80.6 % — HIGH (ref 43–77)
NRBC # BLD: 0 /100 WBCS — SIGNIFICANT CHANGE UP (ref 0–0)
OSMOLALITY UR: 500 MOSM/KG — SIGNIFICANT CHANGE UP (ref 300–900)
OVALOCYTES BLD QL SMEAR: SLIGHT — SIGNIFICANT CHANGE UP
PHOSPHATE SERPL-MCNC: 3 MG/DL — SIGNIFICANT CHANGE UP (ref 2.5–4.5)
PLAT MORPH BLD: NORMAL — SIGNIFICANT CHANGE UP
PLATELET # BLD AUTO: 163 K/UL — SIGNIFICANT CHANGE UP (ref 150–400)
PLATELET # BLD AUTO: 190 K/UL — SIGNIFICANT CHANGE UP (ref 150–400)
POIKILOCYTOSIS BLD QL AUTO: SLIGHT — SIGNIFICANT CHANGE UP
POLYCHROMASIA BLD QL SMEAR: SLIGHT — SIGNIFICANT CHANGE UP
POTASSIUM SERPL-MCNC: 3.4 MMOL/L — LOW (ref 3.5–5.3)
POTASSIUM SERPL-SCNC: 3.4 MMOL/L — LOW (ref 3.5–5.3)
PROT SERPL-MCNC: 5.7 G/DL — LOW (ref 6–8.3)
RAPID RVP RESULT: SIGNIFICANT CHANGE UP
RBC # BLD: 2.47 M/UL — LOW (ref 4.2–5.8)
RBC # BLD: 3.1 M/UL — LOW (ref 4.2–5.8)
RBC # FLD: 17.4 % — HIGH (ref 10.3–14.5)
RBC # FLD: 17.5 % — HIGH (ref 10.3–14.5)
RBC BLD AUTO: ABNORMAL
RH IG SCN BLD-IMP: POSITIVE — SIGNIFICANT CHANGE UP
S AUREUS DNA NOSE QL NAA+PROBE: NEGATIVE — SIGNIFICANT CHANGE UP
SARS-COV-2 RNA SPEC QL NAA+PROBE: SIGNIFICANT CHANGE UP
SCHISTOCYTES BLD QL AUTO: SLIGHT — SIGNIFICANT CHANGE UP
SMUDGE CELLS # BLD: PRESENT — SIGNIFICANT CHANGE UP
SODIUM SERPL-SCNC: 127 MMOL/L — LOW (ref 135–145)
SODIUM UR-SCNC: 84 MMOL/L — SIGNIFICANT CHANGE UP
SPECIMEN SOURCE: SIGNIFICANT CHANGE UP
TIBC SERPL-MCNC: 107 UG/DL — LOW (ref 220–430)
UIBC SERPL-MCNC: 88 UG/DL — LOW (ref 110–370)
WBC # BLD: 2.9 K/UL — LOW (ref 3.8–10.5)
WBC # BLD: 3.42 K/UL — LOW (ref 3.8–10.5)
WBC # FLD AUTO: 2.9 K/UL — LOW (ref 3.8–10.5)
WBC # FLD AUTO: 3.42 K/UL — LOW (ref 3.8–10.5)

## 2024-10-22 PROCEDURE — 99233 SBSQ HOSP IP/OBS HIGH 50: CPT | Mod: GC

## 2024-10-22 PROCEDURE — 74177 CT ABD & PELVIS W/CONTRAST: CPT | Mod: 26

## 2024-10-22 PROCEDURE — 71260 CT THORAX DX C+: CPT | Mod: 26

## 2024-10-22 RX ORDER — POTASSIUM CHLORIDE 10 MEQ
40 TABLET, EXTENDED RELEASE ORAL ONCE
Refills: 0 | Status: COMPLETED | OUTPATIENT
Start: 2024-10-22 | End: 2024-10-22

## 2024-10-22 RX ORDER — IOHEXOL 300 MG/ML
30 INJECTION, SOLUTION INTRAVENOUS ONCE
Refills: 0 | Status: DISCONTINUED | OUTPATIENT
Start: 2024-10-22 | End: 2024-10-22

## 2024-10-22 RX ORDER — ACETAMINOPHEN 500 MG
1000 TABLET ORAL ONCE
Refills: 0 | Status: COMPLETED | OUTPATIENT
Start: 2024-10-22 | End: 2024-10-22

## 2024-10-22 RX ORDER — MAGNESIUM SULFATE IN 0.9% NACL 2 G/50 ML
4 INTRAVENOUS SOLUTION, PIGGYBACK (ML) INTRAVENOUS ONCE
Refills: 0 | Status: COMPLETED | OUTPATIENT
Start: 2024-10-22 | End: 2024-10-22

## 2024-10-22 RX ADMIN — Medication 20 MILLIGRAM(S): at 22:47

## 2024-10-22 RX ADMIN — Medication 25 GRAM(S): at 12:22

## 2024-10-22 RX ADMIN — PIPERACILLIN AND TAZOBACTAM 25 GRAM(S): .5; 4 INJECTION, POWDER, LYOPHILIZED, FOR SOLUTION INTRAVENOUS at 07:13

## 2024-10-22 RX ADMIN — VANCOMYCIN HYDROCHLORIDE 250 MILLIGRAM(S): KIT at 06:31

## 2024-10-22 RX ADMIN — Medication 40 MILLIEQUIVALENT(S): at 12:21

## 2024-10-22 RX ADMIN — Medication 2: at 08:55

## 2024-10-22 RX ADMIN — Medication 400 MILLIGRAM(S): at 06:30

## 2024-10-22 RX ADMIN — Medication 40 MILLIGRAM(S): at 09:01

## 2024-10-22 RX ADMIN — Medication 30 MILLILITER(S): at 12:29

## 2024-10-22 RX ADMIN — POTASSIUM PHOSPHATE 62.5 MILLIMOLE(S): 236; 224 INJECTION, SOLUTION INTRAVENOUS at 00:51

## 2024-10-22 RX ADMIN — PIPERACILLIN AND TAZOBACTAM 25 GRAM(S): .5; 4 INJECTION, POWDER, LYOPHILIZED, FOR SOLUTION INTRAVENOUS at 23:52

## 2024-10-22 RX ADMIN — GABAPENTIN 100 MILLIGRAM(S): 300 CAPSULE ORAL at 06:30

## 2024-10-22 RX ADMIN — Medication 1000 MILLIGRAM(S): at 07:00

## 2024-10-22 RX ADMIN — Medication 5 MILLIGRAM(S): at 06:30

## 2024-10-22 RX ADMIN — PIPERACILLIN AND TAZOBACTAM 25 GRAM(S): .5; 4 INJECTION, POWDER, LYOPHILIZED, FOR SOLUTION INTRAVENOUS at 15:06

## 2024-10-22 NOTE — PROGRESS NOTE ADULT - PROBLEM SELECTOR PLAN 11
F: S/p 2L NS in ED   E: Replete as necessary K>4 Mg>2  N: CCB diet   DVT Prophylaxis: Lovenox 40mg qd  GI prophylaxis: None   CODE STATUS: FULL F: S/p 2L NS in ED   E: Replete as necessary K<4 Mg<2  N: Clear liquid diet  DVT Prophylaxis: Lovenox 40mg qd  GI prophylaxis: None   CODE STATUS: FULL

## 2024-10-22 NOTE — PROGRESS NOTE ADULT - ASSESSMENT
69M w/ PMHx of non-operable lung cancer, DM, HTN, HLD recently admitted for symptomatic anemia and chemotherapy-induced nausea and vomiting (10/14-10/15) presenting with hiccups, nausea, vomiting, and fever admitted to [facility] for further management  69M w/ PMHx of non-operable lung cancer, DM, HTN, HLD recently admitted for symptomatic anemia and chemotherapy-induced nausea and vomiting (10/14-10/15) presenting with hiccups, nausea, vomiting, and fever admitted to [facility] for further management

## 2024-10-22 NOTE — PROGRESS NOTE ADULT - SUBJECTIVE AND OBJECTIVE BOX
Progress Note  INCOMPLETE NOTE    INTERVAL EVENTS:   No acute events overnight.    SUBJECTIVE:   Patient seen and examined at bedside. Condition largely unchanged from yesterday. No acute complaints at this time.    ROS:  Negative unless otherwise stated above.    PHYSICAL EXAM:  General: Alert and oriented x 3. No acute distress.   HEENT: Moist mucous membranes. Anicteric. No cervical lymphadenopathy.  Cardiovascular: Regular rate and rhythm. No murmur. Normal JVP.  Lungs: Clear to auscultation bilaterally. No accessory muscle use.  Abdomen: Soft, non-tender and non-distended. No palpable masses.  Extremities: No edema. Non-tender. Warm.  Skin: No rashes or lesions.   Neurologic: No apparent focal neurological deficits. CN II-XII grossly intact, but not individually tested.  Psychiatric: Cooperative. Appropriate mood and affect.    VITAL SIGNS:  Vital Signs Last 24 Hrs  T(C): 38.4 (22 Oct 2024 05:58), Max: 39.3 (21 Oct 2024 11:24)  T(F): 101.2 (22 Oct 2024 05:58), Max: 102.8 (21 Oct 2024 11:24)  HR: 100 (22 Oct 2024 05:58) (91 - 121)  BP: 115/69 (22 Oct 2024 05:58) (100/59 - 146/72)  BP(mean): 81 (22 Oct 2024 03:21) (81 - 81)  RR: 18 (22 Oct 2024 05:58) (18 - 22)  SpO2: 96% (22 Oct 2024 05:58) (92% - 100%)    Parameters below as of 22 Oct 2024 05:58  Patient On (Oxygen Delivery Method): nasal cannula  O2 Flow (L/min): 2    INPATIENT MEDICATIONS:   MEDICATIONS  (STANDING):  amLODIPine   Tablet 5 milliGRAM(s) Oral daily  atorvastatin 20 milliGRAM(s) Oral at bedtime  dextrose 5%. 1000 milliLiter(s) (100 mL/Hr) IV Continuous <Continuous>  dextrose 5%. 1000 milliLiter(s) (50 mL/Hr) IV Continuous <Continuous>  dextrose 50% Injectable 25 Gram(s) IV Push once  dextrose 50% Injectable 12.5 Gram(s) IV Push once  dextrose 50% Injectable 25 Gram(s) IV Push once  enoxaparin Injectable 40 milliGRAM(s) SubCutaneous every 24 hours  gabapentin 100 milliGRAM(s) Oral every 24 hours  glucagon  Injectable 1 milliGRAM(s) IntraMuscular once  insulin lispro (ADMELOG) corrective regimen sliding scale   SubCutaneous Before meals and at bedtime  lactated ringers. 1000 milliLiter(s) (100 mL/Hr) IV Continuous <Continuous>  piperacillin/tazobactam IVPB.. 3.375 Gram(s) IV Intermittent every 8 hours  vancomycin  IVPB 1000 milliGRAM(s) IV Intermittent every 12 hours    MEDICATIONS  (PRN):  aluminum hydroxide/magnesium hydroxide/simethicone Suspension 30 milliLiter(s) Oral every 4 hours PRN Dyspepsia  benzocaine/menthol Lozenge 1 Lozenge Oral every 2 hours PRN Sore Throat  dextrose Oral Gel 15 Gram(s) Oral once PRN Blood Glucose LESS THAN 70 milliGRAM(s)/deciliter  lidocaine 2% Viscous 15 milliLiter(s) Mucosal every 8 hours PRN Sore throat  melatonin 3 milliGRAM(s) Oral at bedtime PRN Insomnia  ondansetron Injectable 4 milliGRAM(s) IV Push every 8 hours PRN Nausea and/or Vomiting  polyethylene glycol 3350 17 Gram(s) Oral every 24 hours PRN for constipation  senna 2 Tablet(s) Oral at bedtime PRN for constipation    ALLERGIES:  Allergies    No Known Allergies    Intolerances    LABS:                       7.9    2.80  )-----------( 176      ( 21 Oct 2024 22:33 )             23.5     10-21    128[L]  |  96  |  12  ----------------------------<  139[H]  3.4[L]   |  22  |  0.79    Ca    7.8[L]      21 Oct 2024 22:33  Phos  3.1     10-21  Mg     2.1     10-21    TPro  6.3  /  Alb  2.5[L]  /  TBili  1.2  /  DBili  x   /  AST  35  /  ALT  39  /  AlkPhos  92  10-21      Urinalysis Basic - ( 21 Oct 2024 22:33 )    Color: x / Appearance: x / SG: x / pH: x  Gluc: 139 mg/dL / Ketone: x  / Bili: x / Urobili: x   Blood: x / Protein: x / Nitrite: x   Leuk Esterase: x / RBC: x / WBC x   Sq Epi: x / Non Sq Epi: x / Bacteria: x      CAPILLARY BLOOD GLUCOSE      POCT Blood Glucose.: 142 mg/dL (21 Oct 2024 21:45)    RADIOLOGY & ADDITIONAL TESTS: Reviewed. Progress Note    INTERVAL EVENTS:   - had 2 fevers overnight, did not Tmax  - last fever at 6 am 101.2F  - received IV Tylenol   - asymptomatic    SUBJECTIVE:   Patient states he is feeling better and would like to go home. States he has been losing weight since he began chemo/radiation in August. Just last week he was 141 lbs, and this week he was 132 lbs. He has not been able to eat for the past 5 days, no improvement in tolerating PO. Decreased appetite x 2 months, lost ability to taste with RT. Receives RT 5x/week and chemo 1x/week. Drinks vanilla ensure at home. He states he is primarily sedentary and has had generalized weakness, preventing him from being able to walk. Has not had BM for the past 5-6 days, attributes this to not being able to eat anything. He denies any cramping, abd pain, n/v/chills/night sweats/dysuria.    ROS:  Negative unless otherwise stated above.    PHYSICAL EXAM:  Constitutional: NAD, intermittently hiccuping   Eyes: PERRL, EOMI, anicteric sclera  Neck: supple; no JVD or thyromegaly, +skin tags  Respiratory: CTA b/l   Cardiac: +S1/S2; RRR; no M/R/G;   Gastrointestinal: abdomen soft, NT/ND; no rebound or guarding;   Back: spine midline, no bony tenderness or step-offs; no CVAT B/L  Extremities: WWP, no clubbing or cyanosis; no peripheral edema  Dermatologic: skin warm, dry and intact; no rashes, wounds, or scars  Lymphatic: no submandibular or cervical LAD  Neurologic: AAOx3; CNII-XII grossly intact; no focal deficits    VITAL SIGNS:  Vital Signs Last 24 Hrs  T(C): 38.4 (22 Oct 2024 05:58), Max: 39.3 (21 Oct 2024 11:24)  T(F): 101.2 (22 Oct 2024 05:58), Max: 102.8 (21 Oct 2024 11:24)  HR: 100 (22 Oct 2024 05:58) (91 - 121)  BP: 115/69 (22 Oct 2024 05:58) (100/59 - 146/72)  BP(mean): 81 (22 Oct 2024 03:21) (81 - 81)  RR: 18 (22 Oct 2024 05:58) (18 - 22)  SpO2: 96% (22 Oct 2024 05:58) (92% - 100%)    Parameters below as of 22 Oct 2024 05:58  Patient On (Oxygen Delivery Method): nasal cannula  O2 Flow (L/min): 2    INPATIENT MEDICATIONS:   MEDICATIONS  (STANDING):  amLODIPine   Tablet 5 milliGRAM(s) Oral daily  atorvastatin 20 milliGRAM(s) Oral at bedtime  dextrose 5%. 1000 milliLiter(s) (100 mL/Hr) IV Continuous <Continuous>  dextrose 5%. 1000 milliLiter(s) (50 mL/Hr) IV Continuous <Continuous>  dextrose 50% Injectable 25 Gram(s) IV Push once  dextrose 50% Injectable 12.5 Gram(s) IV Push once  dextrose 50% Injectable 25 Gram(s) IV Push once  enoxaparin Injectable 40 milliGRAM(s) SubCutaneous every 24 hours  gabapentin 100 milliGRAM(s) Oral every 24 hours  glucagon  Injectable 1 milliGRAM(s) IntraMuscular once  insulin lispro (ADMELOG) corrective regimen sliding scale   SubCutaneous Before meals and at bedtime  lactated ringers. 1000 milliLiter(s) (100 mL/Hr) IV Continuous <Continuous>  piperacillin/tazobactam IVPB.. 3.375 Gram(s) IV Intermittent every 8 hours  vancomycin  IVPB 1000 milliGRAM(s) IV Intermittent every 12 hours    MEDICATIONS  (PRN):  aluminum hydroxide/magnesium hydroxide/simethicone Suspension 30 milliLiter(s) Oral every 4 hours PRN Dyspepsia  benzocaine/menthol Lozenge 1 Lozenge Oral every 2 hours PRN Sore Throat  dextrose Oral Gel 15 Gram(s) Oral once PRN Blood Glucose LESS THAN 70 milliGRAM(s)/deciliter  lidocaine 2% Viscous 15 milliLiter(s) Mucosal every 8 hours PRN Sore throat  melatonin 3 milliGRAM(s) Oral at bedtime PRN Insomnia  ondansetron Injectable 4 milliGRAM(s) IV Push every 8 hours PRN Nausea and/or Vomiting  polyethylene glycol 3350 17 Gram(s) Oral every 24 hours PRN for constipation  senna 2 Tablet(s) Oral at bedtime PRN for constipation    ALLERGIES:  Allergies    No Known Allergies    Intolerances    LABS:                       7.9    2.80  )-----------( 176      ( 21 Oct 2024 22:33 )             23.5     10-21    128[L]  |  96  |  12  ----------------------------<  139[H]  3.4[L]   |  22  |  0.79    Ca    7.8[L]      21 Oct 2024 22:33  Phos  3.1     10-21  Mg     2.1     10-21    TPro  6.3  /  Alb  2.5[L]  /  TBili  1.2  /  DBili  x   /  AST  35  /  ALT  39  /  AlkPhos  92  10-21      Urinalysis Basic - ( 21 Oct 2024 22:33 )    Color: x / Appearance: x / SG: x / pH: x  Gluc: 139 mg/dL / Ketone: x  / Bili: x / Urobili: x   Blood: x / Protein: x / Nitrite: x   Leuk Esterase: x / RBC: x / WBC x   Sq Epi: x / Non Sq Epi: x / Bacteria: x      CAPILLARY BLOOD GLUCOSE      POCT Blood Glucose.: 142 mg/dL (21 Oct 2024 21:45)    RADIOLOGY & ADDITIONAL TESTS: Reviewed.

## 2024-10-22 NOTE — PROGRESS NOTE ADULT - ATTENDING COMMENTS
Pt seen and examined in AM  69M w non-operable lung CA, DM2, HTN, HLD, recent admitted earlier this month for symptomatic anemia and chemo-induced N/V 10/14-15 now w worsening nausea and vomiting w odynophagia and dysphagia of solids>liquids and fever, admitted to due concern for sepsis and on IV Vancomycin and Zosyn  Reports ongoing nausea and regurgitation with solids + liquids. Denies abd pain. +Flatus but no BM. Continuing to have fevers. Denies chest pain, dyspnea, LH/dizziness.     #sepsis d/t Pneumonia  #Esophageal thickening  #Dysphagia    #Lung Cancer  #HTN - on amlodipine 5  #HLD - on atorva 20  #Leukopenia wo neutropenia  #Normocytic anemia - hgb 7 from 7.9    Plan  f/u CT C/A/P w IV contrast - showing diffuse mid-lower esophageal thickening. DDx includes radiation related espohagitis. In setting of ongoing dysphagia and regurgitation with poor PO intake in 4-5d -- would request EGD with GI  Continue IV Zosyn for pneumonia. Stop Vancomycin as MRSA swab negative  Add on iron studies.   Type and screen as hgb 7.    PPx: SQL  DISPO: TBD pending EGD  Above d/w housestaff, Dr. Larose - heme-onc.

## 2024-10-22 NOTE — PROGRESS NOTE ADULT - PROBLEM SELECTOR PLAN 5
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 S/p 7 cycles of neoadjuvant platinum based with gemcitabine and nivolumab chemoimmunotherapy. Follows with Dr. Mueller for RT, s/p last round of RT on 10/15.   - NTD

## 2024-10-22 NOTE — PROGRESS NOTE ADULT - PROBLEM SELECTOR PLAN 7
Na 127 on admission, previously 134. Pt denies HA but admits to N/V, which is chronic for him. Has had poor PO intake iso N/V. Drinks gatorade and ensures. No visual disturbances, palpitations.   Corrected Na 128. Calculated serum osm 268. S/p 2L NS in ED. Na improved to 131 s/p NS 2 L.  - Monitor BMP  - Encourage PO intake Na 127 on admission, previously 134. Pt denies HA but admits to N/V, which is chronic for him. Has had poor PO intake iso N/V. Drinks gatorade and ensures. No visual disturbances, palpitations.   Corrected Na 128. Calculated serum osm 268. S/p 2L NS in ED. Na improved to 131 s/p NS 2 L. Na has dropped to 127 today. No CNS changes.   - CTM

## 2024-10-22 NOTE — PROGRESS NOTE ADULT - PROBLEM SELECTOR PLAN 2
Pt is immunocompromised iso lung cancer and chemo/RT px with 2 days of cough and 1 day of fever. Pt was dc'd on 10/15 and started having intractable hiccups c/b nausea and vomiting. ?Aspiration pna. Cough is dry. Pt denies SOB, CP, abd pain, diarrhea, dysuria, rashes or joint pain. CXR pending read but appears similar to prior w/o infiltrates/consolidations. WBC 2.94 (from 1.95) with increased lymphocyte %. Covid/Flu/RSV negative.  - Collect full RVP  - C/w Vancomycin 1g q12h, check trough before 4th dose  - Zosyn 3.375g q8h  - Check urine strep/legionella, MRSA Pt is immunocompromised iso lung cancer and chemo/RT px with 2 days of cough and 1 day of fever. Pt was dc'd on 10/15 and started having intractable hiccups c/b nausea and vomiting. ?Aspiration pna. Cough is dry. Pt denies SOB, CP, abd pain, diarrhea, dysuria, rashes or joint pain. CXR pending read but appears similar to prior w/o infiltrates/consolidations. WBC 2.94 (from 1.95) with increased lymphocyte %. Covid/Flu/RSV negative.  MRSA negative. Legionella/strep negative.   - vanc trough 5 pm  - d/c vanc  - c/w zosyn 3.375g q8h  - f/u CT chest/a/p

## 2024-10-22 NOTE — PROGRESS NOTE ADULT - PROBLEM SELECTOR PLAN 6
#pancytopenia   Hgb 8.5, previously 8.0 on 10/15. RDW elevated. Leukopenia. Plt 152. Likely 2/2 chemotx. Pt denies bleeding, bruising. Denies hematuria.  - Monitor H&H  - Active T&S  - Transfuse hgb <7 Hgb 8.5, previously 8.0 on 10/15. RDW elevated. Leukopenia. Plt 152. Likely 2/2 chemotx. Pt denies bleeding, bruising. Denies hematuria. Has not had BMs for 5-6 days, unable to assess for melena.  10/22: Hgb 7.0 (decreased from 7.9)    - Monitor H&H  - Active T&S  - Transfuse hgb <7

## 2024-10-22 NOTE — PROGRESS NOTE ADULT - PROBLEM SELECTOR PLAN 8
A1C 7.2 9/2024.  Home meds: Metformin 500mg  - mISS  - CCB diet A1C 7.2 9/2024.  Home meds: Metformin 500mg  - mISS

## 2024-10-22 NOTE — PROGRESS NOTE ADULT - PROBLEM SELECTOR PLAN 1
POA. Meeting 2/4 SIRS criteria (T 102.9, ) without clear source. Pt has been coughing over the last 2 days with fever at home T 101. Pt has been hiccuping c/b vomiting. S/p Vanc 1g, Zosyn 3.375g in ED. BCx collected x2 in ED.  - C/w abx as below   - F/u BCx x2  - Reculture for Tmax >100.4 in 48h    Fever of 102.8F at 11am. Blood cultures collected. Patient HDS, appearing well, AOx3, warm to touch. Unknown source.   - f/u bcx from 10/21 11 am POA. Meeting 2/4 SIRS criteria (T 102.9, ) without clear source. Pt has been coughing over the last 2 days with fever at home T 101. Pt has been hiccuping c/b vomiting. S/p Vanc 1g, Zosyn 3.375g in ED. BCx collected x2 in ED. Patient HDS, appearing well, AOx3, warm to touch. Unknown source.   - C/w abx as below   - F/u BCx x2 - NGTD x 48 hrs  - Reculture for Tmax >100.4 in 48h

## 2024-10-22 NOTE — PROGRESS NOTE ADULT - PROBLEM SELECTOR PLAN 3
Pt has had hiccups since his dc last week. Rad-onc started pt on Zofran which did not help. S/p Reglan in ED w/o improvement. Hiccups have caused the pt N/V.  S/P gabapentin 300 mg x1 10/21 AM    - gabapentin 100 mg TID Pt has had hiccups since his dc last week. Rad-onc started pt on Zofran which did not help. S/p Reglan in ED w/o improvement. Hiccups have caused the pt N/V.  S/P gabapentin 300 mg x1 10/21 AM  Patient states his hiccups have not happened in the last 24 hours.     - gabapentin 100 mg TID

## 2024-10-22 NOTE — PROGRESS NOTE ADULT - PROBLEM SELECTOR PLAN 4
Pt c/p pain with swallowing since his RT on 10/15, with poor PO intake. Likely 2/2 vomiting vs RT.  As per bedside swallow assessment: no cough, throat clear or change in voice when taking PO trials. Pt is denying difficulty or pain with swallowing. Reports discomfort when he has hiccups.  GI consult placed for evaluation of new-onset dysphagia/odynophagia. No hx of EGD. As per GI, n/v/dysphagia likely 2/2 RT. Recommends:  - CT neck to evaluate any localized cause in the neck (i.e. abscess) iso fever with unknown source  - CT A/P   - Lozenges or lidocaine jelly for symptoms Pt c/p pain with swallowing since his RT on 10/15, with poor PO intake. Likely 2/2 vomiting vs RT.  As per bedside swallow assessment: no cough, throat clear or change in voice when taking PO trials. Pt is denying difficulty or pain with swallowing. Reports discomfort when he has hiccups.    CT C/A/P: Bilateral upper lobe pneumonia versus aspiration. Superimposed upper lobe pulmonary congestion/edema. Small pleural effusions bilaterally.  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 No relevant prior imaging available for to assess chronicity.   Mid-distal esophagitis, possibly treatment-related or gastroesophageal reflux associated with hiatal hernia.    - f/u GI recs -- possible EGD?  - lozenges PRN

## 2024-10-23 ENCOUNTER — TRANSCRIPTION ENCOUNTER (OUTPATIENT)
Age: 69
End: 2024-10-23

## 2024-10-23 DIAGNOSIS — K21.9 GASTRO-ESOPHAGEAL REFLUX DISEASE WITHOUT ESOPHAGITIS: ICD-10-CM

## 2024-10-23 DIAGNOSIS — R11.2 NAUSEA WITH VOMITING, UNSPECIFIED: ICD-10-CM

## 2024-10-23 DIAGNOSIS — E87.1 HYPO-OSMOLALITY AND HYPONATREMIA: ICD-10-CM

## 2024-10-23 DIAGNOSIS — E86.0 DEHYDRATION: ICD-10-CM

## 2024-10-23 DIAGNOSIS — E78.00 PURE HYPERCHOLESTEROLEMIA, UNSPECIFIED: ICD-10-CM

## 2024-10-23 DIAGNOSIS — D61.810 ANTINEOPLASTIC CHEMOTHERAPY INDUCED PANCYTOPENIA: ICD-10-CM

## 2024-10-23 DIAGNOSIS — T45.1X5A ADVERSE EFFECT OF ANTINEOPLASTIC AND IMMUNOSUPPRESSIVE DRUGS, INITIAL ENCOUNTER: ICD-10-CM

## 2024-10-23 DIAGNOSIS — D64.81 ANEMIA DUE TO ANTINEOPLASTIC CHEMOTHERAPY: ICD-10-CM

## 2024-10-23 DIAGNOSIS — R06.6 HICCOUGH: ICD-10-CM

## 2024-10-23 DIAGNOSIS — C34.90 MALIGNANT NEOPLASM OF UNSPECIFIED PART OF UNSPECIFIED BRONCHUS OR LUNG: ICD-10-CM

## 2024-10-23 DIAGNOSIS — I10 ESSENTIAL (PRIMARY) HYPERTENSION: ICD-10-CM

## 2024-10-23 DIAGNOSIS — Z87.891 PERSONAL HISTORY OF NICOTINE DEPENDENCE: ICD-10-CM

## 2024-10-23 DIAGNOSIS — E11.9 TYPE 2 DIABETES MELLITUS WITHOUT COMPLICATIONS: ICD-10-CM

## 2024-10-23 DIAGNOSIS — R79.89 OTHER SPECIFIED ABNORMAL FINDINGS OF BLOOD CHEMISTRY: ICD-10-CM

## 2024-10-23 DIAGNOSIS — Z79.84 LONG TERM (CURRENT) USE OF ORAL HYPOGLYCEMIC DRUGS: ICD-10-CM

## 2024-10-23 DIAGNOSIS — I95.9 HYPOTENSION, UNSPECIFIED: ICD-10-CM

## 2024-10-23 LAB
ANION GAP SERPL CALC-SCNC: 11 MMOL/L — SIGNIFICANT CHANGE UP (ref 5–17)
ANISOCYTOSIS BLD QL: SLIGHT — SIGNIFICANT CHANGE UP
BASOPHILS # BLD AUTO: 0 K/UL — SIGNIFICANT CHANGE UP (ref 0–0.2)
BASOPHILS NFR BLD AUTO: 0 % — SIGNIFICANT CHANGE UP (ref 0–2)
BUN SERPL-MCNC: 15 MG/DL — SIGNIFICANT CHANGE UP (ref 7–23)
BURR CELLS BLD QL SMEAR: PRESENT — SIGNIFICANT CHANGE UP
CALCIUM SERPL-MCNC: 7.7 MG/DL — LOW (ref 8.4–10.5)
CHLORIDE SERPL-SCNC: 96 MMOL/L — SIGNIFICANT CHANGE UP (ref 96–108)
CO2 SERPL-SCNC: 22 MMOL/L — SIGNIFICANT CHANGE UP (ref 22–31)
CREAT SERPL-MCNC: 0.89 MG/DL — SIGNIFICANT CHANGE UP (ref 0.5–1.3)
DACRYOCYTES BLD QL SMEAR: SLIGHT — SIGNIFICANT CHANGE UP
EGFR: 92 ML/MIN/1.73M2 — SIGNIFICANT CHANGE UP
EOSINOPHIL # BLD AUTO: 0.05 K/UL — SIGNIFICANT CHANGE UP (ref 0–0.5)
EOSINOPHIL NFR BLD AUTO: 1.7 % — SIGNIFICANT CHANGE UP (ref 0–6)
GLUCOSE BLDC GLUCOMTR-MCNC: 107 MG/DL — HIGH (ref 70–99)
GLUCOSE BLDC GLUCOMTR-MCNC: 108 MG/DL — HIGH (ref 70–99)
GLUCOSE BLDC GLUCOMTR-MCNC: 152 MG/DL — HIGH (ref 70–99)
GLUCOSE BLDC GLUCOMTR-MCNC: 96 MG/DL — SIGNIFICANT CHANGE UP (ref 70–99)
GLUCOSE SERPL-MCNC: 93 MG/DL — SIGNIFICANT CHANGE UP (ref 70–99)
HCT VFR BLD CALC: 21.6 % — LOW (ref 39–50)
HGB BLD-MCNC: 7.5 G/DL — LOW (ref 13–17)
HYPOCHROMIA BLD QL: SLIGHT — SIGNIFICANT CHANGE UP
LYMPHOCYTES # BLD AUTO: 0.14 K/UL — LOW (ref 1–3.3)
LYMPHOCYTES # BLD AUTO: 4.3 % — LOW (ref 13–44)
MAGNESIUM SERPL-MCNC: 1.9 MG/DL — SIGNIFICANT CHANGE UP (ref 1.6–2.6)
MANUAL SMEAR VERIFICATION: SIGNIFICANT CHANGE UP
MCHC RBC-ENTMCNC: 30.1 PG — SIGNIFICANT CHANGE UP (ref 27–34)
MCHC RBC-ENTMCNC: 34.7 GM/DL — SIGNIFICANT CHANGE UP (ref 32–36)
MCV RBC AUTO: 86.7 FL — SIGNIFICANT CHANGE UP (ref 80–100)
MICROCYTES BLD QL: SLIGHT — SIGNIFICANT CHANGE UP
MONOCYTES # BLD AUTO: 0.22 K/UL — SIGNIFICANT CHANGE UP (ref 0–0.9)
MONOCYTES NFR BLD AUTO: 7 % — SIGNIFICANT CHANGE UP (ref 2–14)
NEUTROPHILS # BLD AUTO: 2.74 K/UL — SIGNIFICANT CHANGE UP (ref 1.8–7.4)
NEUTROPHILS NFR BLD AUTO: 87 % — HIGH (ref 43–77)
OVALOCYTES BLD QL SMEAR: SIGNIFICANT CHANGE UP
PHOSPHATE SERPL-MCNC: 3.4 MG/DL — SIGNIFICANT CHANGE UP (ref 2.5–4.5)
PLAT MORPH BLD: ABNORMAL
PLATELET # BLD AUTO: 159 K/UL — SIGNIFICANT CHANGE UP (ref 150–400)
POIKILOCYTOSIS BLD QL AUTO: SIGNIFICANT CHANGE UP
POLYCHROMASIA BLD QL SMEAR: SLIGHT — SIGNIFICANT CHANGE UP
POTASSIUM SERPL-MCNC: 3.6 MMOL/L — SIGNIFICANT CHANGE UP (ref 3.5–5.3)
POTASSIUM SERPL-SCNC: 3.6 MMOL/L — SIGNIFICANT CHANGE UP (ref 3.5–5.3)
RBC # BLD: 2.49 M/UL — LOW (ref 4.2–5.8)
RBC # FLD: 17.3 % — HIGH (ref 10.3–14.5)
RBC BLD AUTO: ABNORMAL
SODIUM SERPL-SCNC: 129 MMOL/L — LOW (ref 135–145)
SPHEROCYTES BLD QL SMEAR: SLIGHT — SIGNIFICANT CHANGE UP
WBC # BLD: 3.15 K/UL — LOW (ref 3.8–10.5)
WBC # FLD AUTO: 3.15 K/UL — LOW (ref 3.8–10.5)

## 2024-10-23 PROCEDURE — 88305 TISSUE EXAM BY PATHOLOGIST: CPT | Mod: 26

## 2024-10-23 PROCEDURE — 99233 SBSQ HOSP IP/OBS HIGH 50: CPT | Mod: GC

## 2024-10-23 RX ORDER — GABAPENTIN 300 MG/1
200 CAPSULE ORAL ONCE
Refills: 0 | Status: COMPLETED | OUTPATIENT
Start: 2024-10-23 | End: 2024-10-23

## 2024-10-23 RX ORDER — PIPERACILLIN AND TAZOBACTAM .5; 4 G/20ML; G/20ML
4.5 INJECTION, POWDER, LYOPHILIZED, FOR SOLUTION INTRAVENOUS EVERY 8 HOURS
Refills: 0 | Status: DISCONTINUED | OUTPATIENT
Start: 2024-10-23 | End: 2024-10-28

## 2024-10-23 RX ORDER — ACETAMINOPHEN 500 MG
1000 TABLET ORAL ONCE
Refills: 0 | Status: COMPLETED | OUTPATIENT
Start: 2024-10-23 | End: 2024-10-23

## 2024-10-23 RX ORDER — SUCRALFATE 1 G/10ML
1 SUSPENSION ORAL EVERY 6 HOURS
Refills: 0 | Status: DISCONTINUED | OUTPATIENT
Start: 2024-10-23 | End: 2024-11-04

## 2024-10-23 RX ORDER — ENOXAPARIN SODIUM 40MG/0.4ML
40 SYRINGE (ML) SUBCUTANEOUS EVERY 24 HOURS
Refills: 0 | Status: DISCONTINUED | OUTPATIENT
Start: 2024-10-24 | End: 2024-11-04

## 2024-10-23 RX ORDER — GABAPENTIN 300 MG/1
300 CAPSULE ORAL DAILY
Refills: 0 | Status: DISCONTINUED | OUTPATIENT
Start: 2024-10-24 | End: 2024-10-25

## 2024-10-23 RX ORDER — PANTOPRAZOLE SODIUM 40 MG/1
40 TABLET, DELAYED RELEASE ORAL
Refills: 0 | Status: DISCONTINUED | OUTPATIENT
Start: 2024-10-23 | End: 2024-11-04

## 2024-10-23 RX ADMIN — Medication 2: at 22:30

## 2024-10-23 RX ADMIN — GABAPENTIN 200 MILLIGRAM(S): 300 CAPSULE ORAL at 11:13

## 2024-10-23 RX ADMIN — Medication 5 MILLIGRAM(S): at 06:40

## 2024-10-23 RX ADMIN — SUCRALFATE 1 GRAM(S): 1 SUSPENSION ORAL at 17:38

## 2024-10-23 RX ADMIN — PIPERACILLIN AND TAZOBACTAM 25 GRAM(S): .5; 4 INJECTION, POWDER, LYOPHILIZED, FOR SOLUTION INTRAVENOUS at 23:22

## 2024-10-23 RX ADMIN — PIPERACILLIN AND TAZOBACTAM 25 GRAM(S): .5; 4 INJECTION, POWDER, LYOPHILIZED, FOR SOLUTION INTRAVENOUS at 17:00

## 2024-10-23 RX ADMIN — Medication 1000 MILLIGRAM(S): at 01:45

## 2024-10-23 RX ADMIN — GABAPENTIN 100 MILLIGRAM(S): 300 CAPSULE ORAL at 06:40

## 2024-10-23 RX ADMIN — Medication 400 MILLIGRAM(S): at 17:47

## 2024-10-23 RX ADMIN — Medication 400 MILLIGRAM(S): at 01:15

## 2024-10-23 RX ADMIN — PIPERACILLIN AND TAZOBACTAM 25 GRAM(S): .5; 4 INJECTION, POWDER, LYOPHILIZED, FOR SOLUTION INTRAVENOUS at 06:39

## 2024-10-23 RX ADMIN — SUCRALFATE 1 GRAM(S): 1 SUSPENSION ORAL at 23:29

## 2024-10-23 RX ADMIN — Medication 20 MILLIGRAM(S): at 23:29

## 2024-10-23 RX ADMIN — ONDANSETRON HYDROCHLORIDE 4 MILLIGRAM(S): 2 INJECTION, SOLUTION INTRAMUSCULAR; INTRAVENOUS at 23:21

## 2024-10-23 NOTE — PHYSICAL THERAPY INITIAL EVALUATION ADULT - ADDITIONAL COMMENTS
Pt. reports 6 steps to enter; previously independent w/o assistive device. Lives with spouse. Will be staying with spouse and adult son upon d/c.

## 2024-10-23 NOTE — CHART NOTE - NSCHARTNOTEFT_GEN_A_CORE
EGD done 10/23/24:    Findings:  Esophagus	Lumen	A medium size hiatal hernia was seen. Retroflexion view in the stomach confirmed the size and morphology of the hernia. From 20-30 cm from incisor  Mucosa	Segmental continuous erosive, congestion and abnormal vascularity of the mucosa with no bleeding was noted in the middle third of the esophagus. Multiple cold forceps biopsies were performed for histology.  Stomach	Mucosa	Diffuse erythema of the mucosa was noted in the whole stomach. These findings are compatible with non-erosive gastritis. Multiple cold forceps biopsies were performed for histology.  Duodenum	Mucosa	Normal mucosa was noted in the whole examined duodenum.    Findings c/w radiation esophagitis     Plan:  - Pantoprazole 40 mg daily  - Sucralfate suspension 1 g q6h  - F/u pathology    Jan Hare MD  PGY-4 GI Fellow  GI consult pager (M-F [id number]): [phone]  Please call  for on-call fellow after hours

## 2024-10-23 NOTE — PROGRESS NOTE ADULT - PROBLEM SELECTOR PLAN 2
Pt is immunocompromised iso lung cancer and chemo/RT px with 2 days of cough and 1 day of fever. Pt was dc'd on 10/15 and started having intractable hiccups c/b nausea and vomiting. ?Aspiration pna. Cough is dry. Pt denies SOB, CP, abd pain, diarrhea, dysuria, rashes or joint pain. CXR pending read but appears similar to prior w/o infiltrates/consolidations. WBC 2.94 (from 1.95) with increased lymphocyte %. Covid/Flu/RSV negative.  MRSA negative. Legionella/strep negative.   - vanc trough 5 pm  - d/c vanc  - c/w zosyn 3.375g q8h  - f/u CT chest/a/p Pt is immunocompromised iso lung cancer and chemo/RT px with 2 days of cough and 1 day of fever. Pt was dc'd on 10/15 and started having intractable hiccups c/b nausea and vomiting. ?Aspiration pna. Cough is dry. Pt denies SOB, CP, abd pain, diarrhea, dysuria, rashes or joint pain. CXR pending read but appears similar to prior w/o infiltrates/consolidations. WBC 2.94 (from 1.95) with increased lymphocyte %. Covid/Flu/RSV negative.  MRSA negative. Legionella/strep negative.   CT C/A/P: Bilateral upper lobe pneumonia versus aspiration. Superimposed upper lobe pulmonary congestion/edema. Small pleural effusions bilaterally. 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 No relevant prior imaging available for to assess chronicity. Mid-distal esophagitis, possibly treatment-related or gastroesophageal reflux associated with hiatal hernia.  - c/w zosyn 4.5g q8h  - ofirmev as needed for fevers, max 4g Pt is immunocompromised iso lung cancer and chemo/RT px with 2 days of cough and 1 day of fever. Pt was dc'd on 10/15 and started having intractable hiccups c/b nausea and vomiting. ?Aspiration pna. Cough is dry. Pt denies SOB, CP, abd pain, diarrhea, dysuria, rashes or joint pain. CXR pending read but appears similar to prior w/o infiltrates/consolidations. WBC 2.94 (from 1.95) with increased lymphocyte %. Covid/Flu/RSV negative.  MRSA negative. Legionella/strep negative.   CT C/A/P: Bilateral upper lobe pneumonia versus aspiration. Superimposed upper lobe pulmonary congestion/edema. Small pleural effusions bilaterally. 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 No relevant prior imaging available for to assess chronicity. Mid-distal esophagitis, possibly treatment-related or gastroesophageal reflux associated with hiatal hernia.  - c/w zosyn 4.5g q8h x 7 days  -- discharge with augmentin if patient leaves before 7-day course finishes  - ofirmev as needed for fevers, max 4g in 24 hours (can give toradol if maxxed out)

## 2024-10-23 NOTE — PROGRESS NOTE ADULT - ASSESSMENT
69M w/ PMHx of non-operable lung cancer, DM, HTN, HLD recently admitted for symptomatic anemia and chemotherapy-induced nausea and vomiting (10/14-10/15) presenting with hiccups, nausea, vomiting, and fever admitted to [facility] for further management  69M w/ PMHx of non-operable lung cancer, DM, HTN, HLD recently admitted for symptomatic anemia and chemotherapy-induced nausea and vomiting (10/14-10/15) presenting with hiccups, nausea, vomiting, and fever admitted to [facility] for further management, s/p EGD.

## 2024-10-23 NOTE — PROGRESS NOTE ADULT - PROBLEM SELECTOR PLAN 3
Pt has had hiccups since his dc last week. Rad-onc started pt on Zofran which did not help. S/p Reglan in ED w/o improvement. Hiccups have caused the pt N/V.  S/P gabapentin 300 mg x1 10/21 AM  Patient states his hiccups have not happened in the last 24 hours.     - gabapentin 100 mg TID Pt has had hiccups since his dc last week. Rad-onc started pt on Zofran which did not help. S/p Reglan in ED w/o improvement. Hiccups have caused the pt N/V.  S/P gabapentin 300 mg x1 10/21 AM  Presenting with increased hiccupping this morning.     - increase dose of gabapentin 100 mg to 300 mg daily Pt has had hiccups since his dc last week. Rad-onc started pt on Zofran which did not help. S/p Reglan in ED w/o improvement. Hiccups have caused the pt N/V.  S/P gabapentin 300 mg x1 10/21 AM  Presenting with increased hiccupping this morning.     - increase dose of gabapentin 100 mg to 300 mg daily  ----If hiccups do not improve - transition from gabapentin to baclofen 5mg q8h scheduled for hiccups

## 2024-10-23 NOTE — PHYSICAL THERAPY INITIAL EVALUATION ADULT - PERTINENT HX OF CURRENT PROBLEM, REHAB EVAL
Head, normocephalic, atraumatic, Face, Face within normal limits, Ears, External ears within normal limits, Nose/Nasopharynx, External nose  normal appearance, nares patent, no nasal discharge, Mouth and Throat, Oral cavity appearance normal, Breath odor normal, Lips, Appearance normal
Pt. is a 69 y.o male with h/o lung CA, on chemo presenting with symptomatic anemia and nausea/vomiting i/s/o chemo. Admitted for further w/u and management.

## 2024-10-23 NOTE — PROGRESS NOTE ADULT - PROBLEM SELECTOR PLAN 12
F: S/p 2L NS in ED   E: Replete as necessary K<4 Mg<2  N: DASH/CC  DVT Prophylaxis: Lovenox 40mg qd  GI prophylaxis: None   CODE STATUS: FULL F: 80cc LR bolus x 12h (10/23)  E: Replete as necessary K<4 Mg<2  N: DASH/CC  DVT Prophylaxis: Lovenox 40mg qd  GI prophylaxis: None   CODE STATUS: FULL

## 2024-10-23 NOTE — PROGRESS NOTE ADULT - PROBLEM SELECTOR PLAN 5
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 S/p 7 cycles of neoadjuvant platinum based with gemcitabine and nivolumab chemoimmunotherapy. Follows with Dr. Mueller for RT, s/p last round of RT on 10/15.   - NTD Ferritin > 8000. In 09/2024 ferritin levels ~2000  Likely 2/2 to cancer + infection     - NTD

## 2024-10-23 NOTE — PROGRESS NOTE ADULT - PROBLEM SELECTOR PLAN 7
Na 127 on admission, previously 134. Pt denies HA but admits to N/V, which is chronic for him. Has had poor PO intake iso N/V. Drinks gatorade and ensures. No visual disturbances, palpitations.   Corrected Na 128. Calculated serum osm 268. S/p 2L NS in ED. Na improved to 131 s/p NS 2 L. Na has dropped to 127 today. No CNS changes.   - CTM Na 127 on admission, previously 134. Pt denies HA but admits to N/V, which is chronic for him. Has had poor PO intake iso N/V. Drinks gatorade and ensures. No visual disturbances, palpitations.   Corrected Na 128. Calculated serum osm 268. S/p 2L NS in ED. Na improved to 131 s/p NS 2 L. Na has dropped to 129 today. No CNS changes.   - CTM

## 2024-10-23 NOTE — PROGRESS NOTE ADULT - PROBLEM SELECTOR PLAN 4
Pt c/p pain with swallowing since his RT on 10/15, with poor PO intake. Likely 2/2 vomiting vs RT.  As per bedside swallow assessment: no cough, throat clear or change in voice when taking PO trials. Pt is denying difficulty or pain with swallowing. Reports discomfort when he has hiccups.    CT C/A/P: Bilateral upper lobe pneumonia versus aspiration. Superimposed upper lobe pulmonary congestion/edema. Small pleural effusions bilaterally.  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 No relevant prior imaging available for to assess chronicity.   Mid-distal esophagitis, possibly treatment-related or gastroesophageal reflux associated with hiatal hernia.    - f/u GI recs -- possible EGD?  - lozenges PRN Pt c/p pain with swallowing since his RT on 10/15, with poor PO intake. Likely 2/2 vomiting vs RT.  As per bedside swallow assessment: no cough, throat clear or change in voice when taking PO trials. Pt is denying difficulty or pain with swallowing. Reports discomfort when he has hiccups.  s/p EGD 10/23: Erosive, congestion and abnormal vascularity in the middle third of the esophagus. (Biopsy). Esophageal hiatal hernia. Erythema in the whole stomach compatible with non-erosive gastritis. (Biopsy). Normal mucosa in the whole examined duodenum.      - f/u GI recs  - lozenges PRN Pt c/p pain with swallowing since his RT on 10/15, with poor PO intake. Likely 2/2 vomiting vs RT.  As per bedside swallow assessment: no cough, throat clear or change in voice when taking PO trials. Pt is denying difficulty or pain with swallowing. Reports discomfort when he has hiccups.  s/p EGD 10/23: Erosive, congestion and abnormal vascularity in the middle third of the esophagus. (Biopsy). Esophageal hiatal hernia. Erythema in the whole stomach compatible with non-erosive gastritis. (Biopsy). Normal mucosa in the whole examined duodenum.    - f/u GI recs  - lozenges PRN  - start sucralfate 1g q8h for radiation esophagitis

## 2024-10-23 NOTE — PHYSICAL THERAPY INITIAL EVALUATION ADULT - ASSISTIVE DEVICE FOR TRANSFER: STAND/SIT, REHAB EVAL
rolling walker I have personally evaluated and examined the patient. The Attending was available to me as a supervising provider if needed.

## 2024-10-23 NOTE — PROGRESS NOTE ADULT - PROBLEM SELECTOR PLAN 1
POA. Meeting 2/4 SIRS criteria (T 102.9, ) without clear source. Pt has been coughing over the last 2 days with fever at home T 101. Pt has been hiccuping c/b vomiting. S/p Vanc 1g, Zosyn 3.375g in ED. BCx collected x2 in ED. Patient HDS, appearing well, AOx3, warm to touch. Unknown source.   - C/w abx as below   - F/u BCx x2 - NGTD x 48 hrs  - Reculture for Tmax >100.4 in 48h POA. Meeting 2/4 SIRS criteria (T 102.9, ) without clear source. Pt has been coughing over the last 2 days with fever at home T 101. Pt has been hiccuping c/b vomiting. S/p Vanc 1g, Zosyn 3.375g in ED. BCx collected x2 in ED. Patient HDS, appearing well, AOx3, warm to touch. Unknown source.   - C/w abx as below   - F/u BCx x2 - NGTD x 48 hrs  - Reculture for Tmax >100.4 in 48h  - 80 cc/hr LR x 12 hrs

## 2024-10-23 NOTE — PROGRESS NOTE ADULT - PROBLEM SELECTOR PLAN 6
Hgb 8.5, previously 8.0 on 10/15. RDW elevated. Leukopenia. Plt 152. Likely 2/2 chemotx. Pt denies bleeding, bruising. Denies hematuria. Has not had BMs for 5-6 days, unable to assess for melena.  10/22: Hgb 7.0 (decreased from 7.9)    - Monitor H&H  - Active T&S  - Transfuse hgb <7 Hgb 8.5, previously 8.0 on 10/15. RDW elevated. Leukopenia. Plt 152. Likely 2/2 chemotx. Pt denies bleeding, bruising. Denies hematuria. Has not had BMs for 5-6 days, unable to assess for melena.  10/23: Hgb 7.5    - Monitor H&H  - Active T&S  - Transfuse hgb <7

## 2024-10-23 NOTE — PROGRESS NOTE ADULT - SUBJECTIVE AND OBJECTIVE BOX
Progress Note  INCOMPLETE NOTE    INTERVAL EVENTS:   No acute events overnight.    SUBJECTIVE:   Patient seen and examined at bedside. Condition largely unchanged from yesterday. No acute complaints at this time.    ROS:  Negative unless otherwise stated above.    PHYSICAL EXAM:  General: Alert and oriented x 3. No acute distress.   HEENT: Moist mucous membranes. Anicteric. No cervical lymphadenopathy.  Cardiovascular: Regular rate and rhythm. No murmur. Normal JVP.  Lungs: Clear to auscultation bilaterally. No accessory muscle use.  Abdomen: Soft, non-tender and non-distended. No palpable masses.  Extremities: No edema. Non-tender. Warm.  Skin: No rashes or lesions.   Neurologic: No apparent focal neurological deficits. CN II-XII grossly intact, but not individually tested.  Psychiatric: Cooperative. Appropriate mood and affect.    VITAL SIGNS:  Vital Signs Last 24 Hrs  T(C): 37 (23 Oct 2024 05:56), Max: 39.3 (23 Oct 2024 00:25)  T(F): 98.6 (23 Oct 2024 05:56), Max: 102.7 (23 Oct 2024 00:25)  HR: 98 (23 Oct 2024 05:56) (98 - 125)  BP: 106/67 (23 Oct 2024 05:56) (102/62 - 112/57)  BP(mean): 80 (23 Oct 2024 05:56) (80 - 80)  RR: 17 (23 Oct 2024 05:56) (17 - 20)  SpO2: 91% (23 Oct 2024 05:56) (91% - 95%)    Parameters below as of 23 Oct 2024 05:56  Patient On (Oxygen Delivery Method): nasal cannula  O2 Flow (L/min): 2    INPATIENT MEDICATIONS:   MEDICATIONS  (STANDING):  amLODIPine   Tablet 5 milliGRAM(s) Oral daily  atorvastatin 20 milliGRAM(s) Oral at bedtime  dextrose 5%. 1000 milliLiter(s) (100 mL/Hr) IV Continuous <Continuous>  dextrose 5%. 1000 milliLiter(s) (50 mL/Hr) IV Continuous <Continuous>  dextrose 50% Injectable 25 Gram(s) IV Push once  dextrose 50% Injectable 12.5 Gram(s) IV Push once  dextrose 50% Injectable 25 Gram(s) IV Push once  gabapentin 200 milliGRAM(s) Oral once  glucagon  Injectable 1 milliGRAM(s) IntraMuscular once  insulin lispro (ADMELOG) corrective regimen sliding scale   SubCutaneous Before meals and at bedtime  piperacillin/tazobactam IVPB.. 4.5 Gram(s) IV Intermittent every 8 hours    MEDICATIONS  (PRN):  aluminum hydroxide/magnesium hydroxide/simethicone Suspension 30 milliLiter(s) Oral every 4 hours PRN Dyspepsia  benzocaine/menthol Lozenge 1 Lozenge Oral every 2 hours PRN Sore Throat  dextrose Oral Gel 15 Gram(s) Oral once PRN Blood Glucose LESS THAN 70 milliGRAM(s)/deciliter  lidocaine 2% Viscous 15 milliLiter(s) Mucosal every 8 hours PRN Sore throat  melatonin 3 milliGRAM(s) Oral at bedtime PRN Insomnia  ondansetron Injectable 4 milliGRAM(s) IV Push every 8 hours PRN Nausea and/or Vomiting  polyethylene glycol 3350 17 Gram(s) Oral every 24 hours PRN for constipation  senna 2 Tablet(s) Oral at bedtime PRN for constipation    ALLERGIES:  Allergies    No Known Allergies    Intolerances    LABS:                       7.5    3.15  )-----------( 159      ( 23 Oct 2024 03:44 )             21.6     10-23    129[L]  |  96  |  15  ----------------------------<  93  3.6   |  22  |  0.89    Ca    7.7[L]      23 Oct 2024 03:44  Phos  3.4     10-23  Mg     1.9     10-23    TPro  5.7[L]  /  Alb  2.1[L]  /  TBili  1.1  /  DBili  x   /  AST  28  /  ALT  35  /  AlkPhos  84  10-22      Urinalysis Basic - ( 23 Oct 2024 03:44 )    Color: x / Appearance: x / SG: x / pH: x  Gluc: 93 mg/dL / Ketone: x  / Bili: x / Urobili: x   Blood: x / Protein: x / Nitrite: x   Leuk Esterase: x / RBC: x / WBC x   Sq Epi: x / Non Sq Epi: x / Bacteria: x      CAPILLARY BLOOD GLUCOSE      POCT Blood Glucose.: 96 mg/dL (23 Oct 2024 08:07)    RADIOLOGY & ADDITIONAL TESTS: Reviewed. Progress Note    INTERVAL EVENTS:  Fever 102.7F - collected bcx, gave patient ofirmev 1g x1.     SUBJECTIVE:   Patient states he is feeling well. He states he is able to hold down water now, which he could not do yesterday. Unable to tolerate any other liquids in his diet. Patient has not had any bowel movement x 6 days. He is found to be hiccupping. States the hiccups have been the same despite gabapentin. Denies dysuria, chills, night sweats, abdominal cramping, nausea.     ROS:  Negative unless otherwise stated above.    PHYSICAL EXAM:  General: Alert and oriented x 3. No acute distress.   HEENT: Moist mucous membranes. Anicteric. No cervical lymphadenopathy.  Cardiovascular: Regular rate and rhythm. No murmur. Normal JVP.  Lungs: Clear to auscultation bilaterally. No accessory muscle use.  Abdomen: Soft, non-tender and non-distended. No palpable masses.  Extremities: No edema. Non-tender. Warm.  Skin: No rashes or lesions.   Neurologic: No apparent focal neurological deficits. CN II-XII grossly intact, but not individually tested.  Psychiatric: Cooperative. Appropriate mood and affect.    VITAL SIGNS:  Vital Signs Last 24 Hrs  T(C): 37 (23 Oct 2024 05:56), Max: 39.3 (23 Oct 2024 00:25)  T(F): 98.6 (23 Oct 2024 05:56), Max: 102.7 (23 Oct 2024 00:25)  HR: 98 (23 Oct 2024 05:56) (98 - 125)  BP: 106/67 (23 Oct 2024 05:56) (102/62 - 112/57)  BP(mean): 80 (23 Oct 2024 05:56) (80 - 80)  RR: 17 (23 Oct 2024 05:56) (17 - 20)  SpO2: 91% (23 Oct 2024 05:56) (91% - 95%)    Parameters below as of 23 Oct 2024 05:56  Patient On (Oxygen Delivery Method): nasal cannula  O2 Flow (L/min): 2    INPATIENT MEDICATIONS:   MEDICATIONS  (STANDING):  amLODIPine   Tablet 5 milliGRAM(s) Oral daily  atorvastatin 20 milliGRAM(s) Oral at bedtime  dextrose 5%. 1000 milliLiter(s) (100 mL/Hr) IV Continuous <Continuous>  dextrose 5%. 1000 milliLiter(s) (50 mL/Hr) IV Continuous <Continuous>  dextrose 50% Injectable 25 Gram(s) IV Push once  dextrose 50% Injectable 12.5 Gram(s) IV Push once  dextrose 50% Injectable 25 Gram(s) IV Push once  gabapentin 200 milliGRAM(s) Oral once  glucagon  Injectable 1 milliGRAM(s) IntraMuscular once  insulin lispro (ADMELOG) corrective regimen sliding scale   SubCutaneous Before meals and at bedtime  piperacillin/tazobactam IVPB.. 4.5 Gram(s) IV Intermittent every 8 hours    MEDICATIONS  (PRN):  aluminum hydroxide/magnesium hydroxide/simethicone Suspension 30 milliLiter(s) Oral every 4 hours PRN Dyspepsia  benzocaine/menthol Lozenge 1 Lozenge Oral every 2 hours PRN Sore Throat  dextrose Oral Gel 15 Gram(s) Oral once PRN Blood Glucose LESS THAN 70 milliGRAM(s)/deciliter  lidocaine 2% Viscous 15 milliLiter(s) Mucosal every 8 hours PRN Sore throat  melatonin 3 milliGRAM(s) Oral at bedtime PRN Insomnia  ondansetron Injectable 4 milliGRAM(s) IV Push every 8 hours PRN Nausea and/or Vomiting  polyethylene glycol 3350 17 Gram(s) Oral every 24 hours PRN for constipation  senna 2 Tablet(s) Oral at bedtime PRN for constipation    ALLERGIES:  Allergies    No Known Allergies    Intolerances    LABS:                       7.5    3.15  )-----------( 159      ( 23 Oct 2024 03:44 )             21.6     10-23    129[L]  |  96  |  15  ----------------------------<  93  3.6   |  22  |  0.89    Ca    7.7[L]      23 Oct 2024 03:44  Phos  3.4     10-23  Mg     1.9     10-23    TPro  5.7[L]  /  Alb  2.1[L]  /  TBili  1.1  /  DBili  x   /  AST  28  /  ALT  35  /  AlkPhos  84  10-22      Urinalysis Basic - ( 23 Oct 2024 03:44 )    Color: x / Appearance: x / SG: x / pH: x  Gluc: 93 mg/dL / Ketone: x  / Bili: x / Urobili: x   Blood: x / Protein: x / Nitrite: x   Leuk Esterase: x / RBC: x / WBC x   Sq Epi: x / Non Sq Epi: x / Bacteria: x      CAPILLARY BLOOD GLUCOSE      POCT Blood Glucose.: 96 mg/dL (23 Oct 2024 08:07)    RADIOLOGY & ADDITIONAL TESTS: Reviewed. Progress Note    INTERVAL EVENTS:  Fever 102.7F - collected bcx, gave patient ofirmev 1g x1.     SUBJECTIVE:   Patient states he is feeling well. He states he is able to hold down water now, which he could not do yesterday. Unable to tolerate any other liquids in his diet. Patient has not had any bowel movement x 6 days. He is found to be hiccupping. States the hiccups have been the same despite gabapentin. Denies dysuria, chills, night sweats, abdominal cramping, nausea.     ROS:  Negative unless otherwise stated above.    PHYSICAL EXAM:  General: Alert and oriented x 3. No acute distress. Seen to be hiccupping during entire interaction.   HEENT: Moist mucous membranes.   Cardiovascular: Regular rate and rhythm. No murmur.  Lungs: Clear to auscultation bilaterally. No accessory muscle use.  Abdomen: Soft, non-tender and non-distended.   Extremities: No edema. Non-tender. NROMx4, 5/5 strength LE b/l  Skin: No rashes or lesions.     VITAL SIGNS:  Vital Signs Last 24 Hrs  T(C): 37 (23 Oct 2024 05:56), Max: 39.3 (23 Oct 2024 00:25)  T(F): 98.6 (23 Oct 2024 05:56), Max: 102.7 (23 Oct 2024 00:25)  HR: 98 (23 Oct 2024 05:56) (98 - 125)  BP: 106/67 (23 Oct 2024 05:56) (102/62 - 112/57)  BP(mean): 80 (23 Oct 2024 05:56) (80 - 80)  RR: 17 (23 Oct 2024 05:56) (17 - 20)  SpO2: 91% (23 Oct 2024 05:56) (91% - 95%)    Parameters below as of 23 Oct 2024 05:56  Patient On (Oxygen Delivery Method): nasal cannula  O2 Flow (L/min): 2    INPATIENT MEDICATIONS:   MEDICATIONS  (STANDING):  amLODIPine   Tablet 5 milliGRAM(s) Oral daily  atorvastatin 20 milliGRAM(s) Oral at bedtime  dextrose 5%. 1000 milliLiter(s) (100 mL/Hr) IV Continuous <Continuous>  dextrose 5%. 1000 milliLiter(s) (50 mL/Hr) IV Continuous <Continuous>  dextrose 50% Injectable 25 Gram(s) IV Push once  dextrose 50% Injectable 12.5 Gram(s) IV Push once  dextrose 50% Injectable 25 Gram(s) IV Push once  gabapentin 200 milliGRAM(s) Oral once  glucagon  Injectable 1 milliGRAM(s) IntraMuscular once  insulin lispro (ADMELOG) corrective regimen sliding scale   SubCutaneous Before meals and at bedtime  piperacillin/tazobactam IVPB.. 4.5 Gram(s) IV Intermittent every 8 hours    MEDICATIONS  (PRN):  aluminum hydroxide/magnesium hydroxide/simethicone Suspension 30 milliLiter(s) Oral every 4 hours PRN Dyspepsia  benzocaine/menthol Lozenge 1 Lozenge Oral every 2 hours PRN Sore Throat  dextrose Oral Gel 15 Gram(s) Oral once PRN Blood Glucose LESS THAN 70 milliGRAM(s)/deciliter  lidocaine 2% Viscous 15 milliLiter(s) Mucosal every 8 hours PRN Sore throat  melatonin 3 milliGRAM(s) Oral at bedtime PRN Insomnia  ondansetron Injectable 4 milliGRAM(s) IV Push every 8 hours PRN Nausea and/or Vomiting  polyethylene glycol 3350 17 Gram(s) Oral every 24 hours PRN for constipation  senna 2 Tablet(s) Oral at bedtime PRN for constipation    ALLERGIES:  Allergies    No Known Allergies    Intolerances    LABS:                       7.5    3.15  )-----------( 159      ( 23 Oct 2024 03:44 )             21.6     10-23    129[L]  |  96  |  15  ----------------------------<  93  3.6   |  22  |  0.89    Ca    7.7[L]      23 Oct 2024 03:44  Phos  3.4     10-23  Mg     1.9     10-23    TPro  5.7[L]  /  Alb  2.1[L]  /  TBili  1.1  /  DBili  x   /  AST  28  /  ALT  35  /  AlkPhos  84  10-22      Urinalysis Basic - ( 23 Oct 2024 03:44 )    Color: x / Appearance: x / SG: x / pH: x  Gluc: 93 mg/dL / Ketone: x  / Bili: x / Urobili: x   Blood: x / Protein: x / Nitrite: x   Leuk Esterase: x / RBC: x / WBC x   Sq Epi: x / Non Sq Epi: x / Bacteria: x      CAPILLARY BLOOD GLUCOSE      POCT Blood Glucose.: 96 mg/dL (23 Oct 2024 08:07)    RADIOLOGY & ADDITIONAL TESTS: Reviewed.

## 2024-10-23 NOTE — PROGRESS NOTE ADULT - ATTENDING COMMENTS
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 Not tolerating diet. Denies any abd pain. Ambulated w PT - no LH/dizziness. Spiked fever 102.7 this AM. No chest pain, dyspnea.   Exam: male in NAD on RA, MMM, RRR, nml resp effort, CTAB, abd soft, NABS, non-tender, alert, moving all ext.     #sepsis d/t Pneumonia  #Radiation esophagitis leading to dysphagia and N/V   #Dysphagia   - sucralfate 1g q8h     #Lung Cancer  #Hyponatremia - 129 from 127. Likely from poor PO intake  #HTN - on amlodipine 5  #HLD - on atorva 20  #Leukopenia wo neutropenia. ANC 2740 today  #Normocytic anemia - hgb 7 from 7.9   - TSat borderline 18%; Ferritin 8890    Plan  Continue IV Zosyn x7d at this time  Start sucralfate 1g q8 for radiation esophagitis  If hiccups do not improve - transition from gabapentin to baclofen 5mg q8h scheduled for hicciups.   CBC w diff in AM    PPx: SQL  DISPO: TBD pending improvement in PO intake - need to tolerate soft diet  Above d/w Dr. Thuan lundy - heme-onc.

## 2024-10-23 NOTE — PROGRESS NOTE ADULT - PROBLEM SELECTOR PLAN 11
F: S/p 2L NS in ED   E: Replete as necessary K<4 Mg<2  N: Clear liquid diet  DVT Prophylaxis: Lovenox 40mg qd  GI prophylaxis: None   CODE STATUS: FULL Home meds: Atorvastatin 20  - C/w home meds

## 2024-10-23 NOTE — PROGRESS NOTE ADULT - PROBLEM SELECTOR PLAN 9
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 S/p 7 cycles of neoadjuvant platinum based with gemcitabine and nivolumab chemoimmunotherapy. Follows with Dr. Mueller for RT, s/p last round of RT on 10/15.   - NTD

## 2024-10-24 DIAGNOSIS — B25.9 CYTOMEGALOVIRAL DISEASE, UNSPECIFIED: ICD-10-CM

## 2024-10-24 DIAGNOSIS — J18.9 PNEUMONIA, UNSPECIFIED ORGANISM: ICD-10-CM

## 2024-10-24 DIAGNOSIS — E87.8 OTHER DISORDERS OF ELECTROLYTE AND FLUID BALANCE, NOT ELSEWHERE CLASSIFIED: ICD-10-CM

## 2024-10-24 LAB
ALBUMIN SERPL ELPH-MCNC: 2.4 G/DL — LOW (ref 3.3–5)
ALP SERPL-CCNC: 111 U/L — SIGNIFICANT CHANGE UP (ref 40–120)
ALT FLD-CCNC: 26 U/L — SIGNIFICANT CHANGE UP (ref 10–45)
ANION GAP SERPL CALC-SCNC: 10 MMOL/L — SIGNIFICANT CHANGE UP (ref 5–17)
ANION GAP SERPL CALC-SCNC: 12 MMOL/L — SIGNIFICANT CHANGE UP (ref 5–17)
ANISOCYTOSIS BLD QL: SLIGHT — SIGNIFICANT CHANGE UP
APTT BLD: 31.4 SEC — SIGNIFICANT CHANGE UP (ref 24.5–35.6)
AST SERPL-CCNC: 26 U/L — SIGNIFICANT CHANGE UP (ref 10–40)
BASOPHILS # BLD AUTO: 0 K/UL — SIGNIFICANT CHANGE UP (ref 0–0.2)
BASOPHILS # BLD AUTO: 0.01 K/UL — SIGNIFICANT CHANGE UP (ref 0–0.2)
BASOPHILS NFR BLD AUTO: 0 % — SIGNIFICANT CHANGE UP (ref 0–2)
BASOPHILS NFR BLD AUTO: 0.3 % — SIGNIFICANT CHANGE UP (ref 0–2)
BILIRUB SERPL-MCNC: 0.8 MG/DL — SIGNIFICANT CHANGE UP (ref 0.2–1.2)
BUN SERPL-MCNC: 15 MG/DL — SIGNIFICANT CHANGE UP (ref 7–23)
BUN SERPL-MCNC: 18 MG/DL — SIGNIFICANT CHANGE UP (ref 7–23)
CALCIUM SERPL-MCNC: 7.9 MG/DL — LOW (ref 8.4–10.5)
CALCIUM SERPL-MCNC: 7.9 MG/DL — LOW (ref 8.4–10.5)
CHLORIDE SERPL-SCNC: 93 MMOL/L — LOW (ref 96–108)
CHLORIDE SERPL-SCNC: 94 MMOL/L — LOW (ref 96–108)
CO2 SERPL-SCNC: 23 MMOL/L — SIGNIFICANT CHANGE UP (ref 22–31)
CO2 SERPL-SCNC: 24 MMOL/L — SIGNIFICANT CHANGE UP (ref 22–31)
CREAT SERPL-MCNC: 0.75 MG/DL — SIGNIFICANT CHANGE UP (ref 0.5–1.3)
CREAT SERPL-MCNC: 0.8 MG/DL — SIGNIFICANT CHANGE UP (ref 0.5–1.3)
DACRYOCYTES BLD QL SMEAR: SLIGHT — SIGNIFICANT CHANGE UP
EGFR: 95 ML/MIN/1.73M2 — SIGNIFICANT CHANGE UP
EGFR: 97 ML/MIN/1.73M2 — SIGNIFICANT CHANGE UP
EOSINOPHIL # BLD AUTO: 0.05 K/UL — SIGNIFICANT CHANGE UP (ref 0–0.5)
EOSINOPHIL # BLD AUTO: 0.17 K/UL — SIGNIFICANT CHANGE UP (ref 0–0.5)
EOSINOPHIL NFR BLD AUTO: 1.6 % — SIGNIFICANT CHANGE UP (ref 0–6)
EOSINOPHIL NFR BLD AUTO: 5.3 % — SIGNIFICANT CHANGE UP (ref 0–6)
GIANT PLATELETS BLD QL SMEAR: PRESENT — SIGNIFICANT CHANGE UP
GLUCOSE BLDC GLUCOMTR-MCNC: 115 MG/DL — HIGH (ref 70–99)
GLUCOSE BLDC GLUCOMTR-MCNC: 123 MG/DL — HIGH (ref 70–99)
GLUCOSE BLDC GLUCOMTR-MCNC: 126 MG/DL — HIGH (ref 70–99)
GLUCOSE BLDC GLUCOMTR-MCNC: 133 MG/DL — HIGH (ref 70–99)
GLUCOSE SERPL-MCNC: 109 MG/DL — HIGH (ref 70–99)
GLUCOSE SERPL-MCNC: 124 MG/DL — HIGH (ref 70–99)
HCT VFR BLD CALC: 20.5 % — CRITICAL LOW (ref 39–50)
HCT VFR BLD CALC: 22.3 % — LOW (ref 39–50)
HCT VFR BLD CALC: 23.8 % — LOW (ref 39–50)
HGB BLD-MCNC: 7.1 G/DL — LOW (ref 13–17)
HGB BLD-MCNC: 7.4 G/DL — LOW (ref 13–17)
HGB BLD-MCNC: 7.9 G/DL — LOW (ref 13–17)
HYPOCHROMIA BLD QL: SLIGHT — SIGNIFICANT CHANGE UP
IMM GRANULOCYTES NFR BLD AUTO: 0.6 % — SIGNIFICANT CHANGE UP (ref 0–0.9)
INR BLD: 1.32 — HIGH (ref 0.85–1.16)
LYMPHOCYTES # BLD AUTO: 0.11 K/UL — LOW (ref 1–3.3)
LYMPHOCYTES # BLD AUTO: 0.27 K/UL — LOW (ref 1–3.3)
LYMPHOCYTES # BLD AUTO: 3.5 % — LOW (ref 13–44)
LYMPHOCYTES # BLD AUTO: 8.4 % — LOW (ref 13–44)
MAGNESIUM SERPL-MCNC: 1.7 MG/DL — SIGNIFICANT CHANGE UP (ref 1.6–2.6)
MANUAL SMEAR VERIFICATION: SIGNIFICANT CHANGE UP
MCHC RBC-ENTMCNC: 28.9 PG — SIGNIFICANT CHANGE UP (ref 27–34)
MCHC RBC-ENTMCNC: 29.3 PG — SIGNIFICANT CHANGE UP (ref 27–34)
MCHC RBC-ENTMCNC: 30.2 PG — SIGNIFICANT CHANGE UP (ref 27–34)
MCHC RBC-ENTMCNC: 33.2 GM/DL — SIGNIFICANT CHANGE UP (ref 32–36)
MCHC RBC-ENTMCNC: 33.2 GM/DL — SIGNIFICANT CHANGE UP (ref 32–36)
MCHC RBC-ENTMCNC: 34.6 GM/DL — SIGNIFICANT CHANGE UP (ref 32–36)
MCV RBC AUTO: 87.1 FL — SIGNIFICANT CHANGE UP (ref 80–100)
MCV RBC AUTO: 87.2 FL — SIGNIFICANT CHANGE UP (ref 80–100)
MCV RBC AUTO: 88.1 FL — SIGNIFICANT CHANGE UP (ref 80–100)
MONOCYTES # BLD AUTO: 0.14 K/UL — SIGNIFICANT CHANGE UP (ref 0–0.9)
MONOCYTES # BLD AUTO: 0.24 K/UL — SIGNIFICANT CHANGE UP (ref 0–0.9)
MONOCYTES NFR BLD AUTO: 4.4 % — SIGNIFICANT CHANGE UP (ref 2–14)
MONOCYTES NFR BLD AUTO: 7.5 % — SIGNIFICANT CHANGE UP (ref 2–14)
NEUTROPHILS # BLD AUTO: 2.63 K/UL — SIGNIFICANT CHANGE UP (ref 1.8–7.4)
NEUTROPHILS # BLD AUTO: 2.77 K/UL — SIGNIFICANT CHANGE UP (ref 1.8–7.4)
NEUTROPHILS NFR BLD AUTO: 81.6 % — HIGH (ref 43–77)
NEUTROPHILS NFR BLD AUTO: 86.8 % — HIGH (ref 43–77)
NRBC # BLD: 0 /100 WBCS — SIGNIFICANT CHANGE UP (ref 0–0)
NRBC # BLD: 0 /100 WBCS — SIGNIFICANT CHANGE UP (ref 0–0)
OVALOCYTES BLD QL SMEAR: SLIGHT — SIGNIFICANT CHANGE UP
PHOSPHATE SERPL-MCNC: 2 MG/DL — LOW (ref 2.5–4.5)
PLAT MORPH BLD: ABNORMAL
PLATELET # BLD AUTO: 152 K/UL — SIGNIFICANT CHANGE UP (ref 150–400)
PLATELET # BLD AUTO: 173 K/UL — SIGNIFICANT CHANGE UP (ref 150–400)
PLATELET # BLD AUTO: 184 K/UL — SIGNIFICANT CHANGE UP (ref 150–400)
POIKILOCYTOSIS BLD QL AUTO: SLIGHT — SIGNIFICANT CHANGE UP
POLYCHROMASIA BLD QL SMEAR: SLIGHT — SIGNIFICANT CHANGE UP
POTASSIUM SERPL-MCNC: 3.3 MMOL/L — LOW (ref 3.5–5.3)
POTASSIUM SERPL-MCNC: 3.9 MMOL/L — SIGNIFICANT CHANGE UP (ref 3.5–5.3)
POTASSIUM SERPL-SCNC: 3.3 MMOL/L — LOW (ref 3.5–5.3)
POTASSIUM SERPL-SCNC: 3.9 MMOL/L — SIGNIFICANT CHANGE UP (ref 3.5–5.3)
PROT SERPL-MCNC: 6.4 G/DL — SIGNIFICANT CHANGE UP (ref 6–8.3)
PROTHROM AB SERPL-ACNC: 15.1 SEC — HIGH (ref 9.9–13.4)
RBC # BLD: 2.35 M/UL — LOW (ref 4.2–5.8)
RBC # BLD: 2.56 M/UL — LOW (ref 4.2–5.8)
RBC # BLD: 2.7 M/UL — LOW (ref 4.2–5.8)
RBC # FLD: 17 % — HIGH (ref 10.3–14.5)
RBC # FLD: 17.2 % — HIGH (ref 10.3–14.5)
RBC # FLD: 17.2 % — HIGH (ref 10.3–14.5)
RBC BLD AUTO: ABNORMAL
SCHISTOCYTES BLD QL AUTO: SLIGHT — SIGNIFICANT CHANGE UP
SODIUM SERPL-SCNC: 128 MMOL/L — LOW (ref 135–145)
SODIUM SERPL-SCNC: 128 MMOL/L — LOW (ref 135–145)
SURGICAL PATHOLOGY STUDY: SIGNIFICANT CHANGE UP
WBC # BLD: 3.19 K/UL — LOW (ref 3.8–10.5)
WBC # BLD: 3.22 K/UL — LOW (ref 3.8–10.5)
WBC # BLD: 3.67 K/UL — LOW (ref 3.8–10.5)
WBC # FLD AUTO: 3.19 K/UL — LOW (ref 3.8–10.5)
WBC # FLD AUTO: 3.22 K/UL — LOW (ref 3.8–10.5)
WBC # FLD AUTO: 3.67 K/UL — LOW (ref 3.8–10.5)

## 2024-10-24 PROCEDURE — 99233 SBSQ HOSP IP/OBS HIGH 50: CPT | Mod: GC

## 2024-10-24 PROCEDURE — 99223 1ST HOSP IP/OBS HIGH 75: CPT

## 2024-10-24 RX ORDER — ACETAMINOPHEN 500 MG
1000 TABLET ORAL ONCE
Refills: 0 | Status: COMPLETED | OUTPATIENT
Start: 2024-10-24 | End: 2024-10-24

## 2024-10-24 RX ORDER — GANCICLOVIR SODIUM 50 MG/ML
300 INJECTION, POWDER, LYOPHILIZED, FOR SOLUTION INTRAVENOUS ONCE
Refills: 0 | Status: COMPLETED | OUTPATIENT
Start: 2024-10-24 | End: 2024-10-24

## 2024-10-24 RX ORDER — GANCICLOVIR SODIUM 50 MG/ML
INJECTION, POWDER, LYOPHILIZED, FOR SOLUTION INTRAVENOUS
Refills: 0 | Status: DISCONTINUED | OUTPATIENT
Start: 2024-10-24 | End: 2024-11-04

## 2024-10-24 RX ORDER — GANCICLOVIR SODIUM 50 MG/ML
300 INJECTION, POWDER, LYOPHILIZED, FOR SOLUTION INTRAVENOUS EVERY 12 HOURS
Refills: 0 | Status: DISCONTINUED | OUTPATIENT
Start: 2024-10-25 | End: 2024-11-04

## 2024-10-24 RX ORDER — MAGNESIUM SULFATE IN 0.9% NACL 2 G/50 ML
2 INTRAVENOUS SOLUTION, PIGGYBACK (ML) INTRAVENOUS ONCE
Refills: 0 | Status: COMPLETED | OUTPATIENT
Start: 2024-10-24 | End: 2024-10-24

## 2024-10-24 RX ORDER — POTASSIUM CHLORIDE 10 MEQ
40 TABLET, EXTENDED RELEASE ORAL ONCE
Refills: 0 | Status: COMPLETED | OUTPATIENT
Start: 2024-10-24 | End: 2024-10-24

## 2024-10-24 RX ORDER — POTASSIUM PHOSPHATE 236; 224 MG/ML; MG/ML
30 INJECTION, SOLUTION INTRAVENOUS ONCE
Refills: 0 | Status: COMPLETED | OUTPATIENT
Start: 2024-10-24 | End: 2024-10-24

## 2024-10-24 RX ADMIN — Medication 400 MILLIGRAM(S): at 07:30

## 2024-10-24 RX ADMIN — Medication 80 MILLILITER(S): at 22:23

## 2024-10-24 RX ADMIN — PANTOPRAZOLE SODIUM 40 MILLIGRAM(S): 40 TABLET, DELAYED RELEASE ORAL at 06:18

## 2024-10-24 RX ADMIN — Medication 25 GRAM(S): at 16:28

## 2024-10-24 RX ADMIN — Medication 400 MILLIGRAM(S): at 22:18

## 2024-10-24 RX ADMIN — POTASSIUM PHOSPHATE 83.33 MILLIMOLE(S): 236; 224 INJECTION, SOLUTION INTRAVENOUS at 11:52

## 2024-10-24 RX ADMIN — GANCICLOVIR SODIUM 100 MILLIGRAM(S): 50 INJECTION, POWDER, LYOPHILIZED, FOR SOLUTION INTRAVENOUS at 18:17

## 2024-10-24 RX ADMIN — Medication 20 MILLIGRAM(S): at 22:18

## 2024-10-24 RX ADMIN — Medication 40 MILLIEQUIVALENT(S): at 11:51

## 2024-10-24 RX ADMIN — Medication 40 MILLIGRAM(S): at 18:17

## 2024-10-24 RX ADMIN — ONDANSETRON HYDROCHLORIDE 4 MILLIGRAM(S): 2 INJECTION, SOLUTION INTRAMUSCULAR; INTRAVENOUS at 09:05

## 2024-10-24 RX ADMIN — SUCRALFATE 1 GRAM(S): 1 SUSPENSION ORAL at 18:18

## 2024-10-24 RX ADMIN — Medication 1000 MILLIGRAM(S): at 08:00

## 2024-10-24 RX ADMIN — PIPERACILLIN AND TAZOBACTAM 25 GRAM(S): .5; 4 INJECTION, POWDER, LYOPHILIZED, FOR SOLUTION INTRAVENOUS at 07:26

## 2024-10-24 RX ADMIN — SUCRALFATE 1 GRAM(S): 1 SUSPENSION ORAL at 11:52

## 2024-10-24 RX ADMIN — SUCRALFATE 1 GRAM(S): 1 SUSPENSION ORAL at 06:18

## 2024-10-24 RX ADMIN — Medication 5 MILLIGRAM(S): at 06:18

## 2024-10-24 RX ADMIN — GABAPENTIN 300 MILLIGRAM(S): 300 CAPSULE ORAL at 11:56

## 2024-10-24 RX ADMIN — PIPERACILLIN AND TAZOBACTAM 25 GRAM(S): .5; 4 INJECTION, POWDER, LYOPHILIZED, FOR SOLUTION INTRAVENOUS at 22:18

## 2024-10-24 NOTE — CHART NOTE - NSCHARTNOTEFT_GEN_A_CORE
Infectious Diseases Anti-infective Approval Note    Medication: Ganciclovir  Dose: 300mg  Route: IV  Frequency: q12  Duration**:   21 days       Empiric pending cultures:          Empiric, no culture data:       Final duration: x    Dose may be adjusted as needed for alterations in renal function.    *THIS IS NOT AN INFECTIOUS DISEASES CONSULTATION*    **Indicates duration of approval, not necessarily duration of treatment

## 2024-10-24 NOTE — PROGRESS NOTE ADULT - PROBLEM SELECTOR PLAN 1
POA. Meeting 2/4 SIRS criteria (T 102.9, ) without clear source. Pt has been coughing over the last 2 days with fever at home T 101. Pt has been hiccuping c/b vomiting. S/p Vanc 1g, Zosyn 3.375g in ED. BCx collected x2 in ED. Patient HDS, appearing well, AOx3, warm to touch. Unknown source.   - C/w abx as below   - F/u BCx x2 - NGTD x 48 hrs  - Reculture for Tmax >100.4 in 48h  - 80 cc/hr LR x 12 hrs s/p EGD with biopsy 10/23.   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 Small focus of epithelium with atypia c/w prior radiation antrum no significant pathology. Negative for H pylori.    - ID consult

## 2024-10-24 NOTE — PROGRESS NOTE ADULT - ASSESSMENT
69M w/ PMHx of non-operable lung cancer, DM, HTN, HLD recently admitted for symptomatic anemia and chemotherapy-induced nausea and vomiting (10/14-10/15) presenting with nausea, vomiting, and fever.     # NSCLC - SCC histolgoy, s/p chemoRT completed last week  Notable new fevers, nausea/vomiting  This is concerning for infection.  --CT Chest/Abd/Pelvis (10/22/24): Bilateral upper lobe pneumonia versus aspiration. Superimposed upper lobe pulmonary congestion/edema. Since July 1, 2024, slightly decreased lingular mass and thoracicadenopathy. Mid-distal esophagitis, possibly treatment-related or gastroesophageal reflux associated with hiatal hernia.  --EGD (10/23/24)): Esophagus; Segmental continuous erosive, congestion and abnormal vascularity. Stomach; Diffuse erythema of the mucosa was noted in the whole stomach. Findings c/w radiation esophagitis   --IVF hydration until tolerating PO  --Infectious work up and broad spectrum abx per primary team, agree with Zosyn  --monitor CBC, and electrolytes daily  --Will need follow up with Dr. Larose when medically stabilized. Please arrange for follow up when close to discharge.

## 2024-10-24 NOTE — CONSULT NOTE ADULT - ASSESSMENT
69M w/ PMHx of non-operable lung cancer, DM, HTN, HLD recently admitted for symptomatic anemia and chemotherapy-induced nausea and vomiting (10/14-10/15) presenting with nausea, vomiting, and fever.     NSCLC - SCC histolgoy, s/p chemoRT completed last week  Notable new fevers, nausea/vomiting  This is concerning for infection.  --recommend pan-scan - CT CAP  --GI consult for EGD given intractable nausea/vomiting and inability to tolerate any po  --hydration  --BCx obtained, results pending  --broad spectrum abx per primary team, agree with Zosyn  --doubt there is tumor progression in the lungs as the patient has been getting RT up until last week and no new lesion or increasing mass was noted on the scans  --monitor CBC, and electrolytes daily  --I discussed the plan of care with  and summary was that a pan-scan will be done, after which final plan can be determined
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 Close monitoring of cell lines (with daily CBC w/ diff) and kidney function while on gancyte  - Check CMV PCR  - May consider pulmonary consult, as his upper lobe predom lung opacities atypical for aspiration and may in fact be CMV pneumonitis (although he will already be on treatment for CMV tissue invasive disease for esophagitis as above)  - Continue empiric zosyn 4.5g IV q8h EI to complete a 7 day course    ID Team 1 will follow
69M w/ PMHx of non-operable lung cancer, DM, HTN, HLD recently admitted for symptomatic anemia and chemotherapy-induced nausea and vomiting (10/14-10/15) presenting with nausea, vomiting, and fever and GI was consulted for odynophagia:    Recommendations:   - Consider CT neck for further infectious w/u  - Plan for EGD tomorrow w/ biopsies to r/o infectious esophagitis as cause of odynophagia   - NPO at midnight  - Send pre op labs  - Hold AM lovenox     Case discussed w/ GI attending Dr. Urbano Hare MD  PGY-4 GI Fellow  GI consult pager (M-F [id number]): [phone]  Please call  for on-call fellow after hours

## 2024-10-24 NOTE — PROGRESS NOTE ADULT - PROBLEM SELECTOR PLAN 11
Home meds: Atorvastatin 20  - C/w home meds Hx of squamous cell lung ca, AJCC IIIB, [id number] negative. 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 S/p 7 cycles of neoadjuvant platinum based with gemcitabine and nivolumab chemoimmunotherapy. Follows with Dr. Mueller for RT, s/p last round of RT on 10/15.   Follow with Dr. Larose outpatient.    - Heme/onc consult

## 2024-10-24 NOTE — PROGRESS NOTE ADULT - SUBJECTIVE AND OBJECTIVE BOX
Progress Note  INCOMPLETE NOTE    INTERVAL EVENTS:   No acute events overnight.    SUBJECTIVE:   Patient seen and examined at bedside. Condition largely unchanged from yesterday. No acute complaints at this time.    ROS:  Negative unless otherwise stated above.    PHYSICAL EXAM:  General: Alert and oriented x 3. No acute distress.   HEENT: Moist mucous membranes. Anicteric. No cervical lymphadenopathy.  Cardiovascular: Regular rate and rhythm. No murmur. Normal JVP.  Lungs: Clear to auscultation bilaterally. No accessory muscle use.  Abdomen: Soft, non-tender and non-distended. No palpable masses.  Extremities: No edema. Non-tender. Warm.  Skin: No rashes or lesions.   Neurologic: No apparent focal neurological deficits. CN II-XII grossly intact, but not individually tested.  Psychiatric: Cooperative. Appropriate mood and affect.    VITAL SIGNS:  Vital Signs Last 24 Hrs  T(C): 39 (24 Oct 2024 05:55), Max: 39.2 (23 Oct 2024 17:21)  T(F): 102.2 (24 Oct 2024 05:55), Max: 102.5 (23 Oct 2024 17:21)  HR: 119 (24 Oct 2024 05:55) (100 - 120)  BP: 115/64 (24 Oct 2024 05:55) (100/61 - 115/64)  BP(mean): 80 (23 Oct 2024 13:51) (80 - 80)  RR: 18 (24 Oct 2024 05:55) (18 - 20)  SpO2: 93% (24 Oct 2024 05:55) (90% - 97%)    Parameters below as of 24 Oct 2024 05:55  Patient On (Oxygen Delivery Method): nasal cannula  O2 Flow (L/min): 3    INPATIENT MEDICATIONS:   MEDICATIONS  (STANDING):  amLODIPine   Tablet 5 milliGRAM(s) Oral daily  atorvastatin 20 milliGRAM(s) Oral at bedtime  dextrose 5%. 1000 milliLiter(s) (100 mL/Hr) IV Continuous <Continuous>  dextrose 5%. 1000 milliLiter(s) (50 mL/Hr) IV Continuous <Continuous>  dextrose 50% Injectable 25 Gram(s) IV Push once  dextrose 50% Injectable 12.5 Gram(s) IV Push once  dextrose 50% Injectable 25 Gram(s) IV Push once  enoxaparin Injectable 40 milliGRAM(s) SubCutaneous every 24 hours  gabapentin 300 milliGRAM(s) Oral daily  glucagon  Injectable 1 milliGRAM(s) IntraMuscular once  insulin lispro (ADMELOG) corrective regimen sliding scale   SubCutaneous Before meals and at bedtime  lactated ringers. 1000 milliLiter(s) (80 mL/Hr) IV Continuous <Continuous>  pantoprazole    Tablet 40 milliGRAM(s) Oral before breakfast  piperacillin/tazobactam IVPB.. 4.5 Gram(s) IV Intermittent every 8 hours  potassium chloride    Tablet ER 40 milliEquivalent(s) Oral once  potassium phosphate IVPB 30 milliMole(s) IV Intermittent once  sucralfate 1 Gram(s) Oral every 6 hours    MEDICATIONS  (PRN):  aluminum hydroxide/magnesium hydroxide/simethicone Suspension 30 milliLiter(s) Oral every 4 hours PRN Dyspepsia  benzocaine/menthol Lozenge 1 Lozenge Oral every 2 hours PRN Sore Throat  dextrose Oral Gel 15 Gram(s) Oral once PRN Blood Glucose LESS THAN 70 milliGRAM(s)/deciliter  lidocaine 2% Viscous 15 milliLiter(s) Mucosal every 8 hours PRN Sore throat  melatonin 3 milliGRAM(s) Oral at bedtime PRN Insomnia  ondansetron Injectable 4 milliGRAM(s) IV Push every 8 hours PRN Nausea and/or Vomiting  polyethylene glycol 3350 17 Gram(s) Oral every 24 hours PRN for constipation  senna 2 Tablet(s) Oral at bedtime PRN for constipation    ALLERGIES:  Allergies    No Known Allergies    Intolerances    LABS:                       7.1    3.19  )-----------( 152      ( 24 Oct 2024 08:50 )             20.5     10-24    128[L]  |  94[L]  |  18  ----------------------------<  109[H]  3.3[L]   |  24  |  0.80    Ca    7.9[L]      24 Oct 2024 08:50  Phos  2.0     10-24  Mg     1.7     10-24        Urinalysis Basic - ( 24 Oct 2024 08:50 )    Color: x / Appearance: x / SG: x / pH: x  Gluc: 109 mg/dL / Ketone: x  / Bili: x / Urobili: x   Blood: x / Protein: x / Nitrite: x   Leuk Esterase: x / RBC: x / WBC x   Sq Epi: x / Non Sq Epi: x / Bacteria: x      CAPILLARY BLOOD GLUCOSE      POCT Blood Glucose.: 126 mg/dL (24 Oct 2024 08:07)    RADIOLOGY & ADDITIONAL TESTS: Reviewed. Progress Note    INTERVAL EVENTS:   Had episode of brown emesis at 11pm. Nurse gave patient PRN Zofran. Pt was seen to be HDS, not in any acute distress. Patient had a fever of 102.2F in the AM, given ofirmev, tachycardic to 119 bpm, asymptomatic and felt fine with normal blood pressure. Not given any bolus.     SUBJECTIVE:   Patient was seen at bedside sleeping. He denies any nausea in the moments. He states he was able to eat salmon and broccoli yesterday but had the episode of emesis about 4 hours after. Denies hematemesis, states the vomit looked like his food and the chocolate ensure he had. Patient denies f/c/n/v, abdominal pain. He states he worked with physical therapist yesterday and felt well. Admits to struggling when he first started walking, but then he was able to walk on his own independently as he normally does. Today, he denies weakness in his lower extremities.     ROS:  Negative unless otherwise stated above.    PHYSICAL EXAM:  General: Alert and oriented x 3. No acute distress.   Cardiovascular: Regular rate and rhythm.   Lungs: Clear to auscultation bilaterally. No accessory muscle use.  Abdomen: Soft, non-tender and non-distended.   Extremities: No edema. Non-tender. Warm.  Neurologic: No apparent focal neurological deficits. Good motor strength in all extremities b/l. NROMx4.     VITAL SIGNS:  Vital Signs Last 24 Hrs  T(C): 39 (24 Oct 2024 05:55), Max: 39.2 (23 Oct 2024 17:21)  T(F): 102.2 (24 Oct 2024 05:55), Max: 102.5 (23 Oct 2024 17:21)  HR: 119 (24 Oct 2024 05:55) (100 - 120)  BP: 115/64 (24 Oct 2024 05:55) (100/61 - 115/64)  BP(mean): 80 (23 Oct 2024 13:51) (80 - 80)  RR: 18 (24 Oct 2024 05:55) (18 - 20)  SpO2: 93% (24 Oct 2024 05:55) (90% - 97%)    Parameters below as of 24 Oct 2024 05:55  Patient On (Oxygen Delivery Method): nasal cannula  O2 Flow (L/min): 3    INPATIENT MEDICATIONS:   MEDICATIONS  (STANDING):  amLODIPine   Tablet 5 milliGRAM(s) Oral daily  atorvastatin 20 milliGRAM(s) Oral at bedtime  dextrose 5%. 1000 milliLiter(s) (100 mL/Hr) IV Continuous <Continuous>  dextrose 5%. 1000 milliLiter(s) (50 mL/Hr) IV Continuous <Continuous>  dextrose 50% Injectable 25 Gram(s) IV Push once  dextrose 50% Injectable 12.5 Gram(s) IV Push once  dextrose 50% Injectable 25 Gram(s) IV Push once  enoxaparin Injectable 40 milliGRAM(s) SubCutaneous every 24 hours  gabapentin 300 milliGRAM(s) Oral daily  glucagon  Injectable 1 milliGRAM(s) IntraMuscular once  insulin lispro (ADMELOG) corrective regimen sliding scale   SubCutaneous Before meals and at bedtime  lactated ringers. 1000 milliLiter(s) (80 mL/Hr) IV Continuous <Continuous>  pantoprazole    Tablet 40 milliGRAM(s) Oral before breakfast  piperacillin/tazobactam IVPB.. 4.5 Gram(s) IV Intermittent every 8 hours  potassium chloride    Tablet ER 40 milliEquivalent(s) Oral once  potassium phosphate IVPB 30 milliMole(s) IV Intermittent once  sucralfate 1 Gram(s) Oral every 6 hours    MEDICATIONS  (PRN):  aluminum hydroxide/magnesium hydroxide/simethicone Suspension 30 milliLiter(s) Oral every 4 hours PRN Dyspepsia  benzocaine/menthol Lozenge 1 Lozenge Oral every 2 hours PRN Sore Throat  dextrose Oral Gel 15 Gram(s) Oral once PRN Blood Glucose LESS THAN 70 milliGRAM(s)/deciliter  lidocaine 2% Viscous 15 milliLiter(s) Mucosal every 8 hours PRN Sore throat  melatonin 3 milliGRAM(s) Oral at bedtime PRN Insomnia  ondansetron Injectable 4 milliGRAM(s) IV Push every 8 hours PRN Nausea and/or Vomiting  polyethylene glycol 3350 17 Gram(s) Oral every 24 hours PRN for constipation  senna 2 Tablet(s) Oral at bedtime PRN for constipation    ALLERGIES:  Allergies    No Known Allergies    Intolerances    LABS:                       7.1    3.19  )-----------( 152      ( 24 Oct 2024 08:50 )             20.5     10-24    128[L]  |  94[L]  |  18  ----------------------------<  109[H]  3.3[L]   |  24  |  0.80    Ca    7.9[L]      24 Oct 2024 08:50  Phos  2.0     10-24  Mg     1.7     10-24        Urinalysis Basic - ( 24 Oct 2024 08:50 )    Color: x / Appearance: x / SG: x / pH: x  Gluc: 109 mg/dL / Ketone: x  / Bili: x / Urobili: x   Blood: x / Protein: x / Nitrite: x   Leuk Esterase: x / RBC: x / WBC x   Sq Epi: x / Non Sq Epi: x / Bacteria: x      CAPILLARY BLOOD GLUCOSE      POCT Blood Glucose.: 126 mg/dL (24 Oct 2024 08:07)    RADIOLOGY & ADDITIONAL TESTS: Reviewed.

## 2024-10-24 NOTE — PROGRESS NOTE ADULT - PROBLEM SELECTOR PLAN 7
Na 127 on admission, previously 134. Pt denies HA but admits to N/V, which is chronic for him. Has had poor PO intake iso N/V. Drinks gatorade and ensures. No visual disturbances, palpitations.   Corrected Na 128. Calculated serum osm 268. S/p 2L NS in ED. Na improved to 131 s/p NS 2 L. Na has dropped to 129 today. No CNS changes.   - CTM Hgb 8.5, previously 8.0 on 10/15. RDW elevated. Leukopenia. Plt 152. Likely 2/2 chemotx. Pt denies bleeding, bruising. Denies hematuria. Has not had BMs for 5-6 days, unable to assess for melena.  10/23: Hgb 7.5    - Monitor H&H  - Active T&S  - Transfuse hgb <7

## 2024-10-24 NOTE — PROGRESS NOTE ADULT - PROBLEM SELECTOR PLAN 3
Pt has had hiccups since his dc last week. Rad-onc started pt on Zofran which did not help. S/p Reglan in ED w/o improvement. Hiccups have caused the pt N/V.  S/P gabapentin 300 mg x1 10/21 AM  Presenting with increased hiccupping this morning.     - increase dose of gabapentin 100 mg to 300 mg daily  ----If hiccups do not improve - transition from gabapentin to baclofen 5mg q8h scheduled for hiccups POA. Meeting 2/4 SIRS criteria (T 102.9, ) without clear source. Pt has been coughing over the last 2 days with fever at home T 101. Pt has been hiccuping c/b vomiting. S/p Vanc 1g, Zosyn 3.375g in ED. BCx collected x2 in ED. Patient HDS, appearing well, AOx3, warm to touch. Found to have pneumonia and CMV.  s/p 80 cc/hr LR x 12 hrs  - C/w abx as below   - F/u BCx x2 - NGTD x 48 hrs  - Reculture for Tmax >100.4 in 48h

## 2024-10-24 NOTE — DISCHARGE NOTE PROVIDER - NSDCCPCAREPLAN_GEN_ALL_CORE_FT
PRINCIPAL DISCHARGE DIAGNOSIS  Diagnosis: CMV (cytomegalovirus)  Assessment and Plan of Treatment: CMV is a common virus that infects people of all ages. In the [place], nearly 1 in 3 children is already infected with CMV by age 5. Over half of adults have been infected with CMV by age 40.  Once CMV is in a person’s body, it stays there for life and can reactivate. A person can also be re-infected with a different strain of the virus. If you have a weakened immune system and get CMV, you can have more serious symptoms affecting the eyes; lungs; liver; esophagus; stomach; and intestines. During your hospital stay, you had an esophagogastroduodenoscopy (EGD) with gastrointestinal team. You were found to have CMV esophagitis. You were treated with an antiviral medication called GANCICLOVIR. You will be discharged with VALGANCICLOVIR, an oral form of medication of ganciclovir. Please follow up with _____ from infectious disease at the appointed time.   During this EGD, you were found to have irritated mucosa of stomach lining. You were started on pantoprazole and sucralfate for stomach irritation. Please continue ____ outpatient and visit GASTEROENTEROLOGY clinic at the appointed time.      SECONDARY DISCHARGE DIAGNOSES  Diagnosis: Odynophagia  Assessment and Plan of Treatment:     Diagnosis: Anemia  Assessment and Plan of Treatment:      PRINCIPAL DISCHARGE DIAGNOSIS  Diagnosis: CMV (cytomegalovirus)  Assessment and Plan of Treatment: CMV is a common virus that infects people of all ages. In the [place], nearly 1 in 3 children is already infected with CMV by age 5. Over half of adults have been infected with CMV by age 40.  Once CMV is in a person’s body, it stays there for life and can reactivate. A person can also be re-infected with a different strain of the virus. If you have a weakened immune system and get CMV, you can have more serious symptoms affecting the eyes; lungs; liver; esophagus; stomach; and intestines. During your hospital stay, you had an esophagogastroduodenoscopy (EGD) with gastrointestinal team. You were found to have CMV esophagitis. You were treated with an antiviral medication called GANCICLOVIR. You will be discharged with VALGANCICLOVIR, an oral form of medication of ganciclovir. Please follow up with _____ from infectious disease at the appointed time.   During this EGD, you were found to have irritated mucosa of stomach lining. You were started on pantoprazole and sucralfate for stomach irritation. Please continue protonix and sucralfate outpatient and visit GASTEROENTEROLOGY clinic at the appointed time.     PRINCIPAL DISCHARGE DIAGNOSIS  Diagnosis: CMV (cytomegalovirus)  Assessment and Plan of Treatment: CMV is a common virus that infects people of all ages. In the [place], nearly 1 in 3 children is already infected with CMV by age 5. Over half of adults have been infected with CMV by age 40.  Once CMV is in a person’s body, it stays there for life and can reactivate. A person can also be re-infected with a different strain of the virus. If you have a weakened immune system and get CMV, you can have more serious symptoms affecting the eyes; lungs; liver; esophagus; stomach; and intestines. During your hospital stay, you had an esophagogastroduodenoscopy (EGD) with gastrointestinal team. You were found to have CMV esophagitis. You were treated with an antiviral medication called GANCICLOVIR. You will be discharged with VALGANCICLOVIR, an oral form of medication of ganciclovir. Please follow up with infectious disease at the appointed time.   During this EGD, you were found to have irritated mucosa of stomach lining. You were started on pantoprazole and sucralfate for stomach irritation. Please continue protonix and sucralfate outpatient and visit GASTEROENTEROLOGY clinic at the appointed time.     PRINCIPAL DISCHARGE DIAGNOSIS  Diagnosis: CMV (cytomegalovirus)  Assessment and Plan of Treatment: CMV is a common virus that infects people of all ages. In the [place], nearly 1 in 3 children is already infected with CMV by age 5. Over half of adults have been infected with CMV by age 40.  Once CMV is in a person’s body, it stays there for life and can reactivate. A person can also be re-infected with a different strain of the virus. If you have a weakened immune system and get CMV, you can have more serious symptoms affecting the eyes; lungs; liver; esophagus; stomach; and intestines. During your hospital stay, you had an esophagogastroduodenoscopy (EGD) with gastrointestinal team. You were found to have CMV esophagitis. You were treated with an antiviral medication called GANCICLOVIR. You will be discharged with VALGANCICLOVIR, an oral form of medication of ganciclovir. This medication was sent to your [facility]'s pharmacy in [place]. Please follow up with infectious disease (Dr. Lopez) in 1 week. The office will call you to schedule an appointment.  During this EGD, you were found to have irritated mucosa of stomach lining. You were started on pantoprazole and sucralfate for stomach irritation. Please continue protonix and sucralfate outpatient and visit GASTEROENTEROLOGY clinic at the appointed time.     PRINCIPAL DISCHARGE DIAGNOSIS  Diagnosis: CMV (cytomegalovirus)  Assessment and Plan of Treatment: CMV is a common virus that infects people of all ages. In the [place], nearly 1 in 3 children is already infected with CMV by age 5. Over half of adults have been infected with CMV by age 40.  Once CMV is in a person’s body, it stays there for life and can reactivate. A person can also be re-infected with a different strain of the virus. If you have a weakened immune system and get CMV, you can have more serious symptoms affecting the eyes; lungs; liver; esophagus; stomach; and intestines. During your hospital stay, you had an esophagogastroduodenoscopy (EGD) with gastrointestinal team. You were found to have CMV esophagitis. You were treated with an antiviral medication called GANCICLOVIR. You will be discharged with VALGANCICLOVIR, an oral form of medication of ganciclovir. This medication was sent to your [facility]'s pharmacy in [place]. Please follow up with infectious disease (Dr. Robin) in 1 week. The office will call you to schedule an appointment.  During this EGD, you were found to have irritated mucosa of stomach lining. You were started on pantoprazole and sucralfate for stomach irritation. Please continue protonix and sucralfate outpatient.

## 2024-10-24 NOTE — PROGRESS NOTE ADULT - SUBJECTIVE AND OBJECTIVE BOX
Hematology Oncology Progress Note      Interval History: s/p EGD    SUBJECTIVE: Patient seen and examined at bedside. Tolerating soft foods this morning, but threw up last night. Denies pain swallowing. No productive cough or diarrhea.    OBJECTIVE:    VITAL SIGNS:  ICU Vital Signs Last 24 Hrs  T(C): 36.3 (24 Oct 2024 12:04), Max: 39.2 (23 Oct 2024 17:21)  T(F): 97.4 (24 Oct 2024 12:04), Max: 102.5 (23 Oct 2024 17:21)  HR: 103 (24 Oct 2024 12:04) (100 - 120)  BP: 115/68 (24 Oct 2024 12:04) (100/61 - 115/68)  BP(mean): --  ABP: --  ABP(mean): --  RR: 17 (24 Oct 2024 12:04) (17 - 19)  SpO2: 94% (24 Oct 2024 12:04) (93% - 97%)    O2 Parameters below as of 24 Oct 2024 12:04  Patient On (Oxygen Delivery Method): nasal cannula  O2 Flow (L/min): 2            10-23 @ 07:01  -  10-24 @ 07:00  --------------------------------------------------------  IN: 0 mL / OUT: 400 mL / NET: -400 mL      CAPILLARY BLOOD GLUCOSE      POCT Blood Glucose.: 133 mg/dL (24 Oct 2024 12:09)      PHYSICAL EXAM:  General: NAD, answering questions, pleasantly conversant  HEENT: NC/AT; PERRL, clear conjunctiva  Neck: supple  Respiratory: CTA b/l  Cardiovascular: tachycardic  Abdomen: soft, NT/ND; +BS x4  Extremities: WWP, 2+ peripheral pulses b/l; no LE edema  Skin: normal color and turgor; darkened skin on neck/upper chest (likely 2/2 radiation)  Neurological: AAOx3    MEDICATIONS:  MEDICATIONS  (STANDING):  amLODIPine   Tablet 5 milliGRAM(s) Oral daily  atorvastatin 20 milliGRAM(s) Oral at bedtime  dextrose 5%. 1000 milliLiter(s) (100 mL/Hr) IV Continuous <Continuous>  dextrose 5%. 1000 milliLiter(s) (50 mL/Hr) IV Continuous <Continuous>  dextrose 50% Injectable 25 Gram(s) IV Push once  dextrose 50% Injectable 12.5 Gram(s) IV Push once  dextrose 50% Injectable 25 Gram(s) IV Push once  enoxaparin Injectable 40 milliGRAM(s) SubCutaneous every 24 hours  gabapentin 300 milliGRAM(s) Oral daily  glucagon  Injectable 1 milliGRAM(s) IntraMuscular once  insulin lispro (ADMELOG) corrective regimen sliding scale   SubCutaneous Before meals and at bedtime  lactated ringers. 1000 milliLiter(s) (80 mL/Hr) IV Continuous <Continuous>  pantoprazole    Tablet 40 milliGRAM(s) Oral before breakfast  piperacillin/tazobactam IVPB.. 4.5 Gram(s) IV Intermittent every 8 hours  sucralfate 1 Gram(s) Oral every 6 hours    MEDICATIONS  (PRN):  aluminum hydroxide/magnesium hydroxide/simethicone Suspension 30 milliLiter(s) Oral every 4 hours PRN Dyspepsia  benzocaine/menthol Lozenge 1 Lozenge Oral every 2 hours PRN Sore Throat  dextrose Oral Gel 15 Gram(s) Oral once PRN Blood Glucose LESS THAN 70 milliGRAM(s)/deciliter  lidocaine 2% Viscous 15 milliLiter(s) Mucosal every 8 hours PRN Sore throat  melatonin 3 milliGRAM(s) Oral at bedtime PRN Insomnia  ondansetron Injectable 4 milliGRAM(s) IV Push every 8 hours PRN Nausea and/or Vomiting  polyethylene glycol 3350 17 Gram(s) Oral every 24 hours PRN for constipation  senna 2 Tablet(s) Oral at bedtime PRN for constipation      ALLERGIES:  Allergies    No Known Allergies    Intolerances        LABS:                        7.1    3.19  )-----------( 152      ( 24 Oct 2024 08:50 )             20.5     10-24    128[L]  |  94[L]  |  18  ----------------------------<  109[H]  3.3[L]   |  24  |  0.80    Ca    7.9[L]      24 Oct 2024 08:50  Phos  2.0     10-24  Mg     1.7     10-24        Urinalysis Basic - ( 24 Oct 2024 08:50 )    Color: x / Appearance: x / SG: x / pH: x  Gluc: 109 mg/dL / Ketone: x  / Bili: x / Urobili: x   Blood: x / Protein: x / Nitrite: x   Leuk Esterase: x / RBC: x / WBC x   Sq Epi: x / Non Sq Epi: x / Bacteria: x          Culture - Blood (collected 10-23-24 @ 03:45)  Source: .Blood BLOOD  Preliminary Report (10-24-24 @ 07:01):    No growth at 24 hours        RADIOLOGY, PATHOLOGY, & ADDITIONAL TESTS: Reviewed.

## 2024-10-24 NOTE — CONSULT NOTE ADULT - REASON FOR ADMISSION
Fever, nausea, vomiting w poor oral intake

## 2024-10-24 NOTE — PROGRESS NOTE ADULT - PROBLEM SELECTOR PLAN 6
Hgb 8.5, previously 8.0 on 10/15. RDW elevated. Leukopenia. Plt 152. Likely 2/2 chemotx. Pt denies bleeding, bruising. Denies hematuria. Has not had BMs for 5-6 days, unable to assess for melena.  10/23: Hgb 7.5    - Monitor H&H  - Active T&S  - Transfuse hgb <7 Ferritin > 8000. In 09/2024 ferritin levels ~2000  Likely 2/2 to cancer + infection     - NTD

## 2024-10-24 NOTE — PROGRESS NOTE ADULT - ASSESSMENT
69M w/ PMHx of non-operable lung cancer, DM, HTN, HLD recently admitted for symptomatic anemia and chemotherapy-induced nausea and vomiting (10/14-10/15) presenting with hiccups, nausea, vomiting, and fever admitted to [facility] for further management, s/p EGD.

## 2024-10-24 NOTE — PROGRESS NOTE ADULT - PROBLEM SELECTOR PLAN 2
Pt is immunocompromised iso lung cancer and chemo/RT px with 2 days of cough and 1 day of fever. Pt was dc'd on 10/15 and started having intractable hiccups c/b nausea and vomiting. ?Aspiration pna. Cough is dry. Pt denies SOB, CP, abd pain, diarrhea, dysuria, rashes or joint pain. CXR pending read but appears similar to prior w/o infiltrates/consolidations. WBC 2.94 (from 1.95) with increased lymphocyte %. Covid/Flu/RSV negative.  MRSA negative. Legionella/strep negative.   CT C/A/P: Bilateral upper lobe pneumonia versus aspiration. Superimposed upper lobe pulmonary congestion/edema. Small pleural effusions bilaterally. 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 No relevant prior imaging available for to assess chronicity. Mid-distal esophagitis, possibly treatment-related or gastroesophageal reflux associated with hiatal hernia.  - c/w zosyn 4.5g q8h x 7 days  -- discharge with augmentin if patient leaves before 7-day course finishes  - ofirmev as needed for fevers, max 4g in 24 hours (can give toradol if maxxed out) Pt is immunocompromised iso lung cancer and chemo/RT px with 2 days of cough and 1 day of fever. Pt was dc'd on 10/15 and started having intractable hiccups c/b nausea and vomiting. ?Aspiration pna. Cough is dry. Pt denies SOB, CP, abd pain, diarrhea, dysuria, rashes or joint pain. CXR pending read but appears similar to prior w/o infiltrates/consolidations. WBC 2.94 (from 1.95) with increased lymphocyte %. Covid/Flu/RSV negative.  MRSA negative. Legionella/strep negative.     - c/w zosyn 4.5g q8h x 7 days  ---discharge with augmentin if patient leaves before 7-day course finishes  - ofirmev as needed for fevers, max 4g in 24 hours (can give toradol if maxxed out)

## 2024-10-24 NOTE — DISCHARGE NOTE PROVIDER - NSDCFUSCHEDAPPT_GEN_ALL_CORE_FT
Marcelo Larose  [facility] PreAdmits  Scheduled Appointment: 10/31/2024    [facility]  [address]  Scheduled Appointment: 10/31/2024    Marcelo Larose  [facility]  [facility] [address]  Scheduled Appointment: 11/05/2024    Memo Agee  [facility]  [facility] [address]  Scheduled Appointment: 01/07/2025     Marcelo Larose  [facility]  [facility] [address]  Scheduled Appointment: 11/05/2024    Memo Agee  [facility]  [facility] [address]  Scheduled Appointment: 01/07/2025

## 2024-10-24 NOTE — CONSULT NOTE ADULT - SUBJECTIVE AND OBJECTIVE BOX
GASTROENTEROLOGY CONSULT NOTE  HPI: 69M w/ PMHx of non-operable lung cancer, DM, HTN, HLD recently admitted for symptomatic anemia and chemotherapy-induced nausea and vomiting (10/14-10/15) presenting with nausea, vomiting, and fever. Pt had fever yesterday T 101, today with coughing and persistent vomiting. Pt was discharged on 10/15 and started having intractable hiccups causing him to vomit. Pt says he could not tolerate food. He called his rad-onc (Dr. Mueller) who rx'd him zofran PO which did not relieve his sx. Pt had his last round of RT on 10/17, since then he's had a sore throat and odynophagia. He began having a cough 2 days ago that is dry. His wife noticed that he felt warm yesterday, checked temp, which was T 101. Pt denies rhinorrhea, SOB, CP, diarrhea, joint paint, rash.     GI was consulted for persistent odynophagia. Patient received his last cycle of chemo on 10/10 (neoadjuvant platinum based with gemcitabine and nivolumab chemoimmunotherapy) and then had RT done on 10/15. On 10/17 patient developed odynophagia w/ both solids and liquids. No dysphagia. The odynophagia has not improved over the following days.     ED course:  Vitals - T 102.9,  -> 104, /79, SpO2 97% RA, RR 18  Labs - WBC 2.94, Hgb 8.5, Plt 152, Na 127, K 3.2, Cl 93, CO2 21, BUN/Crt 19/0.85, Mg 1.4, venous pH 7.45, pCO2 33  UA cloudy +2 urobilinogen   Covid/Flu/RSV negative  EKG with sinus tach 120bpm, normal axis, nonischemic, QTc 457  Imaging - CXR with LLL mass with similar appearance to prior, no infiltrates/consolidations (author read)  Interventions - Ofirmev 1g x1, Mg 2g x1, Reglan 10mg x1, Vancomycin 1g x1, Zosyn 3.375g x1, 1L NS x2 (21 Oct 2024 03:46)    Allergies    No Known Allergies    Intolerances      Home Medications:  amLODIPine 5 mg oral tablet: 1 tab(s) orally once a day (21 Oct 2024 04:35)  atorvastatin 20 mg oral tablet: 1 tab(s) orally once a day (21 Oct 2024 04:35)  Decadron 4 mg oral tablet: 1 tab(s) orally 2 times a day Takes 5tabs qhs the night before chemo and 5 tabs in the AM during chemo. (21 Oct 2024 04:35)  metFORMIN 500 mg oral tablet: 1 tab(s) orally 2 times a day (21 Oct 2024 04:35)    MEDICATIONS:  MEDICATIONS  (STANDING):  amLODIPine   Tablet 5 milliGRAM(s) Oral daily  atorvastatin 20 milliGRAM(s) Oral at bedtime  dextrose 5%. 1000 milliLiter(s) (100 mL/Hr) IV Continuous <Continuous>  dextrose 5%. 1000 milliLiter(s) (50 mL/Hr) IV Continuous <Continuous>  dextrose 50% Injectable 25 Gram(s) IV Push once  dextrose 50% Injectable 12.5 Gram(s) IV Push once  dextrose 50% Injectable 25 Gram(s) IV Push once  enoxaparin Injectable 40 milliGRAM(s) SubCutaneous every 24 hours  gabapentin 100 milliGRAM(s) Oral every 24 hours  glucagon  Injectable 1 milliGRAM(s) IntraMuscular once  insulin lispro (ADMELOG) corrective regimen sliding scale   SubCutaneous Before meals and at bedtime  piperacillin/tazobactam IVPB.. 3.375 Gram(s) IV Intermittent every 8 hours    MEDICATIONS  (PRN):  aluminum hydroxide/magnesium hydroxide/simethicone Suspension 30 milliLiter(s) Oral every 4 hours PRN Dyspepsia  benzocaine/menthol Lozenge 1 Lozenge Oral every 2 hours PRN Sore Throat  dextrose Oral Gel 15 Gram(s) Oral once PRN Blood Glucose LESS THAN 70 milliGRAM(s)/deciliter  lidocaine 2% Viscous 15 milliLiter(s) Mucosal every 8 hours PRN Sore throat  melatonin 3 milliGRAM(s) Oral at bedtime PRN Insomnia  ondansetron Injectable 4 milliGRAM(s) IV Push every 8 hours PRN Nausea and/or Vomiting  polyethylene glycol 3350 17 Gram(s) Oral every 24 hours PRN for constipation  senna 2 Tablet(s) Oral at bedtime PRN for constipation    PAST MEDICAL & SURGICAL HISTORY:  History of hypertension      History of high cholesterol      History of gastroesophageal reflux (GERD)      History of diabetes mellitus      History of persistent cough      Mass of lingula of lung      HENRY (acute kidney injury)      Squamous cell lung cancer      H/O: obesity      S/P appendectomy        FAMILY HISTORY:  No pertinent family history in first degree relatives      SOCIAL HISTORY:  Tobacco: [ ] Current, [ ] Former, [ ] Never; Pack Years:  Alcohol:  Illicit Drugs:    REVIEW OF SYSTEMS:  All other 10 review of systems is negative unless indicated above.    Vital Signs Last 24 Hrs  T(C): 37.7 (22 Oct 2024 12:36), Max: 39.1 (21 Oct 2024 21:20)  T(F): 99.8 (22 Oct 2024 12:36), Max: 102.3 (21 Oct 2024 21:20)  HR: 109 (22 Oct 2024 12:36) (98 - 118)  BP: 112/57 (22 Oct 2024 12:36) (100/59 - 146/72)  BP(mean): 81 (22 Oct 2024 03:21) (81 - 81)  RR: 18 (22 Oct 2024 12:36) (18 - 20)  SpO2: 95% (22 Oct 2024 12:36) (95% - 96%)    Parameters below as of 22 Oct 2024 12:36  Patient On (Oxygen Delivery Method): nasal cannula  O2 Flow (L/min): 2      10-21 @ 07:01  -  10-22 @ 07:00  --------------------------------------------------------  IN: 0 mL / OUT: 850 mL / NET: -850 mL        PHYSICAL EXAM:    General: lying in bed, in no acute distress  HEENT: Neck supple, mmm, no jvd  Lungs: Normal respiratory effort, no intercostal retractions  Cardiovascular: regular rate  Abdomen: Soft, non-tender non-distended; No rebound or guarding  Neurological: HUFF, speech fluent  Skin: Warm and dry. No obvious rash    LABS:                        7.0    2.90  )-----------( 163      ( 22 Oct 2024 05:30 )             21.3     10-22    127[L]  |  96  |  12  ----------------------------<  129[H]  3.4[L]   |  23  |  0.75    Ca    7.4[L]      22 Oct 2024 05:30  Phos  3.0     10-22  Mg     1.3     10-22    TPro  5.7[L]  /  Alb  2.1[L]  /  TBili  1.1  /  DBili  x   /  AST  28  /  ALT  35  /  AlkPhos  84  10-22            Culture - Urine (collected 21 Oct 2024 02:57)  Source: Clean Catch Clean Catch (Midstream)  Final Report (22 Oct 2024 07:10):    No growth    Culture - Blood (collected 20 Oct 2024 22:16)  Source: .Blood BLOOD  Preliminary Report (22 Oct 2024 03:01):    No growth at 24 hours    Culture - Blood (collected 20 Oct 2024 22:16)  Source: .Blood BLOOD  Preliminary Report (22 Oct 2024 03:01):    No growth at 24 hours      RADIOLOGY & ADDITIONAL STUDIES:     Reviewed  
69M w/ PMHx of non-operable lung cancer, DM, HTN, HLD recently admitted for symptomatic anemia and chemotherapy-induced nausea and vomiting (10/14-10/15) presenting with nausea, vomiting, and fever. Pt had fever yesterday T 101, today with coughing and persistent vomiting. Pt was discharged on 10/15 and started having intractable hiccups causing him to vomit. Pt says he could not tolerate food. He called his rad-onc (Dr. Mueller) who rx'd him zofran PO which did not relieve his sx. Pt had his last round of RT on 10/17, since then he's had a sore throat and odynophagia. He began having a cough 2 days ago that is dry. His wife noticed that he felt warm yesterday, checked temp, which was T 101. Pt denies rhinorrhea, SOB, CP, diarrhea, joint paint, rash.     Patient reports that he has never taken gabapentin. He reports that he has had a bronchoscopy in August with [facility] doctors for a biopsy. He has never had an EGD before. Patient reports that he vomits 1-2 hours after consuming food or water. He denies nausea at this time. When patient was found to be febrile with T of 102.8F, patient did not feel feverish, any chills, palpitations, discomfort. Patient appeared well, although he was warm to touch all over his body.     ED course:  Vitals - T 102.9,  -> 104, /79, SpO2 97% RA, RR 18  Labs - WBC 2.94, Hgb 8.5, Plt 152, Na 127, K 3.2, Cl 93, CO2 21, BUN/Crt 19/0.85, Mg 1.4, venous pH 7.45, pCO2 33  UA cloudy +2 urobilinogen   Covid/Flu/RSV negative  EKG with sinus tach 120bpm, normal axis, nonischemic, QTc 457  Imaging - CXR with LLL mass with similar appearance to prior, no infiltrates/consolidations (author read)  Interventions - Ofirmev 1g x1, Mg 2g x1, Reglan 10mg x1, Vancomycin 1g x1, Zosyn 3.375g x1, 1L NS x2 (21 Oct 2024 03:46)          Allergies    No Known Allergies    Intolerances      REVIEW OF SYSTEMS:    CONSTITUTIONAL: + fever, + weight loss, +fatigue  EYES: No eye pain, visual disturbances, or discharge  ENMT:  No difficulty hearing, tinnitus, vertigo; No sinus or throat pain  NECK: No pain or stiffness  BREASTS: No pain, masses, or nipple discharge  RESPIRATORY: No cough, wheezing, chills or hemoptysis; No shortness of breath  CARDIOVASCULAR: No chest pain, palpitations, dizziness, or leg swelling  GASTROINTESTINAL: No abdominal or epigastric pain. +nausea/vomiting; No diarrhea or constipation. No melena or hematochezia.  GENITOURINARY: No dysuria, frequency, hematuria, or incontinence  NEUROLOGICAL: No headaches, memory loss, loss of strength, numbness, or tremors  SKIN: No itching, burning, rashes, or lesions   LYMPH NODES: No enlarged glands  ENDOCRINE: No heat or cold intolerance; No hair loss  MUSCULOSKELETAL: No joint pain or swelling; No muscle, back, or extremity pain  PSYCHIATRIC: No depression, anxiety, mood swings, or difficulty sleeping  HEME/LYMPH: No easy bruising, or bleeding gums  ALLERY AND IMMUNOLOGIC: No hives or eczema      MEDICATIONS  (STANDING):  amLODIPine   Tablet 5 milliGRAM(s) Oral daily  atorvastatin 20 milliGRAM(s) Oral at bedtime  dextrose 5%. 1000 milliLiter(s) (100 mL/Hr) IV Continuous <Continuous>  dextrose 5%. 1000 milliLiter(s) (50 mL/Hr) IV Continuous <Continuous>  dextrose 50% Injectable 25 Gram(s) IV Push once  dextrose 50% Injectable 12.5 Gram(s) IV Push once  dextrose 50% Injectable 25 Gram(s) IV Push once  enoxaparin Injectable 40 milliGRAM(s) SubCutaneous every 24 hours  glucagon  Injectable 1 milliGRAM(s) IntraMuscular once  insulin lispro (ADMELOG) corrective regimen sliding scale   SubCutaneous Before meals and at bedtime  lactated ringers. 1000 milliLiter(s) (100 mL/Hr) IV Continuous <Continuous>  piperacillin/tazobactam IVPB.. 3.375 Gram(s) IV Intermittent every 8 hours  vancomycin  IVPB 1000 milliGRAM(s) IV Intermittent every 12 hours    MEDICATIONS  (PRN):  aluminum hydroxide/magnesium hydroxide/simethicone Suspension 30 milliLiter(s) Oral every 4 hours PRN Dyspepsia  benzocaine/menthol Lozenge 1 Lozenge Oral every 2 hours PRN Sore Throat  dextrose Oral Gel 15 Gram(s) Oral once PRN Blood Glucose LESS THAN 70 milliGRAM(s)/deciliter  lidocaine 2% Viscous 15 milliLiter(s) Mucosal every 8 hours PRN Sore throat  melatonin 3 milliGRAM(s) Oral at bedtime PRN Insomnia  ondansetron Injectable 4 milliGRAM(s) IV Push every 8 hours PRN Nausea and/or Vomiting  polyethylene glycol 3350 17 Gram(s) Oral every 24 hours PRN for constipation  senna 2 Tablet(s) Oral at bedtime PRN for constipation    Vital Signs Last 24 Hrs  T(C): 37.3 (10-21-24 @ 17:40), Max: 39.4 (10-20-24 @ 22:05)  T(F): 99.2 (10-21-24 @ 17:40), Max: 102.9 (10-20-24 @ 22:05)  HR: 98 (10-21-24 @ 17:40) (91 - 134)  BP: 101/57 (10-21-24 @ 17:40) (101/57 - 126/57)  BP(mean): --  RR: 18 (10-21-24 @ 17:40) (18 - 22)  SpO2: 95% (10-21-24 @ 17:40) (92% - 100%)    PHYSICAL EXAM:  Constitutional: NAD, well-groomed, well-developed  HEENT: PERRLA, EOMI, Normal Hearing, MMM  Neck: No LAD, No JVD  Back: Normal spine flexure, No CVA tenderness  Respiratory: CTAB  Cardiovascular: S1 and S2, RRR, no M/G/R  Gastrointestinal: BS+, soft, NT/ND  Extremities: No peripheral edema  Vascular: 2+ peripheral pulses  Neurological: A/O x 3, no focal deficits  Psychiatric: Normal mood, normal affect  Musculoskeletal: 5/5 strength b/l upper and lower extremities  Skin: No rashes        CBC Full  -  ( 21 Oct 2024 05:30 )  WBC Count : 2.71 K/uL  RBC Count : 2.78 M/uL  Hemoglobin : 8.4 g/dL  Hematocrit : 25.4 %  Platelet Count - Automated : 197 K/uL  Mean Cell Volume : 91.4 fl  Mean Cell Hemoglobin : 30.2 pg  Mean Cell Hemoglobin Concentration : 33.1 gm/dL  Auto Neutrophil # : 1.83 K/uL  Auto Lymphocyte # : 0.39 K/uL  Auto Monocyte # : 0.39 K/uL  Auto Eosinophil # : 0.07 K/uL  Auto Basophil # : 0.01 K/uL  Auto Neutrophil % : 67.5 %  Auto Lymphocyte % : 14.4 %  Auto Monocyte % : 14.4 %  Auto Eosinophil % : 2.6 %  Auto Basophil % : 0.4 %          Ferritin: 2123 ng/mL (09-12 @ 13:20)  Iron Total: 36 ug/dL (09-12 @ 13:20)  Iron - Total Binding Capacity.: 178 ug/dL (09-12 @ 13:20)    10-21    131[L]  |  97  |  16  ----------------------------<  132[H]  3.6   |  23  |  0.83    Ca    8.4      21 Oct 2024 05:30  Phos  3.1     10-21  Mg     2.1     10-21    TPro  7.2  /  Alb  2.8[L]  /  TBili  1.1  /  DBili  x   /  AST  26  /  ALT  32  /  AlkPhos  90  10-21    Pathology:  
INFECTIOUS DISEASES INITIAL CONSULT NOTE    HPI:  69M with hx of DM (A1c 7.2%), HTN, HLD, squamous cell lung cancer currently on carboplatin/taxol (last received 10/10) and RT (last received 10/15), who on 10/14 developed odynophagia, cough, and hiccups, subsequently developed nausea/vomiting and then on 10/20 developed fever for which he presented to [facility] ED, admitted on 10/21 for further management. T 102.9 on admission, tachycardic, hypoxic requiring 2L NC. Labs with leukopenia (w/ severe lymphopenia), and anemia, with borderline low plts. LFTs normal. BCx, RVP/COVID neg. CT C/A/P w/ IVC with decreased size of known COLETTE mass (malignancy), and new bilateral upper lobe GGOs with interlobular septal thickening, as well as mid-distal esophagitis. S/p EGD on 10/23 with finding of erosive changes of middle third of esophagus, non-erosive gastritis. 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 Thusfar patient has been on zosyn for possible pneumonia, and he ?feels slightly improved, but still has unchanged fever/cough.    He denies any headache, neck stiffness, confusion, visual disturbance, abdominal pain, diarrhea, fatigue.       PAST MEDICAL & SURGICAL HISTORY:  History of hypertension      History of high cholesterol      History of gastroesophageal reflux (GERD)      History of diabetes mellitus      History of persistent cough      Mass of lingula of lung      HENRY (acute kidney injury)      Squamous cell lung cancer      H/O: obesity      S/P appendectomy        Review of Systems:   Constitutional, eyes, ENT, cardiovascular, respiratory, gastrointestinal, genitourinary, integumentary, neurological, psychiatric and heme/lymph are otherwise negative other than noted above       ANTIBIOTICS:  MEDICATIONS  (STANDING):  amLODIPine   Tablet 5 milliGRAM(s) Oral daily  atorvastatin 20 milliGRAM(s) Oral at bedtime  dextrose 5%. 1000 milliLiter(s) (100 mL/Hr) IV Continuous <Continuous>  dextrose 5%. 1000 milliLiter(s) (50 mL/Hr) IV Continuous <Continuous>  dextrose 50% Injectable 25 Gram(s) IV Push once  dextrose 50% Injectable 12.5 Gram(s) IV Push once  dextrose 50% Injectable 25 Gram(s) IV Push once  enoxaparin Injectable 40 milliGRAM(s) SubCutaneous every 24 hours  gabapentin 300 milliGRAM(s) Oral daily  ganciclovir IVPB      glucagon  Injectable 1 milliGRAM(s) IntraMuscular once  insulin lispro (ADMELOG) corrective regimen sliding scale   SubCutaneous Before meals and at bedtime  lactated ringers. 1000 milliLiter(s) (80 mL/Hr) IV Continuous <Continuous>  pantoprazole    Tablet 40 milliGRAM(s) Oral before breakfast  piperacillin/tazobactam IVPB.. 4.5 Gram(s) IV Intermittent every 8 hours  sucralfate 1 Gram(s) Oral every 6 hours    MEDICATIONS  (PRN):  aluminum hydroxide/magnesium hydroxide/simethicone Suspension 30 milliLiter(s) Oral every 4 hours PRN Dyspepsia  benzocaine/menthol Lozenge 1 Lozenge Oral every 2 hours PRN Sore Throat  dextrose Oral Gel 15 Gram(s) Oral once PRN Blood Glucose LESS THAN 70 milliGRAM(s)/deciliter  lidocaine 2% Viscous 15 milliLiter(s) Mucosal every 8 hours PRN Sore throat  melatonin 3 milliGRAM(s) Oral at bedtime PRN Insomnia  ondansetron Injectable 4 milliGRAM(s) IV Push every 8 hours PRN Nausea and/or Vomiting  polyethylene glycol 3350 17 Gram(s) Oral every 24 hours PRN for constipation  senna 2 Tablet(s) Oral at bedtime PRN for constipation      Allergies    No Known Allergies    Intolerances      SOCIAL HISTORY:  Lives in [place], but currently living in [place] with son  Originally from [place], [place] lived [place]  Works as   No pets    FAMILY HISTORY:  No pertinent family history in first degree relatives     no FH leading to current infection    Vital Signs Last 24 Hrs  T(C): 36.3 (24 Oct 2024 12:04), Max: 39.2 (23 Oct 2024 17:21)  T(F): 97.4 (24 Oct 2024 12:04), Max: 102.5 (23 Oct 2024 17:21)  HR: 103 (24 Oct 2024 12:04) (100 - 120)  BP: 115/68 (24 Oct 2024 12:04) (100/61 - 115/68)  BP(mean): --  RR: 17 (24 Oct 2024 12:04) (17 - 19)  SpO2: 94% (24 Oct 2024 12:04) (93% - 97%)    Parameters below as of 24 Oct 2024 12:04  Patient On (Oxygen Delivery Method): nasal cannula  O2 Flow (L/min): 2      10-23-24 @ 07:01  -  10-24-24 @ 07:00  --------------------------------------------------------  IN: 0 mL / OUT: 400 mL / NET: -400 mL        PHYSICAL EXAM:  Constitutional: alert, NAD  Eyes: the sclera and conjunctiva were normal.   ENT: the ears and nose were normal in appearance. Normal oropharynx  Neck: the appearance of the neck was normal and the neck was supple.   Pulmonary: no respiratory distress on 2L NC  Heart: heart rate was normal and rhythm regular  Abdomen: soft, non-tender  Neurological: no focal deficits.   Skin: no rash      LABS:                        7.9    3.67  )-----------( 184      ( 24 Oct 2024 16:59 )             23.8     10-24    128[L]  |  94[L]  |  18  ----------------------------<  109[H]  3.3[L]   |  24  |  0.80    Ca    7.9[L]      24 Oct 2024 08:50  Phos  2.0     10-24  Mg     1.7     10-24        Urinalysis Basic - ( 24 Oct 2024 08:50 )    Color: x / Appearance: x / SG: x / pH: x  Gluc: 109 mg/dL / Ketone: x  / Bili: x / Urobili: x   Blood: x / Protein: x / Nitrite: x   Leuk Esterase: x / RBC: x / WBC x   Sq Epi: x / Non Sq Epi: x / Bacteria: x        MICROBIOLOGY:  Reviewed    RADIOLOGY & ADDITIONAL STUDIES:  Reviewed

## 2024-10-24 NOTE — PROGRESS NOTE ADULT - PROBLEM SELECTOR PLAN 5
Ferritin > 8000. In 09/2024 ferritin levels ~2000  Likely 2/2 to cancer + infection     - NTD Pt c/p pain with swallowing since his RT on 10/15, with poor PO intake. Likely 2/2 vomiting vs RT.  As per bedside swallow assessment: no cough, throat clear or change in voice when taking PO trials. Pt is denying difficulty or pain with swallowing. Reports discomfort when he has hiccups.  s/p EGD 10/23: Erosive, congestion and abnormal vascularity in the middle third of the esophagus. (Biopsy). Esophageal hiatal hernia. Erythema in the whole stomach compatible with non-erosive gastritis. (Biopsy). Normal mucosa in the whole examined duodenum.  CT C/A/P:  Bilateral upper lobe pneumonia versus aspiration. Superimposed upper lobe pulmonary congestion/edema. Small pleural effusions bilaterally. 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 Mid-distal esophagitis, possibly treatment-related or gastroesophageal reflux associated with hiatal hernia.    - f/u GI recs  - lozenges PRN  - c/w sucralfate 1g q8h for radiation esophagitis  - c/w pantoprazole

## 2024-10-24 NOTE — DISCHARGE NOTE PROVIDER - NSDCFUADDAPPT_GEN_ALL_CORE_FT
Please follow up with infectious diseases, Dr. Lopez, in 1 week. His office will call you to schedule an appointment. Please follow up with infectious diseases, Dr. Robin, in 1 week. His office will call you to schedule an appointment.   Infectious Disease - Serg Robin  [address], [address], [place] [id number] [phone]

## 2024-10-24 NOTE — PROGRESS NOTE ADULT - PROBLEM SELECTOR PLAN 4
Pt c/p pain with swallowing since his RT on 10/15, with poor PO intake. Likely 2/2 vomiting vs RT.  As per bedside swallow assessment: no cough, throat clear or change in voice when taking PO trials. Pt is denying difficulty or pain with swallowing. Reports discomfort when he has hiccups.  s/p EGD 10/23: Erosive, congestion and abnormal vascularity in the middle third of the esophagus. (Biopsy). Esophageal hiatal hernia. Erythema in the whole stomach compatible with non-erosive gastritis. (Biopsy). Normal mucosa in the whole examined duodenum.    - f/u GI recs  - lozenges PRN  - start sucralfate 1g q8h for radiation esophagitis Pt has had hiccups since his dc last week. Rad-onc started pt on Zofran which did not help. S/p Reglan in ED w/o improvement. Hiccups have caused the pt N/V.  S/P gabapentin 300 mg x1 10/21 AM  Presenting with increased hiccupping this morning.     - increase dose of gabapentin 100 mg to 300 mg daily  ----If hiccups do not improve - transition from gabapentin to baclofen 5mg q8h scheduled for hiccups

## 2024-10-24 NOTE — DISCHARGE NOTE PROVIDER - HOSPITAL COURSE
#Discharge: do not delete    Patient is __ yo M/F with past medical history of _____     Presented with _____, found to have _____    Inpatient treatment course:    Problem List/Main Diagnoses (system-based):         Patient was discharged to: (home/[facility]/acute rehab/hospice, etc, and with what services – home health PT/RN? Home O2?)  New medications:  Changes to old medications:  Medications that were stopped:   Items to follow up as outpatient:  Physical exam at the time of discharge: #Discharge: do not delete    Hospital Course: 69M w/ PMHx of non-operable lung cancer, DM, HTN, HLD recently admitted for symptomatic anemia and chemotherapy-induced nausea and vomiting (10/14-10/15) presenting with hiccups, nausea, vomiting, and fever found to have RML pnuemonia. S/p EGD 10/23, with medium size hiatal hernia, diffuse erythema of mucosa of whole stomach and CMV esophagitis. Started on pantoprazole IV BID and sucralfate. Started on ganciclovir 5 mg/kg IV q12h (10/24). Patient s/p empiric zosyn 4.5 mg 10/21-10/27. Patient has been having fevers daily, given ofirmev each time and all bcx are NGTD thus far (10/21, 10/23, 10/24, 10/26). Tmax of 103F (10/27). s/p 1U pRBC 10/28. Patient has ongoing issue of hiccups and inability to tolerate PO as he will vomit/regurgitate his food 2/2 hiccups. Currently on gabapentin 300 BID with imporvement of tolerating PO. ID, heme/onc following.       Problem List/Main Diagnoses (system-based):  #CMV (cytomegalovirus).   s/p EGD with biopsy 10/23. Risk factor is chemo, HIV negative. CMV PCR 10/24 126k. CMV PCR 5.10 (elevated).  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 Small focus of epithelium with atypia c/w prior radiation antrum no significant pathology. Negative for H pylori. No pulmonary consult necessary at this time as patient is being treated with Zosyn for PNA and CMV esophagitis and pneumonitis simultaneously is rare.  Febrile to 102.3 (oral) on 10/26 AM. Overnight, fever of 100.8F.  All Bcx NGTD thus far (10/26, 10/24, 10/21)  CMV PCR specimen received 10/28  As per ID,   - Started on ganciclovir 5mg/kg IV q12h (10/24 - )  - Monitor for ocular symptoms  - f/u CMV PCR  - Anticipate 3 weeks minimum therapy. Will plan to switch to PO valcyte after his symptoms have improved significantly.    #Pneumonia.   ·  Plan: Pt is immunocompromised iso lung cancer and chemo/RT px with 2 days of cough and 1 day of fever. Pt was dc'd on 10/15 and started having intractable hiccups c/b nausea and vomiting. ?Aspiration pna. Cough is dry. Pt denies SOB, CP, abd pain, diarrhea, dysuria, rashes or joint pain. CXR pending read but appears similar to prior w/o infiltrates/consolidations. WBC 2.94 (from 1.95) with increased lymphocyte %. Covid/Flu/RSV negative.  MRSA negative. Legionella/strep negative.   Patient is still spiking fevers, tmax 103F on 10/28. Last fever 101F on 10/28.  s/p zosyn 4.5g q8h x 7 days (10/21-10/27)  - ofirmev as needed for fevers, max 4g in 24 hours (can give toradol if maxxed out)    #Systemic inflammatory response syndrome (SIRS).   ·  Plan: POA. Meeting 2/4 SIRS criteria (T 102.9, ) without clear source. Pt has been coughing over the last 2 days with fever at home T 101. Pt has been hiccuping c/b vomiting. S/p Vanc 1g, Zosyn 3.375g in ED. BCx collected x2 in ED. Patient HDS, appearing well, AOx3, warm to touch. Found to have pneumonia and CMV.  s/p 80 cc/hr LR x 12 hrs  - C/w tx as above    #Requirement for O2  Not on home O2. Currently on 2L. Tried weaning today, but patient satting at 90% on RA.  - wean O2, currently on 2L  - start incentive spirometer    #Hiccups.   ·  Plan: Pt has had hiccups since his dc last week. Rad-onc started pt on Zofran which did not help. S/p Reglan in ED w/o improvement. Hiccups have caused the pt N/V.  S/P gabapentin 300 mg x1 10/21 AM, switched to baclofen on 10/25  Patient unable to tolerate PO, was able to do so when on gabapentin. Switched baclofen back to eduar on 10/27.   - uptitrate gabapentin 300 qd to bid    #Odynophagia.   ·  Plan: Pt c/p pain with swallowing since his RT on 10/15, with poor PO intake. Likely 2/2 vomiting vs RT.  As per bedside swallow assessment: no cough, throat clear or change in voice when taking PO trials. Pt is denying difficulty or pain with swallowing. Reports discomfort when he has hiccups.  s/p EGD 10/23: Erosive, congestion and abnormal vascularity in the middle third of the esophagus. (Biopsy). Esophageal hiatal hernia. Erythema in the whole stomach compatible with non-erosive gastritis. (Biopsy). Normal mucosa in the whole examined duodenum.  CT C/A/P:  Bilateral upper lobe pneumonia versus aspiration. Superimposed upper lobe pulmonary congestion/edema. Small pleural effusions bilaterally. 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 Mid-distal esophagitis, possibly treatment-related or gastroesophageal reflux associated with hiatal hernia.  - lozenges PRN  - c/w sucralfate 1g q8h for radiation esophagitis  - c/w pantoprazole.    #Anemia.   ·  Plan: Hgb 8.5, previously 8.0 on 10/15. RDW elevated. Leukopenia. Plt 152. Likely 2/2 chemotx. Pt denies bleeding, bruising. Denies hematuria. Has not had BMs for 5-6 days, unable to assess for melena. Hgb 6.7 (10/28) s/p 1U pRBC (10/28), post-transfusion CBC showing Hgb of 8.7 on 10/29.  - CTM since ganciclovir can cause cytopenias.    #High serum ferritin.   ·  Plan: Ferritin > 8000. In 09/2024 ferritin levels ~2000  Likely 2/2 to cancer + infection     #Hyponatremia.   ·  Plan: Na 127 on admission, previously 134. Pt denies HA but admits to N/V, which is chronic for him. Has had poor PO intake iso N/V. Drinks gatorade and ensures. No visual disturbances, palpitations. Corrected Na 128. Calculated serum osm 268. S/p 2L NS in ED. Na improved to 131 s/p NS 2 L. Na has dropped to 128 today. No CNS changes. Likely d/t poor PO intake. Na 129 (10/29)  Urine Studies 10/25: uOsm 451, Jeremi 55, TSH 0.685 wnl. Likely SIADH.     #Diabetes  AIC 7.2 (9/2024). Home med: metformin 500 mg    #HTN  Home med: amlodipine 5 mg     #Squamous cell cancer  AJCC IIIB, [id number] negative. 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 S/p 7 cycles of neoadjuvant platinum based with gemcitabine and nivolumab chemoimmunotherapy. Follows with Dr. Mueller for RT, s/p last round of RT on 10/15.   Follows with Dr. Larose outpatient.        Patient was discharged to: home with home PT    New medications:   Changes to old medications:  Medications that were stopped:   Items to follow up as outpatient: infectious disease, heme/onc  Physical exam at the time of discharge: AAOx3, CTA b/l, RRR, abd nontender/nondistended, LE w/o swelling, hiccupping #Discharge: do not delete    Hospital Course: 69M w/ PMHx of non-operable lung cancer, DM, HTN, HLD recently admitted for symptomatic anemia and chemotherapy-induced nausea and vomiting (10/14-10/15) presenting with hiccups, nausea, vomiting, and fever found to have RML pnuemonia. S/p EGD 10/23, with medium size hiatal hernia, diffuse erythema of mucosa of whole stomach and CMV esophagitis. Started on pantoprazole IV BID and sucralfate. Started on ganciclovir 5 mg/kg IV q12h (10/24). Patient s/p empiric zosyn 4.5 mg 10/21-10/27. Patient has been having fevers daily, given ofirmev each time and all bcx are NGTD thus far (10/21, 10/23, 10/24, 10/26). Tmax of 103F (10/27). s/p 1U pRBC 10/28. Patient has ongoing issue of hiccups and inability to tolerate PO as he will vomit/regurgitate his food 2/2 hiccups. Currently on gabapentin 300 BID with improvement of tolerating PO. ID, heme/onc following.     Problem List/Main Diagnoses (system-based):  #CMV (cytomegalovirus).   s/p EGD with biopsy 10/23. Risk factor is chemo, HIV negative. CMV PCR 10/24 126k. CMV PCR 5.10 (elevated).  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 Small focus of epithelium with atypia c/w prior radiation antrum no significant pathology. Negative for H pylori. No pulmonary consult necessary at this time as patient is being treated with Zosyn for PNA and CMV esophagitis and pneumonitis simultaneously is rare.  Febrile to 102.3 (oral) on 10/26 AM. Overnight, fever of 100.8F.  All Bcx NGTD thus far (10/26, 10/24, 10/21)  CMV PCR specimen received 10/28  As per ID,   - Started on ganciclovir 5mg/kg IV q12h (10/24 - )  - Monitor for ocular symptoms  - f/u CMV PCR  - Anticipate 3 weeks minimum therapy. Will plan to switch to PO valcyte after his symptoms have improved significantly.    #Pneumonia.   ·  Plan: Pt is immunocompromised iso lung cancer and chemo/RT px with 2 days of cough and 1 day of fever. Pt was dc'd on 10/15 and started having intractable hiccups c/b nausea and vomiting. ?Aspiration pna. Cough is dry. Pt denies SOB, CP, abd pain, diarrhea, dysuria, rashes or joint pain. CXR pending read but appears similar to prior w/o infiltrates/consolidations. WBC 2.94 (from 1.95) with increased lymphocyte %. Covid/Flu/RSV negative.  MRSA negative. Legionella/strep negative.   Patient is still spiking fevers, tmax 103F on 10/28. Last fever 101F on 10/28.  s/p zosyn 4.5g q8h x 7 days (10/21-10/27)  - ofirmev as needed for fevers, max 4g in 24 hours (can give toradol if maxxed out)    #Systemic inflammatory response syndrome (SIRS).   ·  Plan: POA. Meeting 2/4 SIRS criteria (T 102.9, ) without clear source. Pt has been coughing over the last 2 days with fever at home T 101. Pt has been hiccuping c/b vomiting. S/p Vanc 1g, Zosyn 3.375g in ED. BCx collected x2 in ED. Patient HDS, appearing well, AOx3, warm to touch. Found to have pneumonia and CMV.  s/p 80 cc/hr LR x 12 hrs  - C/w tx as above    #Requirement for O2  Not on home O2. Currently on 2L. Tried weaning today, but patient satting at 90% on RA.  - wean O2, currently on 2L  - start incentive spirometer    #Hiccups.   ·  Plan: Pt has had hiccups since his dc last week. Rad-onc started pt on Zofran which did not help. S/p Reglan in ED w/o improvement. Hiccups have caused the pt N/V.  S/P gabapentin 300 mg x1 10/21 AM, switched to baclofen on 10/25  Patient unable to tolerate PO, was able to do so when on gabapentin. Switched baclofen back to eduar on 10/27.   - uptitrate gabapentin 300 qd to bid    #Odynophagia.   ·  Plan: Pt c/p pain with swallowing since his RT on 10/15, with poor PO intake. Likely 2/2 vomiting vs RT.  As per bedside swallow assessment: no cough, throat clear or change in voice when taking PO trials. Pt is denying difficulty or pain with swallowing. Reports discomfort when he has hiccups.  s/p EGD 10/23: Erosive, congestion and abnormal vascularity in the middle third of the esophagus. (Biopsy). Esophageal hiatal hernia. Erythema in the whole stomach compatible with non-erosive gastritis. (Biopsy). Normal mucosa in the whole examined duodenum.  CT C/A/P:  Bilateral upper lobe pneumonia versus aspiration. Superimposed upper lobe pulmonary congestion/edema. Small pleural effusions bilaterally. 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 Mid-distal esophagitis, possibly treatment-related or gastroesophageal reflux associated with hiatal hernia.  - lozenges PRN  - c/w sucralfate 1g q8h for radiation esophagitis  - c/w pantoprazole.    #Anemia.   ·  Plan: Hgb 8.5, previously 8.0 on 10/15. RDW elevated. Leukopenia. Plt 152. Likely 2/2 chemotx. Pt denies bleeding, bruising. Denies hematuria. Has not had BMs for 5-6 days, unable to assess for melena. Hgb 6.7 (10/28) s/p 1U pRBC (10/28), post-transfusion CBC showing Hgb of 8.7 on 10/29.  - CTM since ganciclovir can cause cytopenias.    #High serum ferritin.   ·  Plan: Ferritin > 8000. In 09/2024 ferritin levels ~2000  Likely 2/2 to cancer + infection     #Hyponatremia.   ·  Plan: Na 127 on admission, previously 134. Pt denies HA but admits to N/V, which is chronic for him. Has had poor PO intake iso N/V. Drinks gatorade and ensures. No visual disturbances, palpitations. Corrected Na 128. Calculated serum osm 268. S/p 2L NS in ED. Na improved to 131 s/p NS 2 L. Na has dropped to 128 today. No CNS changes. Likely d/t poor PO intake. Na 129 (10/29)  Urine Studies 10/25: uOsm 451, Jeremi 55, TSH 0.685 wnl. Likely SIADH.     #Diabetes  AIC 7.2 (9/2024). Home med: metformin 500 mg    #HTN  Home med: amlodipine 5 mg     #Squamous cell cancer  AJCC IIIB, [id number] negative. 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 S/p 7 cycles of neoadjuvant platinum based with gemcitabine and nivolumab chemoimmunotherapy. Follows with Dr. Mueller for RT, s/p last round of RT on 10/15.   Follows with Dr. Larose outpatient.        Patient was discharged to: home with home PT    New medications:   Changes to old medications:  Medications that were stopped:   Items to follow up as outpatient: infectious disease, heme/onc  Physical exam at the time of discharge: AAOx3, CTA b/l, RRR, abd nontender/nondistended, LE w/o swelling, hiccupping #Discharge:     Hospital Course: 69M w/ PMHx of non-operable lung cancer, DM, HTN, HLD recently admitted for symptomatic anemia and chemotherapy-induced nausea and vomiting (10/14-10/15) presenting with hiccups, nausea, vomiting, and fever found to have RML pnuemonia. S/p EGD 10/23, with medium size hiatal hernia, diffuse erythema of mucosa of whole stomach and CMV esophagitis. Started on pantoprazole IV BID and sucralfate. Started on ganciclovir 5 mg/kg IV q12h (10/24). Patient s/p empiric zosyn 4.5 mg 10/21-10/27. Patient has been having fevers daily, given ofirmev each time and all bcx are NGTD thus far (10/21, 10/23, 10/24, 10/26). Tmax of 103F (10/27). s/p 1U pRBC 10/28. Patient has ongoing issue of hiccups and inability to tolerate PO as he will vomit/regurgitate his food 2/2 hiccups. Currently on gabapentin 300 BID with improvement of tolerating PO. ID, heme/onc following.     Problem List/Main Diagnoses (system-based):  #CMV (cytomegalovirus).   s/p EGD with biopsy 10/23. Risk factor is chemo, HIV negative. CMV PCR 10/24 126k. CMV PCR 5.10 (elevated).  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 Small focus of epithelium with atypia c/w prior radiation antrum no significant pathology. Negative for H pylori. No pulmonary consult necessary at this time as patient is being treated with Zosyn for PNA and CMV esophagitis and pneumonitis simultaneously is rare.  Febrile to 102.3 (oral) on 10/26 AM. Overnight, fever of 100.8F.  All Bcx NGTD thus far (10/26, 10/24, 10/21)  CMV PCR specimen received 10/28  As per ID,   - Started on ganciclovir 5mg/kg IV q12h (10/24 - )  - Monitor for ocular symptoms    #Pneumonia.   ·  Plan: Pt is immunocompromised iso lung cancer and chemo/RT px with 2 days of cough and 1 day of fever. Pt was dc'd on 10/15 and started having intractable hiccups c/b nausea and vomiting. ?Aspiration pna. Cough is dry. Pt denies SOB, CP, abd pain, diarrhea, dysuria, rashes or joint pain. CXR pending read but appears similar to prior w/o infiltrates/consolidations. WBC 2.94 (from 1.95) with increased lymphocyte %. Covid/Flu/RSV negative.  MRSA negative. Legionella/strep negative.   Patient is still spiking fevers, tmax 103F on 10/28. Last fever 101F on 10/28.  s/p zosyn 4.5g q8h x 7 days (10/21-10/27)  - ofirmev as needed for fevers    #Systemic inflammatory response syndrome (SIRS).   ·  Plan: POA. Meeting 2/4 SIRS criteria (T 102.9, ) without clear source. Pt has been coughing over the last 2 days with fever at home T 101. Pt has been hiccuping c/b vomiting. S/p Vanc 1g, Zosyn 3.375g in ED. BCx collected x2 in ED. Patient HDS, appearing well, AOx3, warm to touch. Found to have pneumonia and CMV.  s/p 80 cc/hr LR x 12 hrs  - C/w tx as above    #Requirement for O2  Not on home O2. Was on 2L inpatient.  - O2 weaned    #Hiccups.   ·  Plan: Pt has had hiccups since his dc last week. Rad-onc started pt on Zofran which did not help. S/p Reglan in ED w/o improvement. Hiccups have caused the pt N/V.  S/P gabapentin 300 mg x1 10/21 AM, switched to baclofen on 10/25  Patient unable to tolerate PO, was able to do so when on gabapentin. Switched baclofen back to eduar on 10/27.   - gabapentin 300 bid    #Odynophagia.   ·  Plan: Pt c/p pain with swallowing since his RT on 10/15, with poor PO intake. Likely 2/2 vomiting vs RT.  As per bedside swallow assessment: no cough, throat clear or change in voice when taking PO trials. Pt is denying difficulty or pain with swallowing. Reports discomfort when he has hiccups.  s/p EGD 10/23: Erosive, congestion and abnormal vascularity in the middle third of the esophagus. (Biopsy). Esophageal hiatal hernia. Erythema in the whole stomach compatible with non-erosive gastritis. (Biopsy). Normal mucosa in the whole examined duodenum.  CT C/A/P:  Bilateral upper lobe pneumonia versus aspiration. Superimposed upper lobe pulmonary congestion/edema. Small pleural effusions bilaterally. 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 Mid-distal esophagitis, possibly treatment-related or gastroesophageal reflux associated with hiatal hernia.  - lozenges PRN  - c/w sucralfate 1g q8h for radiation esophagitis  - c/w pantoprazole.    #Anemia.   ·  Plan: Hgb 8.5, previously 8.0 on 10/15. RDW elevated. Leukopenia. Plt 152. Likely 2/2 chemotx. Pt denies bleeding, bruising. Denies hematuria. Has not had BMs for 5-6 days, unable to assess for melena. Hgb 6.7 (10/28) s/p 1U pRBC (10/28), post-transfusion CBC showing Hgb of 8.7 on 10/29.  - CTM since ganciclovir can cause cytopenias.    #High serum ferritin.   ·  Plan: Ferritin > 8000. In 09/2024 ferritin levels ~2000  Likely 2/2 to cancer + infection     #Hyponatremia.   ·  Plan: Na 127 on admission, previously 134. Pt denies HA but admits to N/V, which is chronic for him. Has had poor PO intake iso N/V. Drinks gatorade and ensures. No visual disturbances, palpitations. Corrected Na 128. Calculated serum osm 268. S/p 2L NS in ED. Na improved to 131 s/p NS 2 L. Na has dropped to 128 today. No CNS changes. Likely d/t poor PO intake. Na 129 (10/29)  Urine Studies 10/25: uOsm 451, Jeremi 55, TSH 0.685 wnl. Likely SIADH.     #Diabetes  AIC 7.2 (9/2024). Home med: metformin 500 mg    #HTN  Home med: amlodipine 5 mg     #Squamous cell cancer  AJCC IIIB, [id number] negative. 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 S/p 7 cycles of neoadjuvant platinum based with gemcitabine and nivolumab chemoimmunotherapy. Follows with Dr. Telcat for RT, s/p last round of RT on 10/15.   Follows with Dr. Larose outpatient.      Patient was discharged to: home with home PT    New medications: ganciclovir, gabapentin, sucralfate, protonix  Changes to old medications: --  Medications that were stopped: --  Items to follow up as outpatient: infectious disease, heme/onc, primary care  Physical exam at the time of discharge: AAOx3, CTA b/l, RRR, abd nontender/nondistended, LE w/o swelling #Discharge:     Hospital Course: 69M w/ PMHx of non-operable lung cancer, DM, HTN, HLD recently admitted for symptomatic anemia and chemotherapy-induced nausea and vomiting (10/14-10/15) presenting with hiccups, nausea, vomiting, and fever found to have RML pnuemonia. S/p EGD 10/23, with medium size hiatal hernia, diffuse erythema of mucosa of whole stomach and CMV esophagitis. Started on pantoprazole IV BID and sucralfate. Started on ganciclovir 5 mg/kg IV q12h (10/24). Patient s/p empiric zosyn 4.5 mg 10/21-10/27. Patient has been having fevers daily, given ofirmev each time and all bcx are NGTD thus far (10/21, 10/23, 10/24, 10/26). Tmax of 103F (10/27). s/p 1U pRBC 10/28. Patient has ongoing issue of hiccups and inability to tolerate PO as he will vomit/regurgitate his food 2/2 hiccups. Currently on gabapentin 300 BID with improvement of tolerating PO. ID, heme/onc following. Afebrile since PM of 11/1, able to transition to PO antivirals and stable for discharge home.    Problem List/Main Diagnoses (system-based):  #CMV (cytomegalovirus).   s/p EGD with biopsy 10/23. Risk factor is chemo, HIV negative. CMV PCR 10/24 126k. CMV PCR 10/28 5.10 (elevated). Repeat CMV PCR 11/4   Patient found to have esophageal ulcer showing CMV viral inclusions. Small focus of epithelium with atypia c/w prior radiation antrum no significant pathology. Negative for H pylori.  All Bcx NGTD (10/26, 10/24, 10/21)  Last fever 11/1 PM at 100.6  - Ganciclovir 5mg/kg IV q12h started 10/24  - Afebrile for 2 days, stable for transition to PO antivirals  - F/u outpatient    #Pneumonia.   Pt is immunocompromised iso lung cancer and chemo/RT px with 2 days of cough and 1 day of fever. Pt was dc'd on 10/15 and started having intractable hiccups c/b nausea and vomiting, c/f aspiration pna. CXR appears similar to prior w/o infiltrates/consolidations. WBC 2.94 (from 1.95) with increased lymphocyte %. Covid/Flu/RSV negative.  MRSA negative. Legionella/strep negative.   Last fever 11/1 at 100.6.  - Completed course of zosyn 4.5g q8h x 7 days (10/21-10/27)    #Systemic inflammatory response syndrome (SIRS).   Meeting 2/4 SIRS criteria on admission (T 102.9, ) without clear source. Pt has been coughing over the last 2 days with fever at home T 101. Pt has been hiccuping c/b vomiting. Patient HDS, appearing well, AOx3, warm to touch. Found to have pneumonia and CMV. Bcx negative. Afebrile since 11/1.  -  Completed course of zosyn 4.5g q8h x 7 days (10/21-10/27)    #Requirement for O2  Not on home O2. Was on 2L inpatient.  - O2 weaned to RA.     #Hiccups.   Pt has had hiccups since his dc last week. Rad-onc started pt on Zofran which did not help. S/p Reglan in ED w/o improvement. Hiccups have caused the pt N/V.  - Patient doing well on gabapentin 300 bid, continue outpatient    #Odynophagia.   Pt c/p pain with swallowing since his RT on 10/15, with poor PO intake. Likely 2/2 vomiting vs RT.  Bedside swallow assessment: no cough, throat clear or change in voice when taking PO trials. Pt is denying difficulty or pain with swallowing. Reports discomfort when he has hiccups.  EGD 10/23: Erosive, congestion and abnormal vascularity in the middle third of the esophagus. (Biopsy). Esophageal hiatal hernia. Erythema in the whole stomach compatible with non-erosive gastritis. (Biopsy). Normal mucosa in the whole examined duodenum.  CT C/A/P:  Bilateral upper lobe pneumonia versus aspiration. Superimposed upper lobe pulmonary congestion/edema. Small pleural effusions bilaterally. 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 Mid-distal esophagitis, possibly treatment-related or gastroesophageal reflux associated with hiatal hernia.  - lozenges PRN  - c/w sucralfate 1g q8h for radiation esophagitis  - c/w pantoprazole.    #Anemia.   Hgb 8.5, previously 8.0 on 10/15. RDW elevated. Leukopenia. Plt 152. Likely 2/2 chemotx. Pt denies bleeding, bruising. Denies hematuria. Has not had BMs for 5-6 days, unable to assess for melena. Hgb 6.7 (10/28) s/p 1U pRBC (10/28), post-transfusion CBC showing Hgb of 8.7 on 10/29.  - CTM since ganciclovir can cause cytopenias.    #High serum ferritin.   Ferritin > 8000. In 09/2024 ferritin levels ~2000  Likely 2/2 to cancer + infection   -CTM    #Hyponatremia.   Na 127 on admission, previously 134. Pt denies HA but admits to N/V, which is chronic for him. Has had poor PO intake iso N/V. Drinks gatorade and ensures. No visual disturbances, palpitations. Corrected Na 128. Calculated serum osm 268. S/p 2L NS in ED. Na improved to 131 s/p NS 2 L. Na has dropped to 128 today. No CNS changes. Likely d/t poor PO intake. Na 131 on 11/4.   Urine Studies 10/25: uOsm 451, Jeremi 55, TSH 0.685 wnl. Likely SIADH.   - CTM    #Diabetes  AIC 7.2 (9/2024). Home med: metformin 500 mg    #HTN  Home med: amlodipine 5 mg     #Squamous cell cancer  AJCC IIIB, [id number] negative. 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 S/p 7 cycles of neoadjuvant platinum based with gemcitabine and nivolumab chemoimmunotherapy. Follows with Dr. Mueller for RT, s/p last round of RT on 10/15.   Follows with Dr. Larose outpatient.      Patient was discharged to: home with home PT    New medications: ganciclovir, gabapentin, sucralfate, protonix  Changes to old medications: --  Medications that were stopped: --  Items to follow up as outpatient: infectious disease, heme/onc, primary care  Physical exam at the time of discharge: AAOx3, CTA b/l, RRR, abd nontender/nondistended, LE w/o swelling #Discharge:     Hospital Course: 69M w/ PMHx of non-operable lung cancer, DM, HTN, HLD recently admitted for symptomatic anemia and chemotherapy-induced nausea and vomiting (10/14-10/15) presenting with hiccups, nausea, vomiting, and fever found to have RML pnuemonia. S/p EGD 10/23, with medium size hiatal hernia, diffuse erythema of mucosa of whole stomach and CMV esophagitis. Started on pantoprazole IV BID and sucralfate. Started on ganciclovir 5 mg/kg IV q12h (10/24). Patient s/p empiric zosyn 4.5 mg 10/21-10/27. Patient has been having fevers daily, given ofirmev each time and all bcx are NGTD thus far (10/21, 10/23, 10/24, 10/26). Tmax of 103F (10/27). s/p 1U pRBC 10/28. Patient has ongoing issue of hiccups and inability to tolerate PO as he will vomit/regurgitate his food 2/2 hiccups. Currently on gabapentin 300 BID with improvement of tolerating PO. ID, heme/onc following. Afebrile since PM of 11/1, able to transition to PO valgancyclovir and stable for discharge home.    Problem List/Main Diagnoses (system-based):  #CMV (cytomegalovirus).   s/p EGD with biopsy 10/23. Risk factor is chemo, HIV negative. CMV PCR 10/24 126k. CMV PCR 10/28 5.10 (elevated). Repeat CMV PCR 11/4 pending  Patient found to have esophageal ulcer showing CMV viral inclusions. Small focus of epithelium with atypia c/w prior radiation antrum no significant pathology. Negative for H pylori.  All Bcx NGTD (10/26, 10/24, 10/21)  Last fever 11/1 PM at 100.6  - Ganciclovir 5mg/kg IV q12h started 10/24  - Afebrile for 2 days, stable for transition to PO antivirals, will transition to valgancyclovir 900 q12 until 11/14  - F/u outpatient with ID Dr. Lopez in 1 wk    #Pneumonia.   Pt is immunocompromised iso lung cancer and chemo/RT px with 2 days of cough and 1 day of fever. Pt was dc'd on 10/15 and started having intractable hiccups c/b nausea and vomiting, c/f aspiration pna. CXR appears similar to prior w/o infiltrates/consolidations. WBC 2.94 (from 1.95) with increased lymphocyte %. Covid/Flu/RSV negative.  MRSA negative. Legionella/strep negative.   Last fever 11/1 at 100.6.  - Completed course of zosyn 4.5g q8h x 7 days (10/21-10/27)    #Systemic inflammatory response syndrome (SIRS).   Meeting 2/4 SIRS criteria on admission (T 102.9, ) without clear source. Pt has been coughing over the last 2 days with fever at home T 101. Pt has been hiccuping c/b vomiting. Patient HDS, appearing well, AOx3, warm to touch. Found to have pneumonia and CMV. Bcx negative. Afebrile since 11/1.  -  Completed course of zosyn 4.5g q8h x 7 days (10/21-10/27)    #Requirement for O2  Not on home O2. Was on 2L inpatient.  - O2 weaned to RA.     #Hiccups.   Pt has had hiccups since his dc last week. Rad-onc started pt on Zofran which did not help. S/p Reglan in ED w/o improvement. Hiccups have caused the pt N/V.  - Patient doing well on gabapentin 300 bid, continue outpatient    #Odynophagia.   Pt c/p pain with swallowing since his RT on 10/15, with poor PO intake. Likely 2/2 vomiting vs RT.  Bedside swallow assessment: no cough, throat clear or change in voice when taking PO trials. Pt is denying difficulty or pain with swallowing. Reports discomfort when he has hiccups.  EGD 10/23: Erosive, congestion and abnormal vascularity in the middle third of the esophagus. (Biopsy). Esophageal hiatal hernia. Erythema in the whole stomach compatible with non-erosive gastritis. (Biopsy). Normal mucosa in the whole examined duodenum.  CT C/A/P:  Bilateral upper lobe pneumonia versus aspiration. Superimposed upper lobe pulmonary congestion/edema. Small pleural effusions bilaterally. 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 Mid-distal esophagitis, possibly treatment-related or gastroesophageal reflux associated with hiatal hernia.  - lozenges PRN  - c/w sucralfate 1g q8h for radiation esophagitis  - c/w pantoprazole.    #Anemia.   Hgb 8.5, previously 8.0 on 10/15. RDW elevated. Leukopenia. Plt 152. Likely 2/2 chemotx. Pt denies bleeding, bruising. Denies hematuria. Has not had BMs for 5-6 days, unable to assess for melena. Hgb 6.7 (10/28) s/p 1U pRBC (10/28), post-transfusion CBC showing Hgb of 8.7 on 10/29.  - CTM since ganciclovir can cause cytopenias.    #High serum ferritin.   Ferritin > 8000. In 09/2024 ferritin levels ~2000  Likely 2/2 to cancer + infection   -CTM    #Hyponatremia.   Na 127 on admission, previously 134. Pt denies HA but admits to N/V, which is chronic for him. Has had poor PO intake iso N/V. Drinks gatorade and ensures. No visual disturbances, palpitations. Corrected Na 128. Calculated serum osm 268. S/p 2L NS in ED. Na improved to 131 s/p NS 2 L. Na has dropped to 128 today. No CNS changes. Likely d/t poor PO intake. Na 131 on 11/4.   Urine Studies 10/25: uOsm 451, Jeremi 55, TSH 0.685 wnl. Likely SIADH.   - CTM    #Diabetes  AIC 7.2 (9/2024). Home med: metformin 500 mg    #HTN  Home med: amlodipine 5 mg     #Squamous cell cancer  AJCC IIIB, [id number] negative. 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 S/p 7 cycles of neoadjuvant platinum based with gemcitabine and nivolumab chemoimmunotherapy. Follows with Dr. Mueller for RT, s/p last round of RT on 10/15.   Follows with Dr. Larose outpatient.      Patient was discharged to: home with home PT    New medications: ganciclovir, gabapentin, sucralfate, protonix, valgancyclovir  Changes to old medications: --  Medications that were stopped: --  Items to follow up as outpatient: infectious disease, heme/onc, primary care  Physical exam at the time of discharge: AAOx3, CTA b/l, RRR, abd nontender/nondistended, LE w/o swelling #Discharge:     Hospital Course: 69M w/ PMHx of non-operable lung cancer, DM, HTN, HLD recently admitted for symptomatic anemia and chemotherapy-induced nausea and vomiting (10/14-10/15) presenting with hiccups, nausea, vomiting, and fever found to have RML pnuemonia. S/p EGD 10/23, with medium size hiatal hernia, diffuse erythema of mucosa of whole stomach and CMV esophagitis. Started on pantoprazole IV BID and sucralfate. Started on ganciclovir 5 mg/kg IV q12h (10/24). Patient s/p empiric zosyn 4.5 mg 10/21-10/27. Patient has been having fevers daily, given ofirmev each time and all bcx are NGTD thus far (10/21, 10/23, 10/24, 10/26). Tmax of 103F (10/27). s/p 1U pRBC 10/28. Patient has ongoing issue of hiccups and inability to tolerate PO as he will vomit/regurgitate his food 2/2 hiccups. Currently on gabapentin 300 BID with improvement of tolerating PO. ID, heme/onc following. Afebrile since PM of 11/1, able to transition to PO valgancyclovir and stable for discharge home.    Problem List/Main Diagnoses (system-based):  #CMV (cytomegalovirus).   s/p EGD with biopsy 10/23. Risk factor is chemo, HIV negative. CMV PCR 10/24 126k. CMV PCR 10/28 5.10 (elevated). Repeat CMV PCR 11/4 pending  Patient found to have esophageal ulcer showing CMV viral inclusions. Small focus of epithelium with atypia c/w prior radiation antrum no significant pathology. Negative for H pylori.  All Bcx NGTD (10/26, 10/24, 10/21)  Last fever 11/1 PM at 100.6  - Ganciclovir 5mg/kg IV q12h started 10/24  - Afebrile for 2 days, stable for transition to PO antivirals, will transition to valgancyclovir 900 q12 on DISCHARGE until 11/14  - F/u outpatient with ID Dr. Lopez in 1 wk    #Pneumonia.   Pt is immunocompromised iso lung cancer and chemo/RT px with 2 days of cough and 1 day of fever. Pt was dc'd on 10/15 and started having intractable hiccups c/b nausea and vomiting, c/f aspiration pna. CXR appears similar to prior w/o infiltrates/consolidations. WBC 2.94 (from 1.95) with increased lymphocyte %. Covid/Flu/RSV negative.  MRSA negative. Legionella/strep negative.   Last fever 11/1 at 100.6.  - Completed course of zosyn 4.5g q8h x 7 days (10/21-10/27)    #Systemic inflammatory response syndrome (SIRS).   Meeting 2/4 SIRS criteria on admission (T 102.9, ) without clear source. Pt has been coughing over the last 2 days with fever at home T 101. Pt has been hiccuping c/b vomiting. Patient HDS, appearing well, AOx3, warm to touch. Found to have pneumonia and CMV. Bcx negative. Afebrile since 11/1.  -  Completed course of zosyn 4.5g q8h x 7 days (10/21-10/27)    #Requirement for O2  Not on home O2. Was on 2L inpatient and titrated down. Likely d/t atelectasis    #Hiccups.   Pt has had hiccups since his dc last week. Rad-onc started pt on Zofran which did not help. S/p Reglan in ED w/o improvement. Hiccups have caused the pt N/V.  - Patient doing well on gabapentin 300 bid, continue outpatient    #Odynophagia.   Pt c/p pain with swallowing since his RT on 10/15, with poor PO intake. Likely 2/2 vomiting vs RT.  Bedside swallow assessment: no cough, throat clear or change in voice when taking PO trials. Pt is denying difficulty or pain with swallowing. Reports discomfort when he has hiccups.  EGD 10/23: Erosive, congestion and abnormal vascularity in the middle third of the esophagus. (Biopsy). Esophageal hiatal hernia. Erythema in the whole stomach compatible with non-erosive gastritis. (Biopsy). Normal mucosa in the whole examined duodenum.  CT C/A/P:  Bilateral upper lobe pneumonia versus aspiration. Superimposed upper lobe pulmonary congestion/edema. Small pleural effusions bilaterally. 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 Mid-distal esophagitis, possibly treatment-related or gastroesophageal reflux associated with hiatal hernia.  - lozenges PRN; sucralfate 1g q8h for radiation esophagitis; pantoprazole.    #Anemia.   Hgb 8.5, previously 8.0 on 10/15. RDW elevated. Leukopenia. Plt 152. Likely 2/2 chemotx. Pt denies bleeding, bruising. Denies hematuria. Has not had BMs for 5-6 days, unable to assess for melena. Hgb 6.7 (10/28) s/p 1U pRBC (10/28), post-transfusion CBC showing Hgb of 8.7 on 10/29.    #High serum ferritin.   Ferritin > 8000. In 09/2024 ferritin levels ~2000  Likely 2/2 to cancer + infection     #Hyponatremia.   Na 127 on admission, previously 134. Pt denies HA but admits to N/V, which is chronic for him. Has had poor PO intake iso N/V. Drinks gatorade and ensures. No visual disturbances, palpitations. Corrected Na 128. Calculated serum osm 268. S/p 2L NS in ED. Na improved to 131 s/p NS 2 L. Na has dropped to 128 today. No CNS changes. Likely d/t poor PO intake. Na 130 on 11/4 - day of dc   Urine Studies 10/25: uOsm 451, Jeremi 55, TSH 0.685 wnl. Likely SIADH.     #Diabetes  AIC 7.2 (9/2024). Home med: metformin 500 mg    #HTN  Home med: amlodipine 5 mg     #Squamous cell cancer  AJCC IIIB, [id number] negative. 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 S/p 7 cycles of neoadjuvant platinum based with gemcitabine and nivolumab chemoimmunotherapy. Follows with Dr. Mueller for RT, s/p last round of RT on 10/15.   Follows with Dr. Larose outpatient.    Patient was discharged to: home with home PT    New medications: gabapentin 300mg BID, sucralfate, protonix, valgancyclovir 900mg q12 thru 11/14  Changes to old medications: --  Medications that were stopped: --  Items to follow up as outpatient: infectious disease, heme/onc, primary care  Physical exam at the time of discharge: AAOx3, CTA b/l, RRR, abd nontender/nondistended, LE w/o swelling    Attending attestation - pt seen and examined  69M w non-operable lung CA, DM2, HTN, HLD, recent admitted earlier this month for symptomatic anemia and chemo-induced N/V 10/14-15 now w worsening nausea and vomiting w odynophagia and dysphagia of solids>liquids and fever, admitted to due concern for sepsis and on IV zosyn, EGD 10/23 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 Eating and drinking wo odynophagia, dysphagia, N/V/Abd pain. Walking wo LH/dizziness.  Exam: male in NAD room air. MMM, RRR, nml resp effort, CTAB, Abd soft, NABS, non-tender, alert, moving all extremities. 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 On IV Ganciclovir transitioned to PO Valcyte thru 11/14  #Radiation esophagitis - appears to be improved  #Sepsis d/t pneumonia - improved w 7d zosyn. Unlikely CMV   #Lung Cancer  #Hyponatremia - 130. Likely d/t poor PO intake  #HTN - on amlodipine 5  #HLD - on atorva 20  #Leukopenia wo neutropenia - WBC 3.47 today w ANC 2.44. Lymphopenia also improved w treatment of CMV  #Normocytic anemia - hgb 7.8 today. Retic shows adequate production. No signs/sxs of blood loss  Stable for DC home w HPT. Discussed w housestaff; Onc - Dr. Larose

## 2024-10-24 NOTE — PROGRESS NOTE ADULT - NSPROGADDITIONALINFOA_GEN_ALL_CORE
Prophylactic Measures:  F: s/p 80 cc/hr LR x 12 hrs  E: replete PRN  N: DASH/CC  DVT: lovenox  GI ppx: PPI

## 2024-10-24 NOTE — DISCHARGE NOTE PROVIDER - NSDCMRMEDTOKEN_GEN_ALL_CORE_FT
amLODIPine 5 mg oral tablet: 1 tab(s) orally once a day  atorvastatin 20 mg oral tablet: 1 tab(s) orally once a day  Decadron 4 mg oral tablet: 1 tab(s) orally 2 times a day Takes 5tabs qhs the night before chemo and 5 tabs in the AM during chemo.  metFORMIN 500 mg oral tablet: 1 tab(s) orally 2 times a day  polyethylene glycol 3350 oral powder for reconstitution: 17 gram(s) orally every 24 hours as needed for  constipation  senna leaf extract oral tablet: 2 tab(s) orally once a day (at bedtime) as needed for  constipation   amLODIPine 5 mg oral tablet: 1 tab(s) orally once a day  atorvastatin 20 mg oral tablet: 1 tab(s) orally once a day  Decadron 4 mg oral tablet: 1 tab(s) orally 2 times a day Takes 5tabs qhs the night before chemo and 5 tabs in the AM during chemo.  gabapentin 300 mg oral capsule: 300 milligram(s) orally 2 times a day  metFORMIN 500 mg oral tablet: 1 tab(s) orally 2 times a day  pantoprazole 40 mg oral delayed release tablet: 1 tab(s) orally once a day (before a meal)  polyethylene glycol 3350 oral powder for reconstitution: 17 gram(s) orally every 24 hours as needed for  constipation  senna leaf extract oral tablet: 2 tab(s) orally once a day (at bedtime) as needed for  constipation  sucralfate 1 g oral tablet: 1 tab(s) orally every 6 hours   amLODIPine 5 mg oral tablet: 1 tab(s) orally once a day  atorvastatin 20 mg oral tablet: 1 tab(s) orally once a day  Decadron 4 mg oral tablet: 1 tab(s) orally 2 times a day Takes 5tabs qhs the night before chemo and 5 tabs in the AM during chemo.  gabapentin 300 mg oral capsule: 300 milligram(s) orally 2 times a day  metFORMIN 500 mg oral tablet: 1 tab(s) orally 2 times a day  pantoprazole 40 mg oral delayed release tablet: 1 tab(s) orally once a day (before a meal)  polyethylene glycol 3350 oral powder for reconstitution: 17 gram(s) orally every 24 hours as needed for  constipation  senna leaf extract oral tablet: 2 tab(s) orally once a day (at bedtime) as needed for  constipation  sucralfate 1 g oral tablet: 1 tab(s) orally every 6 hours  Valcyte 450 mg oral tablet: 2 tab(s) orally every 12 hours   amLODIPine 5 mg oral tablet: 1 tab(s) orally once a day  atorvastatin 20 mg oral tablet: 1 tab(s) orally once a day  gabapentin 300 mg oral capsule: 300 milligram(s) orally 2 times a day  metFORMIN 500 mg oral tablet: 1 tab(s) orally 2 times a day  pantoprazole 40 mg oral delayed release tablet: 1 tab(s) orally once a day (before a meal)  polyethylene glycol 3350 oral powder for reconstitution: 17 gram(s) orally every 24 hours as needed for  constipation  senna leaf extract oral tablet: 2 tab(s) orally once a day (at bedtime) as needed for  constipation  sucralfate 1 g oral tablet: 1 tab(s) orally every 6 hours  Valcyte 450 mg oral tablet: 2 tab(s) orally every 12 hours

## 2024-10-24 NOTE — PROGRESS NOTE ADULT - ASSESSMENT
69M w/ PMHx of non-operable lung cancer, DM, HTN, HLD recently admitted for symptomatic anemia and chemotherapy-induced nausea and vomiting (10/14-10/15) presenting with nausea, vomiting, and fever and GI was consulted for odynophagia and now s/p EGD w/ findings most c/w radiation esophagitis:    Recommendations:   - Pantoprazole 40 mg daily  - Sucralfate suspension 1 g q6h  - F/u pathology  - Advance diet as tolerated  - GI will sign off at this time. Please re-consult if patient's clinical status changes or you have further questions    Case discussed w/ GI attending Dr. Urbano Hare MD  PGY-4 GI Fellow  GI consult pager (M-F 7a-6s): [phone]  Please call  for on-call fellow after hours

## 2024-10-24 NOTE — DISCHARGE NOTE PROVIDER - CARE PROVIDER_API CALL
Marcelo Larose  Medical Oncology  [address]  [place] [address]  Phone: [phone]  Fax: [phone]  Scheduled Appointment: 10/31/2024

## 2024-10-24 NOTE — PROGRESS NOTE ADULT - PROBLEM SELECTOR PLAN 9
Hx of squamous cell lung ca, AJCC IIIB, D1N3M3-[id number] negative. 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 S/p 7 cycles of neoadjuvant platinum based with gemcitabine and nivolumab chemoimmunotherapy. Follows with Dr. Mueller for RT, s/p last round of RT on 10/15.   - NTD Na 127 on admission, previously 134. Pt denies HA but admits to N/V, which is chronic for him. Has had poor PO intake iso N/V. Drinks gatorade and ensures. No visual disturbances, palpitations.   Corrected Na 128. Calculated serum osm 268. S/p 2L NS in ED. Na improved to 131 s/p NS 2 L. Na has dropped to 129 today. No CNS changes.   Likely d/t poor PO intake.  - CTM  - f/u repeat urine studies

## 2024-10-24 NOTE — DISCHARGE NOTE PROVIDER - DETAILS OF MALNUTRITION DIAGNOSIS/DIAGNOSES
This patient has been assessed with a concern for Malnutrition and was treated during this hospitalization for the following Nutrition diagnosis/diagnoses:     -  10/25/2024: Severe protein-calorie malnutrition

## 2024-10-24 NOTE — PROGRESS NOTE ADULT - ATTENDING COMMENTS
69M w/ PMH lung cancer on chemotherapy and radiation, recent admission for symptomatic anemia due to chemo p/w worsenig nausea/vomiting, odynophagia/dysphagia, fever, imaging concerning for PNA, found to have radiation esophagitis and hiatal hernia on EGD 10/23.     Episode of emesis of food products overnight. Otherwise denies nausea and reports being able to eat. Denies cough, dyspnea. Another fever in  - patient denies feeling fever symptoms.     #Fever  #Lung Cancer  - Unclear etiology and chronicity - could related to underlying malignancy (although possibly acute?) vs PNA although has been in IV zosyn and continues to be intermittently febrile   - Monitor fever trend  - Touch base with oncology re: fever - if expected due to cancer or if patient needs additional work up    #Dysphagia  #Radiation Esophagitis  - S/p EGD by GI - pathology sent  - Follow up final path  - No other intervention by GI at this time  - C/w PPI, sucralfate    #Pneumonia  - C/w IV zosyn x7 days - transition to PO augmentin if discharged     #Anemia  - Hb 7.1 today - likely due to bone marrow suppression from chemo - no evidence of acute bleed  - Trend CBC - transfuse >7     #Hyponatremia  - Na 128 - has been stabley low during admission - likely related to poor PO intake  - Check urine studies    #Hypokalemia  - K 3.3 today - replete

## 2024-10-24 NOTE — PROGRESS NOTE ADULT - SUBJECTIVE AND OBJECTIVE BOX
GASTROENTEROLOGY PROGRESS NOTE  Patient seen and examined at bedside. Patient reports that odynophagia is improving.     PERTINENT REVIEW OF SYSTEMS:  CONSTITUTIONAL: No weakness, fevers or chills  HEENT: No visual changes; No vertigo  GASTROINTESTINAL: As above.  NEUROLOGICAL: No numbness or weakness  SKIN: No itching, burning, rashes, or lesions     Allergies    No Known Allergies    Intolerances      MEDICATIONS:  MEDICATIONS  (STANDING):  amLODIPine   Tablet 5 milliGRAM(s) Oral daily  atorvastatin 20 milliGRAM(s) Oral at bedtime  dextrose 5%. 1000 milliLiter(s) (100 mL/Hr) IV Continuous <Continuous>  dextrose 5%. 1000 milliLiter(s) (50 mL/Hr) IV Continuous <Continuous>  dextrose 50% Injectable 25 Gram(s) IV Push once  dextrose 50% Injectable 25 Gram(s) IV Push once  dextrose 50% Injectable 12.5 Gram(s) IV Push once  enoxaparin Injectable 40 milliGRAM(s) SubCutaneous every 24 hours  gabapentin 300 milliGRAM(s) Oral daily  glucagon  Injectable 1 milliGRAM(s) IntraMuscular once  insulin lispro (ADMELOG) corrective regimen sliding scale   SubCutaneous Before meals and at bedtime  lactated ringers. 1000 milliLiter(s) (80 mL/Hr) IV Continuous <Continuous>  pantoprazole    Tablet 40 milliGRAM(s) Oral before breakfast  piperacillin/tazobactam IVPB.. 4.5 Gram(s) IV Intermittent every 8 hours  potassium chloride    Tablet ER 40 milliEquivalent(s) Oral once  potassium phosphate IVPB 30 milliMole(s) IV Intermittent once  sucralfate 1 Gram(s) Oral every 6 hours    MEDICATIONS  (PRN):  aluminum hydroxide/magnesium hydroxide/simethicone Suspension 30 milliLiter(s) Oral every 4 hours PRN Dyspepsia  benzocaine/menthol Lozenge 1 Lozenge Oral every 2 hours PRN Sore Throat  dextrose Oral Gel 15 Gram(s) Oral once PRN Blood Glucose LESS THAN 70 milliGRAM(s)/deciliter  lidocaine 2% Viscous 15 milliLiter(s) Mucosal every 8 hours PRN Sore throat  melatonin 3 milliGRAM(s) Oral at bedtime PRN Insomnia  ondansetron Injectable 4 milliGRAM(s) IV Push every 8 hours PRN Nausea and/or Vomiting  polyethylene glycol 3350 17 Gram(s) Oral every 24 hours PRN for constipation  senna 2 Tablet(s) Oral at bedtime PRN for constipation    Vital Signs Last 24 Hrs  T(C): 39 (24 Oct 2024 05:55), Max: 39.2 (23 Oct 2024 17:21)  T(F): 102.2 (24 Oct 2024 05:55), Max: 102.5 (23 Oct 2024 17:21)  HR: 119 (24 Oct 2024 05:55) (100 - 120)  BP: 115/64 (24 Oct 2024 05:55) (100/61 - 115/64)  BP(mean): 80 (23 Oct 2024 13:51) (80 - 80)  RR: 18 (24 Oct 2024 05:55) (18 - 20)  SpO2: 93% (24 Oct 2024 05:55) (90% - 97%)    Parameters below as of 24 Oct 2024 05:55  Patient On (Oxygen Delivery Method): nasal cannula  O2 Flow (L/min): 3      10-23 @ 07:01  -  10-24 @ 07:00  --------------------------------------------------------  IN: 0 mL / OUT: 400 mL / NET: -400 mL      PHYSICAL EXAM:    General: lying in bed, in no acute distress  HEENT: MMM, conjunctiva and sclera clear  Gastrointestinal: Soft non-tender non-distended; No rebound or guarding  Skin: Warm and dry. No obvious rash    LABS:                        7.1    3.19  )-----------( 152      ( 24 Oct 2024 08:50 )             20.5     10-24    128[L]  |  94[L]  |  18  ----------------------------<  109[H]  3.3[L]   |  24  |  0.80    Ca    7.9[L]      24 Oct 2024 08:50  Phos  2.0     10-24  Mg     1.7     10-24            Urinalysis Basic - ( 24 Oct 2024 08:50 )    Color: x / Appearance: x / SG: x / pH: x  Gluc: 109 mg/dL / Ketone: x  / Bili: x / Urobili: x   Blood: x / Protein: x / Nitrite: x   Leuk Esterase: x / RBC: x / WBC x   Sq Epi: x / Non Sq Epi: x / Bacteria: x                Culture - Blood (collected 23 Oct 2024 03:45)  Source: .Blood BLOOD  Preliminary Report (24 Oct 2024 07:01):    No growth at 24 hours    Culture - Blood (collected 21 Oct 2024 13:13)  Source: .Blood BLOOD  Preliminary Report (23 Oct 2024 18:01):    No growth at 48 Hours      RADIOLOGY & ADDITIONAL STUDIES:  Reviewed

## 2024-10-24 NOTE — PROGRESS NOTE ADULT - PROBLEM SELECTOR PLAN 12
F: 80cc LR bolus x 12h (10/23)  E: Replete as necessary K<4 Mg<2  N: DASH/CC  DVT Prophylaxis: Lovenox 40mg qd  GI prophylaxis: None   CODE STATUS: FULL Home meds: Amlodipine 5  - C/w home meds

## 2024-10-25 LAB
ALBUMIN SERPL ELPH-MCNC: 2.3 G/DL — LOW (ref 3.3–5)
ALP SERPL-CCNC: 112 U/L — SIGNIFICANT CHANGE UP (ref 40–120)
ALT FLD-CCNC: 24 U/L — SIGNIFICANT CHANGE UP (ref 10–45)
ANION GAP SERPL CALC-SCNC: 11 MMOL/L — SIGNIFICANT CHANGE UP (ref 5–17)
ANISOCYTOSIS BLD QL: SLIGHT — SIGNIFICANT CHANGE UP
AST SERPL-CCNC: 27 U/L — SIGNIFICANT CHANGE UP (ref 10–40)
BASOPHILS # BLD AUTO: 0 K/UL — SIGNIFICANT CHANGE UP (ref 0–0.2)
BASOPHILS NFR BLD AUTO: 0 % — SIGNIFICANT CHANGE UP (ref 0–2)
BILIRUB SERPL-MCNC: 0.8 MG/DL — SIGNIFICANT CHANGE UP (ref 0.2–1.2)
BUN SERPL-MCNC: 14 MG/DL — SIGNIFICANT CHANGE UP (ref 7–23)
CALCIUM SERPL-MCNC: 8 MG/DL — LOW (ref 8.4–10.5)
CHLORIDE SERPL-SCNC: 92 MMOL/L — LOW (ref 96–108)
CMV DNA CSF QL NAA+PROBE: HIGH IU/ML
CMV DNA SPEC NAA+PROBE-LOG#: 5.1 LOG10IU/ML — HIGH
CO2 SERPL-SCNC: 25 MMOL/L — SIGNIFICANT CHANGE UP (ref 22–31)
CREAT SERPL-MCNC: 0.72 MG/DL — SIGNIFICANT CHANGE UP (ref 0.5–1.3)
EGFR: 98 ML/MIN/1.73M2 — SIGNIFICANT CHANGE UP
EOSINOPHIL # BLD AUTO: 0.07 K/UL — SIGNIFICANT CHANGE UP (ref 0–0.5)
EOSINOPHIL NFR BLD AUTO: 1.8 % — SIGNIFICANT CHANGE UP (ref 0–6)
GLUCOSE BLDC GLUCOMTR-MCNC: 106 MG/DL — HIGH (ref 70–99)
GLUCOSE BLDC GLUCOMTR-MCNC: 135 MG/DL — HIGH (ref 70–99)
GLUCOSE BLDC GLUCOMTR-MCNC: 176 MG/DL — HIGH (ref 70–99)
GLUCOSE BLDC GLUCOMTR-MCNC: 184 MG/DL — HIGH (ref 70–99)
GLUCOSE SERPL-MCNC: 112 MG/DL — HIGH (ref 70–99)
HCT VFR BLD CALC: 22.9 % — LOW (ref 39–50)
HGB BLD-MCNC: 7.7 G/DL — LOW (ref 13–17)
HYPOCHROMIA BLD QL: SLIGHT — SIGNIFICANT CHANGE UP
LYMPHOCYTES # BLD AUTO: 0.11 K/UL — LOW (ref 1–3.3)
LYMPHOCYTES # BLD AUTO: 2.7 % — LOW (ref 13–44)
MAGNESIUM SERPL-MCNC: 1.7 MG/DL — SIGNIFICANT CHANGE UP (ref 1.6–2.6)
MANUAL SMEAR VERIFICATION: SIGNIFICANT CHANGE UP
MCHC RBC-ENTMCNC: 29.7 PG — SIGNIFICANT CHANGE UP (ref 27–34)
MCHC RBC-ENTMCNC: 33.6 GM/DL — SIGNIFICANT CHANGE UP (ref 32–36)
MCV RBC AUTO: 88.4 FL — SIGNIFICANT CHANGE UP (ref 80–100)
MONOCYTES # BLD AUTO: 0.36 K/UL — SIGNIFICANT CHANGE UP (ref 0–0.9)
MONOCYTES NFR BLD AUTO: 8.9 % — SIGNIFICANT CHANGE UP (ref 2–14)
NEUTROPHILS # BLD AUTO: 3.5 K/UL — SIGNIFICANT CHANGE UP (ref 1.8–7.4)
NEUTROPHILS NFR BLD AUTO: 86.6 % — HIGH (ref 43–77)
OSMOLALITY UR: 451 MOSM/KG — SIGNIFICANT CHANGE UP (ref 300–900)
OVALOCYTES BLD QL SMEAR: SLIGHT — SIGNIFICANT CHANGE UP
PHOSPHATE SERPL-MCNC: 2.3 MG/DL — LOW (ref 2.5–4.5)
PLAT MORPH BLD: NORMAL — SIGNIFICANT CHANGE UP
PLATELET # BLD AUTO: 195 K/UL — SIGNIFICANT CHANGE UP (ref 150–400)
POLYCHROMASIA BLD QL SMEAR: SLIGHT — SIGNIFICANT CHANGE UP
POTASSIUM SERPL-MCNC: 3.6 MMOL/L — SIGNIFICANT CHANGE UP (ref 3.5–5.3)
POTASSIUM SERPL-SCNC: 3.6 MMOL/L — SIGNIFICANT CHANGE UP (ref 3.5–5.3)
PROT SERPL-MCNC: 6.2 G/DL — SIGNIFICANT CHANGE UP (ref 6–8.3)
RBC # BLD: 2.59 M/UL — LOW (ref 4.2–5.8)
RBC # FLD: 17.2 % — HIGH (ref 10.3–14.5)
RBC BLD AUTO: ABNORMAL
SODIUM SERPL-SCNC: 128 MMOL/L — LOW (ref 135–145)
SODIUM UR-SCNC: 55 MMOL/L — SIGNIFICANT CHANGE UP
WBC # BLD: 4.04 K/UL — SIGNIFICANT CHANGE UP (ref 3.8–10.5)
WBC # FLD AUTO: 4.04 K/UL — SIGNIFICANT CHANGE UP (ref 3.8–10.5)

## 2024-10-25 PROCEDURE — 99233 SBSQ HOSP IP/OBS HIGH 50: CPT

## 2024-10-25 PROCEDURE — 99233 SBSQ HOSP IP/OBS HIGH 50: CPT | Mod: GC

## 2024-10-25 PROCEDURE — 93010 ELECTROCARDIOGRAM REPORT: CPT

## 2024-10-25 RX ORDER — ACETAMINOPHEN 500 MG
1000 TABLET ORAL ONCE
Refills: 0 | Status: COMPLETED | OUTPATIENT
Start: 2024-10-25 | End: 2024-10-25

## 2024-10-25 RX ORDER — BACLOFEN 10 MG
5 TABLET ORAL EVERY 8 HOURS
Refills: 0 | Status: DISCONTINUED | OUTPATIENT
Start: 2024-10-25 | End: 2024-10-27

## 2024-10-25 RX ADMIN — PIPERACILLIN AND TAZOBACTAM 25 GRAM(S): .5; 4 INJECTION, POWDER, LYOPHILIZED, FOR SOLUTION INTRAVENOUS at 07:00

## 2024-10-25 RX ADMIN — Medication 2: at 22:39

## 2024-10-25 RX ADMIN — Medication 400 MILLIGRAM(S): at 22:40

## 2024-10-25 RX ADMIN — GANCICLOVIR SODIUM 100 MILLIGRAM(S): 50 INJECTION, POWDER, LYOPHILIZED, FOR SOLUTION INTRAVENOUS at 07:15

## 2024-10-25 RX ADMIN — SUCRALFATE 1 GRAM(S): 1 SUSPENSION ORAL at 06:59

## 2024-10-25 RX ADMIN — Medication 2: at 17:52

## 2024-10-25 RX ADMIN — SUCRALFATE 1 GRAM(S): 1 SUSPENSION ORAL at 00:18

## 2024-10-25 RX ADMIN — Medication 5 MILLIGRAM(S): at 07:00

## 2024-10-25 RX ADMIN — Medication 40 MILLIGRAM(S): at 17:52

## 2024-10-25 RX ADMIN — Medication 1000 MILLIGRAM(S): at 23:00

## 2024-10-25 RX ADMIN — GANCICLOVIR SODIUM 100 MILLIGRAM(S): 50 INJECTION, POWDER, LYOPHILIZED, FOR SOLUTION INTRAVENOUS at 18:41

## 2024-10-25 RX ADMIN — ONDANSETRON HYDROCHLORIDE 4 MILLIGRAM(S): 2 INJECTION, SOLUTION INTRAMUSCULAR; INTRAVENOUS at 16:59

## 2024-10-25 RX ADMIN — Medication 400 MILLIGRAM(S): at 12:03

## 2024-10-25 RX ADMIN — Medication 5 MILLIGRAM(S): at 14:29

## 2024-10-25 RX ADMIN — PANTOPRAZOLE SODIUM 40 MILLIGRAM(S): 40 TABLET, DELAYED RELEASE ORAL at 07:00

## 2024-10-25 RX ADMIN — Medication 20 MILLIGRAM(S): at 21:20

## 2024-10-25 RX ADMIN — PIPERACILLIN AND TAZOBACTAM 25 GRAM(S): .5; 4 INJECTION, POWDER, LYOPHILIZED, FOR SOLUTION INTRAVENOUS at 21:21

## 2024-10-25 RX ADMIN — PIPERACILLIN AND TAZOBACTAM 25 GRAM(S): .5; 4 INJECTION, POWDER, LYOPHILIZED, FOR SOLUTION INTRAVENOUS at 14:13

## 2024-10-25 RX ADMIN — Medication 5 MILLIGRAM(S): at 21:20

## 2024-10-25 RX ADMIN — Medication 1000 MILLIGRAM(S): at 00:00

## 2024-10-25 RX ADMIN — Medication 1000 MILLIGRAM(S): at 12:45

## 2024-10-25 NOTE — DIETITIAN INITIAL EVALUATION ADULT - EDUCATION DIETARY MODIFICATIONS
Educated on strategies to maximize PO intake in setting of symptoms and mitigate nausea; discussed foods likely to be best tolerated, small frequent meals, and oral nutrition supplements between meals. Pt aware RD remains available for additional questions/concerns./(2) meets goals/outcomes/verbalization

## 2024-10-25 NOTE — DIETITIAN INITIAL EVALUATION ADULT - PERTINENT MEDS FT
MEDICATIONS  (STANDING):  amLODIPine   Tablet 5 milliGRAM(s) Oral daily  atorvastatin 20 milliGRAM(s) Oral at bedtime  baclofen 5 milliGRAM(s) Oral every 8 hours  dextrose 5%. 1000 milliLiter(s) (100 mL/Hr) IV Continuous <Continuous>  dextrose 5%. 1000 milliLiter(s) (50 mL/Hr) IV Continuous <Continuous>  dextrose 50% Injectable 25 Gram(s) IV Push once  dextrose 50% Injectable 12.5 Gram(s) IV Push once  dextrose 50% Injectable 25 Gram(s) IV Push once  enoxaparin Injectable 40 milliGRAM(s) SubCutaneous every 24 hours  ganciclovir IVPB 300 milliGRAM(s) IV Intermittent every 12 hours  ganciclovir IVPB      glucagon  Injectable 1 milliGRAM(s) IntraMuscular once  insulin lispro (ADMELOG) corrective regimen sliding scale   SubCutaneous Before meals and at bedtime  pantoprazole    Tablet 40 milliGRAM(s) Oral before breakfast  piperacillin/tazobactam IVPB.. 4.5 Gram(s) IV Intermittent every 8 hours  sucralfate 1 Gram(s) Oral every 6 hours    MEDICATIONS  (PRN):  aluminum hydroxide/magnesium hydroxide/simethicone Suspension 30 milliLiter(s) Oral every 4 hours PRN Dyspepsia  benzocaine/menthol Lozenge 1 Lozenge Oral every 2 hours PRN Sore Throat  dextrose Oral Gel 15 Gram(s) Oral once PRN Blood Glucose LESS THAN 70 milliGRAM(s)/deciliter  lidocaine 2% Viscous 15 milliLiter(s) Mucosal every 8 hours PRN Sore throat  melatonin 3 milliGRAM(s) Oral at bedtime PRN Insomnia  ondansetron Injectable 4 milliGRAM(s) IV Push every 8 hours PRN Nausea and/or Vomiting  polyethylene glycol 3350 17 Gram(s) Oral every 24 hours PRN for constipation  senna 2 Tablet(s) Oral at bedtime PRN for constipation

## 2024-10-25 NOTE — PROGRESS NOTE ADULT - PROBLEM SELECTOR PLAN 2
Pt is immunocompromised iso lung cancer and chemo/RT px with 2 days of cough and 1 day of fever. Pt was dc'd on 10/15 and started having intractable hiccups c/b nausea and vomiting. ?Aspiration pna. Cough is dry. Pt denies SOB, CP, abd pain, diarrhea, dysuria, rashes or joint pain. CXR pending read but appears similar to prior w/o infiltrates/consolidations. WBC 2.94 (from 1.95) with increased lymphocyte %. Covid/Flu/RSV negative.  MRSA negative. Legionella/strep negative.     - c/w zosyn 4.5g q8h x 7 days  ---discharge with augmentin if patient leaves before 7-day course finishes  - ofirmev as needed for fevers, max 4g in 24 hours (can give toradol if maxxed out) Pt is immunocompromised iso lung cancer and chemo/RT px with 2 days of cough and 1 day of fever. Pt was dc'd on 10/15 and started having intractable hiccups c/b nausea and vomiting. ?Aspiration pna. Cough is dry. Pt denies SOB, CP, abd pain, diarrhea, dysuria, rashes or joint pain. CXR pending read but appears similar to prior w/o infiltrates/consolidations. WBC 2.94 (from 1.95) with increased lymphocyte %. Covid/Flu/RSV negative.  MRSA negative. Legionella/strep negative.     - c/w zosyn 4.5g q8h x 7 days (10/21-10/27)  ---discharge with augmentin if patient leaves before 7-day course finishes  - ofirmev as needed for fevers, max 4g in 24 hours (can give toradol if maxxed out)

## 2024-10-25 NOTE — PROGRESS NOTE ADULT - ASSESSMENT
69M w/ PMHx of non-operable lung cancer, DM, HTN, HLD recently admitted for symptomatic anemia and chemotherapy-induced nausea and vomiting (10/14-10/15) presenting with hiccups, nausea, vomiting, and fever admitted to [facility] for further management, s/p EGD.  69M w/ PMHx of non-operable lung cancer, DM, HTN, HLD recently admitted for symptomatic anemia and chemotherapy-induced nausea and vomiting (10/14-10/15) presenting with hiccups, nausea, vomiting, and fever admitted to [facility] for further management, s/p EGD. 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  69M w/ PMHx of non-operable lung cancer, DM, HTN, HLD recently admitted for symptomatic anemia and chemotherapy-induced nausea and vomiting (10/14-10/15) presenting with hiccups, nausea, vomiting, and fever admitted to [facility] for further management, s/p EGD. 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 s/p zosyn 10/21-10/27.

## 2024-10-25 NOTE — PROGRESS NOTE ADULT - PROBLEM SELECTOR PLAN 4
Pt has had hiccups since his dc last week. Rad-onc started pt on Zofran which did not help. S/p Reglan in ED w/o improvement. Hiccups have caused the pt N/V.  S/P gabapentin 300 mg x1 10/21 AM  Presenting with increased hiccupping this morning.     - increase dose of gabapentin 100 mg to 300 mg daily  ----If hiccups do not improve - transition from gabapentin to baclofen 5mg q8h scheduled for hiccups Pt has had hiccups since his dc last week. Rad-onc started pt on Zofran which did not help. S/p Reglan in ED w/o improvement. Hiccups have caused the pt N/V.  S/P gabapentin 300 mg x1 10/21 AM  Presenting with increased hiccupping this morning.     - transition from gabapentin 300 qd to baclofen 5mg q8h

## 2024-10-25 NOTE — DIETITIAN INITIAL EVALUATION ADULT - PERTINENT LABORATORY DATA
10-25    128[L]  |  92[L]  |  14  ----------------------------<  112[H]  3.6   |  25  |  0.72    Ca    8.0[L]      25 Oct 2024 05:30  Phos  2.3     10-25  Mg     1.7     10-25    TPro  6.2  /  Alb  2.3[L]  /  TBili  0.8  /  DBili  x   /  AST  27  /  ALT  24  /  AlkPhos  112  10-25  POCT Blood Glucose.: 106 mg/dL (10-25-24 @ 08:14)  A1C with Estimated Average Glucose Result: 7.2 % (09-27-24 @ 05:30)  A1C with Estimated Average Glucose Result: 6.3 % (08-06-24 @ 05:30)  A1C with Estimated Average Glucose Result: 8.0 % (05-04-24 @ 05:30)

## 2024-10-25 NOTE — DIETITIAN INITIAL EVALUATION ADULT - NS FNS DIET ORDER
Diet, Regular:   Consistent Carbohydrate {Evening Snack} (CSTCHOSN)  DASH/TLC {Sodium & Cholesterol Restricted} (DASH)  Supplement Feeding Modality:  Oral  Ensure Max Cans or Servings Per Day:  3       Frequency:  Three Times a day (10-23-24 @ 15:01) [Active]

## 2024-10-25 NOTE — PROGRESS NOTE ADULT - PROBLEM SELECTOR PLAN 7
Hgb 8.5, previously 8.0 on 10/15. RDW elevated. Leukopenia. Plt 152. Likely 2/2 chemotx. Pt denies bleeding, bruising. Denies hematuria. Has not had BMs for 5-6 days, unable to assess for melena.  10/23: Hgb 7.5    - Monitor H&H  - Active T&S  - Transfuse hgb <7 Hgb 8.5, previously 8.0 on 10/15. RDW elevated. Leukopenia. Plt 152. Likely 2/2 chemotx. Pt denies bleeding, bruising. Denies hematuria. Has not had BMs for 5-6 days, unable to assess for melena.  Hgb 7.7 (10/25)    - Monitor H&H  - Active T&S  - Transfuse hgb <7

## 2024-10-25 NOTE — DIETITIAN NUTRITION RISK NOTIFICATION - TREATMENT: THE FOLLOWING DIET HAS BEEN RECOMMENDED
Recommend Consistent Carbohydrate diet with Glucerna Shake (220kcal, 10g protein) x1/day. Defer consistency to team/SLP.   >>Recommend d/c DASH/TLC restriction to promote intake and d/c Ensure Max per pt request.   >>Recommend nursing assistance with meals. Encourage & monitor PO intake. [place] dietary preferences as able.     see initial assessment for complete recommendations.

## 2024-10-25 NOTE — DIETITIAN INITIAL EVALUATION ADULT - NSFNSGIIOFT_GEN_A_CORE
10-24-24 @ 07:01  -  10-25-24 @ 07:00  --------------------------------------------------------  OUT:    Emesis (mL): 60 mL  Total OUT: 60 mL    Total NET: -60 mL

## 2024-10-25 NOTE — DIETITIAN INITIAL EVALUATION ADULT - PROBLEM SELECTOR PLAN 4
Pt c/p pain with swallowing since his RT on 10/15, with poor PO intake. Likely 2/2 vomiting vs RT.  As per bedside swallow assessment: no cough, throat clear or change in voice when taking PO trials. Pt is denying difficulty or pain with swallowing. Reports discomfort when he has hiccups.  GI consult placed for evaluation of new-onset dysphagia/odynophagia. No hx of EGD. As per GI, n/v/dysphagia likely 2/2 RT. Recommends:  - CT neck to evaluate any localized cause in the neck (i.e. abscess) iso fever with unknown source  - CT A/P   - Lozenges or lidocaine jelly for symptoms

## 2024-10-25 NOTE — DIETITIAN INITIAL EVALUATION ADULT - ADD RECOMMEND
1. Recommend Consistent Carbohydrate diet with Glucerjordon [place]ke (220kcal, 10g protein) x1/day. Defer consistency to team/SLP.   >>Recommend d/c DASH/TLC restriction to promote intake and d/c Ensure Max per pt request.   >>Recommend nursing assistance with meals. Encourage & monitor PO intake. [place] dietary preferences as able.   2. Recommend add thiamine and consider multivitamin pending no medical contraindications.   3. Monitor GI tolerance, weight trends, labs, & skin integrity.  >>Note low phosphorus, monitor electrolytes closely and replete prn.   4. Defer bowel and pain regimens to team. Consider advance antiemetic regimen.   5. RD to remain available for diet education/intervention prn.

## 2024-10-25 NOTE — PROGRESS NOTE ADULT - PROBLEM SELECTOR PLAN 1
s/p EGD with biopsy 10/23.   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 Small focus of epithelium with atypia c/w prior radiation antrum no significant pathology. Negative for H pylori.    - ID consult s/p EGD with biopsy 10/23.   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 Small focus of epithelium with atypia c/w prior radiation antrum no significant pathology. Negative for H pylori.  CMV PCR 5.10 (elevated)    As per ID,   - Started on ganciclovir 5mg/kg IV q12h (10/24)  ----Close monitoring of cell lines (with daily CBC w/ diff) and kidney function while on gancyclovir  - No pulmonary consult necessary at this time as patient is being treated with Zosyn for PNA and CMV esophagitis and pneumonitis simultaneously is rare  - Continue empiric zosyn 4.5g IV q8h to complete a 7 day course (10/21-10/27) fair plus

## 2024-10-25 NOTE — DIETITIAN INITIAL EVALUATION ADULT - OTHER INFO
69M with PMH of nonoperable lung cancer (on chemoimmunotherapy status post x6 cycles and radiation therapy last round 10/15), DM, HTN, and HLD who presented with hiccups, nausea/vomiting, and fever, admitted for management. Status post EGD (10/23) with findings of radiation esophagitis.     Pt seen on 7WO for assessment. Labs and medication orders reviewed. Na 128 <low>, K/Mg WNL, Phos 2.3 <low>, POC blood glucose (10/24-10/25) 115-123, HgbA1c 7.2% (10/23). On Consistent Carbohydrate diet with Ensure Max x3 x3/day status post SLP eval (10/21). Pt reports poor intake due to poor appetite and postprandial nausea/vomiting x2-3 weeks. Endorses ongoing nausea/vomiting with last episode emesis yesterday. Diet recall includes little to no PO intake, RD observed breakfast untouched at bedside, pt reports primarily consuming ice water; estimate pt meeting <50% needs >1 week. Pt reports UBW 142lb x2 weeks ago; current admission wt 133lb/60.3kg indicates 9lb/6% wt loss - clinically significant. Nutrition-focused physical examination notable for moderate-severe muscle and fat wasting. Per ASPEN guidelines, pt meets criteria for severe acute on chronic malnutrition - see nutrition risk notification. Pt denies difficulty chewing/swallowing and odynophagia. Notes difficulty with setting up meal trays - RD communicated to PCA. Denies diarrhea, last recorded BM 10/20 - bowel regimen ordered. Confirms no known food allergies. No Anabaptist/ethnic/cultural food preferences noted. No pressure injuries or edema documented, Adriano score 20. RD reviewed formulary, pt reports dislike for Ensure Max, requests trial Glucerna x1/day. See nutrition recommendations - RD communicated to team. RD to remain available.

## 2024-10-25 NOTE — PROGRESS NOTE ADULT - SUBJECTIVE AND OBJECTIVE BOX
INFECTIOUS DISEASES CONSULT FOLLOW-UP NOTE    INTERVAL HPI/OVERNIGHT EVENTS:  Persistently febrile. Odynophagia persists. He feels his cough has been improving over the last few days, now nearly gone. No abdominal pain/nausea/vomiting/diarrhea/hematochezia.    ROS:   Constitutional, eyes, ENT, cardiovascular, respiratory, gastrointestinal, genitourinary, integumentary, neurological, psychiatric and heme/lymph are otherwise negative other than noted above       ANTIBIOTICS/RELEVANT:    MEDICATIONS  (STANDING):  amLODIPine   Tablet 5 milliGRAM(s) Oral daily  atorvastatin 20 milliGRAM(s) Oral at bedtime  baclofen 5 milliGRAM(s) Oral every 8 hours  dextrose 5%. 1000 milliLiter(s) (100 mL/Hr) IV Continuous <Continuous>  dextrose 5%. 1000 milliLiter(s) (50 mL/Hr) IV Continuous <Continuous>  dextrose 50% Injectable 25 Gram(s) IV Push once  dextrose 50% Injectable 12.5 Gram(s) IV Push once  dextrose 50% Injectable 25 Gram(s) IV Push once  enoxaparin Injectable 40 milliGRAM(s) SubCutaneous every 24 hours  ganciclovir IVPB 300 milliGRAM(s) IV Intermittent every 12 hours  ganciclovir IVPB      glucagon  Injectable 1 milliGRAM(s) IntraMuscular once  insulin lispro (ADMELOG) corrective regimen sliding scale   SubCutaneous Before meals and at bedtime  pantoprazole    Tablet 40 milliGRAM(s) Oral before breakfast  piperacillin/tazobactam IVPB.. 4.5 Gram(s) IV Intermittent every 8 hours  sucralfate 1 Gram(s) Oral every 6 hours    MEDICATIONS  (PRN):  aluminum hydroxide/magnesium hydroxide/simethicone Suspension 30 milliLiter(s) Oral every 4 hours PRN Dyspepsia  benzocaine/menthol Lozenge 1 Lozenge Oral every 2 hours PRN Sore Throat  dextrose Oral Gel 15 Gram(s) Oral once PRN Blood Glucose LESS THAN 70 milliGRAM(s)/deciliter  lidocaine 2% Viscous 15 milliLiter(s) Mucosal every 8 hours PRN Sore throat  melatonin 3 milliGRAM(s) Oral at bedtime PRN Insomnia  ondansetron Injectable 4 milliGRAM(s) IV Push every 8 hours PRN Nausea and/or Vomiting  polyethylene glycol 3350 17 Gram(s) Oral every 24 hours PRN for constipation  senna 2 Tablet(s) Oral at bedtime PRN for constipation        Vital Signs Last 24 Hrs  T(C): 38.7 (25 Oct 2024 11:40), Max: 39.4 (24 Oct 2024 20:33)  T(F): 101.7 (25 Oct 2024 11:40), Max: 102.9 (24 Oct 2024 20:33)  HR: 120 (25 Oct 2024 11:40) (105 - 120)  BP: 112/63 (25 Oct 2024 11:40) (112/63 - 118/78)  BP(mean): 91 (24 Oct 2024 20:33) (91 - 91)  RR: 18 (25 Oct 2024 11:40) (18 - 18)  SpO2: 90% (25 Oct 2024 11:40) (90% - 93%)    Parameters below as of 25 Oct 2024 11:40  Patient On (Oxygen Delivery Method): nasal cannula  O2 Flow (L/min): 2      10-24-24 @ 07:01  -  10-25-24 @ 07:00  --------------------------------------------------------  IN: 1040 mL / OUT: 990 mL / NET: 50 mL      PHYSICAL EXAM:  Constitutional: alert, NAD  Eyes: the sclera and conjunctiva were normal.   ENT: the ears and nose were normal in appearance. Normal oropharynx  Neck: the appearance of the neck was normal and the neck was supple.   Pulmonary: no respiratory distress on 2L NC  Heart: heart rate was normal and rhythm regular  Abdomen: soft, non-tender  Neurological: no focal deficits.   Skin: no rash        LABS:                        7.7    4.04  )-----------( 195      ( 25 Oct 2024 05:30 )             22.9     10-25    128[L]  |  92[L]  |  14  ----------------------------<  112[H]  3.6   |  25  |  0.72    Ca    8.0[L]      25 Oct 2024 05:30  Phos  2.3     10-25  Mg     1.7     10-25    TPro  6.2  /  Alb  2.3[L]  /  TBili  0.8  /  DBili  x   /  AST  27  /  ALT  24  /  AlkPhos  112  10-25    PT/INR - ( 24 Oct 2024 16:59 )   PT: 15.1 sec;   INR: 1.32          PTT - ( 24 Oct 2024 16:59 )  PTT:31.4 sec  Urinalysis Basic - ( 25 Oct 2024 05:30 )    Color: x / Appearance: x / SG: x / pH: x  Gluc: 112 mg/dL / Ketone: x  / Bili: x / Urobili: x   Blood: x / Protein: x / Nitrite: x   Leuk Esterase: x / RBC: x / WBC x   Sq Epi: x / Non Sq Epi: x / Bacteria: x        MICROBIOLOGY:  Reviewed    RADIOLOGY & ADDITIONAL STUDIES:  Reviewed

## 2024-10-25 NOTE — PROGRESS NOTE ADULT - PROBLEM SELECTOR PLAN 9
Na 127 on admission, previously 134. Pt denies HA but admits to N/V, which is chronic for him. Has had poor PO intake iso N/V. Drinks gatorade and ensures. No visual disturbances, palpitations.   Corrected Na 128. Calculated serum osm 268. S/p 2L NS in ED. Na improved to 131 s/p NS 2 L. Na has dropped to 129 today. No CNS changes.   Likely d/t poor PO intake.  - CTM  - f/u repeat urine studies Na 127 on admission, previously 134. Pt denies HA but admits to N/V, which is chronic for him. Has had poor PO intake iso N/V. Drinks gatorade and ensures. No visual disturbances, palpitations.   Corrected Na 128. Calculated serum osm 268. S/p 2L NS in ED. Na improved to 131 s/p NS 2 L. Na has dropped to 128 today. No CNS changes. Likely d/t poor PO intake.    - CTM  - f/u repeat urine studies

## 2024-10-25 NOTE — PROGRESS NOTE ADULT - PROBLEM SELECTOR PLAN 11
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 S/p 7 cycles of neoadjuvant platinum based with gemcitabine and nivolumab chemoimmunotherapy. Follows with Dr. Mueller for RT, s/p last round of RT on 10/15.   Follow with Dr. Larose outpatient.    - Heme/onc consult Hx of squamous cell lung ca, AJCC IIIB, [id number] negative. 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 S/p 7 cycles of neoadjuvant platinum based with gemcitabine and nivolumab chemoimmunotherapy. Follows with Dr. Mueller for RT, s/p last round of RT on 10/15.   Follow with Dr. Larose outpatient.

## 2024-10-25 NOTE — PROGRESS NOTE ADULT - PROBLEM SELECTOR PLAN 3
POA. Meeting 2/4 SIRS criteria (T 102.9, ) without clear source. Pt has been coughing over the last 2 days with fever at home T 101. Pt has been hiccuping c/b vomiting. S/p Vanc 1g, Zosyn 3.375g in ED. BCx collected x2 in ED. Patient HDS, appearing well, AOx3, warm to touch. Found to have pneumonia and CMV.  s/p 80 cc/hr LR x 12 hrs  - C/w abx as below   - F/u BCx x2 - NGTD x 48 hrs  - Reculture for Tmax >100.4 in 48h POA. Meeting 2/4 SIRS criteria (T 102.9, ) without clear source. Pt has been coughing over the last 2 days with fever at home T 101. Pt has been hiccuping c/b vomiting. S/p Vanc 1g, Zosyn 3.375g in ED. BCx collected x2 in ED. Patient HDS, appearing well, AOx3, warm to touch. Found to have pneumonia and CMV.  s/p 80 cc/hr LR x 12 hrs  - C/w abx as below   - F/u BCx x3 - NGTD   - Reculture for Tmax >102.9

## 2024-10-25 NOTE — DIETITIAN INITIAL EVALUATION ADULT - PROBLEM SELECTOR PLAN 1
POA. Meeting 2/4 SIRS criteria (T 102.9, ) without clear source. Pt has been coughing over the last 2 days with fever at home T 101. Pt has been hiccuping c/b vomiting. S/p Vanc 1g, Zosyn 3.375g in ED. BCx collected x2 in ED.  - C/w abx as below   - F/u BCx x2  - Reculture for Tmax >100.4 in 48h    Fever of 102.8F at 11am. Blood cultures collected. Patient HDS, appearing well, AOx3, warm to touch. Unknown source.   - f/u bcx from 10/21 11 am

## 2024-10-25 NOTE — DIETITIAN INITIAL EVALUATION ADULT - PROBLEM SELECTOR PLAN 3
Pt has had hiccups since his dc last week. Rad-onc started pt on Zofran which did not help. S/p Reglan in ED w/o improvement. Hiccups have caused the pt N/V.  S/P gabapentin 300 mg x1 10/21 AM    - gabapentin 100 mg TID

## 2024-10-25 NOTE — PROGRESS NOTE ADULT - SUBJECTIVE AND OBJECTIVE BOX
Progress Note  INCOMPLETE NOTE    INTERVAL EVENTS:   No acute events overnight.    SUBJECTIVE:   Patient seen and examined at bedside. Condition largely unchanged from yesterday. No acute complaints at this time.    ROS:  Negative unless otherwise stated above.    PHYSICAL EXAM:  General: Alert and oriented x 3. No acute distress.   HEENT: Moist mucous membranes. Anicteric. No cervical lymphadenopathy.  Cardiovascular: Regular rate and rhythm. No murmur. Normal JVP.  Lungs: Clear to auscultation bilaterally. No accessory muscle use.  Abdomen: Soft, non-tender and non-distended. No palpable masses.  Extremities: No edema. Non-tender. Warm.  Skin: No rashes or lesions.   Neurologic: No apparent focal neurological deficits. CN II-XII grossly intact, but not individually tested.  Psychiatric: Cooperative. Appropriate mood and affect.    VITAL SIGNS:  Vital Signs Last 24 Hrs  T(C): 37.1 (25 Oct 2024 05:46), Max: 39.4 (24 Oct 2024 20:33)  T(F): 98.7 (25 Oct 2024 05:46), Max: 102.9 (24 Oct 2024 20:33)  HR: 105 (25 Oct 2024 05:46) (103 - 118)  BP: 117/72 (25 Oct 2024 05:46) (115/68 - 118/78)  BP(mean): 91 (24 Oct 2024 20:33) (91 - 91)  RR: 18 (25 Oct 2024 05:46) (17 - 18)  SpO2: 92% (25 Oct 2024 05:46) (92% - 94%)    Parameters below as of 25 Oct 2024 05:46  Patient On (Oxygen Delivery Method): nasal cannula      INPATIENT MEDICATIONS:   MEDICATIONS  (STANDING):  amLODIPine   Tablet 5 milliGRAM(s) Oral daily  atorvastatin 20 milliGRAM(s) Oral at bedtime  dextrose 5%. 1000 milliLiter(s) (100 mL/Hr) IV Continuous <Continuous>  dextrose 5%. 1000 milliLiter(s) (50 mL/Hr) IV Continuous <Continuous>  dextrose 50% Injectable 25 Gram(s) IV Push once  dextrose 50% Injectable 12.5 Gram(s) IV Push once  dextrose 50% Injectable 25 Gram(s) IV Push once  enoxaparin Injectable 40 milliGRAM(s) SubCutaneous every 24 hours  gabapentin 300 milliGRAM(s) Oral daily  ganciclovir IVPB 300 milliGRAM(s) IV Intermittent every 12 hours  ganciclovir IVPB      glucagon  Injectable 1 milliGRAM(s) IntraMuscular once  insulin lispro (ADMELOG) corrective regimen sliding scale   SubCutaneous Before meals and at bedtime  pantoprazole    Tablet 40 milliGRAM(s) Oral before breakfast  piperacillin/tazobactam IVPB.. 4.5 Gram(s) IV Intermittent every 8 hours  sucralfate 1 Gram(s) Oral every 6 hours    MEDICATIONS  (PRN):  aluminum hydroxide/magnesium hydroxide/simethicone Suspension 30 milliLiter(s) Oral every 4 hours PRN Dyspepsia  benzocaine/menthol Lozenge 1 Lozenge Oral every 2 hours PRN Sore Throat  dextrose Oral Gel 15 Gram(s) Oral once PRN Blood Glucose LESS THAN 70 milliGRAM(s)/deciliter  lidocaine 2% Viscous 15 milliLiter(s) Mucosal every 8 hours PRN Sore throat  melatonin 3 milliGRAM(s) Oral at bedtime PRN Insomnia  ondansetron Injectable 4 milliGRAM(s) IV Push every 8 hours PRN Nausea and/or Vomiting  polyethylene glycol 3350 17 Gram(s) Oral every 24 hours PRN for constipation  senna 2 Tablet(s) Oral at bedtime PRN for constipation    ALLERGIES:  Allergies    No Known Allergies    Intolerances    LABS:                       7.4    3.22  )-----------( 173      ( 24 Oct 2024 22:40 )             22.3     10-24    128[L]  |  93[L]  |  15  ----------------------------<  124[H]  3.9   |  23  |  0.75    Ca    7.9[L]      24 Oct 2024 22:40  Phos  2.0     10-24  Mg     1.7     10-24    TPro  6.4  /  Alb  2.4[L]  /  TBili  0.8  /  DBili  x   /  AST  26  /  ALT  26  /  AlkPhos  111  10-24    PT/INR - ( 24 Oct 2024 16:59 )   PT: 15.1 sec;   INR: 1.32          PTT - ( 24 Oct 2024 16:59 )  PTT:31.4 sec  Urinalysis Basic - ( 24 Oct 2024 22:40 )    Color: x / Appearance: x / SG: x / pH: x  Gluc: 124 mg/dL / Ketone: x  / Bili: x / Urobili: x   Blood: x / Protein: x / Nitrite: x   Leuk Esterase: x / RBC: x / WBC x   Sq Epi: x / Non Sq Epi: x / Bacteria: x      CAPILLARY BLOOD GLUCOSE      POCT Blood Glucose.: 123 mg/dL (24 Oct 2024 22:03)    RADIOLOGY & ADDITIONAL TESTS: Reviewed. Progress Note    INTERVAL EVENTS:   At 10 pm, patient has fever of 102.9F, received ofirmev, blood cultures, labs due to Tmax. /78, , RR 18, satting 93% NC 2L.    SUBJECTIVE:   Patient states he feels well. He cannot tell if the gabapentin dosage increase has changed his hiccups.     ROS:  Negative unless otherwise stated above.    PHYSICAL EXAM:  General: Alert and oriented x 3. No acute distress.   HEENT: Moist mucous membranes. Anicteric. No cervical lymphadenopathy.  Cardiovascular: Regular rate and rhythm. No murmur. Normal JVP.  Lungs: Clear to auscultation bilaterally. No accessory muscle use.  Abdomen: Soft, non-tender and non-distended. No palpable masses.  Extremities: No edema. Non-tender. Warm.  Skin: No rashes or lesions.   Neurologic: No apparent focal neurological deficits. CN II-XII grossly intact, but not individually tested.  Psychiatric: Cooperative. Appropriate mood and affect.    VITAL SIGNS:  Vital Signs Last 24 Hrs  T(C): 37.1 (25 Oct 2024 05:46), Max: 39.4 (24 Oct 2024 20:33)  T(F): 98.7 (25 Oct 2024 05:46), Max: 102.9 (24 Oct 2024 20:33)  HR: 105 (25 Oct 2024 05:46) (103 - 118)  BP: 117/72 (25 Oct 2024 05:46) (115/68 - 118/78)  BP(mean): 91 (24 Oct 2024 20:33) (91 - 91)  RR: 18 (25 Oct 2024 05:46) (17 - 18)  SpO2: 92% (25 Oct 2024 05:46) (92% - 94%)    Parameters below as of 25 Oct 2024 05:46  Patient On (Oxygen Delivery Method): nasal cannula      INPATIENT MEDICATIONS:   MEDICATIONS  (STANDING):  amLODIPine   Tablet 5 milliGRAM(s) Oral daily  atorvastatin 20 milliGRAM(s) Oral at bedtime  dextrose 5%. 1000 milliLiter(s) (100 mL/Hr) IV Continuous <Continuous>  dextrose 5%. 1000 milliLiter(s) (50 mL/Hr) IV Continuous <Continuous>  dextrose 50% Injectable 25 Gram(s) IV Push once  dextrose 50% Injectable 12.5 Gram(s) IV Push once  dextrose 50% Injectable 25 Gram(s) IV Push once  enoxaparin Injectable 40 milliGRAM(s) SubCutaneous every 24 hours  gabapentin 300 milliGRAM(s) Oral daily  ganciclovir IVPB 300 milliGRAM(s) IV Intermittent every 12 hours  ganciclovir IVPB      glucagon  Injectable 1 milliGRAM(s) IntraMuscular once  insulin lispro (ADMELOG) corrective regimen sliding scale   SubCutaneous Before meals and at bedtime  pantoprazole    Tablet 40 milliGRAM(s) Oral before breakfast  piperacillin/tazobactam IVPB.. 4.5 Gram(s) IV Intermittent every 8 hours  sucralfate 1 Gram(s) Oral every 6 hours    MEDICATIONS  (PRN):  aluminum hydroxide/magnesium hydroxide/simethicone Suspension 30 milliLiter(s) Oral every 4 hours PRN Dyspepsia  benzocaine/menthol Lozenge 1 Lozenge Oral every 2 hours PRN Sore Throat  dextrose Oral Gel 15 Gram(s) Oral once PRN Blood Glucose LESS THAN 70 milliGRAM(s)/deciliter  lidocaine 2% Viscous 15 milliLiter(s) Mucosal every 8 hours PRN Sore throat  melatonin 3 milliGRAM(s) Oral at bedtime PRN Insomnia  ondansetron Injectable 4 milliGRAM(s) IV Push every 8 hours PRN Nausea and/or Vomiting  polyethylene glycol 3350 17 Gram(s) Oral every 24 hours PRN for constipation  senna 2 Tablet(s) Oral at bedtime PRN for constipation    ALLERGIES:  Allergies    No Known Allergies    Intolerances    LABS:                       7.4    3.22  )-----------( 173      ( 24 Oct 2024 22:40 )             22.3     10-24    128[L]  |  93[L]  |  15  ----------------------------<  124[H]  3.9   |  23  |  0.75    Ca    7.9[L]      24 Oct 2024 22:40  Phos  2.0     10-24  Mg     1.7     10-24    TPro  6.4  /  Alb  2.4[L]  /  TBili  0.8  /  DBili  x   /  AST  26  /  ALT  26  /  AlkPhos  111  10-24    PT/INR - ( 24 Oct 2024 16:59 )   PT: 15.1 sec;   INR: 1.32          PTT - ( 24 Oct 2024 16:59 )  PTT:31.4 sec  Urinalysis Basic - ( 24 Oct 2024 22:40 )    Color: x / Appearance: x / SG: x / pH: x  Gluc: 124 mg/dL / Ketone: x  / Bili: x / Urobili: x   Blood: x / Protein: x / Nitrite: x   Leuk Esterase: x / RBC: x / WBC x   Sq Epi: x / Non Sq Epi: x / Bacteria: x      CAPILLARY BLOOD GLUCOSE      POCT Blood Glucose.: 123 mg/dL (24 Oct 2024 22:03)    RADIOLOGY & ADDITIONAL TESTS: Reviewed. Progress Note    INTERVAL EVENTS:   At 10 pm, patient has fever of 102.9F, received ofirmev, blood cultures, labs due to Tmax. /78, , RR 18, satting 93% NC 2L.    SUBJECTIVE:   Patient states he feels well. He cannot tell if the gabapentin dosage increase has changed his hiccups. Reports that the hiccups are intermittent, not bothersome. He is able to tolerate water PO. He reports not eating yesterday d/t fear of vomiting again like the previous night. He does not drink the Ensure because he prefers vanilla-flavored Ensure. He reports low appetite, does not feel hungry, does not feel the need to have a BM. Has not had BM. Denies dysuria, chills, nausea, vomiting, abd pain, chest pain, SOB.     ROS:  Negative unless otherwise stated above.    PHYSICAL EXAM:  General: Alert and oriented x 3. No acute distress.   Cardiovascular: Regular rate and rhythm.   Lungs: Clear to auscultation bilaterally. No accessory muscle use.  Abdomen: Soft, non-tender and non-distended.   Extremities: No edema. Non-tender. Warm.    VITAL SIGNS:  Vital Signs Last 24 Hrs  T(C): 37.1 (25 Oct 2024 05:46), Max: 39.4 (24 Oct 2024 20:33)  T(F): 98.7 (25 Oct 2024 05:46), Max: 102.9 (24 Oct 2024 20:33)  HR: 105 (25 Oct 2024 05:46) (103 - 118)  BP: 117/72 (25 Oct 2024 05:46) (115/68 - 118/78)  BP(mean): 91 (24 Oct 2024 20:33) (91 - 91)  RR: 18 (25 Oct 2024 05:46) (17 - 18)  SpO2: 92% (25 Oct 2024 05:46) (92% - 94%)    Parameters below as of 25 Oct 2024 05:46  Patient On (Oxygen Delivery Method): nasal cannula      INPATIENT MEDICATIONS:   MEDICATIONS  (STANDING):  amLODIPine   Tablet 5 milliGRAM(s) Oral daily  atorvastatin 20 milliGRAM(s) Oral at bedtime  dextrose 5%. 1000 milliLiter(s) (100 mL/Hr) IV Continuous <Continuous>  dextrose 5%. 1000 milliLiter(s) (50 mL/Hr) IV Continuous <Continuous>  dextrose 50% Injectable 25 Gram(s) IV Push once  dextrose 50% Injectable 12.5 Gram(s) IV Push once  dextrose 50% Injectable 25 Gram(s) IV Push once  enoxaparin Injectable 40 milliGRAM(s) SubCutaneous every 24 hours  gabapentin 300 milliGRAM(s) Oral daily  ganciclovir IVPB 300 milliGRAM(s) IV Intermittent every 12 hours  ganciclovir IVPB      glucagon  Injectable 1 milliGRAM(s) IntraMuscular once  insulin lispro (ADMELOG) corrective regimen sliding scale   SubCutaneous Before meals and at bedtime  pantoprazole    Tablet 40 milliGRAM(s) Oral before breakfast  piperacillin/tazobactam IVPB.. 4.5 Gram(s) IV Intermittent every 8 hours  sucralfate 1 Gram(s) Oral every 6 hours    MEDICATIONS  (PRN):  aluminum hydroxide/magnesium hydroxide/simethicone Suspension 30 milliLiter(s) Oral every 4 hours PRN Dyspepsia  benzocaine/menthol Lozenge 1 Lozenge Oral every 2 hours PRN Sore Throat  dextrose Oral Gel 15 Gram(s) Oral once PRN Blood Glucose LESS THAN 70 milliGRAM(s)/deciliter  lidocaine 2% Viscous 15 milliLiter(s) Mucosal every 8 hours PRN Sore throat  melatonin 3 milliGRAM(s) Oral at bedtime PRN Insomnia  ondansetron Injectable 4 milliGRAM(s) IV Push every 8 hours PRN Nausea and/or Vomiting  polyethylene glycol 3350 17 Gram(s) Oral every 24 hours PRN for constipation  senna 2 Tablet(s) Oral at bedtime PRN for constipation    ALLERGIES:  Allergies    No Known Allergies    Intolerances    LABS:                       7.4    3.22  )-----------( 173      ( 24 Oct 2024 22:40 )             22.3     10-24    128[L]  |  93[L]  |  15  ----------------------------<  124[H]  3.9   |  23  |  0.75    Ca    7.9[L]      24 Oct 2024 22:40  Phos  2.0     10-24  Mg     1.7     10-24    TPro  6.4  /  Alb  2.4[L]  /  TBili  0.8  /  DBili  x   /  AST  26  /  ALT  26  /  AlkPhos  111  10-24    PT/INR - ( 24 Oct 2024 16:59 )   PT: 15.1 sec;   INR: 1.32          PTT - ( 24 Oct 2024 16:59 )  PTT:31.4 sec  Urinalysis Basic - ( 24 Oct 2024 22:40 )    Color: x / Appearance: x / SG: x / pH: x  Gluc: 124 mg/dL / Ketone: x  / Bili: x / Urobili: x   Blood: x / Protein: x / Nitrite: x   Leuk Esterase: x / RBC: x / WBC x   Sq Epi: x / Non Sq Epi: x / Bacteria: x      CAPILLARY BLOOD GLUCOSE      POCT Blood Glucose.: 123 mg/dL (24 Oct 2024 22:03)    RADIOLOGY & ADDITIONAL TESTS: Reviewed. Progress Note    HOSPITAL COURSE: 69M w/ PMHx of non-operable lung cancer, DM, HTN, HLD recently admitted for symptomatic anemia and chemotherapy-induced nausea and vomiting (10/14-10/15) presenting with hiccups, nausea, vomiting, and fever found to have RML pnuemonia, admitted on 10/21. GI consulted, s/p EGD 10/23, found to have medium size hiatal hernia, diffuse erythema of mucosa of whole stomach. Started on pantoprazole IV BID and sucralfate. EGD biopsy showed CMV esophagitis, started on ganciclovir 5 mg/kg IV q12h (10/24). Patient was on empiric zosyn 4.5 mg IV q8h from 10/21-10/27. Patient has been having fevers daily, given ofirmev each time and all bcx are NGTD thus far (10/21, 10/23, 10/24, 10/26). Tmax of 103F (10/27).  Hemoglobin dropped to 6.7 on 10/28, s/p 1U pRBC, hemoglobin increased to 8.7 on 10/29. Patient has ongoing issue of hiccups and inability to tolerate PO as he will vomit/regurgitate his food 2/2 hiccups. Patient trialed on baclofen, currently on gabapentin 300 BID with improvement of tolerating PO. ID, heme/onc following.     INTERVAL EVENTS:   At 10 pm, patient has fever of 102.9F, received ofirmev, blood cultures, labs due to Tmax. /78, , RR 18, satting 93% NC 2L.    SUBJECTIVE:   Patient states he feels well. He cannot tell if the gabapentin dosage increase has changed his hiccups. Reports that the hiccups are intermittent, not bothersome. He is able to tolerate water PO. He reports not eating yesterday d/t fear of vomiting again like the previous night. He does not drink the Ensure because he prefers vanilla-flavored Ensure. He reports low appetite, does not feel hungry, does not feel the need to have a BM. Has not had BM. Denies dysuria, chills, nausea, vomiting, abd pain, chest pain, SOB.     ROS:  Negative unless otherwise stated above.    PHYSICAL EXAM:  General: Alert and oriented x 3. No acute distress.   Cardiovascular: Regular rate and rhythm.   Lungs: Clear to auscultation bilaterally. No accessory muscle use.  Abdomen: Soft, non-tender and non-distended.   Extremities: No edema. Non-tender. Warm.    VITAL SIGNS:  Vital Signs Last 24 Hrs  T(C): 37.1 (25 Oct 2024 05:46), Max: 39.4 (24 Oct 2024 20:33)  T(F): 98.7 (25 Oct 2024 05:46), Max: 102.9 (24 Oct 2024 20:33)  HR: 105 (25 Oct 2024 05:46) (103 - 118)  BP: 117/72 (25 Oct 2024 05:46) (115/68 - 118/78)  BP(mean): 91 (24 Oct 2024 20:33) (91 - 91)  RR: 18 (25 Oct 2024 05:46) (17 - 18)  SpO2: 92% (25 Oct 2024 05:46) (92% - 94%)    Parameters below as of 25 Oct 2024 05:46  Patient On (Oxygen Delivery Method): nasal cannula      INPATIENT MEDICATIONS:   MEDICATIONS  (STANDING):  amLODIPine   Tablet 5 milliGRAM(s) Oral daily  atorvastatin 20 milliGRAM(s) Oral at bedtime  dextrose 5%. 1000 milliLiter(s) (100 mL/Hr) IV Continuous <Continuous>  dextrose 5%. 1000 milliLiter(s) (50 mL/Hr) IV Continuous <Continuous>  dextrose 50% Injectable 25 Gram(s) IV Push once  dextrose 50% Injectable 12.5 Gram(s) IV Push once  dextrose 50% Injectable 25 Gram(s) IV Push once  enoxaparin Injectable 40 milliGRAM(s) SubCutaneous every 24 hours  gabapentin 300 milliGRAM(s) Oral daily  ganciclovir IVPB 300 milliGRAM(s) IV Intermittent every 12 hours  ganciclovir IVPB      glucagon  Injectable 1 milliGRAM(s) IntraMuscular once  insulin lispro (ADMELOG) corrective regimen sliding scale   SubCutaneous Before meals and at bedtime  pantoprazole    Tablet 40 milliGRAM(s) Oral before breakfast  piperacillin/tazobactam IVPB.. 4.5 Gram(s) IV Intermittent every 8 hours  sucralfate 1 Gram(s) Oral every 6 hours    MEDICATIONS  (PRN):  aluminum hydroxide/magnesium hydroxide/simethicone Suspension 30 milliLiter(s) Oral every 4 hours PRN Dyspepsia  benzocaine/menthol Lozenge 1 Lozenge Oral every 2 hours PRN Sore Throat  dextrose Oral Gel 15 Gram(s) Oral once PRN Blood Glucose LESS THAN 70 milliGRAM(s)/deciliter  lidocaine 2% Viscous 15 milliLiter(s) Mucosal every 8 hours PRN Sore throat  melatonin 3 milliGRAM(s) Oral at bedtime PRN Insomnia  ondansetron Injectable 4 milliGRAM(s) IV Push every 8 hours PRN Nausea and/or Vomiting  polyethylene glycol 3350 17 Gram(s) Oral every 24 hours PRN for constipation  senna 2 Tablet(s) Oral at bedtime PRN for constipation    ALLERGIES:  Allergies    No Known Allergies    Intolerances    LABS:                       7.4    3.22  )-----------( 173      ( 24 Oct 2024 22:40 )             22.3     10-24    128[L]  |  93[L]  |  15  ----------------------------<  124[H]  3.9   |  23  |  0.75    Ca    7.9[L]      24 Oct 2024 22:40  Phos  2.0     10-24  Mg     1.7     10-24    TPro  6.4  /  Alb  2.4[L]  /  TBili  0.8  /  DBili  x   /  AST  26  /  ALT  26  /  AlkPhos  111  10-24    PT/INR - ( 24 Oct 2024 16:59 )   PT: 15.1 sec;   INR: 1.32          PTT - ( 24 Oct 2024 16:59 )  PTT:31.4 sec  Urinalysis Basic - ( 24 Oct 2024 22:40 )    Color: x / Appearance: x / SG: x / pH: x  Gluc: 124 mg/dL / Ketone: x  / Bili: x / Urobili: x   Blood: x / Protein: x / Nitrite: x   Leuk Esterase: x / RBC: x / WBC x   Sq Epi: x / Non Sq Epi: x / Bacteria: x      CAPILLARY BLOOD GLUCOSE      POCT Blood Glucose.: 123 mg/dL (24 Oct 2024 22:03)    RADIOLOGY & ADDITIONAL TESTS: Reviewed.

## 2024-10-25 NOTE — DIETITIAN INITIAL EVALUATION ADULT - PROBLEM SELECTOR PLAN 5
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 S/p 7 cycles of neoadjuvant platinum based with gemcitabine and nivolumab chemoimmunotherapy. Follows with Dr. Mueller for RT, s/p last round of RT on 10/15.   - NTD

## 2024-10-25 NOTE — PROGRESS NOTE ADULT - ASSESSMENT
69M with hx of DM (A1c 7.2%), HTN, HLD, squamous cell lung cancer currently on carboplatin/taxol (last received 10/10) and RT (last received 10/15), who on 10/14 developed odynophagia, cough, and hiccups, subsequently developed nausea/vomiting and then on 10/20 developed fever for which he presented to [facility] ED, admitted on 10/21 for further management. Was febrile, with mild hypoxia (2L NC), leukopenic with severe lymphopenia. CT C/A/P w/ IVC with decreased size of known COLETTE mass (malignancy), and new bilateral upper lobe GGOs with interlobular septal thickening, as well as mid-distal esophagitis. S/p EGD on 10/23 with finding of erosive changes of middle third of esophagus and esophageal biopsy +CMV inclusions.    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 Continue close monitoring of cell lines and renal function (with daily CBC w/ diff and BMP)  - Follow up CMV PCR  - Anticipate 3 weeks minimum therapy. Will plan to switch to PO valcyte after his symptoms have improved significantly. Please keep on IV therapy over the weekend and I will evaluate for PO switch next week.    # Possible bacterial PNA  - Patient reports significant improvement in his cough since presentation (with zosyn), which argues against CMV disease also being responsible for lung pathology/symptoms, although CMV pneumonitis is still a possibility. Will monitor closely for continued symptomatic improvement, and he will need serial CT chest in several weeks. He will already be on treatment for CMV tissue invasive disease as above.  - Continue zosyn 4.5g IV q8h EI x 7 days total (EOT 10/27)    ID Team 1 will follow. Dr. Rizvi will cover over the weekend and I will resume care on Monday. 69M with hx of DM (A1c 7.2%), HTN, HLD, squamous cell lung cancer currently on carboplatin/taxol (last received 10/10) and RT (last received 10/15), who on 10/14 developed odynophagia, cough, and hiccups, subsequently developed nausea/vomiting and then on 10/20 developed fever for which he presented to [facility] ED, admitted on 10/21 for further management. Was febrile, with mild hypoxia (2L NC), leukopenic with severe lymphopenia. CT C/A/P w/ IVC with decreased size of known COLETTE mass (malignancy), and new bilateral upper lobe GGOs with interlobular septal thickening, as well as mid-distal esophagitis. S/p EGD on 10/23 with finding of erosive changes of middle third of esophagus and esophageal biopsy +CMV inclusions.    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 Continue close monitoring of cell lines and renal function (with daily CBC w/ diff and BMP)  - Follow up CMV PCR  - Anticipate 3 weeks minimum therapy. Will plan to switch to PO valcyte after his symptoms have improved significantly. Please keep on IV therapy over the weekend and I will evaluate for PO switch next week.  - Patient's risk factor is chemo, but recommend routine HIV screen as well.    # Possible bacterial PNA  - Patient reports significant improvement in his cough since presentation (with zosyn), which argues against CMV disease also being responsible for lung pathology/symptoms, although CMV pneumonitis is still a possibility. Will monitor closely for continued symptomatic improvement, and he will need serial CT chest in several weeks. He will already be on treatment for CMV tissue invasive disease as above.  - Continue zosyn 4.5g IV q8h EI x 7 days total (EOT 10/27)    ID Team 1 will follow. Dr. Rizvi will cover over the weekend and I will resume care on Monday.

## 2024-10-25 NOTE — PROGRESS NOTE ADULT - ATTENDING COMMENTS
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     Patient reports feeling well this AM. Is nervous to eat as it makes him feels nauseous. Otherwise denies any other complaints. Had another fever overnight to 102.9 where labs and blood cultures were drawn but patient once again did not feel the fever.     Plan:   #Fever  #CMV Esophagitis  - Positive on EGD biopsy pathology 10/23  - ID consulted, appreciate reccs  - on IV ganciclovir - will check if should hydrate with administration  - Follow up CMV PCR  - Consult pulmonary as findings on CT may be consistent with CMV pneumonitis as opposed to pneumonia as patient is on day 6 of zosyn and has not had symptoms consistent with PNA     #?PNA  - c/w IV Zosyn for 7 day course (last dose tomorrow 10/26)  - Appreciate ID reccs    #Dysphagia  #Radiation Esophagitis  - May also be due to now known CMV esophagitis  - C/w sucralfate, PPI     #Lung cancer on chemo/RT  - Last RT 10/15, last chemo 10/10  - Oncology now following  - Outpatient oncologist Dr. 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     Patient reports feeling well this AM. Is nervous to eat as it makes him feels nauseous. Otherwise denies any other complaints. Had another fever overnight to 102.9 where labs and blood cultures were drawn but patient once again did not feel the fever.     Plan:   #Fever  #CMV Esophagitis  - Positive on EGD biopsy pathology 10/23  - ID consulted, appreciate reccs  - on IV ganciclovir   - Follow up CMV PCR  - Consult pulmonary as findings on CT may be consistent with CMV pneumonitis as opposed to pneumonia as patient is on day 6 of zosyn and has not had symptoms consistent with PNA     #?PNA  - c/w IV Zosyn for 7 day course (last dose tomorrow 10/26)  - Appreciate ID reccs    #Dysphagia  #Radiation Esophagitis  - May also be due to now known CMV esophagitis  - C/w sucralfate, PPI     #Lung cancer on chemo/RT  - Last RT 10/15, last chemo 10/10  - Oncology now following  - Outpatient oncologist Dr. Marcelo Larose    #Anemia  - Stable on repeat in 7s, likely due to BM suppression from chemo  - Continue to monitor  - Transfuse Hb>7    #Hyponatremia  - Stable - possibly due to SIADH vs poor PO intake  - Follow up urine studies    Rest of plan per resident note

## 2024-10-25 NOTE — PROVIDER CONTACT NOTE (OTHER) - BACKGROUND
Patient already had blood cultures drawn overnight per night RN when patient spiked and was tachy overnight

## 2024-10-25 NOTE — DIETITIAN INITIAL EVALUATION ADULT - PROBLEM SELECTOR PLAN 7
Na 127 on admission, previously 134. Pt denies HA but admits to N/V, which is chronic for him. Has had poor PO intake iso N/V. Drinks gatorade and ensures. No visual disturbances, palpitations.   Corrected Na 128. Calculated serum osm 268. S/p 2L NS in ED. Na improved to 131 s/p NS 2 L.  - Monitor BMP  - Encourage PO intake

## 2024-10-25 NOTE — DIETITIAN INITIAL EVALUATION ADULT - OTHER CALCULATIONS
Estimated needs based on dosing wt as within % IBW 142lb/64.5kg (94%). Needs adjusted for age, cancer on treatment, malnutrition, and clinical status.   Defer fluids to team.

## 2024-10-25 NOTE — DIETITIAN NUTRITION RISK NOTIFICATION - ADDITIONAL COMMENTS/DIETITIAN RECOMMENDATIONS
Pt reports poor intake due to poor appetite and postprandial nausea/vomiting x2-3 weeks. Diet recall includes little to no PO intake, RD observed breakfast untouched at bedside, pt reports primarily consuming ice water; estimate pt meeting <50% needs >1 week. Pt reports UBW 142lb x2 weeks ago; current admission wt 133lb/60.3kg indicates 9lb/6% wt loss - clinically significant. Nutrition-focused physical examination notable for moderate-severe muscle and fat wasting. Per ASPEN guidelines, pt meets criteria for severe acute on chronic malnutrition.

## 2024-10-25 NOTE — DIETITIAN INITIAL EVALUATION ADULT - NUTRITIONGOAL OUTCOME1
Pt to consistently meet >75% nutrient needs. Reduce signs and symptoms of protein-calorie malnutrition.   Maintain nutrition within goals of care.

## 2024-10-25 NOTE — PROGRESS NOTE ADULT - PROBLEM SELECTOR PLAN 5
Pt c/p pain with swallowing since his RT on 10/15, with poor PO intake. Likely 2/2 vomiting vs RT.  As per bedside swallow assessment: no cough, throat clear or change in voice when taking PO trials. Pt is denying difficulty or pain with swallowing. Reports discomfort when he has hiccups.  s/p EGD 10/23: Erosive, congestion and abnormal vascularity in the middle third of the esophagus. (Biopsy). Esophageal hiatal hernia. Erythema in the whole stomach compatible with non-erosive gastritis. (Biopsy). Normal mucosa in the whole examined duodenum.  CT C/A/P:  Bilateral upper lobe pneumonia versus aspiration. Superimposed upper lobe pulmonary congestion/edema. Small pleural effusions bilaterally. 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 Mid-distal esophagitis, possibly treatment-related or gastroesophageal reflux associated with hiatal hernia.    - f/u GI recs  - lozenges PRN  - c/w sucralfate 1g q8h for radiation esophagitis  - c/w pantoprazole Pt c/p pain with swallowing since his RT on 10/15, with poor PO intake. Likely 2/2 vomiting vs RT.  As per bedside swallow assessment: no cough, throat clear or change in voice when taking PO trials. Pt is denying difficulty or pain with swallowing. Reports discomfort when he has hiccups.  s/p EGD 10/23: Erosive, congestion and abnormal vascularity in the middle third of the esophagus. (Biopsy). Esophageal hiatal hernia. Erythema in the whole stomach compatible with non-erosive gastritis. (Biopsy). Normal mucosa in the whole examined duodenum.  CT C/A/P:  Bilateral upper lobe pneumonia versus aspiration. Superimposed upper lobe pulmonary congestion/edema. Small pleural effusions bilaterally. 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 Mid-distal esophagitis, possibly treatment-related or gastroesophageal reflux associated with hiatal hernia.    - lozenges PRN  - c/w sucralfate 1g q8h for radiation esophagitis  - c/w pantoprazole

## 2024-10-26 LAB
ALBUMIN SERPL ELPH-MCNC: 2.2 G/DL — LOW (ref 3.3–5)
ALP SERPL-CCNC: 135 U/L — HIGH (ref 40–120)
ALT FLD-CCNC: 25 U/L — SIGNIFICANT CHANGE UP (ref 10–45)
ANION GAP SERPL CALC-SCNC: 10 MMOL/L — SIGNIFICANT CHANGE UP (ref 5–17)
AST SERPL-CCNC: 28 U/L — SIGNIFICANT CHANGE UP (ref 10–40)
BASOPHILS # BLD AUTO: 0.01 K/UL — SIGNIFICANT CHANGE UP (ref 0–0.2)
BASOPHILS NFR BLD AUTO: 0.3 % — SIGNIFICANT CHANGE UP (ref 0–2)
BILIRUB SERPL-MCNC: 0.7 MG/DL — SIGNIFICANT CHANGE UP (ref 0.2–1.2)
BUN SERPL-MCNC: 16 MG/DL — SIGNIFICANT CHANGE UP (ref 7–23)
CALCIUM SERPL-MCNC: 7.8 MG/DL — LOW (ref 8.4–10.5)
CHLORIDE SERPL-SCNC: 96 MMOL/L — SIGNIFICANT CHANGE UP (ref 96–108)
CO2 SERPL-SCNC: 25 MMOL/L — SIGNIFICANT CHANGE UP (ref 22–31)
CREAT SERPL-MCNC: 0.75 MG/DL — SIGNIFICANT CHANGE UP (ref 0.5–1.3)
CULTURE RESULTS: SIGNIFICANT CHANGE UP
EGFR: 97 ML/MIN/1.73M2 — SIGNIFICANT CHANGE UP
EOSINOPHIL # BLD AUTO: 0.09 K/UL — SIGNIFICANT CHANGE UP (ref 0–0.5)
EOSINOPHIL NFR BLD AUTO: 2.6 % — SIGNIFICANT CHANGE UP (ref 0–6)
GLUCOSE BLDC GLUCOMTR-MCNC: 106 MG/DL — HIGH (ref 70–99)
GLUCOSE BLDC GLUCOMTR-MCNC: 110 MG/DL — HIGH (ref 70–99)
GLUCOSE BLDC GLUCOMTR-MCNC: 126 MG/DL — HIGH (ref 70–99)
GLUCOSE BLDC GLUCOMTR-MCNC: 136 MG/DL — HIGH (ref 70–99)
GLUCOSE BLDC GLUCOMTR-MCNC: 144 MG/DL — HIGH (ref 70–99)
GLUCOSE SERPL-MCNC: 88 MG/DL — SIGNIFICANT CHANGE UP (ref 70–99)
HCT VFR BLD CALC: 21.5 % — LOW (ref 39–50)
HGB BLD-MCNC: 7.2 G/DL — LOW (ref 13–17)
HIV 1+2 AB+HIV1 P24 AG SERPL QL IA: SIGNIFICANT CHANGE UP
IMM GRANULOCYTES NFR BLD AUTO: 0.6 % — SIGNIFICANT CHANGE UP (ref 0–0.9)
LYMPHOCYTES # BLD AUTO: 0.44 K/UL — LOW (ref 1–3.3)
LYMPHOCYTES # BLD AUTO: 12.5 % — LOW (ref 13–44)
MAGNESIUM SERPL-MCNC: 1.5 MG/DL — LOW (ref 1.6–2.6)
MCHC RBC-ENTMCNC: 29.1 PG — SIGNIFICANT CHANGE UP (ref 27–34)
MCHC RBC-ENTMCNC: 33.5 GM/DL — SIGNIFICANT CHANGE UP (ref 32–36)
MCV RBC AUTO: 87 FL — SIGNIFICANT CHANGE UP (ref 80–100)
MONOCYTES # BLD AUTO: 0.27 K/UL — SIGNIFICANT CHANGE UP (ref 0–0.9)
MONOCYTES NFR BLD AUTO: 7.7 % — SIGNIFICANT CHANGE UP (ref 2–14)
NEUTROPHILS # BLD AUTO: 2.69 K/UL — SIGNIFICANT CHANGE UP (ref 1.8–7.4)
NEUTROPHILS NFR BLD AUTO: 76.3 % — SIGNIFICANT CHANGE UP (ref 43–77)
NRBC # BLD: 0 /100 WBCS — SIGNIFICANT CHANGE UP (ref 0–0)
PHOSPHATE SERPL-MCNC: 1.9 MG/DL — LOW (ref 2.5–4.5)
PLATELET # BLD AUTO: 190 K/UL — SIGNIFICANT CHANGE UP (ref 150–400)
POTASSIUM SERPL-MCNC: 3.2 MMOL/L — LOW (ref 3.5–5.3)
POTASSIUM SERPL-SCNC: 3.2 MMOL/L — LOW (ref 3.5–5.3)
PROT SERPL-MCNC: 6.2 G/DL — SIGNIFICANT CHANGE UP (ref 6–8.3)
RBC # BLD: 2.47 M/UL — LOW (ref 4.2–5.8)
RBC # FLD: 17.2 % — HIGH (ref 10.3–14.5)
SODIUM SERPL-SCNC: 131 MMOL/L — LOW (ref 135–145)
SPECIMEN SOURCE: SIGNIFICANT CHANGE UP
WBC # BLD: 3.52 K/UL — LOW (ref 3.8–10.5)
WBC # FLD AUTO: 3.52 K/UL — LOW (ref 3.8–10.5)

## 2024-10-26 PROCEDURE — 99233 SBSQ HOSP IP/OBS HIGH 50: CPT

## 2024-10-26 PROCEDURE — 99232 SBSQ HOSP IP/OBS MODERATE 35: CPT | Mod: GC

## 2024-10-26 RX ORDER — ACETAMINOPHEN 500 MG
1000 TABLET ORAL ONCE
Refills: 0 | Status: COMPLETED | OUTPATIENT
Start: 2024-10-26 | End: 2024-10-26

## 2024-10-26 RX ADMIN — SUCRALFATE 1 GRAM(S): 1 SUSPENSION ORAL at 23:26

## 2024-10-26 RX ADMIN — GANCICLOVIR SODIUM 100 MILLIGRAM(S): 50 INJECTION, POWDER, LYOPHILIZED, FOR SOLUTION INTRAVENOUS at 07:14

## 2024-10-26 RX ADMIN — ONDANSETRON HYDROCHLORIDE 4 MILLIGRAM(S): 2 INJECTION, SOLUTION INTRAMUSCULAR; INTRAVENOUS at 19:25

## 2024-10-26 RX ADMIN — Medication 5 MILLIGRAM(S): at 05:38

## 2024-10-26 RX ADMIN — Medication 40 MILLIGRAM(S): at 18:01

## 2024-10-26 RX ADMIN — Medication 1000 MILLIGRAM(S): at 07:30

## 2024-10-26 RX ADMIN — SUCRALFATE 1 GRAM(S): 1 SUSPENSION ORAL at 18:00

## 2024-10-26 RX ADMIN — Medication 5 MILLIGRAM(S): at 21:37

## 2024-10-26 RX ADMIN — Medication 400 MILLIGRAM(S): at 05:36

## 2024-10-26 RX ADMIN — Medication 20 MILLIGRAM(S): at 21:37

## 2024-10-26 RX ADMIN — PIPERACILLIN AND TAZOBACTAM 25 GRAM(S): .5; 4 INJECTION, POWDER, LYOPHILIZED, FOR SOLUTION INTRAVENOUS at 05:39

## 2024-10-26 RX ADMIN — GANCICLOVIR SODIUM 100 MILLIGRAM(S): 50 INJECTION, POWDER, LYOPHILIZED, FOR SOLUTION INTRAVENOUS at 18:01

## 2024-10-26 RX ADMIN — PIPERACILLIN AND TAZOBACTAM 25 GRAM(S): .5; 4 INJECTION, POWDER, LYOPHILIZED, FOR SOLUTION INTRAVENOUS at 21:37

## 2024-10-26 RX ADMIN — PIPERACILLIN AND TAZOBACTAM 25 GRAM(S): .5; 4 INJECTION, POWDER, LYOPHILIZED, FOR SOLUTION INTRAVENOUS at 13:22

## 2024-10-26 RX ADMIN — SUCRALFATE 1 GRAM(S): 1 SUSPENSION ORAL at 05:39

## 2024-10-26 RX ADMIN — Medication 5 MILLIGRAM(S): at 13:22

## 2024-10-26 RX ADMIN — PANTOPRAZOLE SODIUM 40 MILLIGRAM(S): 40 TABLET, DELAYED RELEASE ORAL at 05:45

## 2024-10-26 RX ADMIN — SUCRALFATE 1 GRAM(S): 1 SUSPENSION ORAL at 11:28

## 2024-10-26 NOTE — PROGRESS NOTE ADULT - PROBLEM SELECTOR PLAN 3
POA. Meeting 2/4 SIRS criteria (T 102.9, ) without clear source. Pt has been coughing over the last 2 days with fever at home T 101. Pt has been hiccuping c/b vomiting. S/p Vanc 1g, Zosyn 3.375g in ED. BCx collected x2 in ED. Patient HDS, appearing well, AOx3, warm to touch. Found to have pneumonia and CMV.  s/p 80 cc/hr LR x 12 hrs  - C/w abx as below   - F/u BCx x3 - NGTD   - Reculture for Tmax >102.9

## 2024-10-26 NOTE — PROGRESS NOTE ADULT - ASSESSMENT
69M w/ PMHx of non-operable lung cancer, DM, HTN, HLD recently admitted for symptomatic anemia and chemotherapy-induced nausea and vomiting (10/14-10/15) presenting with hiccups, nausea, vomiting, and fever admitted to [facility] for further management, s/p EGD. 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

## 2024-10-26 NOTE — PROGRESS NOTE ADULT - SUBJECTIVE AND OBJECTIVE BOX
INFECTIOUS DISEASES CONSULT FOLLOW-UP NOTE    INTERVAL HPI/OVERNIGHT EVENTS:      ROS:   Constitutional, eyes, ENT, cardiovascular, respiratory, gastrointestinal, genitourinary, integumentary, neurological, psychiatric and heme/lymph are otherwise negative other than noted above       ANTIBIOTICS/RELEVANT:    MEDICATIONS  (STANDING):  amLODIPine   Tablet 5 milliGRAM(s) Oral daily  atorvastatin 20 milliGRAM(s) Oral at bedtime  baclofen 5 milliGRAM(s) Oral every 8 hours  dextrose 5%. 1000 milliLiter(s) (100 mL/Hr) IV Continuous <Continuous>  dextrose 5%. 1000 milliLiter(s) (50 mL/Hr) IV Continuous <Continuous>  dextrose 50% Injectable 25 Gram(s) IV Push once  dextrose 50% Injectable 12.5 Gram(s) IV Push once  dextrose 50% Injectable 25 Gram(s) IV Push once  enoxaparin Injectable 40 milliGRAM(s) SubCutaneous every 24 hours  ganciclovir IVPB 300 milliGRAM(s) IV Intermittent every 12 hours  ganciclovir IVPB      glucagon  Injectable 1 milliGRAM(s) IntraMuscular once  insulin lispro (ADMELOG) corrective regimen sliding scale   SubCutaneous Before meals and at bedtime  pantoprazole    Tablet 40 milliGRAM(s) Oral before breakfast  piperacillin/tazobactam IVPB.. 4.5 Gram(s) IV Intermittent every 8 hours  sucralfate 1 Gram(s) Oral every 6 hours    MEDICATIONS  (PRN):  aluminum hydroxide/magnesium hydroxide/simethicone Suspension 30 milliLiter(s) Oral every 4 hours PRN Dyspepsia  benzocaine/menthol Lozenge 1 Lozenge Oral every 2 hours PRN Sore Throat  dextrose Oral Gel 15 Gram(s) Oral once PRN Blood Glucose LESS THAN 70 milliGRAM(s)/deciliter  lidocaine 2% Viscous 15 milliLiter(s) Mucosal every 8 hours PRN Sore throat  melatonin 3 milliGRAM(s) Oral at bedtime PRN Insomnia  ondansetron Injectable 4 milliGRAM(s) IV Push every 8 hours PRN Nausea and/or Vomiting  polyethylene glycol 3350 17 Gram(s) Oral every 24 hours PRN for constipation  senna 2 Tablet(s) Oral at bedtime PRN for constipation        Vital Signs Last 24 Hrs  T(C): 37.4 (26 Oct 2024 12:02), Max: 39.1 (26 Oct 2024 05:00)  T(F): 99.3 (26 Oct 2024 12:02), Max: 102.3 (26 Oct 2024 05:00)  HR: 99 (26 Oct 2024 12:02) (99 - 119)  BP: 92/55 (26 Oct 2024 12:02) (92/55 - 134/75)  BP(mean): --  RR: 20 (26 Oct 2024 12:02) (20 - 20)  SpO2: 96% (26 Oct 2024 12:02) (92% - 96%)    Parameters below as of 26 Oct 2024 12:02  Patient On (Oxygen Delivery Method): nasal cannula  O2 Flow (L/min): 2      10-25-24 @ 07:01  -  10-26-24 @ 07:00  --------------------------------------------------------  IN: 100 mL / OUT: 0 mL / NET: 100 mL      PHYSICAL EXAM:  Constitutional: alert, NAD  Eyes: the sclera and conjunctiva were normal.   ENT: the ears and nose were normal in appearance.   Neck: the appearance of the neck was normal and the neck was supple.   Pulmonary: no respiratory distress and lungs were clear to auscultation bilaterally.   Heart: heart rate was normal and rhythm regular, normal S1 and S2  Vascular:. there was no peripheral edema  Abdomen: normal bowel sounds, soft, non-tender  Neurological: no focal deficits.   Psychiatric: the affect was normal        LABS:                        7.2    3.52  )-----------( 190      ( 26 Oct 2024 05:30 )             21.5     10-26    131[L]  |  96  |  16  ----------------------------<  88  3.2[L]   |  25  |  0.75    Ca    7.8[L]      26 Oct 2024 05:30  Phos  1.9     10-26  Mg     1.5     10-26    TPro  6.2  /  Alb  2.2[L]  /  TBili  0.7  /  DBili  x   /  AST  28  /  ALT  25  /  AlkPhos  135[H]  10-26    PT/INR - ( 24 Oct 2024 16:59 )   PT: 15.1 sec;   INR: 1.32          PTT - ( 24 Oct 2024 16:59 )  PTT:31.4 sec  Urinalysis Basic - ( 26 Oct 2024 05:30 )    Color: x / Appearance: x / SG: x / pH: x  Gluc: 88 mg/dL / Ketone: x  / Bili: x / Urobili: x   Blood: x / Protein: x / Nitrite: x   Leuk Esterase: x / RBC: x / WBC x   Sq Epi: x / Non Sq Epi: x / Bacteria: x        MICROBIOLOGY:      RADIOLOGY & ADDITIONAL STUDIES:  Reviewed INFECTIOUS DISEASES CONSULT FOLLOW-UP NOTE    INTERVAL HPI/OVERNIGHT EVENTS: Febrile to 102.3 (oral) this AM, BCx were not sent at the time. Upon evaluation pt has no complaints at this time.      ROS:   Constitutional, eyes, ENT, cardiovascular, respiratory, gastrointestinal, genitourinary, integumentary, neurological, psychiatric and heme/lymph are otherwise negative other than noted above       ANTIBIOTICS/RELEVANT:    MEDICATIONS  (STANDING):  amLODIPine   Tablet 5 milliGRAM(s) Oral daily  atorvastatin 20 milliGRAM(s) Oral at bedtime  baclofen 5 milliGRAM(s) Oral every 8 hours  dextrose 5%. 1000 milliLiter(s) (100 mL/Hr) IV Continuous <Continuous>  dextrose 5%. 1000 milliLiter(s) (50 mL/Hr) IV Continuous <Continuous>  dextrose 50% Injectable 25 Gram(s) IV Push once  dextrose 50% Injectable 12.5 Gram(s) IV Push once  dextrose 50% Injectable 25 Gram(s) IV Push once  enoxaparin Injectable 40 milliGRAM(s) SubCutaneous every 24 hours  ganciclovir IVPB 300 milliGRAM(s) IV Intermittent every 12 hours  ganciclovir IVPB      glucagon  Injectable 1 milliGRAM(s) IntraMuscular once  insulin lispro (ADMELOG) corrective regimen sliding scale   SubCutaneous Before meals and at bedtime  pantoprazole    Tablet 40 milliGRAM(s) Oral before breakfast  piperacillin/tazobactam IVPB.. 4.5 Gram(s) IV Intermittent every 8 hours  sucralfate 1 Gram(s) Oral every 6 hours    MEDICATIONS  (PRN):  aluminum hydroxide/magnesium hydroxide/simethicone Suspension 30 milliLiter(s) Oral every 4 hours PRN Dyspepsia  benzocaine/menthol Lozenge 1 Lozenge Oral every 2 hours PRN Sore Throat  dextrose Oral Gel 15 Gram(s) Oral once PRN Blood Glucose LESS THAN 70 milliGRAM(s)/deciliter  lidocaine 2% Viscous 15 milliLiter(s) Mucosal every 8 hours PRN Sore throat  melatonin 3 milliGRAM(s) Oral at bedtime PRN Insomnia  ondansetron Injectable 4 milliGRAM(s) IV Push every 8 hours PRN Nausea and/or Vomiting  polyethylene glycol 3350 17 Gram(s) Oral every 24 hours PRN for constipation  senna 2 Tablet(s) Oral at bedtime PRN for constipation        Vital Signs Last 24 Hrs  T(C): 37.4 (26 Oct 2024 12:02), Max: 39.1 (26 Oct 2024 05:00)  T(F): 99.3 (26 Oct 2024 12:02), Max: 102.3 (26 Oct 2024 05:00)  HR: 99 (26 Oct 2024 12:02) (99 - 119)  BP: 92/55 (26 Oct 2024 12:02) (92/55 - 134/75)  BP(mean): --  RR: 20 (26 Oct 2024 12:02) (20 - 20)  SpO2: 96% (26 Oct 2024 12:02) (92% - 96%)    Parameters below as of 26 Oct 2024 12:02  Patient On (Oxygen Delivery Method): nasal cannula  O2 Flow (L/min): 2      10-25-24 @ 07:01  -  10-26-24 @ 07:00  --------------------------------------------------------  IN: 100 mL / OUT: 0 mL / NET: 100 mL      PHYSICAL EXAM:  Constitutional: alert, NAD  Eyes: the sclera and conjunctiva were normal.   ENT: the ears and nose were normal in appearance.   Neck: the appearance of the neck was normal and the neck was supple.   Pulmonary: no respiratory distress and lungs were clear to auscultation bilaterally. breathing comfortably on 2L NC.  Heart: heart rate was normal and rhythm regular, normal S1 and S2  Vascular: no peripheral edema  Abdomen: soft, NTND  Neurological: no focal deficits.   Psychiatric: the affect was normal        LABS:                        7.2    3.52  )-----------( 190      ( 26 Oct 2024 05:30 )             21.5     10-26    131[L]  |  96  |  16  ----------------------------<  88  3.2[L]   |  25  |  0.75    Ca    7.8[L]      26 Oct 2024 05:30  Phos  1.9     10-26  Mg     1.5     10-26    TPro  6.2  /  Alb  2.2[L]  /  TBili  0.7  /  DBili  x   /  AST  28  /  ALT  25  /  AlkPhos  135[H]  10-26    PT/INR - ( 24 Oct 2024 16:59 )   PT: 15.1 sec;   INR: 1.32          PTT - ( 24 Oct 2024 16:59 )  PTT:31.4 sec  Urinalysis Basic - ( 26 Oct 2024 05:30 )    Color: x / Appearance: x / SG: x / pH: x  Gluc: 88 mg/dL / Ketone: x  / Bili: x / Urobili: x   Blood: x / Protein: x / Nitrite: x   Leuk Esterase: x / RBC: x / WBC x   Sq Epi: x / Non Sq Epi: x / Bacteria: x        MICROBIOLOGY:      RADIOLOGY & ADDITIONAL STUDIES:  Reviewed

## 2024-10-26 NOTE — PROGRESS NOTE ADULT - PROBLEM SELECTOR PLAN 2
Pt is immunocompromised iso lung cancer and chemo/RT px with 2 days of cough and 1 day of fever. Pt was dc'd on 10/15 and started having intractable hiccups c/b nausea and vomiting. ?Aspiration pna. Cough is dry. Pt denies SOB, CP, abd pain, diarrhea, dysuria, rashes or joint pain. CXR pending read but appears similar to prior w/o infiltrates/consolidations. WBC 2.94 (from 1.95) with increased lymphocyte %. Covid/Flu/RSV negative.  MRSA negative. Legionella/strep negative.     - c/w zosyn 4.5g q8h x 7 days (10/21-10/27)  ---discharge with augmentin if patient leaves before 7-day course finishes  - ofirmev as needed for fevers, max 4g in 24 hours (can give toradol if maxxed out)

## 2024-10-26 NOTE — PROGRESS NOTE ADULT - PROBLEM SELECTOR PLAN 5
Pt c/p pain with swallowing since his RT on 10/15, with poor PO intake. Likely 2/2 vomiting vs RT.  As per bedside swallow assessment: no cough, throat clear or change in voice when taking PO trials. Pt is denying difficulty or pain with swallowing. Reports discomfort when he has hiccups.  s/p EGD 10/23: Erosive, congestion and abnormal vascularity in the middle third of the esophagus. (Biopsy). Esophageal hiatal hernia. Erythema in the whole stomach compatible with non-erosive gastritis. (Biopsy). Normal mucosa in the whole examined duodenum.  CT C/A/P:  Bilateral upper lobe pneumonia versus aspiration. Superimposed upper lobe pulmonary congestion/edema. Small pleural effusions bilaterally. 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 Mid-distal esophagitis, possibly treatment-related or gastroesophageal reflux associated with hiatal hernia.    - lozenges PRN  - c/w sucralfate 1g q8h for radiation esophagitis  - c/w pantoprazole

## 2024-10-26 NOTE — PROGRESS NOTE ADULT - PROBLEM SELECTOR PLAN 4
Pt has had hiccups since his dc last week. Rad-onc started pt on Zofran which did not help. S/p Reglan in ED w/o improvement. Hiccups have caused the pt N/V.  S/P gabapentin 300 mg x1 10/21 AM  Presenting with increased hiccupping this morning.     - transition from gabapentin 300 qd to baclofen 5mg q8h

## 2024-10-26 NOTE — PROGRESS NOTE ADULT - ATTENDING COMMENTS
Weekend coverage for Dr. Robin:    CMV disease (esophagitis, pneumonitis, viremia). Febrile temp 102.3F, tachycardic. WBC 3.52, plts 190, denies somatic complaints.  10/24 CMV PCR = 126,000.  HIV neg, 10/24 BCx ngtd.   Check BCx, cont IV Ganciclovir 5 mg/kg q12h (300 mg q12h), check daily CBC w diff.   Cont Piptazo as mentioned above.    I will cover the weekend and Dr. Robin will resume care on 10/28.

## 2024-10-26 NOTE — PROGRESS NOTE ADULT - SUBJECTIVE AND OBJECTIVE BOX
INTERVAL HPI/OVERNIGHT EVENTS:    SUBJECTIVE: Patient seen and examined at bedside, resting comfortably in bed, and does not appear to be in any acute distress. Patient reports    Vital Signs Last 24 Hrs  T(C): 38.2 (26 Oct 2024 06:40), Max: 39.1 (26 Oct 2024 05:00)  T(F): 100.7 (26 Oct 2024 06:40), Max: 102.3 (26 Oct 2024 05:00)  HR: 119 (26 Oct 2024 05:00) (104 - 120)  BP: 107/61 (26 Oct 2024 05:00) (107/61 - 134/75)  BP(mean): --  RR: 20 (26 Oct 2024 05:00) (18 - 20)  SpO2: 92% (26 Oct 2024 05:00) (90% - 96%)    Parameters below as of 26 Oct 2024 05:00  Patient On (Oxygen Delivery Method): nasal cannula  O2 Flow (L/min): 2      PHYSICAL EXAM:  General: in no acute distress  Eyes: EOMI intact bilaterally. Anicteric sclerae, moist conjunctivae  HENT: Moist mucous membranes  Neck: Trachea midline, supple  Lungs: CTA B/L. No wheezes, rales, or rhonchi  Cardiovascular: RRR. No murmurs, rubs, or gallops  Abdomen: Soft, non-tender non-distended; No rebound or guarding  Extremities: WWP, No clubbing, cyanosis or edema  Neurological: Alert and oriented  Skin: Warm and dry. No obvious rash     LABS:                        7.2    3.52  )-----------( 190      ( 26 Oct 2024 05:30 )             21.5     10-26    131[L]  |  96  |  16  ----------------------------<  88  3.2[L]   |  25  |  0.75    Ca    7.8[L]      26 Oct 2024 05:30  Phos  1.9     10-26  Mg     1.5     10-26    TPro  6.2  /  Alb  2.2[L]  /  TBili  0.7  /  DBili  x   /  AST  28  /  ALT  25  /  AlkPhos  135[H]  10-26

## 2024-10-26 NOTE — PROGRESS NOTE ADULT - PROBLEM SELECTOR PLAN 1
s/p EGD with biopsy 10/23.   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 Small focus of epithelium with atypia c/w prior radiation antrum no significant pathology. Negative for H pylori.  CMV PCR 5.10 (elevated)    As per ID,   - Started on ganciclovir 5mg/kg IV q12h (10/24)  ----Close monitoring of cell lines (with daily CBC w/ diff) and kidney function while on gancyclovir  - No pulmonary consult necessary at this time as patient is being treated with Zosyn for PNA and CMV esophagitis and pneumonitis simultaneously is rare  - Continue empiric zosyn 4.5g IV q8h to complete a 7 day course (10/21-10/27)

## 2024-10-26 NOTE — PROGRESS NOTE ADULT - ATTENDING COMMENTS
69M w/ PMHx of non-operable lung cancer, DM, HTN, HLD recently admitted for symptomatic anemia and chemotherapy-induced nausea and vomiting (10/14-10/15) presenting with hiccups, nausea, vomiting, and fever admitted to [facility] for further management, s/p EGD. 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  Generally doing well however spiked two fevers overnight   At this point unclear if this is 2/2 to malignancy vs infectious   ID is on board   Continue with Galacyclovir and transition to oral on monday   Zosyn is planned to be stopped today   Monitor WBC and fevers

## 2024-10-26 NOTE — PROGRESS NOTE ADULT - PROBLEM SELECTOR PLAN 11
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 S/p 7 cycles of neoadjuvant platinum based with gemcitabine and nivolumab chemoimmunotherapy. Follows with Dr. Mueller for RT, s/p last round of RT on 10/15.   Follow with Dr. Larose outpatient.

## 2024-10-26 NOTE — PROGRESS NOTE ADULT - PROBLEM SELECTOR PLAN 7
Hgb 8.5, previously 8.0 on 10/15. RDW elevated. Leukopenia. Plt 152. Likely 2/2 chemotx. Pt denies bleeding, bruising. Denies hematuria. Has not had BMs for 5-6 days, unable to assess for melena.  Hgb 7.7 (10/25)    - Monitor H&H  - Active T&S  - Transfuse hgb <7

## 2024-10-26 NOTE — PROGRESS NOTE ADULT - ASSESSMENT
69M with hx of DM (A1c 7.2%), HTN, HLD, squamous cell lung cancer currently on carboplatin/taxol (last received 10/10) and RT (last received 10/15), who on 10/14 developed odynophagia, cough, and hiccups, subsequently developed nausea/vomiting and then on 10/20 developed fever for which he presented to [facility] ED, admitted on 10/21 for further management. Was febrile, with mild hypoxia (2L NC), leukopenic with severe lymphopenia. CT C/A/P w/ IVC with decreased size of known COLETTE mass (malignancy), and new bilateral upper lobe GGOs with interlobular septal thickening, as well as mid-distal esophagitis. S/p EGD on 10/23 with finding of erosive changes of middle third of esophagus and esophageal biopsy +CMV inclusions.    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 Continue close monitoring of cell lines and renal function (with daily CBC w/ diff and BMP)  - Follow up CMV PCR  - Anticipate 3 weeks minimum therapy. Will plan to switch to PO valcyte after his symptoms have improved significantly. Please keep on IV therapy over the weekend and I will evaluate for PO switch next week.  - Patient's risk factor is chemo, but recommend routine HIV screen as well.    # Possible bacterial PNA  - Patient reports significant improvement in his cough since presentation (with zosyn), which argues against CMV disease also being responsible for lung pathology/symptoms, although CMV pneumonitis is still a possibility. Will monitor closely for continued symptomatic improvement, and he will need serial CT chest in several weeks. He will already be on treatment for CMV tissue invasive disease as above.  - Continue zosyn 4.5g IV q8h EI x 7 days total (EOT 10/27)    ID Team 1 will follow. Dr. Rizvi will cover over the weekend and I will resume care on Monday. 69M with hx of DM (A1c 7.2%), HTN, HLD, squamous cell lung cancer currently on carboplatin/taxol (last received 10/10) and RT (last received 10/15), who on 10/14 developed odynophagia, cough, and hiccups, subsequently developed nausea/vomiting and then on 10/20 developed fever for which he presented to [facility] ED, admitted on 10/21 for further management. Was febrile, with mild hypoxia (2L NC), leukopenic with severe lymphopenia. CT C/A/P w/ IVC with decreased size of known COLETTE mass (malignancy), and new bilateral upper lobe GGOs with interlobular septal thickening, as well as mid-distal esophagitis. S/p EGD on 10/23 with finding of erosive changes of middle third of esophagus and esophageal biopsy +CMV inclusions.    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 CMV PCR 10/24 126k. Febrile to 102.3 (oral) on 10/26 AM.  - Obtain repeat BCx  - Continue ganciclovir 5mg/kg IV q12h (SOT 10/24). Continue close monitoring of cell lines and renal function (with daily CBC w/ diff and BMP)  - Monitor for ocular symptoms  - Obtain weekly CMV PCR (next 10/31)  - Anticipate 3 weeks minimum therapy. Will plan to switch to PO valcyte after his symptoms have improved significantly. Please keep on IV therapy over the weekend, will evaluate for PO switch next week.    # Possible bacterial PNA  - Patient reports significant improvement in his cough since presentation (with zosyn), which argues against CMV disease also being responsible for lung pathology/symptoms, although CMV pneumonitis is still a possibility. Will monitor closely for continued symptomatic improvement, and he will need serial CT chest in several weeks. He will already be on treatment for CMV tissue invasive disease as above.  - Continue zosyn 4.5g IV q8h EI x 7 days total (EOT 10/27)    ID Team 1 will follow.

## 2024-10-26 NOTE — PROGRESS NOTE ADULT - PROBLEM SELECTOR PLAN 9
Na 127 on admission, previously 134. Pt denies HA but admits to N/V, which is chronic for him. Has had poor PO intake iso N/V. Drinks gatorade and ensures. No visual disturbances, palpitations.   Corrected Na 128. Calculated serum osm 268. S/p 2L NS in ED. Na improved to 131 s/p NS 2 L. Na has dropped to 128 today. No CNS changes. Likely d/t poor PO intake.    - CTM  - f/u repeat urine studies

## 2024-10-27 LAB
ALBUMIN SERPL ELPH-MCNC: 2.2 G/DL — LOW (ref 3.3–5)
ALP SERPL-CCNC: 135 U/L — HIGH (ref 40–120)
ALT FLD-CCNC: 19 U/L — SIGNIFICANT CHANGE UP (ref 10–45)
ANION GAP SERPL CALC-SCNC: 10 MMOL/L — SIGNIFICANT CHANGE UP (ref 5–17)
AST SERPL-CCNC: 20 U/L — SIGNIFICANT CHANGE UP (ref 10–40)
BASOPHILS # BLD AUTO: 0.01 K/UL — SIGNIFICANT CHANGE UP (ref 0–0.2)
BASOPHILS NFR BLD AUTO: 0.3 % — SIGNIFICANT CHANGE UP (ref 0–2)
BILIRUB SERPL-MCNC: 0.7 MG/DL — SIGNIFICANT CHANGE UP (ref 0.2–1.2)
BUN SERPL-MCNC: 14 MG/DL — SIGNIFICANT CHANGE UP (ref 7–23)
CALCIUM SERPL-MCNC: 8.2 MG/DL — LOW (ref 8.4–10.5)
CHLORIDE SERPL-SCNC: 94 MMOL/L — LOW (ref 96–108)
CO2 SERPL-SCNC: 26 MMOL/L — SIGNIFICANT CHANGE UP (ref 22–31)
CREAT SERPL-MCNC: 0.77 MG/DL — SIGNIFICANT CHANGE UP (ref 0.5–1.3)
EGFR: 96 ML/MIN/1.73M2 — SIGNIFICANT CHANGE UP
EOSINOPHIL # BLD AUTO: 0.09 K/UL — SIGNIFICANT CHANGE UP (ref 0–0.5)
EOSINOPHIL NFR BLD AUTO: 2.7 % — SIGNIFICANT CHANGE UP (ref 0–6)
GLUCOSE BLDC GLUCOMTR-MCNC: 128 MG/DL — HIGH (ref 70–99)
GLUCOSE BLDC GLUCOMTR-MCNC: 136 MG/DL — HIGH (ref 70–99)
GLUCOSE BLDC GLUCOMTR-MCNC: 138 MG/DL — HIGH (ref 70–99)
GLUCOSE BLDC GLUCOMTR-MCNC: 152 MG/DL — HIGH (ref 70–99)
GLUCOSE SERPL-MCNC: 115 MG/DL — HIGH (ref 70–99)
HCT VFR BLD CALC: 21.2 % — LOW (ref 39–50)
HGB BLD-MCNC: 7.3 G/DL — LOW (ref 13–17)
IMM GRANULOCYTES NFR BLD AUTO: 1.2 % — HIGH (ref 0–0.9)
LYMPHOCYTES # BLD AUTO: 0.51 K/UL — LOW (ref 1–3.3)
LYMPHOCYTES # BLD AUTO: 15.5 % — SIGNIFICANT CHANGE UP (ref 13–44)
MAGNESIUM SERPL-MCNC: 1.6 MG/DL — SIGNIFICANT CHANGE UP (ref 1.6–2.6)
MCHC RBC-ENTMCNC: 31.1 PG — SIGNIFICANT CHANGE UP (ref 27–34)
MCHC RBC-ENTMCNC: 34.4 GM/DL — SIGNIFICANT CHANGE UP (ref 32–36)
MCV RBC AUTO: 90.2 FL — SIGNIFICANT CHANGE UP (ref 80–100)
MONOCYTES # BLD AUTO: 0.29 K/UL — SIGNIFICANT CHANGE UP (ref 0–0.9)
MONOCYTES NFR BLD AUTO: 8.8 % — SIGNIFICANT CHANGE UP (ref 2–14)
NEUTROPHILS # BLD AUTO: 2.34 K/UL — SIGNIFICANT CHANGE UP (ref 1.8–7.4)
NEUTROPHILS NFR BLD AUTO: 71.5 % — SIGNIFICANT CHANGE UP (ref 43–77)
NRBC # BLD: 0 /100 WBCS — SIGNIFICANT CHANGE UP (ref 0–0)
PHOSPHATE SERPL-MCNC: 2.9 MG/DL — SIGNIFICANT CHANGE UP (ref 2.5–4.5)
PLATELET # BLD AUTO: 176 K/UL — SIGNIFICANT CHANGE UP (ref 150–400)
POTASSIUM SERPL-MCNC: 3.1 MMOL/L — LOW (ref 3.5–5.3)
POTASSIUM SERPL-SCNC: 3.1 MMOL/L — LOW (ref 3.5–5.3)
PROT SERPL-MCNC: 6.1 G/DL — SIGNIFICANT CHANGE UP (ref 6–8.3)
RBC # BLD: 2.35 M/UL — LOW (ref 4.2–5.8)
RBC # FLD: 17.4 % — HIGH (ref 10.3–14.5)
SODIUM SERPL-SCNC: 130 MMOL/L — LOW (ref 135–145)
WBC # BLD: 3.28 K/UL — LOW (ref 3.8–10.5)
WBC # FLD AUTO: 3.28 K/UL — LOW (ref 3.8–10.5)

## 2024-10-27 PROCEDURE — 99232 SBSQ HOSP IP/OBS MODERATE 35: CPT | Mod: GC

## 2024-10-27 PROCEDURE — 99233 SBSQ HOSP IP/OBS HIGH 50: CPT

## 2024-10-27 RX ORDER — POTASSIUM CHLORIDE 10 MEQ
40 TABLET, EXTENDED RELEASE ORAL EVERY 4 HOURS
Refills: 0 | Status: COMPLETED | OUTPATIENT
Start: 2024-10-27 | End: 2024-10-27

## 2024-10-27 RX ORDER — GABAPENTIN 300 MG/1
300 CAPSULE ORAL EVERY 24 HOURS
Refills: 0 | Status: DISCONTINUED | OUTPATIENT
Start: 2024-10-27 | End: 2024-10-29

## 2024-10-27 RX ORDER — ACETAMINOPHEN 500 MG
1000 TABLET ORAL ONCE
Refills: 0 | Status: COMPLETED | OUTPATIENT
Start: 2024-10-27 | End: 2024-10-27

## 2024-10-27 RX ORDER — MAGNESIUM SULFATE IN 0.9% NACL 2 G/50 ML
2 INTRAVENOUS SOLUTION, PIGGYBACK (ML) INTRAVENOUS ONCE
Refills: 0 | Status: COMPLETED | OUTPATIENT
Start: 2024-10-27 | End: 2024-10-27

## 2024-10-27 RX ADMIN — SUCRALFATE 1 GRAM(S): 1 SUSPENSION ORAL at 06:06

## 2024-10-27 RX ADMIN — Medication 40 MILLIEQUIVALENT(S): at 13:20

## 2024-10-27 RX ADMIN — PIPERACILLIN AND TAZOBACTAM 25 GRAM(S): .5; 4 INJECTION, POWDER, LYOPHILIZED, FOR SOLUTION INTRAVENOUS at 22:22

## 2024-10-27 RX ADMIN — GABAPENTIN 300 MILLIGRAM(S): 300 CAPSULE ORAL at 20:26

## 2024-10-27 RX ADMIN — Medication 20 MILLIGRAM(S): at 22:22

## 2024-10-27 RX ADMIN — Medication 2: at 22:21

## 2024-10-27 RX ADMIN — PIPERACILLIN AND TAZOBACTAM 25 GRAM(S): .5; 4 INJECTION, POWDER, LYOPHILIZED, FOR SOLUTION INTRAVENOUS at 06:07

## 2024-10-27 RX ADMIN — Medication 400 MILLIGRAM(S): at 20:25

## 2024-10-27 RX ADMIN — Medication 25 GRAM(S): at 09:51

## 2024-10-27 RX ADMIN — SUCRALFATE 1 GRAM(S): 1 SUSPENSION ORAL at 22:22

## 2024-10-27 RX ADMIN — GANCICLOVIR SODIUM 100 MILLIGRAM(S): 50 INJECTION, POWDER, LYOPHILIZED, FOR SOLUTION INTRAVENOUS at 20:26

## 2024-10-27 RX ADMIN — PIPERACILLIN AND TAZOBACTAM 25 GRAM(S): .5; 4 INJECTION, POWDER, LYOPHILIZED, FOR SOLUTION INTRAVENOUS at 13:21

## 2024-10-27 RX ADMIN — PANTOPRAZOLE SODIUM 40 MILLIGRAM(S): 40 TABLET, DELAYED RELEASE ORAL at 06:44

## 2024-10-27 RX ADMIN — Medication 40 MILLIEQUIVALENT(S): at 09:52

## 2024-10-27 RX ADMIN — GANCICLOVIR SODIUM 100 MILLIGRAM(S): 50 INJECTION, POWDER, LYOPHILIZED, FOR SOLUTION INTRAVENOUS at 06:13

## 2024-10-27 RX ADMIN — Medication 400 MILLIGRAM(S): at 00:19

## 2024-10-27 RX ADMIN — Medication 40 MILLIGRAM(S): at 20:26

## 2024-10-27 RX ADMIN — Medication 5 MILLIGRAM(S): at 06:07

## 2024-10-27 NOTE — PROGRESS NOTE ADULT - PROBLEM SELECTOR PLAN 4
Pt has had hiccups since his dc last week. Rad-onc started pt on Zofran which did not help. S/p Reglan in ED w/o improvement. Hiccups have caused the pt N/V.  S/P gabapentin 300 mg x1 10/21 AM  Presenting with increased hiccupping this morning.     - transition from gabapentin 300 qd to baclofen 5mg q8h Pt has had hiccups since his dc last week. Rad-onc started pt on Zofran which did not help. S/p Reglan in ED w/o improvement. Hiccups have caused the pt N/V.  S/P gabapentin 300 mg x1 10/21 AM, switched to baclofen on 10/25  Presenting with increased hiccupping this morning.   Patient unable to tolerate PO, was able to do so when on gabapentin    - switch back to gabapentin 300 qd   - monitor if patient is able to hold down water/foods

## 2024-10-27 NOTE — PROGRESS NOTE ADULT - PROBLEM SELECTOR PLAN 11
Hx of squamous cell lung ca, AJCC IIIB, [id number] negative. 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 S/p 7 cycles of neoadjuvant platinum based with gemcitabine and nivolumab chemoimmunotherapy. Follows with Dr. Mueller for RT, s/p last round of RT on 10/15.   Follow with Dr. Larose outpatient. Hx of squamous cell lung ca, AJCC IIIB, [id number] negative. 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 S/p 7 cycles of neoadjuvant platinum based with gemcitabine and nivolumab chemoimmunotherapy. Follows with Dr. Mueller for RT, s/p last round of RT on 10/15.   Follows with Dr. Larose outpatient.

## 2024-10-27 NOTE — PROGRESS NOTE ADULT - ASSESSMENT
69M with hx of DM (A1c 7.2%), HTN, HLD, squamous cell lung cancer currently on carboplatin/taxol (last received 10/10) and RT (last received 10/15), who on 10/14 developed odynophagia, cough, and hiccups, subsequently developed nausea/vomiting and then on 10/20 developed fever for which he presented to [facility] ED, admitted on 10/21 for further management. Was febrile, with mild hypoxia (2L NC), leukopenic with severe lymphopenia. CT C/A/P w/ IVC with decreased size of known COLETTE mass (malignancy), and new bilateral upper lobe GGOs with interlobular septal thickening, as well as mid-distal esophagitis. S/p EGD on 10/23 with finding of erosive changes of middle third of esophagus and esophageal biopsy +CMV inclusions.    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 CMV PCR 10/24 126k. Febrile to 102.3 (oral) on 10/26 AM.       - CMV PCR: 126k (log 5.10)        - HIV screen neg       - BCx 10/23: NG@4d       - BCx 10/24: NG@48h       - BCx 10/26: pending    # Possible bacterial PNA  - Patient reports significant improvement in his cough since presentation (with zosyn), which argues against CMV disease also being responsible for lung pathology/symptoms, although CMV pneumonitis is still a possibility. Will monitor closely for continued symptomatic improvement, and he will need serial CT chest in several weeks. He will already be on treatment for CMV tissue invasive disease as above.    Recommendations:  - Continue zosyn 4.5g IV q8h EI x 7 days total (EOT today, 10/27) for PNA  - Continue ganciclovir 5mg/kg IV q12h (SOT 10/24) for CMV esophagitis        - Daily CBC w/ diff and BMP to monitor for pancytopenia and nephrotoxicity  - Obtain weekly CMV PCR (next due 10/31)  - Monitor for ocular symptoms  - Anticipate 3 weeks minimum therapy. Will plan to switch to PO valcyte after his symptoms have improved significantly. Please keep on IV therapy over the weekend, will evaluate for PO switch next week.    ~~~~~  Case discussed with Dr. Rizvi. Please await attending attestation for full and final recommendations.   Team 1 will follow. Dr. Robin to resume care on Monday.

## 2024-10-27 NOTE — PROGRESS NOTE ADULT - PROBLEM SELECTOR PLAN 12
Home meds: Amlodipine 5  - C/w home meds Home meds: Amlodipine 5  - C/w home meds    PPX:  F: --  E: replete PRN  N: CC w/ Ensure  GI: PPI + sucralfate  DVT: lovenox 40 qd    Dispo: home w/ home PT, not medically ready yet

## 2024-10-27 NOTE — PROGRESS NOTE ADULT - PROBLEM SELECTOR PLAN 3
POA. Meeting 2/4 SIRS criteria (T 102.9, ) without clear source. Pt has been coughing over the last 2 days with fever at home T 101. Pt has been hiccuping c/b vomiting. S/p Vanc 1g, Zosyn 3.375g in ED. BCx collected x2 in ED. Patient HDS, appearing well, AOx3, warm to touch. Found to have pneumonia and CMV.  s/p 80 cc/hr LR x 12 hrs  - C/w abx as below   - F/u BCx x3 - NGTD   - Reculture for Tmax >102.9 POA. Meeting 2/4 SIRS criteria (T 102.9, ) without clear source. Pt has been coughing over the last 2 days with fever at home T 101. Pt has been hiccuping c/b vomiting. S/p Vanc 1g, Zosyn 3.375g in ED. BCx collected x2 in ED. Patient HDS, appearing well, AOx3, warm to touch. Found to have pneumonia and CMV.  s/p 80 cc/hr LR x 12 hrs  - C/w abx as above  - F/u BCx 10/26 - all previous cultures NGTD (last one 10/24)  - Reculture for Tmax >102.9

## 2024-10-27 NOTE — PROGRESS NOTE ADULT - PROBLEM SELECTOR PLAN 2
Pt is immunocompromised iso lung cancer and chemo/RT px with 2 days of cough and 1 day of fever. Pt was dc'd on 10/15 and started having intractable hiccups c/b nausea and vomiting. ?Aspiration pna. Cough is dry. Pt denies SOB, CP, abd pain, diarrhea, dysuria, rashes or joint pain. CXR pending read but appears similar to prior w/o infiltrates/consolidations. WBC 2.94 (from 1.95) with increased lymphocyte %. Covid/Flu/RSV negative.  MRSA negative. Legionella/strep negative.     - c/w zosyn 4.5g q8h x 7 days (10/21-10/27)  ---discharge with augmentin if patient leaves before 7-day course finishes  - ofirmev as needed for fevers, max 4g in 24 hours (can give toradol if maxxed out) Pt is immunocompromised iso lung cancer and chemo/RT px with 2 days of cough and 1 day of fever. Pt was dc'd on 10/15 and started having intractable hiccups c/b nausea and vomiting. ?Aspiration pna. Cough is dry. Pt denies SOB, CP, abd pain, diarrhea, dysuria, rashes or joint pain. CXR pending read but appears similar to prior w/o infiltrates/consolidations. WBC 2.94 (from 1.95) with increased lymphocyte %. Covid/Flu/RSV negative.  MRSA negative. Legionella/strep negative.     - c/w zosyn 4.5g q8h x 7 days (10/21-10/27)  - ofirmev as needed for fevers, max 4g in 24 hours (can give toradol if maxxed out)

## 2024-10-27 NOTE — PROGRESS NOTE ADULT - SUBJECTIVE AND OBJECTIVE BOX
INFECTIOUS DISEASES CONSULT FOLLOW-UP NOTE    INTERVAL HPI/OVERNIGHT EVENTS: NAEON    ROS:   Constitutional, eyes, ENT, cardiovascular, respiratory, gastrointestinal, genitourinary, integumentary, neurological, psychiatric and heme/lymph are otherwise negative other than noted above       ANTIBIOTICS/RELEVANT:    MEDICATIONS  (STANDING):  amLODIPine   Tablet 5 milliGRAM(s) Oral daily  atorvastatin 20 milliGRAM(s) Oral at bedtime  dextrose 5%. 1000 milliLiter(s) (100 mL/Hr) IV Continuous <Continuous>  dextrose 5%. 1000 milliLiter(s) (50 mL/Hr) IV Continuous <Continuous>  dextrose 50% Injectable 25 Gram(s) IV Push once  dextrose 50% Injectable 12.5 Gram(s) IV Push once  dextrose 50% Injectable 25 Gram(s) IV Push once  enoxaparin Injectable 40 milliGRAM(s) SubCutaneous every 24 hours  gabapentin 300 milliGRAM(s) Oral every 24 hours  ganciclovir IVPB 300 milliGRAM(s) IV Intermittent every 12 hours  ganciclovir IVPB      glucagon  Injectable 1 milliGRAM(s) IntraMuscular once  insulin lispro (ADMELOG) corrective regimen sliding scale   SubCutaneous Before meals and at bedtime  pantoprazole    Tablet 40 milliGRAM(s) Oral before breakfast  piperacillin/tazobactam IVPB.. 4.5 Gram(s) IV Intermittent every 8 hours  potassium chloride    Tablet ER 40 milliEquivalent(s) Oral every 4 hours  sucralfate 1 Gram(s) Oral every 6 hours    MEDICATIONS  (PRN):  aluminum hydroxide/magnesium hydroxide/simethicone Suspension 30 milliLiter(s) Oral every 4 hours PRN Dyspepsia  benzocaine/menthol Lozenge 1 Lozenge Oral every 2 hours PRN Sore Throat  dextrose Oral Gel 15 Gram(s) Oral once PRN Blood Glucose LESS THAN 70 milliGRAM(s)/deciliter  lidocaine 2% Viscous 15 milliLiter(s) Mucosal every 8 hours PRN Sore throat  melatonin 3 milliGRAM(s) Oral at bedtime PRN Insomnia  ondansetron Injectable 4 milliGRAM(s) IV Push every 8 hours PRN Nausea and/or Vomiting  polyethylene glycol 3350 17 Gram(s) Oral every 24 hours PRN for constipation  senna 2 Tablet(s) Oral at bedtime PRN for constipation        Vital Signs Last 24 Hrs  T(C): 36.9 (27 Oct 2024 05:54), Max: 38.2 (26 Oct 2024 20:30)  T(F): 98.4 (27 Oct 2024 05:54), Max: 100.8 (26 Oct 2024 20:30)  HR: 96 (27 Oct 2024 05:54) (96 - 113)  BP: 96/60 (27 Oct 2024 05:54) (96/60 - 120/73)  BP(mean): 78 (27 Oct 2024 00:08) (78 - 78)  RR: 18 (27 Oct 2024 05:54) (18 - 18)  SpO2: 99% (27 Oct 2024 05:54) (93% - 99%)    Parameters below as of 27 Oct 2024 05:54  Patient On (Oxygen Delivery Method): nasal cannula  O2 Flow (L/min): 2      10-26-24 @ 07:01  -  10-27-24 @ 07:00  --------------------------------------------------------  IN: 500 mL / OUT: 700 mL / NET: -200 mL      PHYSICAL EXAM:  Constitutional: alert, NAD  Eyes: the sclera and conjunctiva were normal.   ENT: the ears and nose were normal in appearance.   Neck: the appearance of the neck was normal and the neck was supple.   Pulmonary: no respiratory distress and lungs were clear to auscultation bilaterally.   Heart: heart rate was normal and rhythm regular, normal S1 and S2  Vascular: There was no peripheral edema  Abdomen: normal bowel sounds, soft, non-tender  Neurological: no focal deficits.   Psychiatric: the affect was normal        LABS:                        7.3    3.28  )-----------( 176      ( 27 Oct 2024 05:30 )             21.2     10-27    130[L]  |  94[L]  |  14  ----------------------------<  115[H]  3.1[L]   |  26  |  0.77    Ca    8.2[L]      27 Oct 2024 05:30  Phos  2.9     10-27  Mg     1.6     10-27    TPro  6.1  /  Alb  2.2[L]  /  TBili  0.7  /  DBili  x   /  AST  20  /  ALT  19  /  AlkPhos  135[H]  10-27      Urinalysis Basic - ( 27 Oct 2024 05:30 )    Color: x / Appearance: x / SG: x / pH: x  Gluc: 115 mg/dL / Ketone: x  / Bili: x / Urobili: x   Blood: x / Protein: x / Nitrite: x   Leuk Esterase: x / RBC: x / WBC x   Sq Epi: x / Non Sq Epi: x / Bacteria: x        MICROBIOLOGY:      RADIOLOGY & ADDITIONAL STUDIES:  Reviewed

## 2024-10-27 NOTE — PROGRESS NOTE ADULT - PROBLEM SELECTOR PLAN 9
Na 127 on admission, previously 134. Pt denies HA but admits to N/V, which is chronic for him. Has had poor PO intake iso N/V. Drinks gatorade and ensures. No visual disturbances, palpitations.   Corrected Na 128. Calculated serum osm 268. S/p 2L NS in ED. Na improved to 131 s/p NS 2 L. Na has dropped to 128 today. No CNS changes. Likely d/t poor PO intake.    - CTM  - f/u repeat urine studies Na 127 on admission, previously 134. Pt denies HA but admits to N/V, which is chronic for him. Has had poor PO intake iso N/V. Drinks gatorade and ensures. No visual disturbances, palpitations.   Corrected Na 128. Calculated serum osm 268. S/p 2L NS in ED. Na improved to 131 s/p NS 2 L. Na has dropped to 128 today. No CNS changes. Likely d/t poor PO intake.  Na 130 (10/27)  Urine Studies 10/25: uOsm 451, Jeremi 55, TSH 0.685 wnl. Likely SIADH.     - CTM

## 2024-10-27 NOTE — PROGRESS NOTE ADULT - ATTENDING COMMENTS
Weekend coverage for Dr. Robin:    CMV disease (esophagitis, pneumonitis, viremia). Febrile temp 100.8F today, remains tachycardic with soft pressures, on 2lpm oxygen via NC.  WBC 3.28, ANC 2340, plts 176, denies all somatic complaints.  10/24 CMV PCR = 126,000.  HIV neg, 10/24 BCx ngtd. F/up these.   Cont IV Ganciclovir 5 mg/kg q12h (300 mg q12h), check daily CBC w diff.   Cont Piptazo as mentioned above, EOT is today 10/27.     I will cover the weekend and Dr. Robin will resume care on 10/28.

## 2024-10-27 NOTE — PROGRESS NOTE ADULT - PROBLEM SELECTOR PLAN 1
s/p EGD with biopsy 10/23.   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 Small focus of epithelium with atypia c/w prior radiation antrum no significant pathology. Negative for H pylori.  CMV PCR 5.10 (elevated)    As per ID,   - Started on ganciclovir 5mg/kg IV q12h (10/24)  ----Close monitoring of cell lines (with daily CBC w/ diff) and kidney function while on gancyclovir  - No pulmonary consult necessary at this time as patient is being treated with Zosyn for PNA and CMV esophagitis and pneumonitis simultaneously is rare  - Continue empiric zosyn 4.5g IV q8h to complete a 7 day course (10/21-10/27) s/p EGD with biopsy 10/23. Risk factor is chemo, HIV negative. CMV PCR 10/24 126k. CMV PCR 5.10 (elevated).  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 Small focus of epithelium with atypia c/w prior radiation antrum no significant pathology. Negative for H pylori.  No pulmonary consult necessary at this time as patient is being treated with Zosyn for PNA and CMV esophagitis and pneumonitis simultaneously is rare.  Febrile to 102.3 (oral) on 10/26 AM. Overnight, fever of 100.8F.    As per ID,   - Started on ganciclovir 5mg/kg IV q12h (10/24 - )  - Obtain repeat BCx  - Continue close monitoring of cell lines and renal function (with daily CBC w/ diff and BMP)  - Monitor for ocular symptoms  - Obtain weekly CMV PCR (next 10/31)  - Anticipate 3 weeks minimum therapy. Will plan to switch to PO valcyte after his symptoms have improved significantly. Please keep on IV therapy over the weekend, will evaluate for PO switch next week.  - Continue empiric zosyn 4.5g IV q8h to complete a 7 day course (10/21-10/27) - LAST DOSE TODAY s/p EGD with biopsy 10/23. Risk factor is chemo, HIV negative. CMV PCR 10/24 126k. CMV PCR 5.10 (elevated).  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 Small focus of epithelium with atypia c/w prior radiation antrum no significant pathology. Negative for H pylori.  No pulmonary consult necessary at this time as patient is being treated with Zosyn for PNA and CMV esophagitis and pneumonitis simultaneously is rare.  Febrile to 102.3 (oral) on 10/26 AM. Overnight, fever of 100.8F.  All Bcx NGTD thus far (last one 10/24, pending 10/26)    As per ID,   - F/u Bcx 10/26 (specimen received)  - Started on ganciclovir 5mg/kg IV q12h (10/24 - )  - Obtain repeat BCx  - Continue close monitoring of cell lines and renal function (with daily CBC w/ diff and BMP)  - Monitor for ocular symptoms  - Obtain weekly CMV PCR (next 10/31)  - Anticipate 3 weeks minimum therapy. Will plan to switch to PO valcyte after his symptoms have improved significantly. Please keep on IV therapy over the weekend, will evaluate for PO switch next week.  - Continue empiric zosyn 4.5g IV q8h to complete a 7 day course (10/21-10/27) - LAST DOSE TODAY s/p EGD with biopsy 10/23. Risk factor is chemo, HIV negative. CMV PCR 10/24 126k. CMV PCR 5.10 (elevated).  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 Small focus of epithelium with atypia c/w prior radiation antrum no significant pathology. Negative for H pylori.  No pulmonary consult necessary at this time as patient is being treated with Zosyn for PNA and CMV esophagitis and pneumonitis simultaneously is rare.  Febrile to 102.3 (oral) on 10/26 AM. Overnight, fever of 100.8F.  All Bcx NGTD thus far (last one 10/24, pending 10/26)    As per ID,   - F/u Bcx 10/26 (specimen received)  - Started on ganciclovir 5mg/kg IV q12h (10/24 - )  - Continue close monitoring of cell lines and renal function (with daily CBC w/ diff and BMP)  - Monitor for ocular symptoms  - Obtain weekly CMV PCR (next 10/31)  - Anticipate 3 weeks minimum therapy. Will plan to switch to PO valcyte after his symptoms have improved significantly. Please keep on IV therapy over the weekend, will evaluate for PO switch next week.  - Continue empiric zosyn 4.5g IV q8h to complete a 7 day course (10/21-10/27) - LAST DOSE TODAY

## 2024-10-27 NOTE — PROGRESS NOTE ADULT - SUBJECTIVE AND OBJECTIVE BOX
Progress Note  INCOMPLETE NOTE    INTERVAL EVENTS:   No acute events overnight.    SUBJECTIVE:   Patient seen and examined at bedside. Condition largely unchanged from yesterday. No acute complaints at this time.    ROS:  Negative unless otherwise stated above.    PHYSICAL EXAM:  General: Alert and oriented x 3. No acute distress.   HEENT: Moist mucous membranes. Anicteric. No cervical lymphadenopathy.  Cardiovascular: Regular rate and rhythm. No murmur. Normal JVP.  Lungs: Clear to auscultation bilaterally. No accessory muscle use.  Abdomen: Soft, non-tender and non-distended. No palpable masses.  Extremities: No edema. Non-tender. Warm.  Skin: No rashes or lesions.   Neurologic: No apparent focal neurological deficits. CN II-XII grossly intact, but not individually tested.  Psychiatric: Cooperative. Appropriate mood and affect.    VITAL SIGNS:  Vital Signs Last 24 Hrs  T(C): 36.9 (27 Oct 2024 05:54), Max: 38.2 (26 Oct 2024 20:30)  T(F): 98.4 (27 Oct 2024 05:54), Max: 100.8 (26 Oct 2024 20:30)  HR: 96 (27 Oct 2024 05:54) (96 - 113)  BP: 96/60 (27 Oct 2024 05:54) (92/55 - 120/73)  BP(mean): 78 (27 Oct 2024 00:08) (78 - 78)  RR: 18 (27 Oct 2024 05:54) (18 - 20)  SpO2: 99% (27 Oct 2024 05:54) (93% - 99%)    Parameters below as of 27 Oct 2024 05:54  Patient On (Oxygen Delivery Method): nasal cannula  O2 Flow (L/min): 2    INPATIENT MEDICATIONS:   MEDICATIONS  (STANDING):  amLODIPine   Tablet 5 milliGRAM(s) Oral daily  atorvastatin 20 milliGRAM(s) Oral at bedtime  baclofen 5 milliGRAM(s) Oral every 8 hours  dextrose 5%. 1000 milliLiter(s) (100 mL/Hr) IV Continuous <Continuous>  dextrose 5%. 1000 milliLiter(s) (50 mL/Hr) IV Continuous <Continuous>  dextrose 50% Injectable 25 Gram(s) IV Push once  dextrose 50% Injectable 12.5 Gram(s) IV Push once  dextrose 50% Injectable 25 Gram(s) IV Push once  enoxaparin Injectable 40 milliGRAM(s) SubCutaneous every 24 hours  ganciclovir IVPB 300 milliGRAM(s) IV Intermittent every 12 hours  ganciclovir IVPB      glucagon  Injectable 1 milliGRAM(s) IntraMuscular once  insulin lispro (ADMELOG) corrective regimen sliding scale   SubCutaneous Before meals and at bedtime  pantoprazole    Tablet 40 milliGRAM(s) Oral before breakfast  piperacillin/tazobactam IVPB.. 4.5 Gram(s) IV Intermittent every 8 hours  sucralfate 1 Gram(s) Oral every 6 hours    MEDICATIONS  (PRN):  aluminum hydroxide/magnesium hydroxide/simethicone Suspension 30 milliLiter(s) Oral every 4 hours PRN Dyspepsia  benzocaine/menthol Lozenge 1 Lozenge Oral every 2 hours PRN Sore Throat  dextrose Oral Gel 15 Gram(s) Oral once PRN Blood Glucose LESS THAN 70 milliGRAM(s)/deciliter  lidocaine 2% Viscous 15 milliLiter(s) Mucosal every 8 hours PRN Sore throat  melatonin 3 milliGRAM(s) Oral at bedtime PRN Insomnia  ondansetron Injectable 4 milliGRAM(s) IV Push every 8 hours PRN Nausea and/or Vomiting  polyethylene glycol 3350 17 Gram(s) Oral every 24 hours PRN for constipation  senna 2 Tablet(s) Oral at bedtime PRN for constipation    ALLERGIES:  Allergies    No Known Allergies    Intolerances    LABS:                       7.2    3.52  )-----------( 190      ( 26 Oct 2024 05:30 )             21.5     10-26    131[L]  |  96  |  16  ----------------------------<  88  3.2[L]   |  25  |  0.75    Ca    7.8[L]      26 Oct 2024 05:30  Phos  1.9     10-26  Mg     1.5     10-26    TPro  6.2  /  Alb  2.2[L]  /  TBili  0.7  /  DBili  x   /  AST  28  /  ALT  25  /  AlkPhos  135[H]  10-26      Urinalysis Basic - ( 26 Oct 2024 05:30 )    Color: x / Appearance: x / SG: x / pH: x  Gluc: 88 mg/dL / Ketone: x  / Bili: x / Urobili: x   Blood: x / Protein: x / Nitrite: x   Leuk Esterase: x / RBC: x / WBC x   Sq Epi: x / Non Sq Epi: x / Bacteria: x      CAPILLARY BLOOD GLUCOSE      POCT Blood Glucose.: 136 mg/dL (26 Oct 2024 22:20)    RADIOLOGY & ADDITIONAL TESTS: Reviewed. Progress Note    INTERVAL EVENTS:   Fever of 100.8F at night, given ofirmev x1.    SUBJECTIVE:   Patient seen to be hiccupping throughout entire interview. Patient states he is no longer able to tolerate PO, including water. He says he will have nonstop hiccups after consumption of anything, and the hiccups will induce regurgitation.     ROS:  Negative unless otherwise stated above.    PHYSICAL EXAM:  General: AAOx3, no acute distress  HEENT: Moist mucous membranes  Lungs: CTA B/L. No wheezes, rales, or rhonchi  Cardiovascular: RRR.   Abdomen: Soft, non-tender non-distended; No rebound or guarding  Extremities: WWP, No clubbing, cyanosis or edema  Skin: Warm and dry.     VITAL SIGNS:  Vital Signs Last 24 Hrs  T(C): 36.9 (27 Oct 2024 05:54), Max: 38.2 (26 Oct 2024 20:30)  T(F): 98.4 (27 Oct 2024 05:54), Max: 100.8 (26 Oct 2024 20:30)  HR: 96 (27 Oct 2024 05:54) (96 - 113)  BP: 96/60 (27 Oct 2024 05:54) (92/55 - 120/73)  BP(mean): 78 (27 Oct 2024 00:08) (78 - 78)  RR: 18 (27 Oct 2024 05:54) (18 - 20)  SpO2: 99% (27 Oct 2024 05:54) (93% - 99%)    Parameters below as of 27 Oct 2024 05:54  Patient On (Oxygen Delivery Method): nasal cannula  O2 Flow (L/min): 2    INPATIENT MEDICATIONS:   MEDICATIONS  (STANDING):  amLODIPine   Tablet 5 milliGRAM(s) Oral daily  atorvastatin 20 milliGRAM(s) Oral at bedtime  baclofen 5 milliGRAM(s) Oral every 8 hours  dextrose 5%. 1000 milliLiter(s) (100 mL/Hr) IV Continuous <Continuous>  dextrose 5%. 1000 milliLiter(s) (50 mL/Hr) IV Continuous <Continuous>  dextrose 50% Injectable 25 Gram(s) IV Push once  dextrose 50% Injectable 12.5 Gram(s) IV Push once  dextrose 50% Injectable 25 Gram(s) IV Push once  enoxaparin Injectable 40 milliGRAM(s) SubCutaneous every 24 hours  ganciclovir IVPB 300 milliGRAM(s) IV Intermittent every 12 hours  ganciclovir IVPB      glucagon  Injectable 1 milliGRAM(s) IntraMuscular once  insulin lispro (ADMELOG) corrective regimen sliding scale   SubCutaneous Before meals and at bedtime  pantoprazole    Tablet 40 milliGRAM(s) Oral before breakfast  piperacillin/tazobactam IVPB.. 4.5 Gram(s) IV Intermittent every 8 hours  sucralfate 1 Gram(s) Oral every 6 hours    MEDICATIONS  (PRN):  aluminum hydroxide/magnesium hydroxide/simethicone Suspension 30 milliLiter(s) Oral every 4 hours PRN Dyspepsia  benzocaine/menthol Lozenge 1 Lozenge Oral every 2 hours PRN Sore Throat  dextrose Oral Gel 15 Gram(s) Oral once PRN Blood Glucose LESS THAN 70 milliGRAM(s)/deciliter  lidocaine 2% Viscous 15 milliLiter(s) Mucosal every 8 hours PRN Sore throat  melatonin 3 milliGRAM(s) Oral at bedtime PRN Insomnia  ondansetron Injectable 4 milliGRAM(s) IV Push every 8 hours PRN Nausea and/or Vomiting  polyethylene glycol 3350 17 Gram(s) Oral every 24 hours PRN for constipation  senna 2 Tablet(s) Oral at bedtime PRN for constipation    ALLERGIES:  Allergies    No Known Allergies    Intolerances    LABS:                       7.2    3.52  )-----------( 190      ( 26 Oct 2024 05:30 )             21.5     10-26    131[L]  |  96  |  16  ----------------------------<  88  3.2[L]   |  25  |  0.75    Ca    7.8[L]      26 Oct 2024 05:30  Phos  1.9     10-26  Mg     1.5     10-26    TPro  6.2  /  Alb  2.2[L]  /  TBili  0.7  /  DBili  x   /  AST  28  /  ALT  25  /  AlkPhos  135[H]  10-26      Urinalysis Basic - ( 26 Oct 2024 05:30 )    Color: x / Appearance: x / SG: x / pH: x  Gluc: 88 mg/dL / Ketone: x  / Bili: x / Urobili: x   Blood: x / Protein: x / Nitrite: x   Leuk Esterase: x / RBC: x / WBC x   Sq Epi: x / Non Sq Epi: x / Bacteria: x      CAPILLARY BLOOD GLUCOSE      POCT Blood Glucose.: 136 mg/dL (26 Oct 2024 22:20)    RADIOLOGY & ADDITIONAL TESTS: Reviewed. Progress Note    INTERVAL EVENTS:   Fever of 100.8F at night, given ofirmev x1.    SUBJECTIVE:   Patient seen to be hiccupping throughout entire interview. Patient states he is no longer able to tolerate PO, including water. He says he will have nonstop hiccups after consumption of anything, and the hiccups will induce regurgitation. Endorses that he gets "a little nauseous" right before he has to vomit. Denies f/c/n/v, abd pain, bowel movements, chest pain, sob. States he is breathing comfortably on nasal cannula.     ROS:  Negative unless otherwise stated above.    PHYSICAL EXAM:  General: AAOx3, no acute distress  HEENT: Moist mucous membranes  Lungs: CTA B/L. No wheezes, rales, or rhonchi  Cardiovascular: RRR.   Abdomen: Soft, non-tender non-distended; No rebound or guarding  Extremities: WWP, No clubbing, cyanosis or edema  Skin: Warm and dry.     VITAL SIGNS:  Vital Signs Last 24 Hrs  T(C): 36.9 (27 Oct 2024 05:54), Max: 38.2 (26 Oct 2024 20:30)  T(F): 98.4 (27 Oct 2024 05:54), Max: 100.8 (26 Oct 2024 20:30)  HR: 96 (27 Oct 2024 05:54) (96 - 113)  BP: 96/60 (27 Oct 2024 05:54) (92/55 - 120/73)  BP(mean): 78 (27 Oct 2024 00:08) (78 - 78)  RR: 18 (27 Oct 2024 05:54) (18 - 20)  SpO2: 99% (27 Oct 2024 05:54) (93% - 99%)    Parameters below as of 27 Oct 2024 05:54  Patient On (Oxygen Delivery Method): nasal cannula  O2 Flow (L/min): 2    INPATIENT MEDICATIONS:   MEDICATIONS  (STANDING):  amLODIPine   Tablet 5 milliGRAM(s) Oral daily  atorvastatin 20 milliGRAM(s) Oral at bedtime  baclofen 5 milliGRAM(s) Oral every 8 hours  dextrose 5%. 1000 milliLiter(s) (100 mL/Hr) IV Continuous <Continuous>  dextrose 5%. 1000 milliLiter(s) (50 mL/Hr) IV Continuous <Continuous>  dextrose 50% Injectable 25 Gram(s) IV Push once  dextrose 50% Injectable 12.5 Gram(s) IV Push once  dextrose 50% Injectable 25 Gram(s) IV Push once  enoxaparin Injectable 40 milliGRAM(s) SubCutaneous every 24 hours  ganciclovir IVPB 300 milliGRAM(s) IV Intermittent every 12 hours  ganciclovir IVPB      glucagon  Injectable 1 milliGRAM(s) IntraMuscular once  insulin lispro (ADMELOG) corrective regimen sliding scale   SubCutaneous Before meals and at bedtime  pantoprazole    Tablet 40 milliGRAM(s) Oral before breakfast  piperacillin/tazobactam IVPB.. 4.5 Gram(s) IV Intermittent every 8 hours  sucralfate 1 Gram(s) Oral every 6 hours    MEDICATIONS  (PRN):  aluminum hydroxide/magnesium hydroxide/simethicone Suspension 30 milliLiter(s) Oral every 4 hours PRN Dyspepsia  benzocaine/menthol Lozenge 1 Lozenge Oral every 2 hours PRN Sore Throat  dextrose Oral Gel 15 Gram(s) Oral once PRN Blood Glucose LESS THAN 70 milliGRAM(s)/deciliter  lidocaine 2% Viscous 15 milliLiter(s) Mucosal every 8 hours PRN Sore throat  melatonin 3 milliGRAM(s) Oral at bedtime PRN Insomnia  ondansetron Injectable 4 milliGRAM(s) IV Push every 8 hours PRN Nausea and/or Vomiting  polyethylene glycol 3350 17 Gram(s) Oral every 24 hours PRN for constipation  senna 2 Tablet(s) Oral at bedtime PRN for constipation    ALLERGIES:  Allergies    No Known Allergies    Intolerances    LABS:                       7.2    3.52  )-----------( 190      ( 26 Oct 2024 05:30 )             21.5     10-26    131[L]  |  96  |  16  ----------------------------<  88  3.2[L]   |  25  |  0.75    Ca    7.8[L]      26 Oct 2024 05:30  Phos  1.9     10-26  Mg     1.5     10-26    TPro  6.2  /  Alb  2.2[L]  /  TBili  0.7  /  DBili  x   /  AST  28  /  ALT  25  /  AlkPhos  135[H]  10-26      Urinalysis Basic - ( 26 Oct 2024 05:30 )    Color: x / Appearance: x / SG: x / pH: x  Gluc: 88 mg/dL / Ketone: x  / Bili: x / Urobili: x   Blood: x / Protein: x / Nitrite: x   Leuk Esterase: x / RBC: x / WBC x   Sq Epi: x / Non Sq Epi: x / Bacteria: x      CAPILLARY BLOOD GLUCOSE      POCT Blood Glucose.: 136 mg/dL (26 Oct 2024 22:20)    RADIOLOGY & ADDITIONAL TESTS: Reviewed.

## 2024-10-27 NOTE — PROGRESS NOTE ADULT - ASSESSMENT
69M w/ PMHx of non-operable lung cancer, DM, HTN, HLD recently admitted for symptomatic anemia and chemotherapy-induced nausea and vomiting (10/14-10/15) presenting with hiccups, nausea, vomiting, and fever admitted to [facility] for further management, s/p EGD. 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  69M w/ PMHx of non-operable lung cancer, DM, HTN, HLD recently admitted for symptomatic anemia and chemotherapy-induced nausea and vomiting (10/14-10/15) presenting with hiccups, nausea, vomiting, and fever admitted to [facility] for further management, s/p EGD. 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 ID, heme/onc following.

## 2024-10-27 NOTE — PROGRESS NOTE ADULT - ATTENDING COMMENTS
69M w/ PMHx of non-operable lung cancer, DM, HTN, HLD recently admitted for symptomatic anemia and chemotherapy-induced nausea and vomiting (10/14-10/15) presenting with hiccups, nausea, vomiting, and fever admitted to [facility] for further management, s/p EGD. 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   At bedside patient had no complaints however earlier to the resident was complaining of swallowing water, no high grade fevers overnight   Today is the last day of zosyn   Will switch ganglicyclovir to oral   General: AAOX3   Cardio: RRR   Resp clear breath sounds   Gastro: soft nontender   Plan   If able to tolerate lunch will discharge today with 7 day course of ganglicyclovir   Given zosyn last day today will hold off on switching to augmentin   If fails diet will transition to oral ganglicyclovir tomorrow

## 2024-10-27 NOTE — PROGRESS NOTE ADULT - PROBLEM SELECTOR PLAN 10
A1C 7.2 9/2024.  Home meds: Metformin 500mg  - mISS A1C 7.2 9/2024.  Home meds: Metformin 500mg    - mISS

## 2024-10-28 LAB
ADD ON TEST-SPECIMEN IN LAB: SIGNIFICANT CHANGE UP
ALBUMIN SERPL ELPH-MCNC: 2.1 G/DL — LOW (ref 3.3–5)
ALP SERPL-CCNC: 167 U/L — HIGH (ref 40–120)
ALT FLD-CCNC: 20 U/L — SIGNIFICANT CHANGE UP (ref 10–45)
ANION GAP SERPL CALC-SCNC: 8 MMOL/L — SIGNIFICANT CHANGE UP (ref 5–17)
ANION GAP SERPL CALC-SCNC: 9 MMOL/L — SIGNIFICANT CHANGE UP (ref 5–17)
ANISOCYTOSIS BLD QL: SLIGHT — SIGNIFICANT CHANGE UP
APPEARANCE UR: CLEAR — SIGNIFICANT CHANGE UP
AST SERPL-CCNC: 31 U/L — SIGNIFICANT CHANGE UP (ref 10–40)
BACTERIA # UR AUTO: NEGATIVE /HPF — SIGNIFICANT CHANGE UP
BASOPHILS # BLD AUTO: 0.01 K/UL — SIGNIFICANT CHANGE UP (ref 0–0.2)
BASOPHILS # BLD AUTO: 0.04 K/UL — SIGNIFICANT CHANGE UP (ref 0–0.2)
BASOPHILS NFR BLD AUTO: 0.3 % — SIGNIFICANT CHANGE UP (ref 0–2)
BASOPHILS NFR BLD AUTO: 1.1 % — SIGNIFICANT CHANGE UP (ref 0–2)
BILIRUB SERPL-MCNC: 0.6 MG/DL — SIGNIFICANT CHANGE UP (ref 0.2–1.2)
BILIRUB UR-MCNC: NEGATIVE — SIGNIFICANT CHANGE UP
BLD GP AB SCN SERPL QL: NEGATIVE — SIGNIFICANT CHANGE UP
BUN SERPL-MCNC: 12 MG/DL — SIGNIFICANT CHANGE UP (ref 7–23)
BUN SERPL-MCNC: 13 MG/DL — SIGNIFICANT CHANGE UP (ref 7–23)
CALCIUM SERPL-MCNC: 8 MG/DL — LOW (ref 8.4–10.5)
CALCIUM SERPL-MCNC: 8.1 MG/DL — LOW (ref 8.4–10.5)
CAST: 0 /LPF — SIGNIFICANT CHANGE UP (ref 0–4)
CHLORIDE SERPL-SCNC: 96 MMOL/L — SIGNIFICANT CHANGE UP (ref 96–108)
CHLORIDE SERPL-SCNC: 96 MMOL/L — SIGNIFICANT CHANGE UP (ref 96–108)
CO2 SERPL-SCNC: 24 MMOL/L — SIGNIFICANT CHANGE UP (ref 22–31)
CO2 SERPL-SCNC: 25 MMOL/L — SIGNIFICANT CHANGE UP (ref 22–31)
COLOR SPEC: YELLOW — SIGNIFICANT CHANGE UP
CREAT SERPL-MCNC: 0.66 MG/DL — SIGNIFICANT CHANGE UP (ref 0.5–1.3)
CREAT SERPL-MCNC: 0.71 MG/DL — SIGNIFICANT CHANGE UP (ref 0.5–1.3)
CULTURE RESULTS: SIGNIFICANT CHANGE UP
DIFF PNL FLD: NEGATIVE — SIGNIFICANT CHANGE UP
EGFR: 101 ML/MIN/1.73M2 — SIGNIFICANT CHANGE UP
EGFR: 99 ML/MIN/1.73M2 — SIGNIFICANT CHANGE UP
EOSINOPHIL # BLD AUTO: 0.09 K/UL — SIGNIFICANT CHANGE UP (ref 0–0.5)
EOSINOPHIL # BLD AUTO: 0.19 K/UL — SIGNIFICANT CHANGE UP (ref 0–0.5)
EOSINOPHIL NFR BLD AUTO: 2.7 % — SIGNIFICANT CHANGE UP (ref 0–6)
EOSINOPHIL NFR BLD AUTO: 5.7 % — SIGNIFICANT CHANGE UP (ref 0–6)
GIANT PLATELETS BLD QL SMEAR: PRESENT — SIGNIFICANT CHANGE UP
GLUCOSE BLDC GLUCOMTR-MCNC: 132 MG/DL — HIGH (ref 70–99)
GLUCOSE BLDC GLUCOMTR-MCNC: 152 MG/DL — HIGH (ref 70–99)
GLUCOSE BLDC GLUCOMTR-MCNC: 156 MG/DL — HIGH (ref 70–99)
GLUCOSE BLDC GLUCOMTR-MCNC: 200 MG/DL — HIGH (ref 70–99)
GLUCOSE SERPL-MCNC: 144 MG/DL — HIGH (ref 70–99)
GLUCOSE SERPL-MCNC: 187 MG/DL — HIGH (ref 70–99)
GLUCOSE UR QL: NEGATIVE MG/DL — SIGNIFICANT CHANGE UP
HCT VFR BLD CALC: 20.4 % — CRITICAL LOW (ref 39–50)
HCT VFR BLD CALC: 24.8 % — LOW (ref 39–50)
HGB BLD-MCNC: 6.7 G/DL — CRITICAL LOW (ref 13–17)
HGB BLD-MCNC: 8.3 G/DL — LOW (ref 13–17)
HYPOCHROMIA BLD QL: SLIGHT — SIGNIFICANT CHANGE UP
IMM GRANULOCYTES NFR BLD AUTO: 0.9 % — SIGNIFICANT CHANGE UP (ref 0–0.9)
KETONES UR-MCNC: 15 MG/DL
LEUKOCYTE ESTERASE UR-ACNC: NEGATIVE — SIGNIFICANT CHANGE UP
LYMPHOCYTES # BLD AUTO: 0.5 K/UL — LOW (ref 1–3.3)
LYMPHOCYTES # BLD AUTO: 0.63 K/UL — LOW (ref 1–3.3)
LYMPHOCYTES # BLD AUTO: 15.1 % — SIGNIFICANT CHANGE UP (ref 13–44)
LYMPHOCYTES # BLD AUTO: 19.3 % — SIGNIFICANT CHANGE UP (ref 13–44)
MACROCYTES BLD QL: SLIGHT — SIGNIFICANT CHANGE UP
MAGNESIUM SERPL-MCNC: 1.6 MG/DL — SIGNIFICANT CHANGE UP (ref 1.6–2.6)
MANUAL SMEAR VERIFICATION: SIGNIFICANT CHANGE UP
MCHC RBC-ENTMCNC: 28.6 PG — SIGNIFICANT CHANGE UP (ref 27–34)
MCHC RBC-ENTMCNC: 29.5 PG — SIGNIFICANT CHANGE UP (ref 27–34)
MCHC RBC-ENTMCNC: 32.8 GM/DL — SIGNIFICANT CHANGE UP (ref 32–36)
MCHC RBC-ENTMCNC: 33.5 GM/DL — SIGNIFICANT CHANGE UP (ref 32–36)
MCV RBC AUTO: 85.5 FL — SIGNIFICANT CHANGE UP (ref 80–100)
MCV RBC AUTO: 89.9 FL — SIGNIFICANT CHANGE UP (ref 80–100)
MICROCYTES BLD QL: SLIGHT — SIGNIFICANT CHANGE UP
MONOCYTES # BLD AUTO: 0.27 K/UL — SIGNIFICANT CHANGE UP (ref 0–0.9)
MONOCYTES # BLD AUTO: 0.52 K/UL — SIGNIFICANT CHANGE UP (ref 0–0.9)
MONOCYTES NFR BLD AUTO: 15.9 % — HIGH (ref 2–14)
MONOCYTES NFR BLD AUTO: 8.2 % — SIGNIFICANT CHANGE UP (ref 2–14)
NEUTROPHILS # BLD AUTO: 1.89 K/UL — SIGNIFICANT CHANGE UP (ref 1.8–7.4)
NEUTROPHILS # BLD AUTO: 2.41 K/UL — SIGNIFICANT CHANGE UP (ref 1.8–7.4)
NEUTROPHILS NFR BLD AUTO: 58 % — SIGNIFICANT CHANGE UP (ref 43–77)
NEUTROPHILS NFR BLD AUTO: 72.8 % — SIGNIFICANT CHANGE UP (ref 43–77)
NITRITE UR-MCNC: NEGATIVE — SIGNIFICANT CHANGE UP
NRBC # BLD: 0 /100 WBCS — SIGNIFICANT CHANGE UP (ref 0–0)
OVALOCYTES BLD QL SMEAR: SLIGHT — SIGNIFICANT CHANGE UP
PH UR: 6.5 — SIGNIFICANT CHANGE UP (ref 5–8)
PHOSPHATE SERPL-MCNC: 2.4 MG/DL — LOW (ref 2.5–4.5)
PLAT MORPH BLD: ABNORMAL
PLATELET # BLD AUTO: 186 K/UL — SIGNIFICANT CHANGE UP (ref 150–400)
PLATELET # BLD AUTO: 190 K/UL — SIGNIFICANT CHANGE UP (ref 150–400)
POIKILOCYTOSIS BLD QL AUTO: SLIGHT — SIGNIFICANT CHANGE UP
POLYCHROMASIA BLD QL SMEAR: SLIGHT — SIGNIFICANT CHANGE UP
POTASSIUM SERPL-MCNC: 3.2 MMOL/L — LOW (ref 3.5–5.3)
POTASSIUM SERPL-MCNC: 3.3 MMOL/L — LOW (ref 3.5–5.3)
POTASSIUM SERPL-SCNC: 3.2 MMOL/L — LOW (ref 3.5–5.3)
POTASSIUM SERPL-SCNC: 3.3 MMOL/L — LOW (ref 3.5–5.3)
PROT SERPL-MCNC: 6.1 G/DL — SIGNIFICANT CHANGE UP (ref 6–8.3)
PROT UR-MCNC: 30 MG/DL
RAPID RVP RESULT: SIGNIFICANT CHANGE UP
RBC # BLD: 2.27 M/UL — LOW (ref 4.2–5.8)
RBC # BLD: 2.9 M/UL — LOW (ref 4.2–5.8)
RBC # FLD: 16.4 % — HIGH (ref 10.3–14.5)
RBC # FLD: 17.2 % — HIGH (ref 10.3–14.5)
RBC BLD AUTO: ABNORMAL
RBC CASTS # UR COMP ASSIST: 5 /HPF — HIGH (ref 0–4)
RH IG SCN BLD-IMP: POSITIVE — SIGNIFICANT CHANGE UP
SARS-COV-2 RNA SPEC QL NAA+PROBE: SIGNIFICANT CHANGE UP
SMUDGE CELLS # BLD: PRESENT — SIGNIFICANT CHANGE UP
SODIUM SERPL-SCNC: 129 MMOL/L — LOW (ref 135–145)
SODIUM SERPL-SCNC: 129 MMOL/L — LOW (ref 135–145)
SP GR SPEC: 1.03 — SIGNIFICANT CHANGE UP (ref 1–1.03)
SPECIMEN SOURCE: SIGNIFICANT CHANGE UP
SPHEROCYTES BLD QL SMEAR: SLIGHT — SIGNIFICANT CHANGE UP
SQUAMOUS # UR AUTO: 1 /HPF — SIGNIFICANT CHANGE UP (ref 0–5)
UROBILINOGEN FLD QL: >=8 MG/DL (ref 0.2–1)
WBC # BLD: 3.26 K/UL — LOW (ref 3.8–10.5)
WBC # BLD: 3.31 K/UL — LOW (ref 3.8–10.5)
WBC # FLD AUTO: 3.26 K/UL — LOW (ref 3.8–10.5)
WBC # FLD AUTO: 3.31 K/UL — LOW (ref 3.8–10.5)
WBC UR QL: 2 /HPF — SIGNIFICANT CHANGE UP (ref 0–5)

## 2024-10-28 PROCEDURE — 99232 SBSQ HOSP IP/OBS MODERATE 35: CPT

## 2024-10-28 PROCEDURE — 99233 SBSQ HOSP IP/OBS HIGH 50: CPT | Mod: GC

## 2024-10-28 RX ORDER — ACETAMINOPHEN 500 MG
1000 TABLET ORAL ONCE
Refills: 0 | Status: COMPLETED | OUTPATIENT
Start: 2024-10-28 | End: 2024-10-28

## 2024-10-28 RX ORDER — POTASSIUM CHLORIDE 10 MEQ
40 TABLET, EXTENDED RELEASE ORAL EVERY 4 HOURS
Refills: 0 | Status: DISCONTINUED | OUTPATIENT
Start: 2024-10-28 | End: 2024-10-28

## 2024-10-28 RX ORDER — MAGNESIUM SULFATE IN 0.9% NACL 2 G/50 ML
4 INTRAVENOUS SOLUTION, PIGGYBACK (ML) INTRAVENOUS ONCE
Refills: 0 | Status: COMPLETED | OUTPATIENT
Start: 2024-10-28 | End: 2024-10-28

## 2024-10-28 RX ORDER — POTASSIUM CHLORIDE 10 MEQ
40 TABLET, EXTENDED RELEASE ORAL ONCE
Refills: 0 | Status: COMPLETED | OUTPATIENT
Start: 2024-10-28 | End: 2024-10-28

## 2024-10-28 RX ADMIN — Medication 25 GRAM(S): at 18:41

## 2024-10-28 RX ADMIN — Medication 400 MILLIGRAM(S): at 19:04

## 2024-10-28 RX ADMIN — Medication 2: at 22:08

## 2024-10-28 RX ADMIN — SUCRALFATE 1 GRAM(S): 1 SUSPENSION ORAL at 12:35

## 2024-10-28 RX ADMIN — Medication 400 MILLIGRAM(S): at 06:34

## 2024-10-28 RX ADMIN — GABAPENTIN 300 MILLIGRAM(S): 300 CAPSULE ORAL at 12:35

## 2024-10-28 RX ADMIN — Medication 5 MILLIGRAM(S): at 06:24

## 2024-10-28 RX ADMIN — PANTOPRAZOLE SODIUM 40 MILLIGRAM(S): 40 TABLET, DELAYED RELEASE ORAL at 06:24

## 2024-10-28 RX ADMIN — Medication 20 MILLIGRAM(S): at 22:09

## 2024-10-28 RX ADMIN — PIPERACILLIN AND TAZOBACTAM 25 GRAM(S): .5; 4 INJECTION, POWDER, LYOPHILIZED, FOR SOLUTION INTRAVENOUS at 06:23

## 2024-10-28 RX ADMIN — Medication 2: at 12:58

## 2024-10-28 RX ADMIN — Medication 40 MILLIEQUIVALENT(S): at 16:41

## 2024-10-28 RX ADMIN — Medication 40 MILLIEQUIVALENT(S): at 23:43

## 2024-10-28 RX ADMIN — SUCRALFATE 1 GRAM(S): 1 SUSPENSION ORAL at 18:18

## 2024-10-28 RX ADMIN — Medication 1000 MILLIGRAM(S): at 07:18

## 2024-10-28 RX ADMIN — GANCICLOVIR SODIUM 100 MILLIGRAM(S): 50 INJECTION, POWDER, LYOPHILIZED, FOR SOLUTION INTRAVENOUS at 06:24

## 2024-10-28 RX ADMIN — SUCRALFATE 1 GRAM(S): 1 SUSPENSION ORAL at 22:09

## 2024-10-28 RX ADMIN — Medication 2: at 18:19

## 2024-10-28 RX ADMIN — GANCICLOVIR SODIUM 100 MILLIGRAM(S): 50 INJECTION, POWDER, LYOPHILIZED, FOR SOLUTION INTRAVENOUS at 18:19

## 2024-10-28 RX ADMIN — SUCRALFATE 1 GRAM(S): 1 SUSPENSION ORAL at 06:24

## 2024-10-28 RX ADMIN — Medication 40 MILLIGRAM(S): at 18:18

## 2024-10-28 NOTE — PROGRESS NOTE ADULT - SUBJECTIVE AND OBJECTIVE BOX
INFECTIOUS DISEASES CONSULT FOLLOW-UP NOTE    INTERVAL HPI/OVERNIGHT EVENTS:  Ongoing fevers T 103  Odynophagia much improved, cough resolving, no new sx of infection - no URI sx, no dysuria, no diarrhea.    ROS:   Constitutional, eyes, ENT, cardiovascular, respiratory, gastrointestinal, genitourinary, integumentary, neurological, psychiatric and heme/lymph are otherwise negative other than noted above       ANTIBIOTICS/RELEVANT:    MEDICATIONS  (STANDING):  amLODIPine   Tablet 5 milliGRAM(s) Oral daily  atorvastatin 20 milliGRAM(s) Oral at bedtime  dextrose 5%. 1000 milliLiter(s) (100 mL/Hr) IV Continuous <Continuous>  dextrose 5%. 1000 milliLiter(s) (50 mL/Hr) IV Continuous <Continuous>  dextrose 50% Injectable 25 Gram(s) IV Push once  dextrose 50% Injectable 25 Gram(s) IV Push once  dextrose 50% Injectable 12.5 Gram(s) IV Push once  enoxaparin Injectable 40 milliGRAM(s) SubCutaneous every 24 hours  gabapentin 300 milliGRAM(s) Oral every 24 hours  ganciclovir IVPB 300 milliGRAM(s) IV Intermittent every 12 hours  ganciclovir IVPB      glucagon  Injectable 1 milliGRAM(s) IntraMuscular once  insulin lispro (ADMELOG) corrective regimen sliding scale   SubCutaneous Before meals and at bedtime  pantoprazole    Tablet 40 milliGRAM(s) Oral before breakfast  sucralfate 1 Gram(s) Oral every 6 hours    MEDICATIONS  (PRN):  aluminum hydroxide/magnesium hydroxide/simethicone Suspension 30 milliLiter(s) Oral every 4 hours PRN Dyspepsia  benzocaine/menthol Lozenge 1 Lozenge Oral every 2 hours PRN Sore Throat  dextrose Oral Gel 15 Gram(s) Oral once PRN Blood Glucose LESS THAN 70 milliGRAM(s)/deciliter  lidocaine 2% Viscous 15 milliLiter(s) Mucosal every 8 hours PRN Sore throat  melatonin 3 milliGRAM(s) Oral at bedtime PRN Insomnia  ondansetron Injectable 4 milliGRAM(s) IV Push every 8 hours PRN Nausea and/or Vomiting  polyethylene glycol 3350 17 Gram(s) Oral every 24 hours PRN for constipation  senna 2 Tablet(s) Oral at bedtime PRN for constipation        Vital Signs Last 24 Hrs  T(C): 37.1 (28 Oct 2024 07:51), Max: 39.4 (28 Oct 2024 06:10)  T(F): 98.8 (28 Oct 2024 07:51), Max: 103 (28 Oct 2024 06:10)  HR: 119 (28 Oct 2024 06:10) (98 - 119)  BP: 114/67 (28 Oct 2024 06:10) (101/65 - 114/67)  BP(mean): --  RR: 18 (28 Oct 2024 06:10) (18 - 19)  SpO2: 94% (28 Oct 2024 06:10) (94% - 98%)    Parameters below as of 28 Oct 2024 06:10  Patient On (Oxygen Delivery Method): nasal cannula  O2 Flow (L/min): 2      PHYSICAL EXAM:  Constitutional: alert, NAD  Eyes: the sclera and conjunctiva were normal.   ENT: the ears and nose were normal in appearance. Normal oropharynx  Neck: the appearance of the neck was normal and the neck was supple.   Pulmonary: no respiratory distress on 2L NC  Heart: heart rate was normal and rhythm regular  Abdomen: soft, non-tender  Neurological: no focal deficits.   Skin: no rash          LABS:                        7.3    3.28  )-----------( 176      ( 27 Oct 2024 05:30 )             21.2     10-27    130[L]  |  94[L]  |  14  ----------------------------<  115[H]  3.1[L]   |  26  |  0.77    Ca    8.2[L]      27 Oct 2024 05:30  Phos  2.9     10-27  Mg     1.6     10-27    TPro  6.1  /  Alb  2.2[L]  /  TBili  0.7  /  DBili  x   /  AST  20  /  ALT  19  /  AlkPhos  135[H]  10-27      Urinalysis Basic - ( 28 Oct 2024 08:50 )    Color: Yellow / Appearance: Clear / S.027 / pH: x  Gluc: x / Ketone: 15 mg/dL  / Bili: Negative / Urobili: >=8.0 mg/dL   Blood: x / Protein: 30 mg/dL / Nitrite: Negative   Leuk Esterase: Negative / RBC: 5 /HPF / WBC 2 /HPF   Sq Epi: x / Non Sq Epi: 1 /HPF / Bacteria: Negative /HPF        MICROBIOLOGY:  Reviewed    RADIOLOGY & ADDITIONAL STUDIES:  Reviewed

## 2024-10-28 NOTE — PROGRESS NOTE ADULT - ASSESSMENT
69M with hx of DM (A1c 7.2%), HTN, HLD, squamous cell lung cancer currently on carboplatin/taxol (last received 10/10) and RT (last received 10/15), who on 10/14 developed odynophagia, cough, and hiccups, subsequently developed nausea/vomiting and then on 10/20 developed fever for which he presented to [facility] ED, admitted on 10/21 for further management. Was febrile, with mild hypoxia (2L NC), leukopenic with severe lymphopenia. CT C/A/P w/ IVC with decreased size of known COLETTE mass (malignancy), and new bilateral upper lobe GGOs with interlobular septal thickening, as well as mid-distal esophagitis. S/p EGD on 10/23 with finding of erosive changes of middle third of esophagus and esophageal biopsy +CMV inclusions.    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 Continue close monitoring of cell lines and renal function (with daily CBC w/ diff and BMP).  - Repeat CMV PCR today (earlier than 1 week, but I want to see if CMV copy number decreasing appropriately on gancyte).  - Anticipate 3 weeks minimum therapy. Will plan to switch to PO valcyte after his fevers resolved    # Possible bacterial PNA  - Patient reported significant improvement in his cough since presentation (with zosyn), which argues against CMV disease also being responsible for lung pathology/symptoms, although CMV pneumonitis is still a possibility.   - S/p zosyn 4.5g IV q8h EI x 7 days total (EOT 10/27)    ID Team 1 will follow.

## 2024-10-28 NOTE — PROGRESS NOTE ADULT - ASSESSMENT
69M with hx of DM (A1c 7.2%), HTN, HLD, squamous cell lung cancer currently on carboplatin/taxol (last received 10/10) and RT (last received 10/15), who on 10/14 developed odynophagia, cough, and hiccups, subsequently developed nausea/vomiting and then on 10/20 developed fever for which he presented to [facility] ED, admitted on 10/21 for further management. Was febrile, with mild hypoxia (2L NC), leukopenic with severe lymphopenia. CT C/A/P w/ IVC with decreased size of known COLETTE mass (malignancy), and new bilateral upper lobe GGOs with interlobular septal thickening, as well as mid-distal esophagitis. S/p EGD on 10/23 with finding of erosive changes of middle third of esophagus and esophageal biopsy +CMV inclusions.

## 2024-10-28 NOTE — PROGRESS NOTE ADULT - PROBLEM SELECTOR PLAN 6
Ferritin > 8000. In 09/2024 ferritin levels ~2000  Likely 2/2 to cancer + infection     - NTD Hgb 8.5, previously 8.0 on 10/15. RDW elevated. Leukopenia. Plt 152. Likely 2/2 chemotx. Pt denies bleeding, bruising. Denies hematuria. Has not had BMs for 5-6 days, unable to assess for melena.  Hgb 7.7 (10/25)  Hgb 6.7 (10/28)    - 1U pRBC  - Monitor H&H  - Active T&S  - Transfuse Hgb <7

## 2024-10-28 NOTE — PROGRESS NOTE ADULT - PROBLEM SELECTOR PLAN 9
Na 127 on admission, previously 134. Pt denies HA but admits to N/V, which is chronic for him. Has had poor PO intake iso N/V. Drinks gatorade and ensures. No visual disturbances, palpitations.   Corrected Na 128. Calculated serum osm 268. S/p 2L NS in ED. Na improved to 131 s/p NS 2 L. Na has dropped to 128 today. No CNS changes. Likely d/t poor PO intake.  Na 130 (10/27)  Urine Studies 10/25: uOsm 451, Jeremi 55, TSH 0.685 wnl. Likely SIADH.     - CTM

## 2024-10-28 NOTE — PROGRESS NOTE ADULT - PROBLEM SELECTOR PLAN 3
POA. Meeting 2/4 SIRS criteria (T 102.9, ) without clear source. Pt has been coughing over the last 2 days with fever at home T 101. Pt has been hiccuping c/b vomiting. S/p Vanc 1g, Zosyn 3.375g in ED. BCx collected x2 in ED. Patient HDS, appearing well, AOx3, warm to touch. Found to have pneumonia and CMV.  s/p 80 cc/hr LR x 12 hrs  - C/w abx as above  - F/u BCx 10/26 - all previous cultures NGTD (last one 10/24)  - Reculture for Tmax >102.9 POA. Meeting 2/4 SIRS criteria (T 102.9, ) without clear source. Pt has been coughing over the last 2 days with fever at home T 101. Pt has been hiccuping c/b vomiting. S/p Vanc 1g, Zosyn 3.375g in ED. BCx collected x2 in ED. Patient HDS, appearing well, AOx3, warm to touch. Found to have pneumonia and CMV.  s/p 80 cc/hr LR x 12 hrs  - C/w abx as above  - F/u BCx 10/26 - all previous cultures NGTD (last one 10/24)  - Reculture for Tmax >103F    - wean O2, currently on 2L

## 2024-10-28 NOTE — PROGRESS NOTE ADULT - PROBLEM SELECTOR PLAN 12
Home meds: Amlodipine 5  - C/w home meds    PPX:  F: --  E: replete PRN  N: CC w/ Ensure  GI: PPI + sucralfate  DVT: lovenox 40 qd    Dispo: home w/ home PT, not medically ready yet Home meds: Amlodipine 5  - C/w home meds    PPX:  F: 1U pRBC  E: replete PRN  N: CC w/ Glucerna ([place])  GI: PPI + sucralfate  DVT: lovenox 40 qd    Dispo: home w/ home PT, not medically ready yet

## 2024-10-28 NOTE — PROGRESS NOTE ADULT - PROBLEM SELECTOR PLAN 4
Pt has had hiccups since his dc last week. Rad-onc started pt on Zofran which did not help. S/p Reglan in ED w/o improvement. Hiccups have caused the pt N/V.  S/P gabapentin 300 mg x1 10/21 AM, switched to baclofen on 10/25  Presenting with increased hiccupping this morning.   Patient unable to tolerate PO, was able to do so when on gabapentin    - switch back to gabapentin 300 qd   - monitor if patient is able to hold down water/foods Pt has had hiccups since his dc last week. Rad-onc started pt on Zofran which did not help. S/p Reglan in ED w/o improvement. Hiccups have caused the pt N/V.  S/P gabapentin 300 mg x1 10/21 AM, switched to baclofen on 10/25  Patient unable to tolerate PO, was able to do so when on gabapentin. Switched baclofen back to eduar on 10/27.     - c/w gabapentin 300 qd   - ctm if patient is able to hold down water/foods

## 2024-10-28 NOTE — PROGRESS NOTE ADULT - PROBLEM SELECTOR PLAN 11
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 S/p 7 cycles of neoadjuvant platinum based with gemcitabine and nivolumab chemoimmunotherapy. Follows with Dr. Mueller for RT, s/p last round of RT on 10/15.   Follows with Dr. Larose outpatient.

## 2024-10-28 NOTE — PROGRESS NOTE ADULT - ATTENDING COMMENTS
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 No N/V or odynophagia. 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  #Radiation esophagitis  #Sepsis d/t pneumonia - improved w 7d zosyn.   #Lung Cancer  #Hyponatremia - 129 from 130. Likely d/t poor PO intake  #HTN - on amlodipine 5  #HLD - on atorva 20  #Leukopenia wo neutropenia. ANC 1890 today  #Normocytic anemia - hgb 6.7 from 7.3. No signs/sxs of blood loss    PPx: SQL  Plan  Continue on IV Ganciclovir at this time d/t ongoing fevers likely from CMV  Repeat CMV PCR per ID recs  Encourage PO/Fluid intake. Mobilize pt OOB to chair    DISPO: Home w HPT pending improvement in fever curve. Above d/w housestaff and ID - Dr. Robin, Oncology - Dr. Larose

## 2024-10-28 NOTE — PROGRESS NOTE ADULT - PROBLEM SELECTOR PLAN 2
Pt is immunocompromised iso lung cancer and chemo/RT px with 2 days of cough and 1 day of fever. Pt was dc'd on 10/15 and started having intractable hiccups c/b nausea and vomiting. ?Aspiration pna. Cough is dry. Pt denies SOB, CP, abd pain, diarrhea, dysuria, rashes or joint pain. CXR pending read but appears similar to prior w/o infiltrates/consolidations. WBC 2.94 (from 1.95) with increased lymphocyte %. Covid/Flu/RSV negative.  MRSA negative. Legionella/strep negative.     - c/w zosyn 4.5g q8h x 7 days (10/21-10/27)  - ofirmev as needed for fevers, max 4g in 24 hours (can give toradol if maxxed out) Pt is immunocompromised iso lung cancer and chemo/RT px with 2 days of cough and 1 day of fever. Pt was dc'd on 10/15 and started having intractable hiccups c/b nausea and vomiting. ?Aspiration pna. Cough is dry. Pt denies SOB, CP, abd pain, diarrhea, dysuria, rashes or joint pain. CXR pending read but appears similar to prior w/o infiltrates/consolidations. WBC 2.94 (from 1.95) with increased lymphocyte %. Covid/Flu/RSV negative.  MRSA negative. Legionella/strep negative.   Patient is still spiking fevers, tmax 103F on 10/28.    - s/p zosyn 4.5g q8h x 7 days (10/21-10/27)  - ofirmev as needed for fevers, max 4g in 24 hours (can give toradol if maxxed out)  - monitor off zosyn

## 2024-10-28 NOTE — PROGRESS NOTE ADULT - SUBJECTIVE AND OBJECTIVE BOX
Progress Note  INCOMPLETE NOTE    INTERVAL EVENTS:   Fever 103F (Tmax) - repeat bcx + ofirmev x1 at 6am.    SUBJECTIVE:   Patient seen and examined at bedside. Condition largely unchanged from yesterday. No acute complaints at this time.    ROS:  Negative unless otherwise stated above.    PHYSICAL EXAM:  General: Alert and oriented x 3. No acute distress.   HEENT: Moist mucous membranes. Anicteric. No cervical lymphadenopathy.  Cardiovascular: Regular rate and rhythm. No murmur. Normal JVP.  Lungs: Clear to auscultation bilaterally. No accessory muscle use.  Abdomen: Soft, non-tender and non-distended. No palpable masses.  Extremities: No edema. Non-tender. Warm.  Skin: No rashes or lesions.   Neurologic: No apparent focal neurological deficits. CN II-XII grossly intact, but not individually tested.  Psychiatric: Cooperative. Appropriate mood and affect.    VITAL SIGNS:  Vital Signs Last 24 Hrs  T(C): 39.4 (28 Oct 2024 06:10), Max: 39.4 (28 Oct 2024 06:10)  T(F): 103 (28 Oct 2024 06:10), Max: 103 (28 Oct 2024 06:10)  HR: 119 (28 Oct 2024 06:10) (98 - 119)  BP: 114/67 (28 Oct 2024 06:10) (101/65 - 114/67)  BP(mean): --  RR: 18 (28 Oct 2024 06:10) (18 - 19)  SpO2: 94% (28 Oct 2024 06:10) (94% - 98%)    Parameters below as of 28 Oct 2024 06:10  Patient On (Oxygen Delivery Method): nasal cannula  O2 Flow (L/min): 2    INPATIENT MEDICATIONS:   MEDICATIONS  (STANDING):  amLODIPine   Tablet 5 milliGRAM(s) Oral daily  atorvastatin 20 milliGRAM(s) Oral at bedtime  dextrose 5%. 1000 milliLiter(s) (50 mL/Hr) IV Continuous <Continuous>  dextrose 5%. 1000 milliLiter(s) (100 mL/Hr) IV Continuous <Continuous>  dextrose 50% Injectable 25 Gram(s) IV Push once  dextrose 50% Injectable 25 Gram(s) IV Push once  dextrose 50% Injectable 12.5 Gram(s) IV Push once  enoxaparin Injectable 40 milliGRAM(s) SubCutaneous every 24 hours  gabapentin 300 milliGRAM(s) Oral every 24 hours  ganciclovir IVPB 300 milliGRAM(s) IV Intermittent every 12 hours  ganciclovir IVPB      glucagon  Injectable 1 milliGRAM(s) IntraMuscular once  insulin lispro (ADMELOG) corrective regimen sliding scale   SubCutaneous Before meals and at bedtime  pantoprazole    Tablet 40 milliGRAM(s) Oral before breakfast  piperacillin/tazobactam IVPB.. 4.5 Gram(s) IV Intermittent every 8 hours  sucralfate 1 Gram(s) Oral every 6 hours    MEDICATIONS  (PRN):  aluminum hydroxide/magnesium hydroxide/simethicone Suspension 30 milliLiter(s) Oral every 4 hours PRN Dyspepsia  benzocaine/menthol Lozenge 1 Lozenge Oral every 2 hours PRN Sore Throat  dextrose Oral Gel 15 Gram(s) Oral once PRN Blood Glucose LESS THAN 70 milliGRAM(s)/deciliter  lidocaine 2% Viscous 15 milliLiter(s) Mucosal every 8 hours PRN Sore throat  melatonin 3 milliGRAM(s) Oral at bedtime PRN Insomnia  ondansetron Injectable 4 milliGRAM(s) IV Push every 8 hours PRN Nausea and/or Vomiting  polyethylene glycol 3350 17 Gram(s) Oral every 24 hours PRN for constipation  senna 2 Tablet(s) Oral at bedtime PRN for constipation    ALLERGIES:  Allergies    No Known Allergies    Intolerances    LABS:                       7.3    3.28  )-----------( 176      ( 27 Oct 2024 05:30 )             21.2     10-27    130[L]  |  94[L]  |  14  ----------------------------<  115[H]  3.1[L]   |  26  |  0.77    Ca    8.2[L]      27 Oct 2024 05:30  Phos  2.9     10-27  Mg     1.6     10-27    TPro  6.1  /  Alb  2.2[L]  /  TBili  0.7  /  DBili  x   /  AST  20  /  ALT  19  /  AlkPhos  135[H]  10-27      Urinalysis Basic - ( 27 Oct 2024 05:30 )    Color: x / Appearance: x / SG: x / pH: x  Gluc: 115 mg/dL / Ketone: x  / Bili: x / Urobili: x   Blood: x / Protein: x / Nitrite: x   Leuk Esterase: x / RBC: x / WBC x   Sq Epi: x / Non Sq Epi: x / Bacteria: x      CAPILLARY BLOOD GLUCOSE      POCT Blood Glucose.: 152 mg/dL (27 Oct 2024 21:49)    RADIOLOGY & ADDITIONAL TESTS: Reviewed. Progress Note    INTERVAL EVENTS:   Fever 103F (Tmax) - repeat bcx + ofirmev x1 at 6am.    SUBJECTIVE:   Patient states he is feeling well. Denies feeling feverish, no chills, SOB during febrile episode. Patient also states that he is now tolerating water, glucerna shakes, crackers, and cake. He reports not having any hiccups today. Believes the gabapentin is helping. Requests that he is informed at least 2 hrs before discharge so his son can pick him up from the hospital. Patient states he worked with PT once last week, struggled to get up initially, then was able to walk without any weakness. Patient would like to work with PT again.   Still no BM. No abd cramping. Passing gas. Eats halal only.     ROS:  Negative unless otherwise stated above.    PHYSICAL EXAM:  General: AAOx3, no acute distress  HEENT: Moist mucous membranes  Lungs: CTA B/L. No wheezes, rales, or rhonchi  Cardiovascular: RRR.   Abdomen: Soft, non-tender non-distended; No rebound or guarding  Extremities: WWP, No clubbing, cyanosis or edema  Skin: Warm and dry.     VITAL SIGNS:  Vital Signs Last 24 Hrs  T(C): 39.4 (28 Oct 2024 06:10), Max: 39.4 (28 Oct 2024 06:10)  T(F): 103 (28 Oct 2024 06:10), Max: 103 (28 Oct 2024 06:10)  HR: 119 (28 Oct 2024 06:10) (98 - 119)  BP: 114/67 (28 Oct 2024 06:10) (101/65 - 114/67)  BP(mean): --  RR: 18 (28 Oct 2024 06:10) (18 - 19)  SpO2: 94% (28 Oct 2024 06:10) (94% - 98%)    Parameters below as of 28 Oct 2024 06:10  Patient On (Oxygen Delivery Method): nasal cannula  O2 Flow (L/min): 2    INPATIENT MEDICATIONS:   MEDICATIONS  (STANDING):  amLODIPine   Tablet 5 milliGRAM(s) Oral daily  atorvastatin 20 milliGRAM(s) Oral at bedtime  dextrose 5%. 1000 milliLiter(s) (50 mL/Hr) IV Continuous <Continuous>  dextrose 5%. 1000 milliLiter(s) (100 mL/Hr) IV Continuous <Continuous>  dextrose 50% Injectable 25 Gram(s) IV Push once  dextrose 50% Injectable 25 Gram(s) IV Push once  dextrose 50% Injectable 12.5 Gram(s) IV Push once  enoxaparin Injectable 40 milliGRAM(s) SubCutaneous every 24 hours  gabapentin 300 milliGRAM(s) Oral every 24 hours  ganciclovir IVPB 300 milliGRAM(s) IV Intermittent every 12 hours  ganciclovir IVPB      glucagon  Injectable 1 milliGRAM(s) IntraMuscular once  insulin lispro (ADMELOG) corrective regimen sliding scale   SubCutaneous Before meals and at bedtime  pantoprazole    Tablet 40 milliGRAM(s) Oral before breakfast  piperacillin/tazobactam IVPB.. 4.5 Gram(s) IV Intermittent every 8 hours  sucralfate 1 Gram(s) Oral every 6 hours    MEDICATIONS  (PRN):  aluminum hydroxide/magnesium hydroxide/simethicone Suspension 30 milliLiter(s) Oral every 4 hours PRN Dyspepsia  benzocaine/menthol Lozenge 1 Lozenge Oral every 2 hours PRN Sore Throat  dextrose Oral Gel 15 Gram(s) Oral once PRN Blood Glucose LESS THAN 70 milliGRAM(s)/deciliter  lidocaine 2% Viscous 15 milliLiter(s) Mucosal every 8 hours PRN Sore throat  melatonin 3 milliGRAM(s) Oral at bedtime PRN Insomnia  ondansetron Injectable 4 milliGRAM(s) IV Push every 8 hours PRN Nausea and/or Vomiting  polyethylene glycol 3350 17 Gram(s) Oral every 24 hours PRN for constipation  senna 2 Tablet(s) Oral at bedtime PRN for constipation    ALLERGIES:  Allergies    No Known Allergies    Intolerances    LABS:                       7.3    3.28  )-----------( 176      ( 27 Oct 2024 05:30 )             21.2     10-27    130[L]  |  94[L]  |  14  ----------------------------<  115[H]  3.1[L]   |  26  |  0.77    Ca    8.2[L]      27 Oct 2024 05:30  Phos  2.9     10-27  Mg     1.6     10-27    TPro  6.1  /  Alb  2.2[L]  /  TBili  0.7  /  DBili  x   /  AST  20  /  ALT  19  /  AlkPhos  135[H]  10-27      Urinalysis Basic - ( 27 Oct 2024 05:30 )    Color: x / Appearance: x / SG: x / pH: x  Gluc: 115 mg/dL / Ketone: x  / Bili: x / Urobili: x   Blood: x / Protein: x / Nitrite: x   Leuk Esterase: x / RBC: x / WBC x   Sq Epi: x / Non Sq Epi: x / Bacteria: x      CAPILLARY BLOOD GLUCOSE      POCT Blood Glucose.: 152 mg/dL (27 Oct 2024 21:49)    RADIOLOGY & ADDITIONAL TESTS: Reviewed.

## 2024-10-28 NOTE — PROGRESS NOTE ADULT - PROBLEM SELECTOR PLAN 7
Hgb 8.5, previously 8.0 on 10/15. RDW elevated. Leukopenia. Plt 152. Likely 2/2 chemotx. Pt denies bleeding, bruising. Denies hematuria. Has not had BMs for 5-6 days, unable to assess for melena.  Hgb 7.7 (10/25)    - Monitor H&H  - Active T&S  - Transfuse hgb <7 Ferritin > 8000. In 09/2024 ferritin levels ~2000  Likely 2/2 to cancer + infection     - NTD

## 2024-10-29 LAB
ALBUMIN SERPL ELPH-MCNC: 2.1 G/DL — LOW (ref 3.3–5)
ALP SERPL-CCNC: 165 U/L — HIGH (ref 40–120)
ALT FLD-CCNC: 19 U/L — SIGNIFICANT CHANGE UP (ref 10–45)
ANION GAP SERPL CALC-SCNC: 9 MMOL/L — SIGNIFICANT CHANGE UP (ref 5–17)
AST SERPL-CCNC: 20 U/L — SIGNIFICANT CHANGE UP (ref 10–40)
BASOPHILS # BLD AUTO: 0.02 K/UL — SIGNIFICANT CHANGE UP (ref 0–0.2)
BASOPHILS NFR BLD AUTO: 0.5 % — SIGNIFICANT CHANGE UP (ref 0–2)
BILIRUB SERPL-MCNC: 0.8 MG/DL — SIGNIFICANT CHANGE UP (ref 0.2–1.2)
BUN SERPL-MCNC: 12 MG/DL — SIGNIFICANT CHANGE UP (ref 7–23)
CALCIUM SERPL-MCNC: 8.1 MG/DL — LOW (ref 8.4–10.5)
CHLORIDE SERPL-SCNC: 97 MMOL/L — SIGNIFICANT CHANGE UP (ref 96–108)
CMV DNA CSF QL NAA+PROBE: HIGH IU/ML
CMV DNA SPEC NAA+PROBE-LOG#: 4.2 LOG10IU/ML — HIGH
CO2 SERPL-SCNC: 23 MMOL/L — SIGNIFICANT CHANGE UP (ref 22–31)
CREAT SERPL-MCNC: 0.64 MG/DL — SIGNIFICANT CHANGE UP (ref 0.5–1.3)
EGFR: 102 ML/MIN/1.73M2 — SIGNIFICANT CHANGE UP
EOSINOPHIL # BLD AUTO: 0.12 K/UL — SIGNIFICANT CHANGE UP (ref 0–0.5)
EOSINOPHIL NFR BLD AUTO: 3.3 % — SIGNIFICANT CHANGE UP (ref 0–6)
GLUCOSE BLDC GLUCOMTR-MCNC: 121 MG/DL — HIGH (ref 70–99)
GLUCOSE BLDC GLUCOMTR-MCNC: 128 MG/DL — HIGH (ref 70–99)
GLUCOSE BLDC GLUCOMTR-MCNC: 151 MG/DL — HIGH (ref 70–99)
GLUCOSE BLDC GLUCOMTR-MCNC: 178 MG/DL — HIGH (ref 70–99)
GLUCOSE SERPL-MCNC: 98 MG/DL — SIGNIFICANT CHANGE UP (ref 70–99)
HCT VFR BLD CALC: 26.1 % — LOW (ref 39–50)
HGB BLD-MCNC: 8.7 G/DL — LOW (ref 13–17)
IMM GRANULOCYTES NFR BLD AUTO: 0.8 % — SIGNIFICANT CHANGE UP (ref 0–0.9)
LYMPHOCYTES # BLD AUTO: 0.64 K/UL — LOW (ref 1–3.3)
LYMPHOCYTES # BLD AUTO: 17.3 % — SIGNIFICANT CHANGE UP (ref 13–44)
MAGNESIUM SERPL-MCNC: 1.4 MG/DL — LOW (ref 1.6–2.6)
MCHC RBC-ENTMCNC: 29.2 PG — SIGNIFICANT CHANGE UP (ref 27–34)
MCHC RBC-ENTMCNC: 33.3 GM/DL — SIGNIFICANT CHANGE UP (ref 32–36)
MCV RBC AUTO: 87.6 FL — SIGNIFICANT CHANGE UP (ref 80–100)
MONOCYTES # BLD AUTO: 0.29 K/UL — SIGNIFICANT CHANGE UP (ref 0–0.9)
MONOCYTES NFR BLD AUTO: 7.9 % — SIGNIFICANT CHANGE UP (ref 2–14)
NEUTROPHILS # BLD AUTO: 2.59 K/UL — SIGNIFICANT CHANGE UP (ref 1.8–7.4)
NEUTROPHILS NFR BLD AUTO: 70.2 % — SIGNIFICANT CHANGE UP (ref 43–77)
NRBC # BLD: 0 /100 WBCS — SIGNIFICANT CHANGE UP (ref 0–0)
PHOSPHATE SERPL-MCNC: 2.1 MG/DL — LOW (ref 2.5–4.5)
PLATELET # BLD AUTO: 197 K/UL — SIGNIFICANT CHANGE UP (ref 150–400)
POTASSIUM SERPL-MCNC: 3.6 MMOL/L — SIGNIFICANT CHANGE UP (ref 3.5–5.3)
POTASSIUM SERPL-SCNC: 3.6 MMOL/L — SIGNIFICANT CHANGE UP (ref 3.5–5.3)
PROT SERPL-MCNC: 6 G/DL — SIGNIFICANT CHANGE UP (ref 6–8.3)
RBC # BLD: 2.98 M/UL — LOW (ref 4.2–5.8)
RBC # FLD: 16.7 % — HIGH (ref 10.3–14.5)
SODIUM SERPL-SCNC: 129 MMOL/L — LOW (ref 135–145)
WBC # BLD: 3.69 K/UL — LOW (ref 3.8–10.5)
WBC # FLD AUTO: 3.69 K/UL — LOW (ref 3.8–10.5)

## 2024-10-29 PROCEDURE — 99233 SBSQ HOSP IP/OBS HIGH 50: CPT | Mod: GC

## 2024-10-29 PROCEDURE — 99233 SBSQ HOSP IP/OBS HIGH 50: CPT

## 2024-10-29 RX ORDER — GABAPENTIN 300 MG/1
300 CAPSULE ORAL EVERY 12 HOURS
Refills: 0 | Status: DISCONTINUED | OUTPATIENT
Start: 2024-10-29 | End: 2024-10-29

## 2024-10-29 RX ORDER — MAGNESIUM SULFATE IN 0.9% NACL 2 G/50 ML
4 INTRAVENOUS SOLUTION, PIGGYBACK (ML) INTRAVENOUS ONCE
Refills: 0 | Status: COMPLETED | OUTPATIENT
Start: 2024-10-29 | End: 2024-10-29

## 2024-10-29 RX ORDER — GABAPENTIN 300 MG/1
300 CAPSULE ORAL
Refills: 0 | Status: DISCONTINUED | OUTPATIENT
Start: 2024-10-29 | End: 2024-11-04

## 2024-10-29 RX ORDER — POTASSIUM PHOSPHATE 236; 224 MG/ML; MG/ML
30 INJECTION, SOLUTION INTRAVENOUS ONCE
Refills: 0 | Status: COMPLETED | OUTPATIENT
Start: 2024-10-29 | End: 2024-10-29

## 2024-10-29 RX ADMIN — Medication 20 MILLIGRAM(S): at 22:52

## 2024-10-29 RX ADMIN — Medication 5 MILLIGRAM(S): at 06:26

## 2024-10-29 RX ADMIN — SUCRALFATE 1 GRAM(S): 1 SUSPENSION ORAL at 06:26

## 2024-10-29 RX ADMIN — GANCICLOVIR SODIUM 100 MILLIGRAM(S): 50 INJECTION, POWDER, LYOPHILIZED, FOR SOLUTION INTRAVENOUS at 17:41

## 2024-10-29 RX ADMIN — SUCRALFATE 1 GRAM(S): 1 SUSPENSION ORAL at 12:09

## 2024-10-29 RX ADMIN — GANCICLOVIR SODIUM 100 MILLIGRAM(S): 50 INJECTION, POWDER, LYOPHILIZED, FOR SOLUTION INTRAVENOUS at 06:27

## 2024-10-29 RX ADMIN — PANTOPRAZOLE SODIUM 40 MILLIGRAM(S): 40 TABLET, DELAYED RELEASE ORAL at 06:27

## 2024-10-29 RX ADMIN — SUCRALFATE 1 GRAM(S): 1 SUSPENSION ORAL at 17:41

## 2024-10-29 RX ADMIN — ONDANSETRON HYDROCHLORIDE 4 MILLIGRAM(S): 2 INJECTION, SOLUTION INTRAMUSCULAR; INTRAVENOUS at 13:33

## 2024-10-29 RX ADMIN — Medication 40 MILLIGRAM(S): at 17:41

## 2024-10-29 RX ADMIN — POTASSIUM PHOSPHATE 83.33 MILLIMOLE(S): 236; 224 INJECTION, SOLUTION INTRAVENOUS at 12:10

## 2024-10-29 RX ADMIN — Medication 2: at 22:52

## 2024-10-29 RX ADMIN — GABAPENTIN 300 MILLIGRAM(S): 300 CAPSULE ORAL at 13:33

## 2024-10-29 RX ADMIN — Medication 25 GRAM(S): at 07:54

## 2024-10-29 NOTE — PROGRESS NOTE ADULT - SUBJECTIVE AND OBJECTIVE BOX
INFECTIOUS DISEASES CONSULT FOLLOW-UP NOTE    INTERVAL HPI/OVERNIGHT EVENTS:  Persistent odynophagia and fever. Cough has ~resolved.    ROS:   Constitutional, eyes, ENT, cardiovascular, respiratory, gastrointestinal, genitourinary, integumentary, neurological, psychiatric and heme/lymph are otherwise negative other than noted above       ANTIBIOTICS/RELEVANT:    MEDICATIONS  (STANDING):  amLODIPine   Tablet 5 milliGRAM(s) Oral daily  atorvastatin 20 milliGRAM(s) Oral at bedtime  dextrose 5%. 1000 milliLiter(s) (100 mL/Hr) IV Continuous <Continuous>  dextrose 5%. 1000 milliLiter(s) (50 mL/Hr) IV Continuous <Continuous>  dextrose 50% Injectable 25 Gram(s) IV Push once  dextrose 50% Injectable 12.5 Gram(s) IV Push once  dextrose 50% Injectable 25 Gram(s) IV Push once  enoxaparin Injectable 40 milliGRAM(s) SubCutaneous every 24 hours  gabapentin 300 milliGRAM(s) Oral <User Schedule>  ganciclovir IVPB 300 milliGRAM(s) IV Intermittent every 12 hours  ganciclovir IVPB      glucagon  Injectable 1 milliGRAM(s) IntraMuscular once  insulin lispro (ADMELOG) corrective regimen sliding scale   SubCutaneous Before meals and at bedtime  pantoprazole    Tablet 40 milliGRAM(s) Oral before breakfast  sucralfate 1 Gram(s) Oral every 6 hours    MEDICATIONS  (PRN):  aluminum hydroxide/magnesium hydroxide/simethicone Suspension 30 milliLiter(s) Oral every 4 hours PRN Dyspepsia  benzocaine/menthol Lozenge 1 Lozenge Oral every 2 hours PRN Sore Throat  dextrose Oral Gel 15 Gram(s) Oral once PRN Blood Glucose LESS THAN 70 milliGRAM(s)/deciliter  lidocaine 2% Viscous 15 milliLiter(s) Mucosal every 8 hours PRN Sore throat  melatonin 3 milliGRAM(s) Oral at bedtime PRN Insomnia  ondansetron Injectable 4 milliGRAM(s) IV Push every 8 hours PRN Nausea and/or Vomiting  polyethylene glycol 3350 17 Gram(s) Oral every 24 hours PRN for constipation  senna 2 Tablet(s) Oral at bedtime PRN for constipation        Vital Signs Last 24 Hrs  T(C): 37.6 (29 Oct 2024 05:32), Max: 38.6 (28 Oct 2024 19:00)  T(F): 99.7 (29 Oct 2024 05:32), Max: 101.4 (28 Oct 2024 19:00)  HR: 112 (29 Oct 2024 05:32) (99 - 112)  BP: 111/60 (29 Oct 2024 05:32) (101/63 - 112/68)  BP(mean): --  RR: 18 (29 Oct 2024 05:32) (18 - 18)  SpO2: 94% (29 Oct 2024 05:32) (94% - 97%)    Parameters below as of 29 Oct 2024 05:32  Patient On (Oxygen Delivery Method): nasal cannula        PHYSICAL EXAM:  Constitutional: alert, NAD  Eyes: the sclera and conjunctiva were normal.   ENT: the ears and nose were normal in appearance. Normal oropharynx. Upper dentures.  Neck: the appearance of the neck was normal and the neck was supple.   Pulmonary: no respiratory distress on RA  Heart: heart rate was normal and rhythm regular  Abdomen: soft, non-tender  Neurological: no focal deficits.   Skin: no rash      LABS:                        8.7    3.69  )-----------( 197      ( 29 Oct 2024 05:30 )             26.1     10-29    129[L]  |  97  |  12  ----------------------------<  98  3.6   |  23  |  0.64    Ca    8.1[L]      29 Oct 2024 05:30  Phos  2.1     10-29  Mg     1.4     10-29    TPro  6.0  /  Alb  2.1[L]  /  TBili  0.8  /  DBili  x   /  AST  20  /  ALT  19  /  AlkPhos  165[H]  10-29      Urinalysis Basic - ( 29 Oct 2024 05:30 )    Color: x / Appearance: x / SG: x / pH: x  Gluc: 98 mg/dL / Ketone: x  / Bili: x / Urobili: x   Blood: x / Protein: x / Nitrite: x   Leuk Esterase: x / RBC: x / WBC x   Sq Epi: x / Non Sq Epi: x / Bacteria: x        MICROBIOLOGY:  Reviewed    RADIOLOGY & ADDITIONAL STUDIES:  Reviewed

## 2024-10-29 NOTE — PROGRESS NOTE ADULT - ATTENDING COMMENTS
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 Denies any LH/dizziness, chest pain, dyspnea. Later this afternoon - states he was able to eat a little lunch today. States he had poor appetite still. Walked down length of [id number] w PT using rolling walker.     PPx: SQL  Plan  Increase Gabapentin for hiccups to 300mg BID. Hgb responded appropriately after 1u RBC. CBC w diff in AM  Continue on IV Ganciclovir at this time d/t ongoing fevers likely from CMV. CMV PCR Log improving.   Encourage PO/Fluid intake. Mobilize pt OOB to chair    DISPO: Home w HPT pending improvement in fever curve. Above d/w housestaff and ID - Dr. Robin

## 2024-10-29 NOTE — PROGRESS NOTE ADULT - SUBJECTIVE AND OBJECTIVE BOX
Progress Note  INCOMPLETE NOTE    INTERVAL EVENTS:   No acute events overnight.    SUBJECTIVE:   Patient seen and examined at bedside. Condition largely unchanged from yesterday. No acute complaints at this time.    ROS:  Negative unless otherwise stated above.    PHYSICAL EXAM:  General: Alert and oriented x 3. No acute distress.   HEENT: Moist mucous membranes. Anicteric. No cervical lymphadenopathy.  Cardiovascular: Regular rate and rhythm. No murmur. Normal JVP.  Lungs: Clear to auscultation bilaterally. No accessory muscle use.  Abdomen: Soft, non-tender and non-distended. No palpable masses.  Extremities: No edema. Non-tender. Warm.  Skin: No rashes or lesions.   Neurologic: No apparent focal neurological deficits. CN II-XII grossly intact, but not individually tested.  Psychiatric: Cooperative. Appropriate mood and affect.    VITAL SIGNS:  Vital Signs Last 24 Hrs  T(C): 37.6 (29 Oct 2024 05:32), Max: 38.6 (28 Oct 2024 19:00)  T(F): 99.7 (29 Oct 2024 05:32), Max: 101.4 (28 Oct 2024 19:00)  HR: 112 (29 Oct 2024 05:32) (99 - 112)  BP: 111/60 (29 Oct 2024 05:32) (101/63 - 112/68)  BP(mean): --  RR: 18 (29 Oct 2024 05:32) (18 - 18)  SpO2: 94% (29 Oct 2024 05:32) (94% - 97%)    Parameters below as of 29 Oct 2024 05:32  Patient On (Oxygen Delivery Method): nasal cannula      INPATIENT MEDICATIONS:   MEDICATIONS  (STANDING):  amLODIPine   Tablet 5 milliGRAM(s) Oral daily  atorvastatin 20 milliGRAM(s) Oral at bedtime  dextrose 5%. 1000 milliLiter(s) (100 mL/Hr) IV Continuous <Continuous>  dextrose 5%. 1000 milliLiter(s) (50 mL/Hr) IV Continuous <Continuous>  dextrose 50% Injectable 25 Gram(s) IV Push once  dextrose 50% Injectable 12.5 Gram(s) IV Push once  dextrose 50% Injectable 25 Gram(s) IV Push once  enoxaparin Injectable 40 milliGRAM(s) SubCutaneous every 24 hours  gabapentin 300 milliGRAM(s) Oral every 24 hours  ganciclovir IVPB 300 milliGRAM(s) IV Intermittent every 12 hours  ganciclovir IVPB      glucagon  Injectable 1 milliGRAM(s) IntraMuscular once  insulin lispro (ADMELOG) corrective regimen sliding scale   SubCutaneous Before meals and at bedtime  magnesium sulfate  IVPB 4 Gram(s) IV Intermittent once  pantoprazole    Tablet 40 milliGRAM(s) Oral before breakfast  sucralfate 1 Gram(s) Oral every 6 hours    MEDICATIONS  (PRN):  aluminum hydroxide/magnesium hydroxide/simethicone Suspension 30 milliLiter(s) Oral every 4 hours PRN Dyspepsia  benzocaine/menthol Lozenge 1 Lozenge Oral every 2 hours PRN Sore Throat  dextrose Oral Gel 15 Gram(s) Oral once PRN Blood Glucose LESS THAN 70 milliGRAM(s)/deciliter  lidocaine 2% Viscous 15 milliLiter(s) Mucosal every 8 hours PRN Sore throat  melatonin 3 milliGRAM(s) Oral at bedtime PRN Insomnia  ondansetron Injectable 4 milliGRAM(s) IV Push every 8 hours PRN Nausea and/or Vomiting  polyethylene glycol 3350 17 Gram(s) Oral every 24 hours PRN for constipation  senna 2 Tablet(s) Oral at bedtime PRN for constipation    ALLERGIES:  Allergies    chlorhexidine containing compounds (Rash)    Intolerances    LABS:                       8.7    3.69  )-----------( 197      ( 29 Oct 2024 05:30 )             26.1     10-29    129[L]  |  97  |  12  ----------------------------<  98  3.6   |  23  |  0.64    Ca    8.1[L]      29 Oct 2024 05:30  Phos  2.1     10-29  Mg     1.4     10-29    TPro  6.0  /  Alb  2.1[L]  /  TBili  0.8  /  DBili  x   /  AST  20  /  ALT  19  /  AlkPhos  165[H]  10-29      Urinalysis Basic - ( 29 Oct 2024 05:30 )    Color: x / Appearance: x / SG: x / pH: x  Gluc: 98 mg/dL / Ketone: x  / Bili: x / Urobili: x   Blood: x / Protein: x / Nitrite: x   Leuk Esterase: x / RBC: x / WBC x   Sq Epi: x / Non Sq Epi: x / Bacteria: x      CAPILLARY BLOOD GLUCOSE      POCT Blood Glucose.: 156 mg/dL (28 Oct 2024 21:52)    RADIOLOGY & ADDITIONAL TESTS: Reviewed. 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 ID, heme/onc following.      INTERVAL EVENTS:   No acute events overnight.    SUBJECTIVE:   Patient seen at bedside lying flat. He states that he tried eating fish 2 days ago and managed to have half of it, but vomited a few hours after. At the time patient denied any nausea, but states he was hiccuping and eventually felt the need to throw up. Patient reports that his vomit was white in color, color of his Glucerna shakes, no food particles. Patient did not eat yesterday because he was scared of vomiting, states "I am okay if I don't eat." Has not had any bowel movements. Patient states that he was told side effects of his radiation were chest pain and esophageal discomfort, which he is still experiencing. Denies abdominal pain, nausea, fevers, chills, sob at this time. Patient had fever of 101F yesterday, asymptomatic at the time. Patient states he is breathing okay. When in the room with him, patient was taken off nasal cannula and desaturated to 90%. Patient was put back on 2LNC and saturation increased to 93%.     ROS:  Negative unless otherwise stated above.    PHYSICAL EXAM:  General: Alert and oriented x 3. No acute distress.   HEENT: Moist mucous membranes.   Cardiovascular: Regular rate and rhythm.   Lungs: Clear to auscultation bilaterally.  Abdomen: Soft, non-tender and non-distended.  Extremities: No edema. Non-tender. Warm.    VITAL SIGNS:  Vital Signs Last 24 Hrs  T(C): 37.6 (29 Oct 2024 05:32), Max: 38.6 (28 Oct 2024 19:00)  T(F): 99.7 (29 Oct 2024 05:32), Max: 101.4 (28 Oct 2024 19:00)  HR: 112 (29 Oct 2024 05:32) (99 - 112)  BP: 111/60 (29 Oct 2024 05:32) (101/63 - 112/68)  BP(mean): --  RR: 18 (29 Oct 2024 05:32) (18 - 18)  SpO2: 94% (29 Oct 2024 05:32) (94% - 97%)    Parameters below as of 29 Oct 2024 05:32  Patient On (Oxygen Delivery Method): nasal cannula      INPATIENT MEDICATIONS:   MEDICATIONS  (STANDING):  amLODIPine   Tablet 5 milliGRAM(s) Oral daily  atorvastatin 20 milliGRAM(s) Oral at bedtime  dextrose 5%. 1000 milliLiter(s) (100 mL/Hr) IV Continuous <Continuous>  dextrose 5%. 1000 milliLiter(s) (50 mL/Hr) IV Continuous <Continuous>  dextrose 50% Injectable 25 Gram(s) IV Push once  dextrose 50% Injectable 12.5 Gram(s) IV Push once  dextrose 50% Injectable 25 Gram(s) IV Push once  enoxaparin Injectable 40 milliGRAM(s) SubCutaneous every 24 hours  gabapentin 300 milliGRAM(s) Oral every 24 hours  ganciclovir IVPB 300 milliGRAM(s) IV Intermittent every 12 hours  ganciclovir IVPB      glucagon  Injectable 1 milliGRAM(s) IntraMuscular once  insulin lispro (ADMELOG) corrective regimen sliding scale   SubCutaneous Before meals and at bedtime  magnesium sulfate  IVPB 4 Gram(s) IV Intermittent once  pantoprazole    Tablet 40 milliGRAM(s) Oral before breakfast  sucralfate 1 Gram(s) Oral every 6 hours    MEDICATIONS  (PRN):  aluminum hydroxide/magnesium hydroxide/simethicone Suspension 30 milliLiter(s) Oral every 4 hours PRN Dyspepsia  benzocaine/menthol Lozenge 1 Lozenge Oral every 2 hours PRN Sore Throat  dextrose Oral Gel 15 Gram(s) Oral once PRN Blood Glucose LESS THAN 70 milliGRAM(s)/deciliter  lidocaine 2% Viscous 15 milliLiter(s) Mucosal every 8 hours PRN Sore throat  melatonin 3 milliGRAM(s) Oral at bedtime PRN Insomnia  ondansetron Injectable 4 milliGRAM(s) IV Push every 8 hours PRN Nausea and/or Vomiting  polyethylene glycol 3350 17 Gram(s) Oral every 24 hours PRN for constipation  senna 2 Tablet(s) Oral at bedtime PRN for constipation    ALLERGIES:  Allergies    chlorhexidine containing compounds (Rash)    Intolerances    LABS:                       8.7    3.69  )-----------( 197      ( 29 Oct 2024 05:30 )             26.1     10-29    129[L]  |  97  |  12  ----------------------------<  98  3.6   |  23  |  0.64    Ca    8.1[L]      29 Oct 2024 05:30  Phos  2.1     10-29  Mg     1.4     10-29    TPro  6.0  /  Alb  2.1[L]  /  TBili  0.8  /  DBili  x   /  AST  20  /  ALT  19  /  AlkPhos  165[H]  10-29      Urinalysis Basic - ( 29 Oct 2024 05:30 )    Color: x / Appearance: x / SG: x / pH: x  Gluc: 98 mg/dL / Ketone: x  / Bili: x / Urobili: x   Blood: x / Protein: x / Nitrite: x   Leuk Esterase: x / RBC: x / WBC x   Sq Epi: x / Non Sq Epi: x / Bacteria: x      CAPILLARY BLOOD GLUCOSE      POCT Blood Glucose.: 156 mg/dL (28 Oct 2024 21:52)    RADIOLOGY & ADDITIONAL TESTS: Reviewed. Progress Note    HOSPITAL COURSE:  69M w/ PMHx of non-operable lung cancer, DM, HTN, HLD recently admitted for symptomatic anemia and chemotherapy-induced nausea and vomiting (10/14-10/15) presenting with hiccups, nausea, vomiting, and fever found to have RML pnuemonia, admitted on 10/21. GI consulted, s/p EGD 10/23, found to have medium size hiatal hernia, diffuse erythema of mucosa of whole stomach. Started on pantoprazole IV BID and sucralfate. EGD biopsy showed CMV esophagitis, started on ganciclovir 5 mg/kg IV q12h (10/24). Patient was on empiric zosyn 4.5 mg IV q8h from 10/21-10/27. Patient has been having fevers daily, given ofirmev each time and all bcx are NGTD thus far (10/21, 10/23, 10/24, 10/26). Tmax of 103F (10/27).  Hemoglobin dropped to 6.7 on 10/28, s/p 1U pRBC, hemoglobin increased to 8.7 on 10/29. Patient has ongoing issue of hiccups and inability to tolerate PO as he will vomit/regurgitate his food 2/2 hiccups. Patient trialed on baclofen, currently on gabapentin 300 BID with imporvement of tolerating PO. ID, heme/onc following.     INTERVAL EVENTS:   No acute events overnight.    SUBJECTIVE:   Patient seen at bedside lying flat. He states that he tried eating fish 2 days ago and managed to have half of it, but vomited a few hours after. At the time patient denied any nausea, but states he was hiccuping and eventually felt the need to throw up. Patient reports that his vomit was white in color, color of his Glucerna shakes, no food particles. Patient did not eat yesterday because he was scared of vomiting, states "I am okay if I don't eat." Has not had any bowel movements. Patient states that he was told side effects of his radiation were chest pain and esophageal discomfort, which he is still experiencing. Denies abdominal pain, nausea, fevers, chills, sob at this time. Patient had fever of 101F yesterday, asymptomatic at the time. Patient states he is breathing okay. When in the room with him, patient was taken off nasal cannula and desaturated to 90%. Patient was put back on 2LNC and saturation increased to 93%.     ROS:  Negative unless otherwise stated above.    PHYSICAL EXAM:  General: Alert and oriented x 3. No acute distress.   HEENT: Moist mucous membranes.   Cardiovascular: Regular rate and rhythm.   Lungs: Clear to auscultation bilaterally.  Abdomen: Soft, non-tender and non-distended.  Extremities: No edema. Non-tender. Warm.    VITAL SIGNS:  Vital Signs Last 24 Hrs  T(C): 37.6 (29 Oct 2024 05:32), Max: 38.6 (28 Oct 2024 19:00)  T(F): 99.7 (29 Oct 2024 05:32), Max: 101.4 (28 Oct 2024 19:00)  HR: 112 (29 Oct 2024 05:32) (99 - 112)  BP: 111/60 (29 Oct 2024 05:32) (101/63 - 112/68)  BP(mean): --  RR: 18 (29 Oct 2024 05:32) (18 - 18)  SpO2: 94% (29 Oct 2024 05:32) (94% - 97%)    Parameters below as of 29 Oct 2024 05:32  Patient On (Oxygen Delivery Method): nasal cannula      INPATIENT MEDICATIONS:   MEDICATIONS  (STANDING):  amLODIPine   Tablet 5 milliGRAM(s) Oral daily  atorvastatin 20 milliGRAM(s) Oral at bedtime  dextrose 5%. 1000 milliLiter(s) (100 mL/Hr) IV Continuous <Continuous>  dextrose 5%. 1000 milliLiter(s) (50 mL/Hr) IV Continuous <Continuous>  dextrose 50% Injectable 25 Gram(s) IV Push once  dextrose 50% Injectable 12.5 Gram(s) IV Push once  dextrose 50% Injectable 25 Gram(s) IV Push once  enoxaparin Injectable 40 milliGRAM(s) SubCutaneous every 24 hours  gabapentin 300 milliGRAM(s) Oral every 24 hours  ganciclovir IVPB 300 milliGRAM(s) IV Intermittent every 12 hours  ganciclovir IVPB      glucagon  Injectable 1 milliGRAM(s) IntraMuscular once  insulin lispro (ADMELOG) corrective regimen sliding scale   SubCutaneous Before meals and at bedtime  magnesium sulfate  IVPB 4 Gram(s) IV Intermittent once  pantoprazole    Tablet 40 milliGRAM(s) Oral before breakfast  sucralfate 1 Gram(s) Oral every 6 hours    MEDICATIONS  (PRN):  aluminum hydroxide/magnesium hydroxide/simethicone Suspension 30 milliLiter(s) Oral every 4 hours PRN Dyspepsia  benzocaine/menthol Lozenge 1 Lozenge Oral every 2 hours PRN Sore Throat  dextrose Oral Gel 15 Gram(s) Oral once PRN Blood Glucose LESS THAN 70 milliGRAM(s)/deciliter  lidocaine 2% Viscous 15 milliLiter(s) Mucosal every 8 hours PRN Sore throat  melatonin 3 milliGRAM(s) Oral at bedtime PRN Insomnia  ondansetron Injectable 4 milliGRAM(s) IV Push every 8 hours PRN Nausea and/or Vomiting  polyethylene glycol 3350 17 Gram(s) Oral every 24 hours PRN for constipation  senna 2 Tablet(s) Oral at bedtime PRN for constipation    ALLERGIES:  Allergies    chlorhexidine containing compounds (Rash)    Intolerances    LABS:                       8.7    3.69  )-----------( 197      ( 29 Oct 2024 05:30 )             26.1     10-29    129[L]  |  97  |  12  ----------------------------<  98  3.6   |  23  |  0.64    Ca    8.1[L]      29 Oct 2024 05:30  Phos  2.1     10-29  Mg     1.4     10-29    TPro  6.0  /  Alb  2.1[L]  /  TBili  0.8  /  DBili  x   /  AST  20  /  ALT  19  /  AlkPhos  165[H]  10-29      Urinalysis Basic - ( 29 Oct 2024 05:30 )    Color: x / Appearance: x / SG: x / pH: x  Gluc: 98 mg/dL / Ketone: x  / Bili: x / Urobili: x   Blood: x / Protein: x / Nitrite: x   Leuk Esterase: x / RBC: x / WBC x   Sq Epi: x / Non Sq Epi: x / Bacteria: x      CAPILLARY BLOOD GLUCOSE      POCT Blood Glucose.: 156 mg/dL (28 Oct 2024 21:52)    RADIOLOGY & ADDITIONAL TESTS: Reviewed.

## 2024-10-29 NOTE — PROGRESS NOTE ADULT - PROBLEM SELECTOR PLAN 11
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 S/p 7 cycles of neoadjuvant platinum based with gemcitabine and nivolumab chemoimmunotherapy. Follows with Dr. Mueller for RT, s/p last round of RT on 10/15.   Follows with Dr. Larose outpatient.

## 2024-10-29 NOTE — PROGRESS NOTE ADULT - PROBLEM SELECTOR PLAN 12
Home meds: Amlodipine 5  - C/w home meds    PPX:  F: 1U pRBC  E: replete PRN  N: CC w/ Glucerna ([place])  GI: PPI + sucralfate  DVT: lovenox 40 qd    Dispo: home w/ home PT, not medically ready yet Home meds: Amlodipine 5  - C/w home meds    PPX:  F: s/p 1U pRBC  E: replete PRN  N: CC w/ Glucerna ([place])  GI: PPI + sucralfate  DVT: lovenox 40 qd    Dispo: home w/ home PT, not medically ready yet

## 2024-10-29 NOTE — PROGRESS NOTE ADULT - ASSESSMENT
69M with hx of DM (A1c 7.2%), HTN, HLD, squamous cell lung cancer currently on carboplatin/taxol (last received 10/10) and RT (last received 10/15), who on 10/14 developed odynophagia, cough, and hiccups, subsequently developed nausea/vomiting and then on 10/20 developed fever for which he presented to [facility] ED, admitted on 10/21 for further management. Was febrile, with mild hypoxia (2L NC), leukopenic with severe lymphopenia. CT C/A/P w/ IVC with decreased size of known COLETTE mass (malignancy), and new bilateral upper lobe GGOs with interlobular septal thickening, as well as mid-distal esophagitis. S/p EGD on 10/23 with finding of erosive changes of middle third of esophagus and esophageal biopsy +CMV inclusions.   69M with hx of DM (A1c 7.2%), HTN, HLD, squamous cell lung cancer currently on carboplatin/taxol (last received 10/10) and RT (last received 10/15), who on 10/14 developed odynophagia, cough, and hiccups, subsequently developed nausea/vomiting and then on 10/20 developed fever for which he presented to [facility] ED, admitted on 10/21 for further management. Was febrile, with mild hypoxia (2L NC), leukopenic with severe lymphopenia. CT C/A/P w/ IVC with decreased size of known COLETTE mass (malignancy), and new bilateral upper lobe GGOs with interlobular septal thickening, as well as mid-distal esophagitis. S/p EGD on 10/23 with finding of erosive changes of middle third of esophagus and esophageal biopsy +CMV inclusions. Started on ganciclovir 10/24.

## 2024-10-29 NOTE — PROGRESS NOTE ADULT - PROBLEM SELECTOR PLAN 3
POA. Meeting 2/4 SIRS criteria (T 102.9, ) without clear source. Pt has been coughing over the last 2 days with fever at home T 101. Pt has been hiccuping c/b vomiting. S/p Vanc 1g, Zosyn 3.375g in ED. BCx collected x2 in ED. Patient HDS, appearing well, AOx3, warm to touch. Found to have pneumonia and CMV.  s/p 80 cc/hr LR x 12 hrs  - C/w abx as above  - F/u BCx 10/26 - all previous cultures NGTD (last one 10/24)  - Reculture for Tmax >103F    - wean O2, currently on 2L POA. Meeting 2/4 SIRS criteria (T 102.9, ) without clear source. Pt has been coughing over the last 2 days with fever at home T 101. Pt has been hiccuping c/b vomiting. S/p Vanc 1g, Zosyn 3.375g in ED. BCx collected x2 in ED. Patient HDS, appearing well, AOx3, warm to touch. Found to have pneumonia and CMV.  s/p 80 cc/hr LR x 12 hrs  - C/w abx as above  - F/u BCx 10/26 - all previous cultures NGTD (last one 10/24)  - Reculture for Tmax >103F    #Requirement for O2  Not on home O2. Currently on 2L. Tried weaning today, but patient satting at 90% on RA.  - wean O2, currently on 2L  - start incentive spirometer POA. Meeting 2/4 SIRS criteria (T 102.9, ) without clear source. Pt has been coughing over the last 2 days with fever at home T 101. Pt has been hiccuping c/b vomiting. S/p Vanc 1g, Zosyn 3.375g in ED. BCx collected x2 in ED. Patient HDS, appearing well, AOx3, warm to touch. Found to have pneumonia and CMV.  s/p 80 cc/hr LR x 12 hrs  - C/w abx as above  - Reculture for Tmax >103F    #Requirement for O2  Not on home O2. Currently on 2L. Tried weaning today, but patient satting at 90% on RA.  - wean O2, currently on 2L  - start incentive spirometer

## 2024-10-29 NOTE — PROGRESS NOTE ADULT - PROBLEM SELECTOR PLAN 6
Hgb 8.5, previously 8.0 on 10/15. RDW elevated. Leukopenia. Plt 152. Likely 2/2 chemotx. Pt denies bleeding, bruising. Denies hematuria. Has not had BMs for 5-6 days, unable to assess for melena.  Hgb 7.7 (10/25)  Hgb 6.7 (10/28)    - 1U pRBC  - Monitor H&H  - Active T&S  - Transfuse Hgb <7 Hgb 8.5, previously 8.0 on 10/15. RDW elevated. Leukopenia. Plt 152. Likely 2/2 chemotx. Pt denies bleeding, bruising. Denies hematuria. Has not had BMs for 5-6 days, unable to assess for melena.  Hgb 7.7 (10/25)  Hgb 6.7 (10/28)    - s/p 1U pRBC on 10/29  - Monitor H&H  - Active T&S  - Transfuse Hgb <7  - CTM since ganciclovir can cause cytopenias Hgb 8.5, previously 8.0 on 10/15. RDW elevated. Leukopenia. Plt 152. Likely 2/2 chemotx. Pt denies bleeding, bruising. Denies hematuria. Has not had BMs for 5-6 days, unable to assess for melena.  Hgb 7.7 (10/25)  Hgb 6.7 (10/28)  s/p 1U pRBC (10/28), post-transfusion CBC showing Hgb of 8.7 on 10/29    - Monitor H&H  - Active T&S  - Transfuse Hgb <7  - CTM since ganciclovir can cause cytopenias

## 2024-10-29 NOTE — PROGRESS NOTE ADULT - PROBLEM SELECTOR PLAN 2
Pt is immunocompromised iso lung cancer and chemo/RT px with 2 days of cough and 1 day of fever. Pt was dc'd on 10/15 and started having intractable hiccups c/b nausea and vomiting. ?Aspiration pna. Cough is dry. Pt denies SOB, CP, abd pain, diarrhea, dysuria, rashes or joint pain. CXR pending read but appears similar to prior w/o infiltrates/consolidations. WBC 2.94 (from 1.95) with increased lymphocyte %. Covid/Flu/RSV negative.  MRSA negative. Legionella/strep negative.   Patient is still spiking fevers, tmax 103F on 10/28.    - s/p zosyn 4.5g q8h x 7 days (10/21-10/27)  - ofirmev as needed for fevers, max 4g in 24 hours (can give toradol if maxxed out)  - monitor off zosyn Pt is immunocompromised iso lung cancer and chemo/RT px with 2 days of cough and 1 day of fever. Pt was dc'd on 10/15 and started having intractable hiccups c/b nausea and vomiting. ?Aspiration pna. Cough is dry. Pt denies SOB, CP, abd pain, diarrhea, dysuria, rashes or joint pain. CXR pending read but appears similar to prior w/o infiltrates/consolidations. WBC 2.94 (from 1.95) with increased lymphocyte %. Covid/Flu/RSV negative.  MRSA negative. Legionella/strep negative.   Patient is still spiking fevers, tmax 103F on 10/28. Last fever 101F on 10/28.  s/p zosyn 4.5g q8h x 7 days (10/21-10/27)    - ofirmev as needed for fevers, max 4g in 24 hours (can give toradol if maxxed out)  - monitor off zosyn

## 2024-10-29 NOTE — PROGRESS NOTE ADULT - PROBLEM SELECTOR PLAN 9
Na 127 on admission, previously 134. Pt denies HA but admits to N/V, which is chronic for him. Has had poor PO intake iso N/V. Drinks gatorade and ensures. No visual disturbances, palpitations.   Corrected Na 128. Calculated serum osm 268. S/p 2L NS in ED. Na improved to 131 s/p NS 2 L. Na has dropped to 128 today. No CNS changes. Likely d/t poor PO intake.  Na 130 (10/27)  Urine Studies 10/25: uOsm 451, Jeremi 55, TSH 0.685 wnl. Likely SIADH.     - CTM Na 127 on admission, previously 134. Pt denies HA but admits to N/V, which is chronic for him. Has had poor PO intake iso N/V. Drinks gatorade and ensures. No visual disturbances, palpitations.   Corrected Na 128. Calculated serum osm 268. S/p 2L NS in ED. Na improved to 131 s/p NS 2 L. Na has dropped to 128 today. No CNS changes. Likely d/t poor PO intake.  Na 129 (10/29)  Urine Studies 10/25: uOsm 451, Jeremi 55, TSH 0.685 wnl. Likely SIADH.     - CTM

## 2024-10-29 NOTE — PROGRESS NOTE ADULT - PROBLEM SELECTOR PLAN 4
Pt has had hiccups since his dc last week. Rad-onc started pt on Zofran which did not help. S/p Reglan in ED w/o improvement. Hiccups have caused the pt N/V.  S/P gabapentin 300 mg x1 10/21 AM, switched to baclofen on 10/25  Patient unable to tolerate PO, was able to do so when on gabapentin. Switched baclofen back to eduar on 10/27.     - c/w gabapentin 300 qd   - ctm if patient is able to hold down water/foods Pt has had hiccups since his dc last week. Rad-onc started pt on Zofran which did not help. S/p Reglan in ED w/o improvement. Hiccups have caused the pt N/V.  S/P gabapentin 300 mg x1 10/21 AM, switched to baclofen on 10/25  Patient unable to tolerate PO, was able to do so when on gabapentin. Switched baclofen back to eduar on 10/27.     - uptitrate gabapentin 300 qd to bid  - ctm if patient is able to hold down water/foods

## 2024-10-29 NOTE — PROGRESS NOTE ADULT - ASSESSMENT
69M with hx of DM (A1c 7.2%), HTN, HLD, squamous cell lung cancer currently on carboplatin/taxol (last received 10/10) and RT (last received 10/15), who on 10/14 developed odynophagia, cough, and hiccups, subsequently developed nausea/vomiting and then on 10/20 developed fever for which he presented to [facility] ED, admitted on 10/21 for further management. Was febrile, with mild hypoxia (2L NC), leukopenic with severe lymphopenia. CT C/A/P w/ IVC with decreased size of known COLETTE mass (malignancy), and new bilateral upper lobe GGOs with interlobular septal thickening, as well as mid-distal esophagitis. S/p EGD on 10/23 with finding of erosive changes of middle third of esophagus and esophageal biopsy +CMV inclusions.    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 Continue close monitoring of cell lines and renal function (with daily CBC w/ diff and BMP).  - Repeat CMV PCR sent yesterday (sent after 5 days which is early, but I want to see if CMV copy number decreasing appropriately on gancyte). Note that refractory CMV disease (which would raise concern for resistance) is suspected when clinical symptoms have not improved after 2 weeks of appropriate first line therapy, or if CMV PCR log decreases by <1 after 2 weeks of therapy. Patient does not have risk factors for resistant CMV, and this is very unlikely in this patient.  - Anticipate 3 weeks minimum therapy. Will plan to switch to PO valcyte after his fevers resolved    # Possible bacterial PNA  - Patient reported significant improvement in his cough since presentation (with zosyn), which argues against CMV disease also being responsible for lung pathology/symptoms, although CMV pneumonitis is still a possibility.   - S/p zosyn 4.5g IV q8h EI x 7 days total (EOT 10/27)    ID Team 1 will follow.

## 2024-10-29 NOTE — PROGRESS NOTE ADULT - PROBLEM SELECTOR PLAN 1
s/p EGD with biopsy 10/23. Risk factor is chemo, HIV negative. CMV PCR 10/24 126k. CMV PCR 5.10 (elevated).  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 Small focus of epithelium with atypia c/w prior radiation antrum no significant pathology. Negative for H pylori.  No pulmonary consult necessary at this time as patient is being treated with Zosyn for PNA and CMV esophagitis and pneumonitis simultaneously is rare.  Febrile to 102.3 (oral) on 10/26 AM. Overnight, fever of 100.8F.  All Bcx NGTD thus far (last one 10/24, pending 10/26)    As per ID,   - F/u Bcx 10/26 (specimen received)  - Started on ganciclovir 5mg/kg IV q12h (10/24 - )  ----continue IV ganciclovir for 1-2 days, discharge with valganciclovir  - Continue close monitoring of cell lines and renal function (with daily CBC w/ diff and BMP)  - Monitor for ocular symptoms  - Obtain CMV PCR TODAY  - Anticipate 3 weeks minimum therapy. Will plan to switch to PO valcyte after his symptoms have improved significantly. Please keep on IV therapy over the weekend, will evaluate for PO switch next week.  - s/p empiric zosyn 4.5g IV q8h to complete a 7 day course 10/21-10/27 s/p EGD with biopsy 10/23. Risk factor is chemo, HIV negative. CMV PCR 10/24 126k. CMV PCR 5.10 (elevated).  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 Small focus of epithelium with atypia c/w prior radiation antrum no significant pathology. Negative for H pylori.  No pulmonary consult necessary at this time as patient is being treated with Zosyn for PNA and CMV esophagitis and pneumonitis simultaneously is rare.  Febrile to 102.3 (oral) on 10/26 AM. Overnight, fever of 100.8F.  All Bcx NGTD thus far (10/26, 10/24, 10/21)  CMV PCR specimen received 10/28    As per ID,   - Started on ganciclovir 5mg/kg IV q12h (10/24 - )  - Continue close monitoring of cell lines and renal function (with daily CBC w/ diff and BMP)  - Monitor for ocular symptoms  - f/u CMV PCR  - Anticipate 3 weeks minimum therapy. Will plan to switch to PO valcyte after his symptoms have improved significantly.

## 2024-10-30 LAB
ALBUMIN SERPL ELPH-MCNC: 2 G/DL — LOW (ref 3.3–5)
ALP SERPL-CCNC: 152 U/L — HIGH (ref 40–120)
ALT FLD-CCNC: 19 U/L — SIGNIFICANT CHANGE UP (ref 10–45)
ANION GAP SERPL CALC-SCNC: 9 MMOL/L — SIGNIFICANT CHANGE UP (ref 5–17)
AST SERPL-CCNC: 26 U/L — SIGNIFICANT CHANGE UP (ref 10–40)
BASOPHILS # BLD AUTO: 0.02 K/UL — SIGNIFICANT CHANGE UP (ref 0–0.2)
BASOPHILS NFR BLD AUTO: 0.6 % — SIGNIFICANT CHANGE UP (ref 0–2)
BILIRUB SERPL-MCNC: 0.7 MG/DL — SIGNIFICANT CHANGE UP (ref 0.2–1.2)
BUN SERPL-MCNC: 12 MG/DL — SIGNIFICANT CHANGE UP (ref 7–23)
CALCIUM SERPL-MCNC: 7.9 MG/DL — LOW (ref 8.4–10.5)
CHLORIDE SERPL-SCNC: 96 MMOL/L — SIGNIFICANT CHANGE UP (ref 96–108)
CO2 SERPL-SCNC: 24 MMOL/L — SIGNIFICANT CHANGE UP (ref 22–31)
CREAT SERPL-MCNC: 0.66 MG/DL — SIGNIFICANT CHANGE UP (ref 0.5–1.3)
CULTURE RESULTS: SIGNIFICANT CHANGE UP
EGFR: 101 ML/MIN/1.73M2 — SIGNIFICANT CHANGE UP
EOSINOPHIL # BLD AUTO: 0.13 K/UL — SIGNIFICANT CHANGE UP (ref 0–0.5)
EOSINOPHIL NFR BLD AUTO: 4.1 % — SIGNIFICANT CHANGE UP (ref 0–6)
FOLATE SERPL-MCNC: 6.9 NG/ML — SIGNIFICANT CHANGE UP
GLUCOSE BLDC GLUCOMTR-MCNC: 132 MG/DL — HIGH (ref 70–99)
GLUCOSE BLDC GLUCOMTR-MCNC: 132 MG/DL — HIGH (ref 70–99)
GLUCOSE BLDC GLUCOMTR-MCNC: 140 MG/DL — HIGH (ref 70–99)
GLUCOSE BLDC GLUCOMTR-MCNC: 166 MG/DL — HIGH (ref 70–99)
GLUCOSE SERPL-MCNC: 122 MG/DL — HIGH (ref 70–99)
HCT VFR BLD CALC: 22.8 % — LOW (ref 39–50)
HGB BLD-MCNC: 7.7 G/DL — LOW (ref 13–17)
IMM GRANULOCYTES NFR BLD AUTO: 0.6 % — SIGNIFICANT CHANGE UP (ref 0–0.9)
LYMPHOCYTES # BLD AUTO: 0.5 K/UL — LOW (ref 1–3.3)
LYMPHOCYTES # BLD AUTO: 15.9 % — SIGNIFICANT CHANGE UP (ref 13–44)
MAGNESIUM SERPL-MCNC: 1.6 MG/DL — SIGNIFICANT CHANGE UP (ref 1.6–2.6)
MCHC RBC-ENTMCNC: 30.6 PG — SIGNIFICANT CHANGE UP (ref 27–34)
MCHC RBC-ENTMCNC: 33.8 G/DL — SIGNIFICANT CHANGE UP (ref 32–36)
MCV RBC AUTO: 90.5 FL — SIGNIFICANT CHANGE UP (ref 80–100)
MONOCYTES # BLD AUTO: 0.28 K/UL — SIGNIFICANT CHANGE UP (ref 0–0.9)
MONOCYTES NFR BLD AUTO: 8.9 % — SIGNIFICANT CHANGE UP (ref 2–14)
NEUTROPHILS # BLD AUTO: 2.2 K/UL — SIGNIFICANT CHANGE UP (ref 1.8–7.4)
NEUTROPHILS NFR BLD AUTO: 69.9 % — SIGNIFICANT CHANGE UP (ref 43–77)
NRBC # BLD: 0 /100 WBCS — SIGNIFICANT CHANGE UP (ref 0–0)
PHOSPHATE SERPL-MCNC: 2.6 MG/DL — SIGNIFICANT CHANGE UP (ref 2.5–4.5)
PLATELET # BLD AUTO: 170 K/UL — SIGNIFICANT CHANGE UP (ref 150–400)
POTASSIUM SERPL-MCNC: 3.4 MMOL/L — LOW (ref 3.5–5.3)
POTASSIUM SERPL-SCNC: 3.4 MMOL/L — LOW (ref 3.5–5.3)
PROT SERPL-MCNC: 5.7 G/DL — LOW (ref 6–8.3)
RBC # BLD: 2.52 M/UL — LOW (ref 4.2–5.8)
RBC # FLD: 16.6 % — HIGH (ref 10.3–14.5)
SODIUM SERPL-SCNC: 129 MMOL/L — LOW (ref 135–145)
SPECIMEN SOURCE: SIGNIFICANT CHANGE UP
VIT B12 SERPL-MCNC: 874 PG/ML — SIGNIFICANT CHANGE UP (ref 232–1245)
WBC # BLD: 3.15 K/UL — LOW (ref 3.8–10.5)
WBC # FLD AUTO: 3.15 K/UL — LOW (ref 3.8–10.5)

## 2024-10-30 PROCEDURE — 99232 SBSQ HOSP IP/OBS MODERATE 35: CPT

## 2024-10-30 PROCEDURE — 99233 SBSQ HOSP IP/OBS HIGH 50: CPT | Mod: GC

## 2024-10-30 PROCEDURE — 99233 SBSQ HOSP IP/OBS HIGH 50: CPT

## 2024-10-30 RX ORDER — ACETAMINOPHEN 500 MG
650 TABLET ORAL ONCE
Refills: 0 | Status: COMPLETED | OUTPATIENT
Start: 2024-10-30 | End: 2024-10-30

## 2024-10-30 RX ORDER — DIBASIC SODIUM PHOSPHATE, MONOBASIC POTASSIUM PHOSPHATE AND MONOBASIC SODIUM PHOSPHATE 852; 155; 130 MG/1; MG/1; MG/1
1 TABLET ORAL ONCE
Refills: 0 | Status: COMPLETED | OUTPATIENT
Start: 2024-10-30 | End: 2024-10-30

## 2024-10-30 RX ORDER — MAGNESIUM SULFATE IN 0.9% NACL 2 G/50 ML
2 INTRAVENOUS SOLUTION, PIGGYBACK (ML) INTRAVENOUS ONCE
Refills: 0 | Status: COMPLETED | OUTPATIENT
Start: 2024-10-30 | End: 2024-10-30

## 2024-10-30 RX ORDER — ACETAMINOPHEN 500 MG
1000 TABLET ORAL ONCE
Refills: 0 | Status: COMPLETED | OUTPATIENT
Start: 2024-10-30 | End: 2024-10-30

## 2024-10-30 RX ADMIN — SUCRALFATE 1 GRAM(S): 1 SUSPENSION ORAL at 00:39

## 2024-10-30 RX ADMIN — SUCRALFATE 1 GRAM(S): 1 SUSPENSION ORAL at 06:04

## 2024-10-30 RX ADMIN — Medication 400 MILLIGRAM(S): at 21:14

## 2024-10-30 RX ADMIN — PANTOPRAZOLE SODIUM 40 MILLIGRAM(S): 40 TABLET, DELAYED RELEASE ORAL at 06:08

## 2024-10-30 RX ADMIN — Medication 20 MILLIGRAM(S): at 21:14

## 2024-10-30 RX ADMIN — Medication 2: at 12:56

## 2024-10-30 RX ADMIN — DIBASIC SODIUM PHOSPHATE, MONOBASIC POTASSIUM PHOSPHATE AND MONOBASIC SODIUM PHOSPHATE 1 PACKET(S): 852; 155; 130 TABLET ORAL at 09:59

## 2024-10-30 RX ADMIN — SUCRALFATE 1 GRAM(S): 1 SUSPENSION ORAL at 23:01

## 2024-10-30 RX ADMIN — Medication 25 GRAM(S): at 09:55

## 2024-10-30 RX ADMIN — SUCRALFATE 1 GRAM(S): 1 SUSPENSION ORAL at 18:10

## 2024-10-30 RX ADMIN — GABAPENTIN 300 MILLIGRAM(S): 300 CAPSULE ORAL at 14:00

## 2024-10-30 RX ADMIN — SUCRALFATE 1 GRAM(S): 1 SUSPENSION ORAL at 11:57

## 2024-10-30 RX ADMIN — Medication 650 MILLIGRAM(S): at 06:03

## 2024-10-30 RX ADMIN — Medication 40 MILLIGRAM(S): at 18:10

## 2024-10-30 RX ADMIN — GABAPENTIN 300 MILLIGRAM(S): 300 CAPSULE ORAL at 06:04

## 2024-10-30 RX ADMIN — DIBASIC SODIUM PHOSPHATE, MONOBASIC POTASSIUM PHOSPHATE AND MONOBASIC SODIUM PHOSPHATE 1 PACKET(S): 852; 155; 130 TABLET ORAL at 11:19

## 2024-10-30 RX ADMIN — GANCICLOVIR SODIUM 100 MILLIGRAM(S): 50 INJECTION, POWDER, LYOPHILIZED, FOR SOLUTION INTRAVENOUS at 18:34

## 2024-10-30 RX ADMIN — GANCICLOVIR SODIUM 100 MILLIGRAM(S): 50 INJECTION, POWDER, LYOPHILIZED, FOR SOLUTION INTRAVENOUS at 06:03

## 2024-10-30 NOTE — PROGRESS NOTE ADULT - SUBJECTIVE AND OBJECTIVE BOX
INFECTIOUS DISEASES CONSULT FOLLOW-UP NOTE    INTERVAL HPI/OVERNIGHT EVENTS:  Remains febrile, odynophagia much improved - was able to tolerate solid food yesterday. Cough ~resolved.    ROS:   Constitutional, eyes, ENT, cardiovascular, respiratory, gastrointestinal, genitourinary, integumentary, neurological, psychiatric and heme/lymph are otherwise negative other than noted above       ANTIBIOTICS/RELEVANT:    MEDICATIONS  (STANDING):  amLODIPine   Tablet 5 milliGRAM(s) Oral daily  atorvastatin 20 milliGRAM(s) Oral at bedtime  dextrose 5%. 1000 milliLiter(s) (50 mL/Hr) IV Continuous <Continuous>  dextrose 5%. 1000 milliLiter(s) (100 mL/Hr) IV Continuous <Continuous>  dextrose 50% Injectable 25 Gram(s) IV Push once  dextrose 50% Injectable 25 Gram(s) IV Push once  dextrose 50% Injectable 12.5 Gram(s) IV Push once  enoxaparin Injectable 40 milliGRAM(s) SubCutaneous every 24 hours  gabapentin 300 milliGRAM(s) Oral <User Schedule>  ganciclovir IVPB 300 milliGRAM(s) IV Intermittent every 12 hours  ganciclovir IVPB      glucagon  Injectable 1 milliGRAM(s) IntraMuscular once  insulin lispro (ADMELOG) corrective regimen sliding scale   SubCutaneous Before meals and at bedtime  pantoprazole    Tablet 40 milliGRAM(s) Oral before breakfast  sucralfate 1 Gram(s) Oral every 6 hours    MEDICATIONS  (PRN):  aluminum hydroxide/magnesium hydroxide/simethicone Suspension 30 milliLiter(s) Oral every 4 hours PRN Dyspepsia  benzocaine/menthol Lozenge 1 Lozenge Oral every 2 hours PRN Sore Throat  dextrose Oral Gel 15 Gram(s) Oral once PRN Blood Glucose LESS THAN 70 milliGRAM(s)/deciliter  lidocaine 2% Viscous 15 milliLiter(s) Mucosal every 8 hours PRN Sore throat  melatonin 3 milliGRAM(s) Oral at bedtime PRN Insomnia  ondansetron Injectable 4 milliGRAM(s) IV Push every 8 hours PRN Nausea and/or Vomiting  polyethylene glycol 3350 17 Gram(s) Oral every 24 hours PRN for constipation  senna 2 Tablet(s) Oral at bedtime PRN for constipation        Vital Signs Last 24 Hrs  T(C): 38.6 (30 Oct 2024 05:56), Max: 38.6 (30 Oct 2024 05:56)  T(F): 101.5 (30 Oct 2024 05:56), Max: 101.5 (30 Oct 2024 05:56)  HR: 114 (30 Oct 2024 06:13) (109 - 114)  BP: 104/55 (30 Oct 2024 06:13) (95/56 - 105/63)  BP(mean): 69 (30 Oct 2024 05:56) (69 - 69)  RR: 17 (30 Oct 2024 05:56) (17 - 18)  SpO2: 93% (30 Oct 2024 05:56) (93% - 95%)    Parameters below as of 30 Oct 2024 05:56  Patient On (Oxygen Delivery Method): nasal cannula  O2 Flow (L/min): 2      PHYSICAL EXAM:  Constitutional: alert, NAD  Eyes: the sclera and conjunctiva were normal.   ENT: the ears and nose were normal in appearance. Normal oropharynx. Upper dentures.  Neck: the appearance of the neck was normal and the neck was supple.   Pulmonary: no respiratory distress on RA  Heart: heart rate was normal and rhythm regular  Abdomen: soft, non-tender  Neurological: no focal deficits.   Skin: no rash        LABS:                        7.7    3.15  )-----------( 170      ( 30 Oct 2024 07:50 )             22.8     10-30    129[L]  |  96  |  12  ----------------------------<  122[H]  3.4[L]   |  24  |  0.66    Ca    7.9[L]      30 Oct 2024 07:50  Phos  2.6     10-30  Mg     1.6     10-30    TPro  5.7[L]  /  Alb  2.0[L]  /  TBili  0.7  /  DBili  x   /  AST  26  /  ALT  19  /  AlkPhos  152[H]  10-30      Urinalysis Basic - ( 30 Oct 2024 07:50 )    Color: x / Appearance: x / SG: x / pH: x  Gluc: 122 mg/dL / Ketone: x  / Bili: x / Urobili: x   Blood: x / Protein: x / Nitrite: x   Leuk Esterase: x / RBC: x / WBC x   Sq Epi: x / Non Sq Epi: x / Bacteria: x        MICROBIOLOGY:  Reviewed    RADIOLOGY & ADDITIONAL STUDIES:  Reviewed

## 2024-10-30 NOTE — PROGRESS NOTE ADULT - PROBLEM SELECTOR PLAN 12
Home meds: Amlodipine 5  - C/w home meds    PPX:  F: s/p 1U pRBC  E: replete PRN  N: CC w/ Glucerna ([place])  GI: PPI + sucralfate  DVT: lovenox 40 qd    Dispo: home w/ home PT, not medically ready yet

## 2024-10-30 NOTE — PROGRESS NOTE ADULT - NS ATTEST RISK PROBLEM GEN_ALL_CORE FT
Acute medical conidtion with CMV bacteremia with direct injury to bodily function  Extensive record review

## 2024-10-30 NOTE — PROGRESS NOTE ADULT - ATTENDING COMMENTS
Date of service 10/30/24  Still spiking fevers. Continue IV Ganciclovir. Encourage PO/Fluid intake - hyponatremia noted -- pt denies nausea, vomiting, abd pain, odynophagia or dysphagia. Continue mobilization OOB to chair and ambulate as tolerated.  Monitor CBC, BMP  Above d/w Housestaff, Onc - Dr. Larose, ID - Dr. Robin

## 2024-10-30 NOTE — PROGRESS NOTE ADULT - PROBLEM SELECTOR PLAN 1
s/p EGD with biopsy 10/23. Risk factor is chemo, HIV negative. CMV PCR 10/24 126k. CMV PCR 5.10 (elevated).  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 Small focus of epithelium with atypia c/w prior radiation antrum no significant pathology. Negative for H pylori.  No pulmonary consult necessary at this time as patient is being treated with Zosyn for PNA and CMV esophagitis and pneumonitis simultaneously is rare.  Febrile to 102.3 (oral) on 10/26 AM. Overnight, fever of 100.8F.  All Bcx NGTD thus far (10/26, 10/24, 10/21)  CMV PCR specimen received 10/28    As per ID,   - Started on ganciclovir 5mg/kg IV q12h (10/24 - )  - Continue close monitoring of cell lines and renal function (with daily CBC w/ diff and BMP)  - Monitor for ocular symptoms  - f/u CMV PCR  - Anticipate 3 weeks minimum therapy. Will plan to switch to PO valcyte after his symptoms have improved significantly.

## 2024-10-30 NOTE — PROGRESS NOTE ADULT - ASSESSMENT
69M with hx of DM (A1c 7.2%), HTN, HLD, squamous cell lung cancer currently on carboplatin/taxol (last received 10/10) and RT (last received 10/15), who on 10/14 developed odynophagia, cough, and hiccups, subsequently developed nausea/vomiting and then on 10/20 developed fever for which he presented to [facility] ED, admitted on 10/21 for further management. Was febrile, with mild hypoxia (2L NC), leukopenic with severe lymphopenia. CT C/A/P w/ IVC with decreased size of known COLETTE mass (malignancy), and new bilateral upper lobe GGOs with interlobular septal thickening, as well as mid-distal esophagitis. S/p EGD on 10/23 with finding of erosive changes of middle third of esophagus and esophageal biopsy +CMV inclusions.    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  # Possible CMV pneumonitis  # Persistent fever  - Continue ganciclovir 5mg/kg IV q12h (SOT 10/24). Continue close monitoring of cell lines and renal function (with daily CBC w/ diff and BMP).  - Repeat CMV PCR on 10/28 (after 5 days of gancyte) showed almost one log reduction - now 4.2   - Anticipate 3 weeks minimum therapy. Will plan to switch to PO valcyte once fevers resolved    # Possible bacterial PNA  - Patient reported significant improvement in his cough since presentation (with zosyn), which argues against CMV disease also being responsible for lung pathology/symptoms, although CMV pneumonitis is still a possibility.   - S/p zosyn 4.5g IV q8h EI x 7 days total (EOT 10/27)    ID Team 1 will follow.

## 2024-10-30 NOTE — PROGRESS NOTE ADULT - SUBJECTIVE AND OBJECTIVE BOX
INTERVAL HPI/OVERNIGHT EVENTS:    Pt was seen and examined at the bedside. He was observed to be lying down comfortably.   His fevers have improved and he is now able to eat and drink. Odynophagia has improved significantly.      VITAL SIGNS:  T(F): 98.6 (10-31-24 @ 05:56)  HR: 102 (10-31-24 @ 05:56)  BP: 102/50 (10-31-24 @ 05:56)  RR: 18 (10-31-24 @ 05:56)  SpO2: 99% (10-31-24 @ 05:56)  Wt(kg): --  I&O's Summary    30 Oct 2024 07:01  -  31 Oct 2024 06:22  --------------------------------------------------------  IN: 0 mL / OUT: 600 mL / NET: -600 mL      PHYSICAL EXAM:  Constitutional: Thin  HEENT: PERRLA, EOMI, Normal Hearing, MMM  Neck: No LAD, No JVD  Back: Normal spine flexure, No CVA tenderness  Respiratory: CTAB  Cardiovascular: S1 and S2, RRR, no M/G/R  Gastrointestinal: BS+, soft, NT/ND  Extremities: No peripheral edema  Vascular: 2+ peripheral pulses  Neurological: A/O x 3, no focal deficits  Psychiatric: Normal mood, normal affect  Musculoskeletal: 5/5 strength b/l upper and lower extremities  Skin: No rashes        MEDICATIONS  (STANDING):  amLODIPine   Tablet 5 milliGRAM(s) Oral daily  atorvastatin 20 milliGRAM(s) Oral at bedtime  dextrose 5%. 1000 milliLiter(s) (100 mL/Hr) IV Continuous <Continuous>  dextrose 5%. 1000 milliLiter(s) (50 mL/Hr) IV Continuous <Continuous>  dextrose 50% Injectable 25 Gram(s) IV Push once  dextrose 50% Injectable 12.5 Gram(s) IV Push once  dextrose 50% Injectable 25 Gram(s) IV Push once  enoxaparin Injectable 40 milliGRAM(s) SubCutaneous every 24 hours  gabapentin 300 milliGRAM(s) Oral <User Schedule>  ganciclovir IVPB 300 milliGRAM(s) IV Intermittent every 12 hours  ganciclovir IVPB      glucagon  Injectable 1 milliGRAM(s) IntraMuscular once  insulin lispro (ADMELOG) corrective regimen sliding scale   SubCutaneous Before meals and at bedtime  pantoprazole    Tablet 40 milliGRAM(s) Oral before breakfast  sucralfate 1 Gram(s) Oral every 6 hours    MEDICATIONS  (PRN):  aluminum hydroxide/magnesium hydroxide/simethicone Suspension 30 milliLiter(s) Oral every 4 hours PRN Dyspepsia  benzocaine/menthol Lozenge 1 Lozenge Oral every 2 hours PRN Sore Throat  dextrose Oral Gel 15 Gram(s) Oral once PRN Blood Glucose LESS THAN 70 milliGRAM(s)/deciliter  lidocaine 2% Viscous 15 milliLiter(s) Mucosal every 8 hours PRN Sore throat  melatonin 3 milliGRAM(s) Oral at bedtime PRN Insomnia  ondansetron Injectable 4 milliGRAM(s) IV Push every 8 hours PRN Nausea and/or Vomiting  polyethylene glycol 3350 17 Gram(s) Oral every 24 hours PRN for constipation  senna 2 Tablet(s) Oral at bedtime PRN for constipation      Allergies    chlorhexidine containing compounds (Rash)    Intolerances        LABS:  CBC Full  -  ( 30 Oct 2024 07:50 )  WBC Count : 3.15 K/uL  RBC Count : 2.52 M/uL  Hemoglobin : 7.7 g/dL  Hematocrit : 22.8 %  Platelet Count - Automated : 170 K/uL  Mean Cell Volume : 90.5 fl  Mean Cell Hemoglobin : 30.6 pg  Mean Cell Hemoglobin Concentration : 33.8 g/dL  Auto Neutrophil # : 2.20 K/uL  Auto Lymphocyte # : 0.50 K/uL  Auto Monocyte # : 0.28 K/uL  Auto Eosinophil # : 0.13 K/uL  Auto Basophil # : 0.02 K/uL  Auto Neutrophil % : 69.9 %  Auto Lymphocyte % : 15.9 %  Auto Monocyte % : 8.9 %  Auto Eosinophil % : 4.1 %  Auto Basophil % : 0.6 %    10-30    129[L]  |  96  |  12  ----------------------------<  122[H]  3.4[L]   |  24  |  0.66    Ca    7.9[L]      30 Oct 2024 07:50  Phos  2.6     10-30  Mg     1.6     10-30    TPro  5.7[L]  /  Alb  2.0[L]  /  TBili  0.7  /  DBili  x   /  AST  26  /  ALT  19  /  AlkPhos  152[H]  10-30      Urinalysis Basic - ( 30 Oct 2024 07:50 )    Color: x / Appearance: x / SG: x / pH: x  Gluc: 122 mg/dL / Ketone: x  / Bili: x / Urobili: x   Blood: x / Protein: x / Nitrite: x   Leuk Esterase: x / RBC: x / WBC x   Sq Epi: x / Non Sq Epi: x / Bacteria: x        Iron Total: 19 ug/dL (10-22 @ 19:28)  Iron - Total Binding Capacity.: 107 ug/dL (10-22 @ 19:28)  Ferritin: 8890 ng/mL (10-22 @ 19:28)  Ferritin: 2123 ng/mL (09-12 @ 13:20)  Iron Total: 36 ug/dL (09-12 @ 13:20)  Iron - Total Binding Capacity.: 178 ug/dL (09-12 @ 13:20)        RADIOLOGY & ADDITIONAL TESTS: Reviewed.

## 2024-10-30 NOTE — PROGRESS NOTE ADULT - SUBJECTIVE AND OBJECTIVE BOX
HOSPITAL COURSE:  69M w/ PMHx of non-operable lung cancer, DM, HTN, HLD recently admitted for symptomatic anemia and chemotherapy-induced nausea and vomiting (10/14-10/15) presenting with hiccups, nausea, vomiting, and fever found to have RML pnuemonia, admitted on 10/21. GI consulted, s/p EGD 10/23, found to have medium size hiatal hernia, diffuse erythema of mucosa of whole stomach. Started on pantoprazole IV BID and sucralfate. EGD biopsy showed CMV esophagitis, started on ganciclovir 5 mg/kg IV q12h (10/24). Patient was on empiric zosyn 4.5 mg IV q8h from 10/21-10/27. Patient has been having fevers daily, given ofirmev each time and all bcx are NGTD thus far (10/21, 10/23, 10/24, 10/26). Tmax of 103F (10/27).  Hemoglobin dropped to 6.7 on 10/28, s/p 1U pRBC, hemoglobin increased to 8.7 on 10/29. Patient has ongoing issue of hiccups and inability to tolerate PO as he will vomit/regurgitate his food 2/2 hiccups. Patient trialed on baclofen, currently on gabapentin 300 BID with imporvement of tolerating PO. ID, heme/onc following.     INTERVAL EVENTS:   No acute events overnight.    SUBJECTIVE:   Patient seen at bedside lying flat. Patient reports that his vomit was white in color, color of his Glucerna shakes, no food particles. Patient did not eat yesterday because he was scared of vomiting, states "I am okay if I don't eat." Has not had any bowel movements. Patient states that he was told side effects of his radiation were chest pain and esophageal discomfort, which he is still experiencing. Denies abdominal pain, nausea, fevers, chills, sob at this time. Patient states he is breathing okay.     ROS:  Negative unless otherwise stated above.    PHYSICAL EXAM:  General: Alert and oriented x 3. No acute distress.   HEENT: Moist mucous membranes.   Cardiovascular: Regular rate and rhythm.   Lungs: Clear to auscultation bilaterally.  Abdomen: Soft, non-tender and non-distended.  Extremities: No edema. Non-tender. Warm.    VITAL SIGNS:  Vital Signs Last 24 Hrs  T(C): 37.6 (29 Oct 2024 05:32), Max: 38.6 (28 Oct 2024 19:00)  T(F): 99.7 (29 Oct 2024 05:32), Max: 101.4 (28 Oct 2024 19:00)  HR: 112 (29 Oct 2024 05:32) (99 - 112)  BP: 111/60 (29 Oct 2024 05:32) (101/63 - 112/68)  BP(mean): --  RR: 18 (29 Oct 2024 05:32) (18 - 18)  SpO2: 94% (29 Oct 2024 05:32) (94% - 97%)    Parameters below as of 29 Oct 2024 05:32  Patient On (Oxygen Delivery Method): nasal cannula      INPATIENT MEDICATIONS:   MEDICATIONS  (STANDING):  amLODIPine   Tablet 5 milliGRAM(s) Oral daily  atorvastatin 20 milliGRAM(s) Oral at bedtime  dextrose 5%. 1000 milliLiter(s) (100 mL/Hr) IV Continuous <Continuous>  dextrose 5%. 1000 milliLiter(s) (50 mL/Hr) IV Continuous <Continuous>  dextrose 50% Injectable 25 Gram(s) IV Push once  dextrose 50% Injectable 12.5 Gram(s) IV Push once  dextrose 50% Injectable 25 Gram(s) IV Push once  enoxaparin Injectable 40 milliGRAM(s) SubCutaneous every 24 hours  gabapentin 300 milliGRAM(s) Oral every 24 hours  ganciclovir IVPB 300 milliGRAM(s) IV Intermittent every 12 hours  ganciclovir IVPB      glucagon  Injectable 1 milliGRAM(s) IntraMuscular once  insulin lispro (ADMELOG) corrective regimen sliding scale   SubCutaneous Before meals and at bedtime  magnesium sulfate  IVPB 4 Gram(s) IV Intermittent once  pantoprazole    Tablet 40 milliGRAM(s) Oral before breakfast  sucralfate 1 Gram(s) Oral every 6 hours    MEDICATIONS  (PRN):  aluminum hydroxide/magnesium hydroxide/simethicone Suspension 30 milliLiter(s) Oral every 4 hours PRN Dyspepsia  benzocaine/menthol Lozenge 1 Lozenge Oral every 2 hours PRN Sore Throat  dextrose Oral Gel 15 Gram(s) Oral once PRN Blood Glucose LESS THAN 70 milliGRAM(s)/deciliter  lidocaine 2% Viscous 15 milliLiter(s) Mucosal every 8 hours PRN Sore throat  melatonin 3 milliGRAM(s) Oral at bedtime PRN Insomnia  ondansetron Injectable 4 milliGRAM(s) IV Push every 8 hours PRN Nausea and/or Vomiting  polyethylene glycol 3350 17 Gram(s) Oral every 24 hours PRN for constipation  senna 2 Tablet(s) Oral at bedtime PRN for constipation    ALLERGIES:  Allergies    chlorhexidine containing compounds (Rash)    Intolerances    LABS:                       8.7    3.69  )-----------( 197      ( 29 Oct 2024 05:30 )             26.1     10-29    129[L]  |  97  |  12  ----------------------------<  98  3.6   |  23  |  0.64    Ca    8.1[L]      29 Oct 2024 05:30  Phos  2.1     10-29  Mg     1.4     10-29    TPro  6.0  /  Alb  2.1[L]  /  TBili  0.8  /  DBili  x   /  AST  20  /  ALT  19  /  AlkPhos  165[H]  10-29      Urinalysis Basic - ( 29 Oct 2024 05:30 )    Color: x / Appearance: x / SG: x / pH: x  Gluc: 98 mg/dL / Ketone: x  / Bili: x / Urobili: x   Blood: x / Protein: x / Nitrite: x   Leuk Esterase: x / RBC: x / WBC x   Sq Epi: x / Non Sq Epi: x / Bacteria: x      CAPILLARY BLOOD GLUCOSE      POCT Blood Glucose.: 156 mg/dL (28 Oct 2024 21:52)    RADIOLOGY & ADDITIONAL TESTS: Reviewed. HOSPITAL COURSE:  69M w/ PMHx of non-operable lung cancer, DM, HTN, HLD recently admitted for symptomatic anemia and chemotherapy-induced nausea and vomiting (10/14-10/15) presenting with hiccups, nausea, vomiting, and fever found to have RML pnuemonia, admitted on 10/21. GI consulted, s/p EGD 10/23, found to have medium size hiatal hernia, diffuse erythema of mucosa of whole stomach. Started on pantoprazole IV BID and sucralfate. EGD biopsy showed CMV esophagitis, started on ganciclovir 5 mg/kg IV q12h (10/24). Patient was on empiric zosyn 4.5 mg IV q8h from 10/21-10/27. Patient has been having fevers daily, given ofirmev each time and all bcx are NGTD thus far (10/21, 10/23, 10/24, 10/26). Tmax of 103F (10/27).  Hemoglobin dropped to 6.7 on 10/28, s/p 1U pRBC, hemoglobin increased to 8.7 on 10/29. Patient has ongoing issue of hiccups and inability to tolerate PO as he will vomit/regurgitate his food 2/2 hiccups. Patient trialed on baclofen, currently on gabapentin 300 BID with imporvement of tolerating PO. ID, heme/onc following.     INTERVAL EVENTS:   No acute events overnight.    SUBJECTIVE:   Patient seen at bedside lying flat. Patient has no acute complaints and states that he is feeling much improved from yesterday. He endorses occasional hiccuping but less frequently than yesterday. Pt has not had any episodes of vomiting overnight. Denies abdominal pain, nausea, fevers, chills, sob at this time. Patient states he is breathing okay.     ROS:  Negative unless otherwise stated above.    PHYSICAL EXAM:  General: Alert and oriented x 3. No acute distress.   HEENT: Moist mucous membranes.   Cardiovascular: Regular rate and rhythm.   Lungs: Clear to auscultation bilaterally.  Abdomen: Soft, non-tender and non-distended.  Extremities: No edema. Non-tender. Warm.    VITAL SIGNS:  Vital Signs Last 24 Hrs  T(C): 37.6 (29 Oct 2024 05:32), Max: 38.6 (28 Oct 2024 19:00)  T(F): 99.7 (29 Oct 2024 05:32), Max: 101.4 (28 Oct 2024 19:00)  HR: 112 (29 Oct 2024 05:32) (99 - 112)  BP: 111/60 (29 Oct 2024 05:32) (101/63 - 112/68)  BP(mean): --  RR: 18 (29 Oct 2024 05:32) (18 - 18)  SpO2: 94% (29 Oct 2024 05:32) (94% - 97%)    Parameters below as of 29 Oct 2024 05:32  Patient On (Oxygen Delivery Method): nasal cannula      INPATIENT MEDICATIONS:   MEDICATIONS  (STANDING):  amLODIPine   Tablet 5 milliGRAM(s) Oral daily  atorvastatin 20 milliGRAM(s) Oral at bedtime  dextrose 5%. 1000 milliLiter(s) (100 mL/Hr) IV Continuous <Continuous>  dextrose 5%. 1000 milliLiter(s) (50 mL/Hr) IV Continuous <Continuous>  dextrose 50% Injectable 25 Gram(s) IV Push once  dextrose 50% Injectable 12.5 Gram(s) IV Push once  dextrose 50% Injectable 25 Gram(s) IV Push once  enoxaparin Injectable 40 milliGRAM(s) SubCutaneous every 24 hours  gabapentin 300 milliGRAM(s) Oral every 24 hours  ganciclovir IVPB 300 milliGRAM(s) IV Intermittent every 12 hours  ganciclovir IVPB      glucagon  Injectable 1 milliGRAM(s) IntraMuscular once  insulin lispro (ADMELOG) corrective regimen sliding scale   SubCutaneous Before meals and at bedtime  magnesium sulfate  IVPB 4 Gram(s) IV Intermittent once  pantoprazole    Tablet 40 milliGRAM(s) Oral before breakfast  sucralfate 1 Gram(s) Oral every 6 hours    MEDICATIONS  (PRN):  aluminum hydroxide/magnesium hydroxide/simethicone Suspension 30 milliLiter(s) Oral every 4 hours PRN Dyspepsia  benzocaine/menthol Lozenge 1 Lozenge Oral every 2 hours PRN Sore Throat  dextrose Oral Gel 15 Gram(s) Oral once PRN Blood Glucose LESS THAN 70 milliGRAM(s)/deciliter  lidocaine 2% Viscous 15 milliLiter(s) Mucosal every 8 hours PRN Sore throat  melatonin 3 milliGRAM(s) Oral at bedtime PRN Insomnia  ondansetron Injectable 4 milliGRAM(s) IV Push every 8 hours PRN Nausea and/or Vomiting  polyethylene glycol 3350 17 Gram(s) Oral every 24 hours PRN for constipation  senna 2 Tablet(s) Oral at bedtime PRN for constipation    ALLERGIES:  Allergies    chlorhexidine containing compounds (Rash)    Intolerances    LABS:                       8.7    3.69  )-----------( 197      ( 29 Oct 2024 05:30 )             26.1     10-29    129[L]  |  97  |  12  ----------------------------<  98  3.6   |  23  |  0.64    Ca    8.1[L]      29 Oct 2024 05:30  Phos  2.1     10-29  Mg     1.4     10-29    TPro  6.0  /  Alb  2.1[L]  /  TBili  0.8  /  DBili  x   /  AST  20  /  ALT  19  /  AlkPhos  165[H]  10-29      Urinalysis Basic - ( 29 Oct 2024 05:30 )    Color: x / Appearance: x / SG: x / pH: x  Gluc: 98 mg/dL / Ketone: x  / Bili: x / Urobili: x   Blood: x / Protein: x / Nitrite: x   Leuk Esterase: x / RBC: x / WBC x   Sq Epi: x / Non Sq Epi: x / Bacteria: x      CAPILLARY BLOOD GLUCOSE      POCT Blood Glucose.: 156 mg/dL (28 Oct 2024 21:52)    RADIOLOGY & ADDITIONAL TESTS: Reviewed.

## 2024-10-30 NOTE — PROGRESS NOTE ADULT - ASSESSMENT
69M with hx of DM (A1c 7.2%), HTN, HLD, squamous cell lung cancer currently on carboplatin/taxol (last received 10/10) and RT (last received 10/15), who on 10/14 developed odynophagia, cough, and hiccups, subsequently developed nausea/vomiting and then on 10/20 developed fever for which he presented to [facility] ED, admitted on 10/21 for further management. Was febrile, with mild hypoxia (2L NC), leukopenic with severe lymphopenia. CT C/A/P w/ IVC with decreased size of known COLETTE mass (malignancy), and new bilateral upper lobe GGOs with interlobular septal thickening, as well as mid-distal esophagitis. S/p EGD on 10/23 with finding of erosive changes of middle third of esophagus and esophageal biopsy +CMV inclusions. Started on ganciclovir 10/24.

## 2024-10-30 NOTE — PROGRESS NOTE ADULT - PROBLEM SELECTOR PLAN 11
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 S/p 7 cycles of neoadjuvant platinum based with gemcitabine and nivolumab chemoimmunotherapy. Follows with Dr. Mueller for RT, s/p last round of RT on 10/15.   Follows with Dr. Larose outpatient.

## 2024-10-30 NOTE — PROGRESS NOTE ADULT - PROBLEM SELECTOR PLAN 6
Hgb 8.5, previously 8.0 on 10/15. RDW elevated. Leukopenia. Plt 152. Likely 2/2 chemotx. Pt denies bleeding, bruising. Denies hematuria. Has not had BMs for 5-6 days, unable to assess for melena.  Hgb 7.7 (10/25)  Hgb 6.7 (10/28)  s/p 1U pRBC (10/28), post-transfusion CBC showing Hgb of 8.7 on 10/29    - Monitor H&H  - Active T&S  - Transfuse Hgb <7  - CTM since ganciclovir can cause cytopenias

## 2024-10-30 NOTE — PROGRESS NOTE ADULT - PROBLEM SELECTOR PLAN 9
Na 127 on admission, previously 134. Pt denies HA but admits to N/V, which is chronic for him. Has had poor PO intake iso N/V. Drinks gatorade and ensures. No visual disturbances, palpitations.   Corrected Na 128. Calculated serum osm 268. S/p 2L NS in ED. Na improved to 131 s/p NS 2 L. Na has dropped to 128 today. No CNS changes. Likely d/t poor PO intake.  Na 129 (10/29)  Urine Studies 10/25: uOsm 451, Jeremi 55, TSH 0.685 wnl. Likely SIADH.     - CTM

## 2024-10-30 NOTE — PROGRESS NOTE ADULT - ASSESSMENT
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    CMV viremia and esophagitis  --followed by  from ID, I personally discussed the plan with , since his fevers are downtrending and his viral load is downtrending, there is no indication to escelate CMV therapy at this time. He will need a prolong course of antivirals. plan to switch him to PO gancyclovir once ok'ed by ID.      NSCLC - s/p chemoRT - completed definitive curative intent therapy.  --will reschedule his CTs to be done outpatient after current hospitalization  --no plan for cancer therapy at this time as he has completed definitive therapy, he will have consolidation immunotherapy alone if surveillance scans show stability.

## 2024-10-30 NOTE — PROGRESS NOTE ADULT - PROBLEM SELECTOR PLAN 2
Pt is immunocompromised iso lung cancer and chemo/RT px with 2 days of cough and 1 day of fever. Pt was dc'd on 10/15 and started having intractable hiccups c/b nausea and vomiting. ?Aspiration pna. Cough is dry. Pt denies SOB, CP, abd pain, diarrhea, dysuria, rashes or joint pain. CXR pending read but appears similar to prior w/o infiltrates/consolidations. WBC 2.94 (from 1.95) with increased lymphocyte %. Covid/Flu/RSV negative.  MRSA negative. Legionella/strep negative.   Patient is still spiking fevers, tmax 103F on 10/28. Last fever 101F on 10/28.  s/p zosyn 4.5g q8h x 7 days (10/21-10/27)    - ofirmev as needed for fevers, max 4g in 24 hours (can give toradol if maxxed out)  - monitor off zosyn

## 2024-10-30 NOTE — CHART NOTE - NSCHARTNOTEFT_GEN_A_CORE
Admitting Diagnosis:   Patient is a 69y old  Male who presents with a chief complaint of Fever, nausea, vomiting w poor oral intake (30 Oct 2024 11:53)    PAST MEDICAL & SURGICAL HISTORY:  History of hypertension  History of high cholesterol  History of gastroesophageal reflux (GERD)  History of diabetes mellitus  History of persistent cough  Mass of lingula of lung  HENRY (acute kidney injury)  Squamous cell lung cancer  H/O: obesity  S/P appendectomy    Current Nutrition Order:  Consistent Carbohydrate w/Evening Snack  Halal No Pork  Glucerna Shake x2/day     PO Intake: Good (%) [   ]  Fair (50-75%) [   ] Poor (<25-50%) [ x ]    GI Issues:   Pt reports nausea/vomiting improved - antiemetic regimen ordered  Endorses constipation, last recorded BM 10/25 - bowel regimen ordered  Pt denies abdominal distension/discomfort    Pain:  No pain reported     Skin Integrity:  No pressure injuries or edema documented, Adriano score 18    Labs:   10-30    129[L]  |  96  |  12  ----------------------------<  122[H]  3.4[L]   |  24  |  0.66    Ca    7.9[L]      30 Oct 2024 07:50  Phos  2.6     10-30  Mg     1.6     10-30    TPro  5.7[L]  /  Alb  2.0[L]  /  TBili  0.7  /  DBili  x   /  AST  26  /  ALT  19  /  AlkPhos  152[H]  10-30    CAPILLARY BLOOD GLUCOSE  POCT Blood Glucose.: 166 mg/dL (30 Oct 2024 12:35)  POCT Blood Glucose.: 132 mg/dL (30 Oct 2024 08:26)  POCT Blood Glucose.: 178 mg/dL (29 Oct 2024 21:53)  POCT Blood Glucose.: 151 mg/dL (29 Oct 2024 17:56)    Medications:  MEDICATIONS  (STANDING):  amLODIPine   Tablet 5 milliGRAM(s) Oral daily  atorvastatin 20 milliGRAM(s) Oral at bedtime  dextrose 5%. 1000 milliLiter(s) (100 mL/Hr) IV Continuous <Continuous>  dextrose 5%. 1000 milliLiter(s) (50 mL/Hr) IV Continuous <Continuous>  dextrose 50% Injectable 25 Gram(s) IV Push once  dextrose 50% Injectable 12.5 Gram(s) IV Push once  dextrose 50% Injectable 25 Gram(s) IV Push once  enoxaparin Injectable 40 milliGRAM(s) SubCutaneous every 24 hours  gabapentin 300 milliGRAM(s) Oral <User Schedule>  ganciclovir IVPB 300 milliGRAM(s) IV Intermittent every 12 hours  ganciclovir IVPB      glucagon  Injectable 1 milliGRAM(s) IntraMuscular once  insulin lispro (ADMELOG) corrective regimen sliding scale   SubCutaneous Before meals and at bedtime  pantoprazole    Tablet 40 milliGRAM(s) Oral before breakfast  sucralfate 1 Gram(s) Oral every 6 hours    MEDICATIONS  (PRN):  aluminum hydroxide/magnesium hydroxide/simethicone Suspension 30 milliLiter(s) Oral every 4 hours PRN Dyspepsia  benzocaine/menthol Lozenge 1 Lozenge Oral every 2 hours PRN Sore Throat  dextrose Oral Gel 15 Gram(s) Oral once PRN Blood Glucose LESS THAN 70 milliGRAM(s)/deciliter  lidocaine 2% Viscous 15 milliLiter(s) Mucosal every 8 hours PRN Sore throat  melatonin 3 milliGRAM(s) Oral at bedtime PRN Insomnia  ondansetron Injectable 4 milliGRAM(s) IV Push every 8 hours PRN Nausea and/or Vomiting  polyethylene glycol 3350 17 Gram(s) Oral every 24 hours PRN for constipation  senna 2 Tablet(s) Oral at bedtime PRN for constipation    Anthropometrics:  Height: 5'6"  Weight: 133lb/60.3kg  BMI: 21.4  IBW: 142lb/64.5kg             94%IBW    Weight Change:   No new weights obtained since admission. Recommend nursing to trend weekly weights. RD to continue monitoring weights as able.     Severe Acute Protein Calorie Malnutrition: Risk Assessment Completed 10/25  - PO intake: intake <50% needs >1 week  - Wt Loss: 6% wt loss in 2 weeks  - NFPE: moderate-severe muscle and fat wasting    Estimated energy needs:   Calories: 25-30kcal/k-1809kcal/d  Protein: 1.3-1.5g/k-90g/d  Defer fluids to team.   Estimated needs based on dosing wt as within % IBW 142lb/64.5kg (94%). Needs adjusted for age, cancer on treatment, malnutrition, and clinical status.     Subjective:   69M with PMH of nonoperable lung cancer (on chemoimmunotherapy status post x6 cycles and radiation therapy last round 10/15), DM, HTN, and HLD who presented with hiccups, nausea/vomiting, and fever, admitted for management. 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 Started on ganciclovir 10/24.     Pt seen o    Previous Nutrition Diagnosis:  Severe acute malnutrition related to inadequate PO intake in setting of increased metabolic demand secondary to cancer on treatment as evidenced by moderate-severe muscle and fat wasting, 6% wt loss in 2 weeks, intake <50% needs >1 week.    Active [ x ]  Resolved [   ]    Goal:  Pt to consistently meet >75% nutrient needs. Reduce signs and symptoms of protein-calorie malnutrition.   Maintain nutrition within goals of care.     Recommendations:  1. Continue Consistent Carbohydrate Halal diet with Glucerna x2/day.   >>Encourage & monitor PO intake. [place] dietary preferences as able.   2. Monitor GI tolerance, weight trends, labs, & skin integrity.  3. Defer bowel, antiemetic, and pain regimens to team.   4. RD to remain available for diet education/intervention prn.     Education:   Reviewed strategies to promote PO intake and nutritional status. Encouraged increased use of oral nutrition supplement between meals as tolerated. Pt aware RD remains available for additional questions/concerns.     Risk Level: High [ x ] Moderate [   ] Low [   ] Admitting Diagnosis:   Patient is a 69y old  Male who presents with a chief complaint of Fever, nausea, vomiting w poor oral intake (30 Oct 2024 11:53)    PAST MEDICAL & SURGICAL HISTORY:  History of hypertension  History of high cholesterol  History of gastroesophageal reflux (GERD)  History of diabetes mellitus  History of persistent cough  Mass of lingula of lung  HENRY (acute kidney injury)  Squamous cell lung cancer  H/O: obesity  S/P appendectomy    Current Nutrition Order:  Consistent Carbohydrate w/Evening Snack  Halal No Pork  Glucerna Shake x2/day     PO Intake: Good (%) [   ]  Fair (50-75%) [   ] Poor (<25%) [   ] - poor-fair (25-50%)     GI Issues:   Pt reports nausea/vomiting improved - antiemetic regimen ordered  Endorses constipation, last recorded BM 10/25 - bowel regimen ordered  Pt denies abdominal distension/discomfort    Pain:  No pain reported     Skin Integrity:  No pressure injuries or edema documented, Adriano score 18    Labs:   10-    129[L]  |  96  |  12  ----------------------------<  122[H]  3.4[L]   |  24  |  0.66    Ca    7.9[L]      30 Oct 2024 07:50  Phos  2.6     10-30  Mg     1.6     10-30    TPro  5.7[L]  /  Alb  2.0[L]  /  TBili  0.7  /  DBili  x   /  AST  26  /  ALT  19  /  AlkPhos  152[H]  10-30    CAPILLARY BLOOD GLUCOSE  POCT Blood Glucose.: 166 mg/dL (30 Oct 2024 12:35)  POCT Blood Glucose.: 132 mg/dL (30 Oct 2024 08:26)  POCT Blood Glucose.: 178 mg/dL (29 Oct 2024 21:53)  POCT Blood Glucose.: 151 mg/dL (29 Oct 2024 17:56)    Medications:  MEDICATIONS  (STANDING):  amLODIPine   Tablet 5 milliGRAM(s) Oral daily  atorvastatin 20 milliGRAM(s) Oral at bedtime  dextrose 5%. 1000 milliLiter(s) (100 mL/Hr) IV Continuous <Continuous>  dextrose 5%. 1000 milliLiter(s) (50 mL/Hr) IV Continuous <Continuous>  dextrose 50% Injectable 25 Gram(s) IV Push once  dextrose 50% Injectable 12.5 Gram(s) IV Push once  dextrose 50% Injectable 25 Gram(s) IV Push once  enoxaparin Injectable 40 milliGRAM(s) SubCutaneous every 24 hours  gabapentin 300 milliGRAM(s) Oral <User Schedule>  ganciclovir IVPB 300 milliGRAM(s) IV Intermittent every 12 hours  ganciclovir IVPB      glucagon  Injectable 1 milliGRAM(s) IntraMuscular once  insulin lispro (ADMELOG) corrective regimen sliding scale   SubCutaneous Before meals and at bedtime  pantoprazole    Tablet 40 milliGRAM(s) Oral before breakfast  sucralfate 1 Gram(s) Oral every 6 hours    MEDICATIONS  (PRN):  aluminum hydroxide/magnesium hydroxide/simethicone Suspension 30 milliLiter(s) Oral every 4 hours PRN Dyspepsia  benzocaine/menthol Lozenge 1 Lozenge Oral every 2 hours PRN Sore Throat  dextrose Oral Gel 15 Gram(s) Oral once PRN Blood Glucose LESS THAN 70 milliGRAM(s)/deciliter  lidocaine 2% Viscous 15 milliLiter(s) Mucosal every 8 hours PRN Sore throat  melatonin 3 milliGRAM(s) Oral at bedtime PRN Insomnia  ondansetron Injectable 4 milliGRAM(s) IV Push every 8 hours PRN Nausea and/or Vomiting  polyethylene glycol 3350 17 Gram(s) Oral every 24 hours PRN for constipation  senna 2 Tablet(s) Oral at bedtime PRN for constipation    Anthropometrics:  Height: 5'6"  Weight: 133lb/60.3kg  BMI: 21.4  IBW: 142lb/64.5kg             94%IBW    Weight Change:   No new weights obtained since admission. Recommend nursing to trend weekly weights. RD to continue monitoring weights as able.     Severe Acute Protein Calorie Malnutrition: Risk Assessment Completed 10/25  - PO intake: intake <50% needs >1 week  - Wt Loss: 6% wt loss in 2 weeks  - NFPE: moderate-severe muscle and fat wasting    Estimated energy needs:   Calories: 25-30kcal/k-1809kcal/d  Protein: 1.3-1.5g/k-90g/d  Defer fluids to team.   Estimated needs based on dosing wt as within % IBW 142lb/64.5kg (94%). Needs adjusted for age, cancer on treatment, malnutrition, and clinical status.     Subjective:   69M with PMH of nonoperable lung cancer (on chemoimmunotherapy status post x6 cycles and radiation therapy last round 10/15), DM, HTN, and HLD who presented with hiccups, nausea/vomiting, and fever, admitted for management. 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 Started on ganciclovir 10/24.     Pt seen on 7WO for follow-up. Labs and medication orders reviewed. Na 129 <low>, K 3.4 <low>, Mg/Phos WNL, POC blood glucose (10/29-10/30) 121-178. On Consistent Carbohydrate Halal/No Pork diet with Glucerna x2/day. Pt reports appetite and intake increased with improved nausea control over the past few days. Endorses now consuming ~25% meal trays and x1 occasional Glucerna shake. Denies difficulty chewing/swallowing and odynophagia. Denies GI discomfort however reports little to no BMs since admission - bowel regimen last advanced 10/21. See nutrition recommendations. RD to remain available.     Previous Nutrition Diagnosis:  Severe acute malnutrition related to inadequate PO intake in setting of increased metabolic demand secondary to cancer on treatment as evidenced by moderate-severe muscle and fat wasting, 6% wt loss in 2 weeks, intake <50% needs >1 week.    Active [ x ]  Resolved [   ]    Goal:  Pt to consistently meet >75% nutrient needs. Reduce signs and symptoms of protein-calorie malnutrition.   Maintain nutrition within goals of care.     Recommendations:  1. Continue Consistent Carbohydrate Halal diet with Glucerna x2/day.   >>Encourage & monitor PO intake. [place] dietary preferences as able.   2. Monitor GI tolerance, weight trends, labs, & skin integrity.  3. Defer bowel, antiemetic, and pain regimens to team.   >>Consider advance bowel regimen.   4. RD to remain available for diet education/intervention prn.     Education:   Reviewed strategies to promote PO intake and nutritional status. Encouraged increased use of oral nutrition supplement between meals as tolerated. Discussed adequate hydration to promote bowel regularity. Pt aware RD remains available for additional questions/concerns.     Risk Level: High [ x ] Moderate [   ] Low [   ]

## 2024-10-30 NOTE — PROGRESS NOTE ADULT - PROBLEM SELECTOR PLAN 3
POA. Meeting 2/4 SIRS criteria (T 102.9, ) without clear source. Pt has been coughing over the last 2 days with fever at home T 101. Pt has been hiccuping c/b vomiting. S/p Vanc 1g, Zosyn 3.375g in ED. BCx collected x2 in ED. Patient HDS, appearing well, AOx3, warm to touch. Found to have pneumonia and CMV.  s/p 80 cc/hr LR x 12 hrs  - C/w abx as above  - Reculture for Tmax >103F    #Requirement for O2  Not on home O2. Currently on 2L. Tried weaning today, but patient satting at 90% on RA.  - wean O2, currently on 2L  - start incentive spirometer

## 2024-10-30 NOTE — PROGRESS NOTE ADULT - PROBLEM SELECTOR PLAN 4
Pt has had hiccups since his dc last week. Rad-onc started pt on Zofran which did not help. S/p Reglan in ED w/o improvement. Hiccups have caused the pt N/V.  S/P gabapentin 300 mg x1 10/21 AM, switched to baclofen on 10/25  Patient unable to tolerate PO, was able to do so when on gabapentin. Switched baclofen back to eduar on 10/27.     - uptitrate gabapentin 300 qd to bid  - ctm if patient is able to hold down water/foods

## 2024-10-31 ENCOUNTER — APPOINTMENT (OUTPATIENT)
Dept: CT IMAGING | Facility: HOSPITAL | Age: 69
End: 2024-10-31

## 2024-10-31 LAB
ALBUMIN SERPL ELPH-MCNC: 2 G/DL — LOW (ref 3.3–5)
ALP SERPL-CCNC: 160 U/L — HIGH (ref 40–120)
ALT FLD-CCNC: 23 U/L — SIGNIFICANT CHANGE UP (ref 10–45)
ANION GAP SERPL CALC-SCNC: 9 MMOL/L — SIGNIFICANT CHANGE UP (ref 5–17)
AST SERPL-CCNC: 29 U/L — SIGNIFICANT CHANGE UP (ref 10–40)
BASOPHILS # BLD AUTO: 0.03 K/UL — SIGNIFICANT CHANGE UP (ref 0–0.2)
BASOPHILS NFR BLD AUTO: 0.9 % — SIGNIFICANT CHANGE UP (ref 0–2)
BILIRUB SERPL-MCNC: 0.6 MG/DL — SIGNIFICANT CHANGE UP (ref 0.2–1.2)
BUN SERPL-MCNC: 9 MG/DL — SIGNIFICANT CHANGE UP (ref 7–23)
CALCIUM SERPL-MCNC: 8.4 MG/DL — SIGNIFICANT CHANGE UP (ref 8.4–10.5)
CHLORIDE SERPL-SCNC: 97 MMOL/L — SIGNIFICANT CHANGE UP (ref 96–108)
CO2 SERPL-SCNC: 25 MMOL/L — SIGNIFICANT CHANGE UP (ref 22–31)
CREAT SERPL-MCNC: 0.64 MG/DL — SIGNIFICANT CHANGE UP (ref 0.5–1.3)
EGFR: 102 ML/MIN/1.73M2 — SIGNIFICANT CHANGE UP
EOSINOPHIL # BLD AUTO: 0.2 K/UL — SIGNIFICANT CHANGE UP (ref 0–0.5)
EOSINOPHIL NFR BLD AUTO: 5.8 % — SIGNIFICANT CHANGE UP (ref 0–6)
GLUCOSE BLDC GLUCOMTR-MCNC: 105 MG/DL — HIGH (ref 70–99)
GLUCOSE BLDC GLUCOMTR-MCNC: 116 MG/DL — HIGH (ref 70–99)
GLUCOSE BLDC GLUCOMTR-MCNC: 118 MG/DL — HIGH (ref 70–99)
GLUCOSE BLDC GLUCOMTR-MCNC: 130 MG/DL — HIGH (ref 70–99)
GLUCOSE SERPL-MCNC: 114 MG/DL — HIGH (ref 70–99)
HCT VFR BLD CALC: 26.8 % — LOW (ref 39–50)
HGB BLD-MCNC: 8.8 G/DL — LOW (ref 13–17)
IMM GRANULOCYTES NFR BLD AUTO: 0.9 % — SIGNIFICANT CHANGE UP (ref 0–0.9)
LYMPHOCYTES # BLD AUTO: 0.52 K/UL — LOW (ref 1–3.3)
LYMPHOCYTES # BLD AUTO: 15.1 % — SIGNIFICANT CHANGE UP (ref 13–44)
MAGNESIUM SERPL-MCNC: 1.7 MG/DL — SIGNIFICANT CHANGE UP (ref 1.6–2.6)
MCHC RBC-ENTMCNC: 28.9 PG — SIGNIFICANT CHANGE UP (ref 27–34)
MCHC RBC-ENTMCNC: 32.8 G/DL — SIGNIFICANT CHANGE UP (ref 32–36)
MCV RBC AUTO: 88.2 FL — SIGNIFICANT CHANGE UP (ref 80–100)
MONOCYTES # BLD AUTO: 0.26 K/UL — SIGNIFICANT CHANGE UP (ref 0–0.9)
MONOCYTES NFR BLD AUTO: 7.6 % — SIGNIFICANT CHANGE UP (ref 2–14)
NEUTROPHILS # BLD AUTO: 2.4 K/UL — SIGNIFICANT CHANGE UP (ref 1.8–7.4)
NEUTROPHILS NFR BLD AUTO: 69.7 % — SIGNIFICANT CHANGE UP (ref 43–77)
NRBC # BLD: 0 /100 WBCS — SIGNIFICANT CHANGE UP (ref 0–0)
PHOSPHATE SERPL-MCNC: 3 MG/DL — SIGNIFICANT CHANGE UP (ref 2.5–4.5)
PLATELET # BLD AUTO: 191 K/UL — SIGNIFICANT CHANGE UP (ref 150–400)
POTASSIUM SERPL-MCNC: 3.5 MMOL/L — SIGNIFICANT CHANGE UP (ref 3.5–5.3)
POTASSIUM SERPL-SCNC: 3.5 MMOL/L — SIGNIFICANT CHANGE UP (ref 3.5–5.3)
PROT SERPL-MCNC: 6.5 G/DL — SIGNIFICANT CHANGE UP (ref 6–8.3)
RBC # BLD: 3.04 M/UL — LOW (ref 4.2–5.8)
RBC # BLD: 3.04 M/UL — LOW (ref 4.2–5.8)
RBC # FLD: 16.2 % — HIGH (ref 10.3–14.5)
RETICS #: 67.8 K/UL — SIGNIFICANT CHANGE UP (ref 25–125)
RETICS/RBC NFR: 2.2 % — SIGNIFICANT CHANGE UP (ref 0.5–2.5)
SODIUM SERPL-SCNC: 131 MMOL/L — LOW (ref 135–145)
WBC # BLD: 3.44 K/UL — LOW (ref 3.8–10.5)
WBC # FLD AUTO: 3.44 K/UL — LOW (ref 3.8–10.5)

## 2024-10-31 PROCEDURE — 99233 SBSQ HOSP IP/OBS HIGH 50: CPT | Mod: GC

## 2024-10-31 PROCEDURE — 99232 SBSQ HOSP IP/OBS MODERATE 35: CPT

## 2024-10-31 RX ORDER — PANTOPRAZOLE SODIUM 40 MG/1
1 TABLET, DELAYED RELEASE ORAL
Qty: 0 | Refills: 0 | DISCHARGE
Start: 2024-10-31

## 2024-10-31 RX ORDER — GABAPENTIN 300 MG/1
300 CAPSULE ORAL
Qty: 0 | Refills: 0 | DISCHARGE
Start: 2024-10-31

## 2024-10-31 RX ORDER — POTASSIUM CHLORIDE 10 MEQ
40 TABLET, EXTENDED RELEASE ORAL ONCE
Refills: 0 | Status: COMPLETED | OUTPATIENT
Start: 2024-10-31 | End: 2024-10-31

## 2024-10-31 RX ORDER — MAGNESIUM SULFATE IN 0.9% NACL 2 G/50 ML
2 INTRAVENOUS SOLUTION, PIGGYBACK (ML) INTRAVENOUS ONCE
Refills: 0 | Status: COMPLETED | OUTPATIENT
Start: 2024-10-31 | End: 2024-10-31

## 2024-10-31 RX ORDER — SUCRALFATE 1 G/10ML
1 SUSPENSION ORAL
Qty: 0 | Refills: 0 | DISCHARGE
Start: 2024-10-31

## 2024-10-31 RX ORDER — ACETAMINOPHEN 500 MG
1000 TABLET ORAL ONCE
Refills: 0 | Status: COMPLETED | OUTPATIENT
Start: 2024-10-31 | End: 2024-10-31

## 2024-10-31 RX ADMIN — SUCRALFATE 1 GRAM(S): 1 SUSPENSION ORAL at 05:44

## 2024-10-31 RX ADMIN — SUCRALFATE 1 GRAM(S): 1 SUSPENSION ORAL at 11:38

## 2024-10-31 RX ADMIN — Medication 20 MILLIGRAM(S): at 22:01

## 2024-10-31 RX ADMIN — Medication 400 MILLIGRAM(S): at 11:37

## 2024-10-31 RX ADMIN — Medication 40 MILLIEQUIVALENT(S): at 09:09

## 2024-10-31 RX ADMIN — Medication 25 GRAM(S): at 09:11

## 2024-10-31 RX ADMIN — GABAPENTIN 300 MILLIGRAM(S): 300 CAPSULE ORAL at 13:11

## 2024-10-31 RX ADMIN — GABAPENTIN 300 MILLIGRAM(S): 300 CAPSULE ORAL at 05:44

## 2024-10-31 RX ADMIN — SUCRALFATE 1 GRAM(S): 1 SUSPENSION ORAL at 18:02

## 2024-10-31 RX ADMIN — GANCICLOVIR SODIUM 100 MILLIGRAM(S): 50 INJECTION, POWDER, LYOPHILIZED, FOR SOLUTION INTRAVENOUS at 18:08

## 2024-10-31 RX ADMIN — PANTOPRAZOLE SODIUM 40 MILLIGRAM(S): 40 TABLET, DELAYED RELEASE ORAL at 05:44

## 2024-10-31 RX ADMIN — SUCRALFATE 1 GRAM(S): 1 SUSPENSION ORAL at 23:43

## 2024-10-31 RX ADMIN — Medication 40 MILLIGRAM(S): at 18:01

## 2024-10-31 RX ADMIN — GANCICLOVIR SODIUM 100 MILLIGRAM(S): 50 INJECTION, POWDER, LYOPHILIZED, FOR SOLUTION INTRAVENOUS at 05:41

## 2024-10-31 NOTE — PROGRESS NOTE ADULT - SUBJECTIVE AND OBJECTIVE BOX
HOSPITAL COURSE:  69M w/ PMHx of non-operable lung cancer, DM, HTN, HLD recently admitted for symptomatic anemia and chemotherapy-induced nausea and vomiting (10/14-10/15) presenting with hiccups, nausea, vomiting, and fever found to have RML pnuemonia, admitted on 10/21. GI consulted, s/p EGD 10/23, found to have medium size hiatal hernia, diffuse erythema of mucosa of whole stomach. Started on pantoprazole IV BID and sucralfate. EGD biopsy showed CMV esophagitis, started on ganciclovir 5 mg/kg IV q12h (10/24). Patient was on empiric zosyn 4.5 mg IV q8h from 10/21-10/27. Patient has been having fevers daily, given ofirmev each time and all bcx are NGTD thus far (10/21, 10/23, 10/24, 10/26). Tmax of 103F (10/27).  Hemoglobin dropped to 6.7 on 10/28, s/p 1U pRBC, hemoglobin increased to 8.7 on 10/29. Patient has ongoing issue of hiccups and inability to tolerate PO as he will vomit/regurgitate his food 2/2 hiccups. Patient trialed on baclofen, currently on gabapentin 300 BID with imporvement of tolerating PO. ID, heme/onc following.     INTERVAL EVENTS:   No acute events overnight.    SUBJECTIVE:   Patient seen at bedside lying flat. Patient has no acute complaints and states that he is feeling much improved from yesterday. He endorses occasional hiccuping but less frequently than yesterday. Pt has not had any episodes of vomiting overnight. Denies abdominal pain, nausea, fevers, chills, sob at this time. Patient states he is breathing okay.     ROS:  Negative unless otherwise stated above.    PHYSICAL EXAM:  General: Alert and oriented x 3. No acute distress.   HEENT: Moist mucous membranes.   Cardiovascular: Regular rate and rhythm.   Lungs: Clear to auscultation bilaterally.  Abdomen: Soft, non-tender and non-distended.  Extremities: No edema. Non-tender. Warm.    VITAL SIGNS:  Vital Signs Last 24 Hrs  T(C): 37.6 (29 Oct 2024 05:32), Max: 38.6 (28 Oct 2024 19:00)  T(F): 99.7 (29 Oct 2024 05:32), Max: 101.4 (28 Oct 2024 19:00)  HR: 112 (29 Oct 2024 05:32) (99 - 112)  BP: 111/60 (29 Oct 2024 05:32) (101/63 - 112/68)  BP(mean): --  RR: 18 (29 Oct 2024 05:32) (18 - 18)  SpO2: 94% (29 Oct 2024 05:32) (94% - 97%)    Parameters below as of 29 Oct 2024 05:32  Patient On (Oxygen Delivery Method): nasal cannula      INPATIENT MEDICATIONS:   MEDICATIONS  (STANDING):  amLODIPine   Tablet 5 milliGRAM(s) Oral daily  atorvastatin 20 milliGRAM(s) Oral at bedtime  dextrose 5%. 1000 milliLiter(s) (100 mL/Hr) IV Continuous <Continuous>  dextrose 5%. 1000 milliLiter(s) (50 mL/Hr) IV Continuous <Continuous>  dextrose 50% Injectable 25 Gram(s) IV Push once  dextrose 50% Injectable 12.5 Gram(s) IV Push once  dextrose 50% Injectable 25 Gram(s) IV Push once  enoxaparin Injectable 40 milliGRAM(s) SubCutaneous every 24 hours  gabapentin 300 milliGRAM(s) Oral every 24 hours  ganciclovir IVPB 300 milliGRAM(s) IV Intermittent every 12 hours  ganciclovir IVPB      glucagon  Injectable 1 milliGRAM(s) IntraMuscular once  insulin lispro (ADMELOG) corrective regimen sliding scale   SubCutaneous Before meals and at bedtime  magnesium sulfate  IVPB 4 Gram(s) IV Intermittent once  pantoprazole    Tablet 40 milliGRAM(s) Oral before breakfast  sucralfate 1 Gram(s) Oral every 6 hours    MEDICATIONS  (PRN):  aluminum hydroxide/magnesium hydroxide/simethicone Suspension 30 milliLiter(s) Oral every 4 hours PRN Dyspepsia  benzocaine/menthol Lozenge 1 Lozenge Oral every 2 hours PRN Sore Throat  dextrose Oral Gel 15 Gram(s) Oral once PRN Blood Glucose LESS THAN 70 milliGRAM(s)/deciliter  lidocaine 2% Viscous 15 milliLiter(s) Mucosal every 8 hours PRN Sore throat  melatonin 3 milliGRAM(s) Oral at bedtime PRN Insomnia  ondansetron Injectable 4 milliGRAM(s) IV Push every 8 hours PRN Nausea and/or Vomiting  polyethylene glycol 3350 17 Gram(s) Oral every 24 hours PRN for constipation  senna 2 Tablet(s) Oral at bedtime PRN for constipation    ALLERGIES:  Allergies    chlorhexidine containing compounds (Rash)    Intolerances    LABS:                       8.7    3.69  )-----------( 197      ( 29 Oct 2024 05:30 )             26.1     10-29    129[L]  |  97  |  12  ----------------------------<  98  3.6   |  23  |  0.64    Ca    8.1[L]      29 Oct 2024 05:30  Phos  2.1     10-29  Mg     1.4     10-29    TPro  6.0  /  Alb  2.1[L]  /  TBili  0.8  /  DBili  x   /  AST  20  /  ALT  19  /  AlkPhos  165[H]  10-29      Urinalysis Basic - ( 29 Oct 2024 05:30 )    Color: x / Appearance: x / SG: x / pH: x  Gluc: 98 mg/dL / Ketone: x  / Bili: x / Urobili: x   Blood: x / Protein: x / Nitrite: x   Leuk Esterase: x / RBC: x / WBC x   Sq Epi: x / Non Sq Epi: x / Bacteria: x      CAPILLARY BLOOD GLUCOSE      POCT Blood Glucose.: 156 mg/dL (28 Oct 2024 21:52)    RADIOLOGY & ADDITIONAL TESTS: Reviewed. HOSPITAL COURSE:  69M w/ PMHx of non-operable lung cancer, DM, HTN, HLD recently admitted for symptomatic anemia and chemotherapy-induced nausea and vomiting (10/14-10/15) presenting with hiccups, nausea, vomiting, and fever found to have RML pnuemonia, admitted on 10/21. GI consulted, s/p EGD 10/23, found to have medium size hiatal hernia, diffuse erythema of mucosa of whole stomach. Started on pantoprazole IV BID and sucralfate. EGD biopsy showed CMV esophagitis, started on ganciclovir 5 mg/kg IV q12h (10/24). Patient was on empiric zosyn 4.5 mg IV q8h from 10/21-10/27. Patient has been having fevers daily, given ofirmev each time and all bcx are NGTD thus far (10/21, 10/23, 10/24, 10/26). Tmax of 103F (10/27).  Hemoglobin dropped to 6.7 on 10/28, s/p 1U pRBC, hemoglobin increased to 8.7 on 10/29. Patient has ongoing issue of hiccups and inability to tolerate PO as he will vomit/regurgitate his food 2/2 hiccups. Patient trialed on baclofen, currently on gabapentin 300 BID with imporvement of tolerating PO. ID, heme/onc following.     INTERVAL EVENTS:   Temp to 101.1 overnight, gave ofirmev    SUBJECTIVE:   Patient seen at bedside lying flat. Patient has no acute complaints and states that he is feeling much improved from yesterday. He endorses occasional hiccuping but less frequently than yesterday. Pt has not had any episodes of vomiting overnight. Denies abdominal pain, nausea, fevers, chills, sob at this time. Patient states he is breathing okay.     ROS:  Negative unless otherwise stated above.    PHYSICAL EXAM:  General: Alert and oriented x 3. No acute distress.   HEENT: Moist mucous membranes.   Cardiovascular: Regular rate and rhythm.   Lungs: Clear to auscultation bilaterally.  Abdomen: Soft, non-tender and non-distended.  Extremities: No edema. Non-tender. Warm.    VITAL SIGNS:  Vital Signs Last 24 Hrs  T(C): 37.6 (29 Oct 2024 05:32), Max: 38.6 (28 Oct 2024 19:00)  T(F): 99.7 (29 Oct 2024 05:32), Max: 101.4 (28 Oct 2024 19:00)  HR: 112 (29 Oct 2024 05:32) (99 - 112)  BP: 111/60 (29 Oct 2024 05:32) (101/63 - 112/68)  BP(mean): --  RR: 18 (29 Oct 2024 05:32) (18 - 18)  SpO2: 94% (29 Oct 2024 05:32) (94% - 97%)    Parameters below as of 29 Oct 2024 05:32  Patient On (Oxygen Delivery Method): nasal cannula      INPATIENT MEDICATIONS:   MEDICATIONS  (STANDING):  amLODIPine   Tablet 5 milliGRAM(s) Oral daily  atorvastatin 20 milliGRAM(s) Oral at bedtime  dextrose 5%. 1000 milliLiter(s) (100 mL/Hr) IV Continuous <Continuous>  dextrose 5%. 1000 milliLiter(s) (50 mL/Hr) IV Continuous <Continuous>  dextrose 50% Injectable 25 Gram(s) IV Push once  dextrose 50% Injectable 12.5 Gram(s) IV Push once  dextrose 50% Injectable 25 Gram(s) IV Push once  enoxaparin Injectable 40 milliGRAM(s) SubCutaneous every 24 hours  gabapentin 300 milliGRAM(s) Oral every 24 hours  ganciclovir IVPB 300 milliGRAM(s) IV Intermittent every 12 hours  ganciclovir IVPB      glucagon  Injectable 1 milliGRAM(s) IntraMuscular once  insulin lispro (ADMELOG) corrective regimen sliding scale   SubCutaneous Before meals and at bedtime  magnesium sulfate  IVPB 4 Gram(s) IV Intermittent once  pantoprazole    Tablet 40 milliGRAM(s) Oral before breakfast  sucralfate 1 Gram(s) Oral every 6 hours    MEDICATIONS  (PRN):  aluminum hydroxide/magnesium hydroxide/simethicone Suspension 30 milliLiter(s) Oral every 4 hours PRN Dyspepsia  benzocaine/menthol Lozenge 1 Lozenge Oral every 2 hours PRN Sore Throat  dextrose Oral Gel 15 Gram(s) Oral once PRN Blood Glucose LESS THAN 70 milliGRAM(s)/deciliter  lidocaine 2% Viscous 15 milliLiter(s) Mucosal every 8 hours PRN Sore throat  melatonin 3 milliGRAM(s) Oral at bedtime PRN Insomnia  ondansetron Injectable 4 milliGRAM(s) IV Push every 8 hours PRN Nausea and/or Vomiting  polyethylene glycol 3350 17 Gram(s) Oral every 24 hours PRN for constipation  senna 2 Tablet(s) Oral at bedtime PRN for constipation    ALLERGIES:  Allergies    chlorhexidine containing compounds (Rash)    Intolerances    LABS:                       8.7    3.69  )-----------( 197      ( 29 Oct 2024 05:30 )             26.1     10-29    129[L]  |  97  |  12  ----------------------------<  98  3.6   |  23  |  0.64    Ca    8.1[L]      29 Oct 2024 05:30  Phos  2.1     10-29  Mg     1.4     10-29    TPro  6.0  /  Alb  2.1[L]  /  TBili  0.8  /  DBili  x   /  AST  20  /  ALT  19  /  AlkPhos  165[H]  10-29      Urinalysis Basic - ( 29 Oct 2024 05:30 )    Color: x / Appearance: x / SG: x / pH: x  Gluc: 98 mg/dL / Ketone: x  / Bili: x / Urobili: x   Blood: x / Protein: x / Nitrite: x   Leuk Esterase: x / RBC: x / WBC x   Sq Epi: x / Non Sq Epi: x / Bacteria: x      CAPILLARY BLOOD GLUCOSE      POCT Blood Glucose.: 156 mg/dL (28 Oct 2024 21:52)    RADIOLOGY & ADDITIONAL TESTS: Reviewed. HOSPITAL COURSE:  69M w/ PMHx of non-operable lung cancer, DM, HTN, HLD recently admitted for symptomatic anemia and chemotherapy-induced nausea and vomiting (10/14-10/15) presenting with hiccups, nausea, vomiting, and fever found to have RML pnuemonia, admitted on 10/21. GI consulted, s/p EGD 10/23, found to have medium size hiatal hernia, diffuse erythema of mucosa of whole stomach. Started on pantoprazole IV BID and sucralfate. EGD biopsy showed CMV esophagitis, started on ganciclovir 5 mg/kg IV q12h (10/24). Patient was on empiric zosyn 4.5 mg IV q8h from 10/21-10/27. Patient has been having fevers daily, given ofirmev each time and all bcx are NGTD thus far (10/21, 10/23, 10/24, 10/26). Tmax of 103F (10/27).  Hemoglobin dropped to 6.7 on 10/28, s/p 1U pRBC, hemoglobin increased to 8.7 on 10/29. Patient has ongoing issue of hiccups and inability to tolerate PO as he will vomit/regurgitate his food 2/2 hiccups. Patient trialed on baclofen, currently on gabapentin 300 BID with improvement of tolerating PO. ID, heme/onc following.     INTERVAL EVENTS:   Temp to 101.1 overnight, gave ofirmev    SUBJECTIVE:   Patient seen at bedside lying flat. Patient has no acute complaints and states that he is feeling much improved. He denies hiccuping and has not had any episodes of vomiting overnight. Denies abdominal pain, nausea, fevers, chills, sob at this time.  ROS:  Negative unless otherwise stated above.    PHYSICAL EXAM:  General: Alert and oriented x 3. No acute distress.   HEENT: Moist mucous membranes.   Cardiovascular: Regular rate and rhythm.   Lungs: Clear to auscultation bilaterally.  Abdomen: Soft, non-tender and non-distended.  Extremities: No edema. Non-tender. Warm.    VITAL SIGNS:  Vital Signs Last 24 Hrs  T(C): 37.6 (29 Oct 2024 05:32), Max: 38.6 (28 Oct 2024 19:00)  T(F): 99.7 (29 Oct 2024 05:32), Max: 101.4 (28 Oct 2024 19:00)  HR: 112 (29 Oct 2024 05:32) (99 - 112)  BP: 111/60 (29 Oct 2024 05:32) (101/63 - 112/68)  BP(mean): --  RR: 18 (29 Oct 2024 05:32) (18 - 18)  SpO2: 94% (29 Oct 2024 05:32) (94% - 97%)    Parameters below as of 29 Oct 2024 05:32  Patient On (Oxygen Delivery Method): nasal cannula      INPATIENT MEDICATIONS:   MEDICATIONS  (STANDING):  amLODIPine   Tablet 5 milliGRAM(s) Oral daily  atorvastatin 20 milliGRAM(s) Oral at bedtime  dextrose 5%. 1000 milliLiter(s) (100 mL/Hr) IV Continuous <Continuous>  dextrose 5%. 1000 milliLiter(s) (50 mL/Hr) IV Continuous <Continuous>  dextrose 50% Injectable 25 Gram(s) IV Push once  dextrose 50% Injectable 12.5 Gram(s) IV Push once  dextrose 50% Injectable 25 Gram(s) IV Push once  enoxaparin Injectable 40 milliGRAM(s) SubCutaneous every 24 hours  gabapentin 300 milliGRAM(s) Oral every 24 hours  ganciclovir IVPB 300 milliGRAM(s) IV Intermittent every 12 hours  ganciclovir IVPB      glucagon  Injectable 1 milliGRAM(s) IntraMuscular once  insulin lispro (ADMELOG) corrective regimen sliding scale   SubCutaneous Before meals and at bedtime  magnesium sulfate  IVPB 4 Gram(s) IV Intermittent once  pantoprazole    Tablet 40 milliGRAM(s) Oral before breakfast  sucralfate 1 Gram(s) Oral every 6 hours    MEDICATIONS  (PRN):  aluminum hydroxide/magnesium hydroxide/simethicone Suspension 30 milliLiter(s) Oral every 4 hours PRN Dyspepsia  benzocaine/menthol Lozenge 1 Lozenge Oral every 2 hours PRN Sore Throat  dextrose Oral Gel 15 Gram(s) Oral once PRN Blood Glucose LESS THAN 70 milliGRAM(s)/deciliter  lidocaine 2% Viscous 15 milliLiter(s) Mucosal every 8 hours PRN Sore throat  melatonin 3 milliGRAM(s) Oral at bedtime PRN Insomnia  ondansetron Injectable 4 milliGRAM(s) IV Push every 8 hours PRN Nausea and/or Vomiting  polyethylene glycol 3350 17 Gram(s) Oral every 24 hours PRN for constipation  senna 2 Tablet(s) Oral at bedtime PRN for constipation    ALLERGIES:  Allergies    chlorhexidine containing compounds (Rash)    Intolerances    LABS:                       8.7    3.69  )-----------( 197      ( 29 Oct 2024 05:30 )             26.1     10-29    129[L]  |  97  |  12  ----------------------------<  98  3.6   |  23  |  0.64    Ca    8.1[L]      29 Oct 2024 05:30  Phos  2.1     10-29  Mg     1.4     10-29    TPro  6.0  /  Alb  2.1[L]  /  TBili  0.8  /  DBili  x   /  AST  20  /  ALT  19  /  AlkPhos  165[H]  10-29      Urinalysis Basic - ( 29 Oct 2024 05:30 )    Color: x / Appearance: x / SG: x / pH: x  Gluc: 98 mg/dL / Ketone: x  / Bili: x / Urobili: x   Blood: x / Protein: x / Nitrite: x   Leuk Esterase: x / RBC: x / WBC x   Sq Epi: x / Non Sq Epi: x / Bacteria: x      CAPILLARY BLOOD GLUCOSE      POCT Blood Glucose.: 156 mg/dL (28 Oct 2024 21:52)    RADIOLOGY & ADDITIONAL TESTS: Reviewed. HOSPITAL COURSE:  69M w/ PMHx of non-operable lung cancer, DM, HTN, HLD recently admitted for symptomatic anemia and chemotherapy-induced nausea and vomiting (10/14-10/15) presenting with hiccups, nausea, vomiting, and fever found to have RML pnuemonia, admitted on 10/21. GI consulted, s/p EGD 10/23, found to have medium size hiatal hernia, diffuse erythema of mucosa of whole stomach. Started on pantoprazole IV BID and sucralfate. EGD biopsy showed CMV esophagitis, started on ganciclovir 5 mg/kg IV q12h (10/24). Patient was on empiric zosyn 4.5 mg IV q8h from 10/21-10/27. Patient has been having fevers daily, given ofirmev each time and all bcx are NGTD thus far (10/21, 10/23, 10/24, 10/26). Tmax of 103F (10/27).  Hemoglobin dropped to 6.7 on 10/28, s/p 1U pRBC, hemoglobin increased to 8.7 on 10/29. Patient has ongoing issue of hiccups and inability to tolerate PO as he will vomit/regurgitate his food 2/2 hiccups. Patient trialed on baclofen, currently on gabapentin 300 BID with improvement of tolerating PO. ID, heme/onc following.     INTERVAL EVENTS:   Temp to 101.1 overnight, gave ofirmev    SUBJECTIVE:   Patient seen at bedside lying flat. Patient has no acute complaints and states that he is feeling much improved. He denies hiccuping and has not had any episodes of vomiting overnight. Denies abdominal pain, nausea, fevers, chills, sob at this time.    PHYSICAL EXAM:  General: Alert and oriented x 3. No acute distress.   HEENT: Moist mucous membranes.   Cardiovascular: Regular rate and rhythm.   Lungs: Clear to auscultation bilaterally.  Abdomen: Soft, non-tender and non-distended.  Extremities: No edema. Non-tender. Warm.    VITAL SIGNS:  Vital Signs Last 24 Hrs  T(C): 37.6 (29 Oct 2024 05:32), Max: 38.6 (28 Oct 2024 19:00)  T(F): 99.7 (29 Oct 2024 05:32), Max: 101.4 (28 Oct 2024 19:00)  HR: 112 (29 Oct 2024 05:32) (99 - 112)  BP: 111/60 (29 Oct 2024 05:32) (101/63 - 112/68)  BP(mean): --  RR: 18 (29 Oct 2024 05:32) (18 - 18)  SpO2: 94% (29 Oct 2024 05:32) (94% - 97%)    Parameters below as of 29 Oct 2024 05:32  Patient On (Oxygen Delivery Method): nasal cannula      INPATIENT MEDICATIONS:   MEDICATIONS  (STANDING):  amLODIPine   Tablet 5 milliGRAM(s) Oral daily  atorvastatin 20 milliGRAM(s) Oral at bedtime  dextrose 5%. 1000 milliLiter(s) (100 mL/Hr) IV Continuous <Continuous>  dextrose 5%. 1000 milliLiter(s) (50 mL/Hr) IV Continuous <Continuous>  dextrose 50% Injectable 25 Gram(s) IV Push once  dextrose 50% Injectable 12.5 Gram(s) IV Push once  dextrose 50% Injectable 25 Gram(s) IV Push once  enoxaparin Injectable 40 milliGRAM(s) SubCutaneous every 24 hours  gabapentin 300 milliGRAM(s) Oral every 24 hours  ganciclovir IVPB 300 milliGRAM(s) IV Intermittent every 12 hours  ganciclovir IVPB      glucagon  Injectable 1 milliGRAM(s) IntraMuscular once  insulin lispro (ADMELOG) corrective regimen sliding scale   SubCutaneous Before meals and at bedtime  magnesium sulfate  IVPB 4 Gram(s) IV Intermittent once  pantoprazole    Tablet 40 milliGRAM(s) Oral before breakfast  sucralfate 1 Gram(s) Oral every 6 hours    MEDICATIONS  (PRN):  aluminum hydroxide/magnesium hydroxide/simethicone Suspension 30 milliLiter(s) Oral every 4 hours PRN Dyspepsia  benzocaine/menthol Lozenge 1 Lozenge Oral every 2 hours PRN Sore Throat  dextrose Oral Gel 15 Gram(s) Oral once PRN Blood Glucose LESS THAN 70 milliGRAM(s)/deciliter  lidocaine 2% Viscous 15 milliLiter(s) Mucosal every 8 hours PRN Sore throat  melatonin 3 milliGRAM(s) Oral at bedtime PRN Insomnia  ondansetron Injectable 4 milliGRAM(s) IV Push every 8 hours PRN Nausea and/or Vomiting  polyethylene glycol 3350 17 Gram(s) Oral every 24 hours PRN for constipation  senna 2 Tablet(s) Oral at bedtime PRN for constipation    ALLERGIES:  Allergies    chlorhexidine containing compounds (Rash)    Intolerances    LABS:                       8.7    3.69  )-----------( 197      ( 29 Oct 2024 05:30 )             26.1     10-29    129[L]  |  97  |  12  ----------------------------<  98  3.6   |  23  |  0.64    Ca    8.1[L]      29 Oct 2024 05:30  Phos  2.1     10-29  Mg     1.4     10-29    TPro  6.0  /  Alb  2.1[L]  /  TBili  0.8  /  DBili  x   /  AST  20  /  ALT  19  /  AlkPhos  165[H]  10-29      Urinalysis Basic - ( 29 Oct 2024 05:30 )    Color: x / Appearance: x / SG: x / pH: x  Gluc: 98 mg/dL / Ketone: x  / Bili: x / Urobili: x   Blood: x / Protein: x / Nitrite: x   Leuk Esterase: x / RBC: x / WBC x   Sq Epi: x / Non Sq Epi: x / Bacteria: x      CAPILLARY BLOOD GLUCOSE      POCT Blood Glucose.: 156 mg/dL (28 Oct 2024 21:52)    RADIOLOGY & ADDITIONAL TESTS: Reviewed.

## 2024-10-31 NOTE — PROGRESS NOTE ADULT - SUBJECTIVE AND OBJECTIVE BOX
INFECTIOUS DISEASES CONSULT FOLLOW-UP NOTE    INTERVAL HPI/OVERNIGHT EVENTS: Noted to have fever of 101.8 last night.     This morning on ROS, patient reports he has been able to eat more food without throat pain. States both cough and hiccups have improved. Denies N/V/D/C or abdominal pain.          ANTIBIOTICS/RELEVANT:    MEDICATIONS  (STANDING):  amLODIPine   Tablet 5 milliGRAM(s) Oral daily  atorvastatin 20 milliGRAM(s) Oral at bedtime  dextrose 5%. 1000 milliLiter(s) (100 mL/Hr) IV Continuous <Continuous>  dextrose 5%. 1000 milliLiter(s) (50 mL/Hr) IV Continuous <Continuous>  dextrose 50% Injectable 25 Gram(s) IV Push once  dextrose 50% Injectable 12.5 Gram(s) IV Push once  dextrose 50% Injectable 25 Gram(s) IV Push once  enoxaparin Injectable 40 milliGRAM(s) SubCutaneous every 24 hours  gabapentin 300 milliGRAM(s) Oral <User Schedule>  ganciclovir IVPB 300 milliGRAM(s) IV Intermittent every 12 hours  ganciclovir IVPB      glucagon  Injectable 1 milliGRAM(s) IntraMuscular once  insulin lispro (ADMELOG) corrective regimen sliding scale   SubCutaneous Before meals and at bedtime  pantoprazole    Tablet 40 milliGRAM(s) Oral before breakfast  sucralfate 1 Gram(s) Oral every 6 hours    MEDICATIONS  (PRN):  aluminum hydroxide/magnesium hydroxide/simethicone Suspension 30 milliLiter(s) Oral every 4 hours PRN Dyspepsia  benzocaine/menthol Lozenge 1 Lozenge Oral every 2 hours PRN Sore Throat  dextrose Oral Gel 15 Gram(s) Oral once PRN Blood Glucose LESS THAN 70 milliGRAM(s)/deciliter  lidocaine 2% Viscous 15 milliLiter(s) Mucosal every 8 hours PRN Sore throat  melatonin 3 milliGRAM(s) Oral at bedtime PRN Insomnia  ondansetron Injectable 4 milliGRAM(s) IV Push every 8 hours PRN Nausea and/or Vomiting  polyethylene glycol 3350 17 Gram(s) Oral every 24 hours PRN for constipation  senna 2 Tablet(s) Oral at bedtime PRN for constipation        Vital Signs Last 24 Hrs  T(C): 37.8 (31 Oct 2024 11:30), Max: 38.8 (30 Oct 2024 20:30)  T(F): 100 (31 Oct 2024 11:30), Max: 101.8 (30 Oct 2024 20:30)  HR: 118 (31 Oct 2024 11:30) (95 - 118)  BP: 114/59 (31 Oct 2024 11:30) (102/50 - 122/73)  BP(mean): 68 (31 Oct 2024 05:56) (68 - 68)  RR: 18 (31 Oct 2024 11:30) (18 - 18)  SpO2: 92% (31 Oct 2024 11:30) (92% - 99%)    Parameters below as of 31 Oct 2024 11:30  Patient On (Oxygen Delivery Method): nasal cannula  O2 Flow (L/min): 2      10-30-24 @ 07:01  -  10-31-24 @ 07:00  --------------------------------------------------------  IN: 0 mL / OUT: 600 mL / NET: -600 mL      PHYSICAL EXAM:  Constitutional: alert, NAD, ill-appearing   Eyes: the sclera and conjunctiva were normal.   ENT: the ears and nose were normal in appearance.   Neck: the appearance of the neck was normal  Pulmonary: no respiratory distress; breathing comfortably on NC and unlabored while speaking full sentences   Vascular: no peripheral edema  Abdomen: soft, non-tender          LABS:                        8.8    3.44  )-----------( 191      ( 31 Oct 2024 07:54 )             26.8     10-31    131[L]  |  97  |  9   ----------------------------<  114[H]  3.5   |  25  |  0.64    Ca    8.4      31 Oct 2024 07:54  Phos  3.0     10-31  Mg     1.7     10-31    TPro  6.5  /  Alb  2.0[L]  /  TBili  0.6  /  DBili  x   /  AST  29  /  ALT  23  /  AlkPhos  160[H]  10-31      Urinalysis Basic - ( 31 Oct 2024 07:54 )    Color: x / Appearance: x / SG: x / pH: x  Gluc: 114 mg/dL / Ketone: x  / Bili: x / Urobili: x   Blood: x / Protein: x / Nitrite: x   Leuk Esterase: x / RBC: x / WBC x   Sq Epi: x / Non Sq Epi: x / Bacteria: x        MICROBIOLOGY:      RADIOLOGY & ADDITIONAL STUDIES:  Reviewed

## 2024-10-31 NOTE — PROGRESS NOTE ADULT - ATTENDING COMMENTS
Fevers persist but symptoms and overall clinical appearance much improved. Favor that persistent fevers are due to this tissue invasive CMV infection - no e/o at this time for any concurrent infectious process. Continue gancyte until fevers resolve, then will switch to valcyte.

## 2024-10-31 NOTE — PROGRESS NOTE ADULT - PROBLEM SELECTOR PLAN 11
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 S/p 7 cycles of neoadjuvant platinum based with gemcitabine and nivolumab chemoimmunotherapy. Follows with Dr. Mueller for RT, s/p last round of RT on 10/15.   Follows with Dr. Larose outpatient.

## 2024-10-31 NOTE — PROGRESS NOTE ADULT - ATTENDING COMMENTS
Date of service 10/31/24  Still spiking fevers. Pt tolerating PO/fluids wo N/V/Abd pain, odynophagia, or dysphagia.  Walking w PT using RW.  Hyponatremia improving. Hgb stable.   Continue IV Ganciclovir  Monitor CBC, BMP  Monitor for improvement of fevers in decision to transition from IV ganciclovir to PO valganciclovir  Above d/w Housestasylvester, Onc - Dr. Larose, ID - Dr. Robin

## 2024-10-31 NOTE — PROGRESS NOTE ADULT - PROBLEM SELECTOR PLAN 2
Pt is immunocompromised iso lung cancer and chemo/RT px with 2 days of cough and 1 day of fever. Pt was dc'd on 10/15 and started having intractable hiccups c/b nausea and vomiting. ?Aspiration pna. Cough is dry. Pt denies SOB, CP, abd pain, diarrhea, dysuria, rashes or joint pain. CXR pending read but appears similar to prior w/o infiltrates/consolidations. WBC 2.94 (from 1.95) with increased lymphocyte %. Covid/Flu/RSV negative.  MRSA negative. Legionella/strep negative.   Patient is still spiking fevers, tmax 103F on 10/28. Last fever 101F on 10/28.  s/p zosyn 4.5g q8h x 7 days (10/21-10/27)    - ofirmev as needed for fevers, max 4g in 24 hours (can give toradol if maxxed out)  - monitor off zosyn Pt is immunocompromised iso lung cancer and chemo/RT px with 2 days of cough and 1 day of fever. Pt was dc'd on 10/15 and started having intractable hiccups c/b nausea and vomiting. ?Aspiration pna. Cough is dry. Pt denies SOB, CP, abd pain, diarrhea, dysuria, rashes or joint pain. CXR pending read but appears similar to prior w/o infiltrates/consolidations. WBC 2.94 (from 1.95) with increased lymphocyte %. Covid/Flu/RSV negative.  MRSA negative. Legionella/strep negative.   Patient is still spiking fevers, tmax 103F on 10/28.   s/p zosyn 4.5g q8h x 7 days (10/21-10/27)    - ofirmev as needed for fevers, max 4g in 24 hours (can give toradol if maxxed out)  - monitor off zosyn

## 2024-10-31 NOTE — PROGRESS NOTE ADULT - ASSESSMENT
69M with hx of DM (A1c 7.2%), HTN, HLD, squamous cell lung cancer currently on carboplatin/taxol (last received 10/10) and RT (last received 10/15), who on 10/14 developed odynophagia, cough, and hiccups, subsequently developed nausea/vomiting and then on 10/20 developed fever for which he presented to [facility] ED, admitted on 10/21 for further management. Was febrile, with mild hypoxia (2L NC), leukopenic with severe lymphopenia. CT C/A/P w/ IVC with decreased size of known COLETTE mass (malignancy), and new bilateral upper lobe GGOs with interlobular septal thickening, as well as mid-distal esophagitis. S/p EGD on 10/23 with finding of erosive changes of middle third of esophagus and esophageal biopsy +CMV inclusions. Started on ganciclovir 10/24.

## 2024-10-31 NOTE — PROGRESS NOTE ADULT - PROBLEM SELECTOR PLAN 12
Home meds: Amlodipine 5  - C/w home meds    PPX:  F: s/p 1U pRBC  E: replete PRN  N: CC w/ Glucerna (Halal)  GI: PPI + sucralfate  DVT: lovenox 40 qd    Dispo: home w/ home PT, not medically ready yet Home meds: Amlodipine 5  - C/w home meds    PPX:  F: s/p 1U pRBC  E: replete PRN  N: CC w/ Glucerna (Halal)  GI: PPI + sucralfate  DVT: lovenox 40 qd    Dispo: home w/ home PT

## 2024-10-31 NOTE — PROGRESS NOTE ADULT - PROBLEM SELECTOR PLAN 4
Pt has had hiccups since his dc last week. Rad-onc started pt on Zofran which did not help. S/p Reglan in ED w/o improvement. Hiccups have caused the pt N/V.  S/P gabapentin 300 mg x1 10/21 AM, switched to baclofen on 10/25  Patient unable to tolerate PO, was able to do so when on gabapentin. Switched baclofen back to eduar on 10/27.     - uptitrate gabapentin 300 qd to bid  - ctm if patient is able to hold down water/foods Pt has had hiccups since his dc last week. Rad-onc started pt on Zofran which did not help. S/p Reglan in ED w/o improvement. Hiccups have caused the pt N/V.  S/P gabapentin 300 mg x1 10/21 AM, switched to baclofen on 10/25  Patient unable to tolerate PO, was able to do so when on gabapentin. Switched baclofen back to eduar on 10/27.     - c/w gabapentin 300 qd to bid

## 2024-10-31 NOTE — PROGRESS NOTE ADULT - ASSESSMENT
69M with hx of DM (A1c 7.2%), HTN, HLD, squamous cell lung cancer currently on carboplatin/taxol (last received 10/10) and RT (last received 10/15), who on 10/14 developed odynophagia, cough, and hiccups, subsequently developed nausea/vomiting and then on 10/20 developed fever for which he presented to [facility] ED, admitted on 10/21 for further management. Was febrile, with mild hypoxia (2L NC), leukopenic with severe lymphopenia. CT C/A/P w/ IVC with decreased size of known COLETTE mass (malignancy), and new bilateral upper lobe GGOs with interlobular septal thickening, as well as mid-distal esophagitis. S/p EGD on 10/23 with finding of erosive changes of middle third of esophagus and esophageal biopsy +CMV inclusions. Persistently febrile but cough and throat pain improving. Given improvement in symptoms, less concern fevers are from another source other than CMV.     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  # Possible CMV pneumonitis  # Persistent fever  - Continue ganciclovir 5mg/kg IV q12h (SOT 10/24). Continue close monitoring of cell lines and renal function (with daily CBC w/ diff and BMP).  - Repeat CMV PCR on 10/28 (after 5 days of gancyte) showed almost one log reduction - now 4.2. Recommend repeat CMV PCR at day 10.    - Anticipate 3 weeks minimum therapy. Will plan to switch to PO valcyte once fevers resolved    # Possible bacterial PNA  - Patient reported significant improvement in his cough since presentation (with zosyn), which argues against CMV disease also being responsible for lung pathology/symptoms, although CMV pneumonitis is still a possibility.   - S/p zosyn 4.5g IV q8h EI x 7 days total (EOT 10/27)    ID Team 1 will follow. 69M with hx of DM (A1c 7.2%), HTN, HLD, squamous cell lung cancer currently on carboplatin/taxol (last received 10/10) and RT (last received 10/15), who on 10/14 developed odynophagia, cough, and hiccups, subsequently developed nausea/vomiting and then on 10/20 developed fever for which he presented to [facility] ED, admitted on 10/21 for further management. Was febrile, with mild hypoxia (2L NC), leukopenic with severe lymphopenia. CT C/A/P w/ IVC with decreased size of known COLETTE mass (malignancy), and new bilateral upper lobe GGOs with interlobular septal thickening, as well as mid-distal esophagitis. S/p EGD on 10/23 with finding of erosive changes of middle third of esophagus and esophageal biopsy +CMV inclusions. Persistently febrile but cough and throat pain improving. Given improvement in symptoms, less concern fevers are from another source other than CMV.     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  # Possible CMV pneumonitis  # Persistent fever  - Continue ganciclovir 5mg/kg IV q12h (SOT 10/24). Continue close monitoring of cell lines and renal function (with daily CBC w/ diff and BMP).  - Serial CMV PCR on 10/28 (after 5 days of gancyte) showed almost one log reduction - now 4.2. Recommend repeat CMV PCR at day 10 to ensure continued improvement.  - Anticipate 3 weeks minimum therapy. Will plan to switch to PO valcyte once fevers resolved    # Possible bacterial PNA  - Patient reported significant improvement in his cough since presentation (with zosyn), which argues against CMV disease also being responsible for lung pathology/symptoms, although CMV pneumonitis is still a possibility.   - S/p zosyn 4.5g IV q8h EI x 7 days total (EOT 10/27)    ID Team 1 will follow.

## 2024-11-01 LAB
ANION GAP SERPL CALC-SCNC: 10 MMOL/L — SIGNIFICANT CHANGE UP (ref 5–17)
ANISOCYTOSIS BLD QL: SLIGHT — SIGNIFICANT CHANGE UP
BASOPHILS # BLD AUTO: 0 K/UL — SIGNIFICANT CHANGE UP (ref 0–0.2)
BASOPHILS NFR BLD AUTO: 0 % — SIGNIFICANT CHANGE UP (ref 0–2)
BUN SERPL-MCNC: 14 MG/DL — SIGNIFICANT CHANGE UP (ref 7–23)
CALCIUM SERPL-MCNC: 8.1 MG/DL — LOW (ref 8.4–10.5)
CHLORIDE SERPL-SCNC: 98 MMOL/L — SIGNIFICANT CHANGE UP (ref 96–108)
CO2 SERPL-SCNC: 24 MMOL/L — SIGNIFICANT CHANGE UP (ref 22–31)
CREAT SERPL-MCNC: 0.63 MG/DL — SIGNIFICANT CHANGE UP (ref 0.5–1.3)
CULTURE RESULTS: SIGNIFICANT CHANGE UP
DACRYOCYTES BLD QL SMEAR: SLIGHT — SIGNIFICANT CHANGE UP
EGFR: 102 ML/MIN/1.73M2 — SIGNIFICANT CHANGE UP
EOSINOPHIL # BLD AUTO: 0.22 K/UL — SIGNIFICANT CHANGE UP (ref 0–0.5)
EOSINOPHIL NFR BLD AUTO: 6.1 % — HIGH (ref 0–6)
GIANT PLATELETS BLD QL SMEAR: PRESENT — SIGNIFICANT CHANGE UP
GLUCOSE BLDC GLUCOMTR-MCNC: 100 MG/DL — HIGH (ref 70–99)
GLUCOSE BLDC GLUCOMTR-MCNC: 110 MG/DL — HIGH (ref 70–99)
GLUCOSE BLDC GLUCOMTR-MCNC: 138 MG/DL — HIGH (ref 70–99)
GLUCOSE BLDC GLUCOMTR-MCNC: 144 MG/DL — HIGH (ref 70–99)
GLUCOSE SERPL-MCNC: 90 MG/DL — SIGNIFICANT CHANGE UP (ref 70–99)
HCT VFR BLD CALC: 23.8 % — LOW (ref 39–50)
HGB BLD-MCNC: 7.9 G/DL — LOW (ref 13–17)
HYPOCHROMIA BLD QL: SLIGHT — SIGNIFICANT CHANGE UP
LYMPHOCYTES # BLD AUTO: 0.19 K/UL — LOW (ref 1–3.3)
LYMPHOCYTES # BLD AUTO: 5.2 % — LOW (ref 13–44)
MACROCYTES BLD QL: SLIGHT — SIGNIFICANT CHANGE UP
MAGNESIUM SERPL-MCNC: 1.6 MG/DL — SIGNIFICANT CHANGE UP (ref 1.6–2.6)
MANUAL SMEAR VERIFICATION: SIGNIFICANT CHANGE UP
MCHC RBC-ENTMCNC: 29.2 PG — SIGNIFICANT CHANGE UP (ref 27–34)
MCHC RBC-ENTMCNC: 33.2 G/DL — SIGNIFICANT CHANGE UP (ref 32–36)
MCV RBC AUTO: 87.8 FL — SIGNIFICANT CHANGE UP (ref 80–100)
MICROCYTES BLD QL: SLIGHT — SIGNIFICANT CHANGE UP
MONOCYTES # BLD AUTO: 0.16 K/UL — SIGNIFICANT CHANGE UP (ref 0–0.9)
MONOCYTES NFR BLD AUTO: 4.3 % — SIGNIFICANT CHANGE UP (ref 2–14)
NEUTROPHILS # BLD AUTO: 3.1 K/UL — SIGNIFICANT CHANGE UP (ref 1.8–7.4)
NEUTROPHILS NFR BLD AUTO: 84.4 % — HIGH (ref 43–77)
OVALOCYTES BLD QL SMEAR: SLIGHT — SIGNIFICANT CHANGE UP
PHOSPHATE SERPL-MCNC: 2.8 MG/DL — SIGNIFICANT CHANGE UP (ref 2.5–4.5)
PLAT MORPH BLD: ABNORMAL
PLATELET # BLD AUTO: 193 K/UL — SIGNIFICANT CHANGE UP (ref 150–400)
POIKILOCYTOSIS BLD QL AUTO: SIGNIFICANT CHANGE UP
POLYCHROMASIA BLD QL SMEAR: SLIGHT — SIGNIFICANT CHANGE UP
POTASSIUM SERPL-MCNC: 3.7 MMOL/L — SIGNIFICANT CHANGE UP (ref 3.5–5.3)
POTASSIUM SERPL-SCNC: 3.7 MMOL/L — SIGNIFICANT CHANGE UP (ref 3.5–5.3)
RBC # BLD: 2.71 M/UL — LOW (ref 4.2–5.8)
RBC # FLD: 16 % — HIGH (ref 10.3–14.5)
RBC BLD AUTO: ABNORMAL
SCHISTOCYTES BLD QL AUTO: SLIGHT — SIGNIFICANT CHANGE UP
SODIUM SERPL-SCNC: 132 MMOL/L — LOW (ref 135–145)
SPECIMEN SOURCE: SIGNIFICANT CHANGE UP
SPHEROCYTES BLD QL SMEAR: SLIGHT — SIGNIFICANT CHANGE UP
WBC # BLD: 3.67 K/UL — LOW (ref 3.8–10.5)
WBC # FLD AUTO: 3.67 K/UL — LOW (ref 3.8–10.5)

## 2024-11-01 PROCEDURE — 99232 SBSQ HOSP IP/OBS MODERATE 35: CPT

## 2024-11-01 PROCEDURE — 99233 SBSQ HOSP IP/OBS HIGH 50: CPT | Mod: GC

## 2024-11-01 RX ORDER — DIBASIC SODIUM PHOSPHATE, MONOBASIC POTASSIUM PHOSPHATE AND MONOBASIC SODIUM PHOSPHATE 852; 155; 130 MG/1; MG/1; MG/1
1 TABLET ORAL ONCE
Refills: 0 | Status: COMPLETED | OUTPATIENT
Start: 2024-11-01 | End: 2024-11-01

## 2024-11-01 RX ORDER — ACETAMINOPHEN 500 MG
1000 TABLET ORAL ONCE
Refills: 0 | Status: COMPLETED | OUTPATIENT
Start: 2024-11-01 | End: 2024-11-01

## 2024-11-01 RX ORDER — MAGNESIUM SULFATE IN 0.9% NACL 2 G/50 ML
2 INTRAVENOUS SOLUTION, PIGGYBACK (ML) INTRAVENOUS ONCE
Refills: 0 | Status: COMPLETED | OUTPATIENT
Start: 2024-11-01 | End: 2024-11-01

## 2024-11-01 RX ADMIN — DIBASIC SODIUM PHOSPHATE, MONOBASIC POTASSIUM PHOSPHATE AND MONOBASIC SODIUM PHOSPHATE 1 PACKET(S): 852; 155; 130 TABLET ORAL at 09:30

## 2024-11-01 RX ADMIN — SUCRALFATE 1 GRAM(S): 1 SUSPENSION ORAL at 17:15

## 2024-11-01 RX ADMIN — Medication 1000 MILLIGRAM(S): at 07:56

## 2024-11-01 RX ADMIN — Medication 400 MILLIGRAM(S): at 06:05

## 2024-11-01 RX ADMIN — Medication 20 MILLIGRAM(S): at 22:25

## 2024-11-01 RX ADMIN — SUCRALFATE 1 GRAM(S): 1 SUSPENSION ORAL at 11:38

## 2024-11-01 RX ADMIN — Medication 40 MILLIGRAM(S): at 17:15

## 2024-11-01 RX ADMIN — GABAPENTIN 300 MILLIGRAM(S): 300 CAPSULE ORAL at 13:23

## 2024-11-01 RX ADMIN — GANCICLOVIR SODIUM 100 MILLIGRAM(S): 50 INJECTION, POWDER, LYOPHILIZED, FOR SOLUTION INTRAVENOUS at 17:32

## 2024-11-01 RX ADMIN — GANCICLOVIR SODIUM 100 MILLIGRAM(S): 50 INJECTION, POWDER, LYOPHILIZED, FOR SOLUTION INTRAVENOUS at 06:48

## 2024-11-01 RX ADMIN — GABAPENTIN 300 MILLIGRAM(S): 300 CAPSULE ORAL at 06:04

## 2024-11-01 RX ADMIN — SUCRALFATE 1 GRAM(S): 1 SUSPENSION ORAL at 06:04

## 2024-11-01 RX ADMIN — Medication 25 GRAM(S): at 09:30

## 2024-11-01 RX ADMIN — PANTOPRAZOLE SODIUM 40 MILLIGRAM(S): 40 TABLET, DELAYED RELEASE ORAL at 06:04

## 2024-11-01 NOTE — PROGRESS NOTE ADULT - PROBLEM SELECTOR PLAN 11
Hx of squamous cell lung ca, AJCC IIIB, [id number] negative. 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 S/p 7 cycles of neoadjuvant platinum based with gemcitabine and nivolumab chemoimmunotherapy. Follows with Dr. Mueller for RT, s/p last round of RT on 10/15.   Follows with Dr. Larose outpatient.

## 2024-11-01 NOTE — PROGRESS NOTE ADULT - SUBJECTIVE AND OBJECTIVE BOX
INFECTIOUS DISEASES CONSULT FOLLOW-UP NOTE    INTERVAL HPI/OVERNIGHT EVENTS:  T 101.3  Entirely resolved odynophagia, cough, hiccups. Infectious ROS pan-negative    ROS:   Constitutional, eyes, ENT, cardiovascular, respiratory, gastrointestinal, genitourinary, integumentary, neurological, psychiatric and heme/lymph are otherwise negative other than noted above       ANTIBIOTICS/RELEVANT:    MEDICATIONS  (STANDING):  amLODIPine   Tablet 5 milliGRAM(s) Oral daily  atorvastatin 20 milliGRAM(s) Oral at bedtime  dextrose 5%. 1000 milliLiter(s) (100 mL/Hr) IV Continuous <Continuous>  dextrose 5%. 1000 milliLiter(s) (50 mL/Hr) IV Continuous <Continuous>  dextrose 50% Injectable 25 Gram(s) IV Push once  dextrose 50% Injectable 12.5 Gram(s) IV Push once  dextrose 50% Injectable 25 Gram(s) IV Push once  enoxaparin Injectable 40 milliGRAM(s) SubCutaneous every 24 hours  gabapentin 300 milliGRAM(s) Oral <User Schedule>  ganciclovir IVPB 300 milliGRAM(s) IV Intermittent every 12 hours  ganciclovir IVPB      glucagon  Injectable 1 milliGRAM(s) IntraMuscular once  insulin lispro (ADMELOG) corrective regimen sliding scale   SubCutaneous Before meals and at bedtime  pantoprazole    Tablet 40 milliGRAM(s) Oral before breakfast  sucralfate 1 Gram(s) Oral every 6 hours    MEDICATIONS  (PRN):  aluminum hydroxide/magnesium hydroxide/simethicone Suspension 30 milliLiter(s) Oral every 4 hours PRN Dyspepsia  benzocaine/menthol Lozenge 1 Lozenge Oral every 2 hours PRN Sore Throat  dextrose Oral Gel 15 Gram(s) Oral once PRN Blood Glucose LESS THAN 70 milliGRAM(s)/deciliter  lidocaine 2% Viscous 15 milliLiter(s) Mucosal every 8 hours PRN Sore throat  melatonin 3 milliGRAM(s) Oral at bedtime PRN Insomnia  ondansetron Injectable 4 milliGRAM(s) IV Push every 8 hours PRN Nausea and/or Vomiting  polyethylene glycol 3350 17 Gram(s) Oral every 24 hours PRN for constipation  senna 2 Tablet(s) Oral at bedtime PRN for constipation        Vital Signs Last 24 Hrs  T(C): 37.3 (01 Nov 2024 07:46), Max: 38.5 (01 Nov 2024 05:20)  T(F): 99.1 (01 Nov 2024 07:46), Max: 101.3 (01 Nov 2024 05:20)  HR: 118 (01 Nov 2024 05:20) (98 - 122)  BP: 97/53 (01 Nov 2024 05:20) (97/53 - 122/74)  BP(mean): 68 (01 Nov 2024 05:20) (68 - 90)  RR: 18 (01 Nov 2024 05:20) (18 - 18)  SpO2: 97% (01 Nov 2024 05:20) (87% - 99%)    Parameters below as of 01 Nov 2024 05:20  Patient On (Oxygen Delivery Method): room air        PHYSICAL EXAM:  Constitutional: alert, NAD, ill-appearing   Eyes: the sclera and conjunctiva were normal.   ENT: the ears and nose were normal in appearance. No sinus tenderness. Normal oropharynx  Neck: the appearance of the neck was normal  Pulmonary: no respiratory distress   Vascular: no peripheral edema  Abdomen: soft, non-tender        LABS:                        7.9    3.67  )-----------( 193      ( 01 Nov 2024 05:30 )             23.8     11-01    132[L]  |  98  |  14  ----------------------------<  90  3.7   |  24  |  0.63    Ca    8.1[L]      01 Nov 2024 05:30  Phos  2.8     11-01  Mg     1.6     11-01    TPro  6.5  /  Alb  2.0[L]  /  TBili  0.6  /  DBili  x   /  AST  29  /  ALT  23  /  AlkPhos  160[H]  10-31      Urinalysis Basic - ( 01 Nov 2024 05:30 )    Color: x / Appearance: x / SG: x / pH: x  Gluc: 90 mg/dL / Ketone: x  / Bili: x / Urobili: x   Blood: x / Protein: x / Nitrite: x   Leuk Esterase: x / RBC: x / WBC x   Sq Epi: x / Non Sq Epi: x / Bacteria: x        MICROBIOLOGY:  Reviewed    RADIOLOGY & ADDITIONAL STUDIES:  Reviewed

## 2024-11-01 NOTE — PROGRESS NOTE ADULT - ASSESSMENT
69M with hx of DM (A1c 7.2%), HTN, HLD, squamous cell lung cancer currently on carboplatin/taxol (last received 10/10) and RT (last received 10/15), who on 10/14 developed odynophagia, cough, and hiccups, subsequently developed nausea/vomiting and then on 10/20 developed fever for which he presented to [facility] ED, admitted on 10/21 for further management. Was febrile, with mild hypoxia (2L NC), leukopenic with severe lymphopenia. CT C/A/P w/ IVC with decreased size of known COLETTE mass (malignancy), and new bilateral upper lobe GGOs with interlobular septal thickening, as well as mid-distal esophagitis. S/p EGD on 10/23 with finding of erosive changes of middle third of esophagus and esophageal biopsy +CMV inclusions. Started on ganciclovir 10/24.

## 2024-11-01 NOTE — PROGRESS NOTE ADULT - ASSESSMENT
69M with hx of DM (A1c 7.2%), HTN, HLD, squamous cell lung cancer currently on carboplatin/taxol (last received 10/10) and RT (last received 10/15), who on 10/14 developed odynophagia, cough, and hiccups, subsequently developed nausea/vomiting and then on 10/20 developed fever for which he presented to [facility] ED, admitted on 10/21 for further management. Was febrile, with mild hypoxia (2L NC), leukopenic with severe lymphopenia. CT C/A/P w/ IVC with decreased size of known COLETTE mass (malignancy), and new bilateral upper lobe GGOs with interlobular septal thickening, as well as mid-distal esophagitis. S/p EGD on 10/23 with finding of erosive changes of middle third of esophagus and esophageal biopsy +CMV inclusions. Started on ganciclovir 10/24.

## 2024-11-01 NOTE — PROGRESS NOTE ADULT - PROBLEM SELECTOR PLAN 1
s/p EGD with biopsy 10/23. Risk factor is chemo, HIV negative. CMV PCR 10/24 126k. CMV PCR 5.10 (elevated).  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 Small focus of epithelium with atypia c/w prior radiation antrum no significant pathology. Negative for H pylori.  No pulmonary consult necessary at this time as patient is being treated with Zosyn for PNA and CMV esophagitis and pneumonitis simultaneously is rare.  Febrile to 102.3 (oral) on 10/26 AM. Overnight, fever of 100.8F.  All Bcx NGTD thus far (10/26, 10/24, 10/21)  CMV PCR specimen received 10/28    As per ID,   - Started on ganciclovir 5mg/kg IV q12h (10/24 - )  - Continue close monitoring of cell lines and renal function (with daily CBC w/ diff and BMP)  - Monitor for ocular symptoms  - f/u CMV PCR  - Anticipate 3 weeks minimum therapy. Will plan to switch to PO valcyte after his symptoms have improved significantly. s/p EGD with biopsy 10/23. Risk factor is chemo, HIV negative. CMV PCR 10/24 126k. CMV PCR 5.10 (elevated).  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 Small focus of epithelium with atypia c/w prior radiation antrum no significant pathology. Negative for H pylori.  No pulmonary consult necessary at this time as patient is being treated with Zosyn for PNA and CMV esophagitis and pneumonitis simultaneously is rare.  All Bcx NGTD thus far (10/26, 10/24, 10/21)  CMV PCR specimen received 10/28  Continues to spike fevers daily    As per ID,   - Started on ganciclovir 5mg/kg IV q12h (10/24 - )  - Continue close monitoring of cell lines and renal function (with daily CBC w/ diff and BMP)  - Monitor for ocular symptoms  - f/u CMV PCR on day 10 of ganciclovir (11/3)  - Anticipate 3 weeks minimum therapy. Will plan to switch to PO valcyte after his symptoms have improved significantly.

## 2024-11-01 NOTE — PROGRESS NOTE ADULT - PROBLEM SELECTOR PLAN 11
Hx of squamous cell lung ca, AJCC IIIB, [id number] negative. 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 S/p 7 cycles of neoadjuvant platinum based with gemcitabine and nivolumab chemoimmunotherapy. Follows with Dr. Mueller for RT, s/p last round of RT on 10/15.   Follows with Dr. Larose outpatient.

## 2024-11-01 NOTE — PROGRESS NOTE ADULT - PROBLEM SELECTOR PLAN 12
Home meds: Amlodipine 5  - C/w home meds    PPX:  F: s/p 1U pRBC  E: replete PRN  N: CC w/ Glucerna (Halal)  GI: PPI + sucralfate  DVT: lovenox 40 qd    Dispo: home w/ home PT

## 2024-11-01 NOTE — PROGRESS NOTE ADULT - PROBLEM SELECTOR PLAN 2
Pt is immunocompromised iso lung cancer and chemo/RT px with 2 days of cough and 1 day of fever. Pt was dc'd on 10/15 and started having intractable hiccups c/b nausea and vomiting. ?Aspiration pna. Cough is dry. Pt denies SOB, CP, abd pain, diarrhea, dysuria, rashes or joint pain. CXR pending read but appears similar to prior w/o infiltrates/consolidations. WBC 2.94 (from 1.95) with increased lymphocyte %. Covid/Flu/RSV negative.  MRSA negative. Legionella/strep negative.   Patient is still spiking fevers, tmax 103F on 10/28.   s/p zosyn 4.5g q8h x 7 days (10/21-10/27)    - ofirmev as needed for fevers, max 4g in 24 hours (can give toradol if maxxed out)  - monitor off zosyn

## 2024-11-01 NOTE — PROGRESS NOTE ADULT - PROBLEM SELECTOR PLAN 4
Pt has had hiccups since his dc last week. Rad-onc started pt on Zofran which did not help. S/p Reglan in ED w/o improvement. Hiccups have caused the pt N/V.  S/P gabapentin 300 mg x1 10/21 AM, switched to baclofen on 10/25  Patient unable to tolerate PO, was able to do so when on gabapentin. Switched baclofen back to eduar on 10/27.     - c/w gabapentin 300 qd to bid Pt has had hiccups since his dc last week. Rad-onc started pt on Zofran which did not help. S/p Reglan in ED w/o improvement. Hiccups have caused the pt N/V.  S/P gabapentin 300 mg x1 10/21 AM, switched to baclofen on 10/25  Patient unable to tolerate PO, was able to do so when on gabapentin. Switched baclofen back to eduar on 10/27.     - c/w gabapentin 300 bid

## 2024-11-01 NOTE — PROGRESS NOTE ADULT - PROBLEM SELECTOR PLAN 4
Pt has had hiccups since his dc last week. Rad-onc started pt on Zofran which did not help. S/p Reglan in ED w/o improvement. Hiccups have caused the pt N/V.  S/P gabapentin 300 mg x1 10/21 AM, switched to baclofen on 10/25  Patient unable to tolerate PO, was able to do so when on gabapentin. Switched baclofen back to eduar on 10/27.     - c/w gabapentin 300 qd to bid

## 2024-11-01 NOTE — CHART NOTE - NSCHARTNOTEFT_GEN_A_CORE
Admitting Diagnosis:   Patient is a 69y old  Male who presents with a chief complaint of Fever, nausea, vomiting w poor oral intake (2024 11:50)    PAST MEDICAL & SURGICAL HISTORY:  History of hypertension  History of high cholesterol  History of gastroesophageal reflux (GERD)  History of diabetes mellitus  History of persistent cough  Mass of lingula of lung  HENRY (acute kidney injury)  Squamous cell lung cancer  H/O: obesity  S/P appendectomy    Current Nutrition Order:  Consistent Carbohydrate w/Evening Snack  Halal/No Pork  Glucerna Shake x2/day     PO Intake: Good (%) [   ]  Fair (50-75%) [   ] Poor (<25-50%) [ x ]    GI Issues:   Per RN pt with no nausea/vomiting  Constipation during admission, last recorded BM 10/30  No abdominal distension/discomfort noted     Pain:  No pain reported     Skin Integrity:  No pressure injuries or edema documented, Adriano score 17    Labs:       132[L]  |  98  |  14  ----------------------------<  90  3.7   |  24  |  0.63    Ca    8.1[L]      2024 05:30  Phos  2.8       Mg     1.6         TPro  6.5  /  Alb  2.0[L]  /  TBili  0.6  /  DBili  x   /  AST  29  /  ALT  23  /  AlkPhos  160[H]  10-31    CAPILLARY BLOOD GLUCOSE  POCT Blood Glucose.: 110 mg/dL (2024 12:25)  POCT Blood Glucose.: 100 mg/dL (2024 08:41)  POCT Blood Glucose.: 118 mg/dL (31 Oct 2024 21:58)  POCT Blood Glucose.: 130 mg/dL (31 Oct 2024 17:30)    Medications:  MEDICATIONS  (STANDING):  amLODIPine   Tablet 5 milliGRAM(s) Oral daily  atorvastatin 20 milliGRAM(s) Oral at bedtime  dextrose 5%. 1000 milliLiter(s) (100 mL/Hr) IV Continuous <Continuous>  dextrose 5%. 1000 milliLiter(s) (50 mL/Hr) IV Continuous <Continuous>  dextrose 50% Injectable 25 Gram(s) IV Push once  dextrose 50% Injectable 12.5 Gram(s) IV Push once  dextrose 50% Injectable 25 Gram(s) IV Push once  enoxaparin Injectable 40 milliGRAM(s) SubCutaneous every 24 hours  gabapentin 300 milliGRAM(s) Oral <User Schedule>  ganciclovir IVPB 300 milliGRAM(s) IV Intermittent every 12 hours  ganciclovir IVPB      glucagon  Injectable 1 milliGRAM(s) IntraMuscular once  insulin lispro (ADMELOG) corrective regimen sliding scale   SubCutaneous Before meals and at bedtime  pantoprazole    Tablet 40 milliGRAM(s) Oral before breakfast  sucralfate 1 Gram(s) Oral every 6 hours    MEDICATIONS  (PRN):  aluminum hydroxide/magnesium hydroxide/simethicone Suspension 30 milliLiter(s) Oral every 4 hours PRN Dyspepsia  benzocaine/menthol Lozenge 1 Lozenge Oral every 2 hours PRN Sore Throat  dextrose Oral Gel 15 Gram(s) Oral once PRN Blood Glucose LESS THAN 70 milliGRAM(s)/deciliter  lidocaine 2% Viscous 15 milliLiter(s) Mucosal every 8 hours PRN Sore throat  melatonin 3 milliGRAM(s) Oral at bedtime PRN Insomnia  ondansetron Injectable 4 milliGRAM(s) IV Push every 8 hours PRN Nausea and/or Vomiting  polyethylene glycol 3350 17 Gram(s) Oral every 24 hours PRN for constipation  senna 2 Tablet(s) Oral at bedtime PRN for constipation    Anthropometrics:  Height: 5'6"  Weight: 133lb/60.3kg  BMI: 21.4  IBW: 142lb/64.5kg             94%IBW    Weight Change:   No new weights obtained since admission. Recommend nursing to trend weekly weights. RD to continue monitoring weights as able.     Severe Acute Protein Calorie Malnutrition: Risk Assessment Completed 10/25  - PO intake: intake <50% needs >1 week  - Wt Loss: 6% wt loss in 2 weeks  - NFPE: moderate-severe muscle and fat wasting    Estimated energy needs:   Calories: 25-30kcal/k-1809kcal/d  Protein: 1.3-1.5g/k-90g/d  Defer fluids to team.   Estimated needs based on dosing wt as within % IBW 142lb/64.5kg (94%). Needs adjusted for age, cancer on treatment, malnutrition, and clinical status.     Subjective:   69M with PMH of nonoperable lung cancer (on chemoimmunotherapy status post x6 cycles and radiation therapy last round 10/15), DM, HTN, and HLD who presented with hiccups, nausea/vomiting, and fever, admitted for management. 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 Started on ganciclovir 10/24.     Pt seen on 7WO for follow-up. Labs and medication orders reviewed. Na 132 <low>, K/Mg/Phos WNL, POC blood glucose (10/31-) 105-130. On Consistent Carbohydrate Halal/No Pork diet with Glucerna x2/day. Pt asleep on visits this AM. Per RN, pt with poor appetite this AM, no GI discomfort reported. RD observed breakfast tray untouched at bedside. Per MD, pt feeling unwell in setting of fevers. Note pt appetite previously improving during admission. Constipation noted, now with x1 BM x2d ago - bowel regimen ordered, last advanced 10/21. See nutrition recommendations. RD to remain available.     Previous Nutrition Diagnosis:  Severe acute malnutrition related to inadequate PO intake in setting of increased metabolic demand secondary to cancer on treatment as evidenced by moderate-severe muscle and fat wasting, 6% wt loss in 2 weeks, intake <50% needs >1 week.    Active [ x ]  Resolved [   ]    Goal:  Pt to consistently meet >75% nutrient needs. Reduce signs and symptoms of protein-calorie malnutrition.   Maintain nutrition within goals of care.     Recommendations:  1. Continue Consistent Carbohydrate Halal diet with Glucerna x2/day.   >>Encourage & monitor PO intake. [place] dietary preferences as able.   2. Monitor GI tolerance, weight trends, labs, & skin integrity.  3. Defer bowel, antiemetic, and pain regimens to team.   >>Consider advance bowel regimen.   4. RD to remain available for diet education/intervention prn.     Risk Level: High [ x ] Moderate [   ] Low [   ]

## 2024-11-01 NOTE — PROGRESS NOTE ADULT - SUBJECTIVE AND OBJECTIVE BOX
HOSPITAL COURSE:  69M w/ PMHx of non-operable lung cancer, DM, HTN, HLD recently admitted for symptomatic anemia and chemotherapy-induced nausea and vomiting (10/14-10/15) presenting with hiccups, nausea, vomiting, and fever found to have RML pnuemonia, admitted on 10/21. GI consulted, s/p EGD 10/23, found to have medium size hiatal hernia, diffuse erythema of mucosa of whole stomach. Started on pantoprazole IV BID and sucralfate. EGD biopsy showed CMV esophagitis, started on ganciclovir 5 mg/kg IV q12h (10/24). Patient was on empiric zosyn 4.5 mg IV q8h from 10/21-10/27. Patient has been having fevers daily, given ofirmev each time and all bcx are NGTD thus far (10/21, 10/23, 10/24, 10/26). Tmax of 103F (10/27).  Hemoglobin dropped to 6.7 on 10/28, s/p 1U pRBC, hemoglobin increased to 8.7 on 10/29. Patient has ongoing issue of hiccups and inability to tolerate PO as he will vomit/regurgitate his food 2/2 hiccups. Patient trialed on baclofen, currently on gabapentin 300 BID with improvement of tolerating PO. ID, heme/onc following.     INTERVAL EVENTS:   Temp to 101.3 overnight, gave ofirmev    SUBJECTIVE:   Patient seen at bedside lying flat. Patient has no acute complaints and states that he is feeling much improved. He denies hiccuping and has not had any episodes of vomiting overnight. Denies abdominal pain, nausea, fevers, chills, sob at this time.    PHYSICAL EXAM:  General: Alert and oriented x 3. No acute distress.   HEENT: Moist mucous membranes.   Cardiovascular: Regular rate and rhythm.   Lungs: Clear to auscultation bilaterally.  Abdomen: Soft, non-tender and non-distended.  Extremities: No edema. Non-tender. Warm.    VITAL SIGNS:  Vital Signs Last 24 Hrs  T(C): 37.6 (29 Oct 2024 05:32), Max: 38.6 (28 Oct 2024 19:00)  T(F): 99.7 (29 Oct 2024 05:32), Max: 101.4 (28 Oct 2024 19:00)  HR: 112 (29 Oct 2024 05:32) (99 - 112)  BP: 111/60 (29 Oct 2024 05:32) (101/63 - 112/68)  BP(mean): --  RR: 18 (29 Oct 2024 05:32) (18 - 18)  SpO2: 94% (29 Oct 2024 05:32) (94% - 97%)    Parameters below as of 29 Oct 2024 05:32  Patient On (Oxygen Delivery Method): nasal cannula      INPATIENT MEDICATIONS:   MEDICATIONS  (STANDING):  amLODIPine   Tablet 5 milliGRAM(s) Oral daily  atorvastatin 20 milliGRAM(s) Oral at bedtime  dextrose 5%. 1000 milliLiter(s) (100 mL/Hr) IV Continuous <Continuous>  dextrose 5%. 1000 milliLiter(s) (50 mL/Hr) IV Continuous <Continuous>  dextrose 50% Injectable 25 Gram(s) IV Push once  dextrose 50% Injectable 12.5 Gram(s) IV Push once  dextrose 50% Injectable 25 Gram(s) IV Push once  enoxaparin Injectable 40 milliGRAM(s) SubCutaneous every 24 hours  gabapentin 300 milliGRAM(s) Oral every 24 hours  ganciclovir IVPB 300 milliGRAM(s) IV Intermittent every 12 hours  ganciclovir IVPB      glucagon  Injectable 1 milliGRAM(s) IntraMuscular once  insulin lispro (ADMELOG) corrective regimen sliding scale   SubCutaneous Before meals and at bedtime  magnesium sulfate  IVPB 4 Gram(s) IV Intermittent once  pantoprazole    Tablet 40 milliGRAM(s) Oral before breakfast  sucralfate 1 Gram(s) Oral every 6 hours    MEDICATIONS  (PRN):  aluminum hydroxide/magnesium hydroxide/simethicone Suspension 30 milliLiter(s) Oral every 4 hours PRN Dyspepsia  benzocaine/menthol Lozenge 1 Lozenge Oral every 2 hours PRN Sore Throat  dextrose Oral Gel 15 Gram(s) Oral once PRN Blood Glucose LESS THAN 70 milliGRAM(s)/deciliter  lidocaine 2% Viscous 15 milliLiter(s) Mucosal every 8 hours PRN Sore throat  melatonin 3 milliGRAM(s) Oral at bedtime PRN Insomnia  ondansetron Injectable 4 milliGRAM(s) IV Push every 8 hours PRN Nausea and/or Vomiting  polyethylene glycol 3350 17 Gram(s) Oral every 24 hours PRN for constipation  senna 2 Tablet(s) Oral at bedtime PRN for constipation    ALLERGIES:  Allergies    chlorhexidine containing compounds (Rash)    Intolerances    LABS:                       8.7    3.69  )-----------( 197      ( 29 Oct 2024 05:30 )             26.1     10-29    129[L]  |  97  |  12  ----------------------------<  98  3.6   |  23  |  0.64    Ca    8.1[L]      29 Oct 2024 05:30  Phos  2.1     10-29  Mg     1.4     10-29    TPro  6.0  /  Alb  2.1[L]  /  TBili  0.8  /  DBili  x   /  AST  20  /  ALT  19  /  AlkPhos  165[H]  10-29      Urinalysis Basic - ( 29 Oct 2024 05:30 )    Color: x / Appearance: x / SG: x / pH: x  Gluc: 98 mg/dL / Ketone: x  / Bili: x / Urobili: x   Blood: x / Protein: x / Nitrite: x   Leuk Esterase: x / RBC: x / WBC x   Sq Epi: x / Non Sq Epi: x / Bacteria: x      CAPILLARY BLOOD GLUCOSE      POCT Blood Glucose.: 156 mg/dL (28 Oct 2024 21:52)    RADIOLOGY & ADDITIONAL TESTS: Reviewed.

## 2024-11-01 NOTE — PROGRESS NOTE ADULT - ASSESSMENT
69M with hx of DM (A1c 7.2%), HTN, HLD, squamous cell lung cancer currently on carboplatin/taxol (last received 10/10) and RT (last received 10/15), who on 10/14 developed odynophagia, cough, and hiccups, subsequently developed nausea/vomiting and then on 10/20 developed fever for which he presented to [facility] ED, admitted on 10/21 for further management. Was febrile, with mild hypoxia (2L NC), leukopenic with severe lymphopenia. CT C/A/P w/ IVC with decreased size of known COLETTE mass (malignancy), and new bilateral upper lobe GGOs with interlobular septal thickening, as well as mid-distal esophagitis. S/p EGD on 10/23 with finding of erosive changes of middle third of esophagus and esophageal biopsy +CMV inclusions. Serum CMV PCR was log 5.1. Started on gancyte 5mg/kg IV q12h on 10/24 and odynophagia resolved completely, repeat serum CMV PCR on day 5 of therapy down to log 4.2. Also completed 7 days of zosyn on 10/27 for possible PNA. All respiratory and esophageal symptoms have entirely resolved, but patient remains febrile - likely from known CMV.    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 Continue close monitoring of cell lines and renal function (with daily CBC w/ diff and BMP).  - Please repeat serum CMV PCR on 11/4  - Anticipate 3 weeks minimum therapy. Will plan to switch to PO valcyte once fevers resolved    # Possible bacterial PNA  - Patient reported significant improvement in his cough since presentation (with zosyn), which argues against CMV disease also being responsible for lung pathology/symptoms, although CMV pneumonitis is still a possibility.   - S/p zosyn 4.5g IV q8h EI x 7 days total (EOT 10/27)    ID Team 1 will follow.

## 2024-11-01 NOTE — PROGRESS NOTE ADULT - ATTENDING COMMENTS
Pt seen and examined w students this AM.  Pt's son and wife also at bedside  69M w non-operable lung CA, DM2, HTN, HLD, recent admitted earlier this month for symptomatic anemia and chemo-induced N/V 10/14-15 now w worsening nausea and vomiting w odynophagia and dysphagia of solids>liquids and fever, admitted to due concern for sepsis and on IV zosyn, EGD 10/23 found to have radiation esophagitis - biopsy +CMV inclusions, now on IV ganciclovir starting 10/24 - s/p 1u RBC 10/28    Still spiking fevers. Otherwise tolerating PO/fluids wo N/V/Abd pain, odynophagia, or dysphagia.  Walking w PT using RW.  Exam: male in NAD on 1-2L NC. MMM, RRR, nml resp effort, CTAB, Abd soft, NABS, non-tender, alert, moving all extremities. 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 On IV Ganciclovir  #Radiation esophagitis - appears to be improved  #Sepsis d/t pneumonia - improved w 7d zosyn. Unlikely CMV as causes  #Lung Cancer  #Hyponatremia - 132 today. Likely d/t poor PO intake  #HTN - on amlodipine 5  #HLD - on atorva 20  #Leukopenia wo neutropenia. ANC 1890 today  #Normocytic anemia - hgb 7.9 today. Retic shows adequate production. No signs/sxs of blood loss    Plan  Continue IV Ganciclovir  Monitor CBC, BMP  Monitor for improvement of fevers in decision to transition from IV ganciclovir to PO valganciclovir  Above d/w Housestaff

## 2024-11-02 LAB
ANION GAP SERPL CALC-SCNC: 9 MMOL/L — SIGNIFICANT CHANGE UP (ref 5–17)
APPEARANCE UR: CLEAR — SIGNIFICANT CHANGE UP
BASOPHILS # BLD AUTO: 0.02 K/UL — SIGNIFICANT CHANGE UP (ref 0–0.2)
BASOPHILS NFR BLD AUTO: 0.6 % — SIGNIFICANT CHANGE UP (ref 0–2)
BILIRUB UR-MCNC: NEGATIVE — SIGNIFICANT CHANGE UP
BUN SERPL-MCNC: 16 MG/DL — SIGNIFICANT CHANGE UP (ref 7–23)
CALCIUM SERPL-MCNC: 8.2 MG/DL — LOW (ref 8.4–10.5)
CHLORIDE SERPL-SCNC: 96 MMOL/L — SIGNIFICANT CHANGE UP (ref 96–108)
CO2 SERPL-SCNC: 23 MMOL/L — SIGNIFICANT CHANGE UP (ref 22–31)
COLOR SPEC: YELLOW — SIGNIFICANT CHANGE UP
CREAT ?TM UR-MCNC: 61 MG/DL — SIGNIFICANT CHANGE UP
CREAT SERPL-MCNC: 0.59 MG/DL — SIGNIFICANT CHANGE UP (ref 0.5–1.3)
CULTURE RESULTS: SIGNIFICANT CHANGE UP
DIFF PNL FLD: NEGATIVE — SIGNIFICANT CHANGE UP
EGFR: 104 ML/MIN/1.73M2 — SIGNIFICANT CHANGE UP
EOSINOPHIL # BLD AUTO: 0.12 K/UL — SIGNIFICANT CHANGE UP (ref 0–0.5)
EOSINOPHIL NFR BLD AUTO: 3.3 % — SIGNIFICANT CHANGE UP (ref 0–6)
GLUCOSE BLDC GLUCOMTR-MCNC: 100 MG/DL — HIGH (ref 70–99)
GLUCOSE BLDC GLUCOMTR-MCNC: 100 MG/DL — HIGH (ref 70–99)
GLUCOSE BLDC GLUCOMTR-MCNC: 105 MG/DL — HIGH (ref 70–99)
GLUCOSE BLDC GLUCOMTR-MCNC: 171 MG/DL — HIGH (ref 70–99)
GLUCOSE SERPL-MCNC: 91 MG/DL — SIGNIFICANT CHANGE UP (ref 70–99)
GLUCOSE UR QL: NEGATIVE MG/DL — SIGNIFICANT CHANGE UP
HCT VFR BLD CALC: 22.7 % — LOW (ref 39–50)
HGB BLD-MCNC: 7.7 G/DL — LOW (ref 13–17)
IMM GRANULOCYTES NFR BLD AUTO: 0.6 % — SIGNIFICANT CHANGE UP (ref 0–0.9)
KETONES UR-MCNC: 15 MG/DL
LEUKOCYTE ESTERASE UR-ACNC: NEGATIVE — SIGNIFICANT CHANGE UP
LYMPHOCYTES # BLD AUTO: 0.47 K/UL — LOW (ref 1–3.3)
LYMPHOCYTES # BLD AUTO: 13 % — SIGNIFICANT CHANGE UP (ref 13–44)
MAGNESIUM SERPL-MCNC: 1.4 MG/DL — LOW (ref 1.6–2.6)
MCHC RBC-ENTMCNC: 30.1 PG — SIGNIFICANT CHANGE UP (ref 27–34)
MCHC RBC-ENTMCNC: 33.9 G/DL — SIGNIFICANT CHANGE UP (ref 32–36)
MCV RBC AUTO: 88.7 FL — SIGNIFICANT CHANGE UP (ref 80–100)
MONOCYTES # BLD AUTO: 0.26 K/UL — SIGNIFICANT CHANGE UP (ref 0–0.9)
MONOCYTES NFR BLD AUTO: 7.2 % — SIGNIFICANT CHANGE UP (ref 2–14)
NEUTROPHILS # BLD AUTO: 2.72 K/UL — SIGNIFICANT CHANGE UP (ref 1.8–7.4)
NEUTROPHILS NFR BLD AUTO: 75.3 % — SIGNIFICANT CHANGE UP (ref 43–77)
NITRITE UR-MCNC: NEGATIVE — SIGNIFICANT CHANGE UP
NRBC # BLD: 0 /100 WBCS — SIGNIFICANT CHANGE UP (ref 0–0)
OSMOLALITY UR: 443 MOSM/KG — SIGNIFICANT CHANGE UP (ref 300–900)
PH UR: 7.5 — SIGNIFICANT CHANGE UP (ref 5–8)
PHOSPHATE SERPL-MCNC: 2.8 MG/DL — SIGNIFICANT CHANGE UP (ref 2.5–4.5)
PLATELET # BLD AUTO: 175 K/UL — SIGNIFICANT CHANGE UP (ref 150–400)
POTASSIUM SERPL-MCNC: 3.7 MMOL/L — SIGNIFICANT CHANGE UP (ref 3.5–5.3)
POTASSIUM SERPL-SCNC: 3.7 MMOL/L — SIGNIFICANT CHANGE UP (ref 3.5–5.3)
POTASSIUM UR-SCNC: 17 MMOL/L — SIGNIFICANT CHANGE UP
PROT ?TM UR-MCNC: 19 MG/DL — HIGH (ref 0–12)
PROT UR-MCNC: SIGNIFICANT CHANGE UP MG/DL
PROT/CREAT UR-RTO: 0.3 RATIO — HIGH (ref 0–0.2)
RBC # BLD: 2.56 M/UL — LOW (ref 4.2–5.8)
RBC # FLD: 15.9 % — HIGH (ref 10.3–14.5)
SODIUM SERPL-SCNC: 128 MMOL/L — LOW (ref 135–145)
SODIUM UR-SCNC: 77 MMOL/L — SIGNIFICANT CHANGE UP
SP GR SPEC: 1.01 — SIGNIFICANT CHANGE UP (ref 1–1.03)
SPECIMEN SOURCE: SIGNIFICANT CHANGE UP
UROBILINOGEN FLD QL: 2 MG/DL (ref 0.2–1)
UUN UR-MCNC: 635 MG/DL — SIGNIFICANT CHANGE UP
WBC # BLD: 3.61 K/UL — LOW (ref 3.8–10.5)
WBC # FLD AUTO: 3.61 K/UL — LOW (ref 3.8–10.5)

## 2024-11-02 PROCEDURE — 99233 SBSQ HOSP IP/OBS HIGH 50: CPT

## 2024-11-02 RX ORDER — DIBASIC SODIUM PHOSPHATE, MONOBASIC POTASSIUM PHOSPHATE AND MONOBASIC SODIUM PHOSPHATE 852; 155; 130 MG/1; MG/1; MG/1
1 TABLET ORAL ONCE
Refills: 0 | Status: COMPLETED | OUTPATIENT
Start: 2024-11-02 | End: 2024-11-02

## 2024-11-02 RX ORDER — MAGNESIUM SULFATE IN 0.9% NACL 2 G/50 ML
4 INTRAVENOUS SOLUTION, PIGGYBACK (ML) INTRAVENOUS ONCE
Refills: 0 | Status: COMPLETED | OUTPATIENT
Start: 2024-11-02 | End: 2024-11-02

## 2024-11-02 RX ADMIN — SUCRALFATE 1 GRAM(S): 1 SUSPENSION ORAL at 06:07

## 2024-11-02 RX ADMIN — GABAPENTIN 300 MILLIGRAM(S): 300 CAPSULE ORAL at 06:07

## 2024-11-02 RX ADMIN — Medication 20 MILLIGRAM(S): at 21:41

## 2024-11-02 RX ADMIN — Medication 25 GRAM(S): at 10:13

## 2024-11-02 RX ADMIN — Medication 5 MILLIGRAM(S): at 06:07

## 2024-11-02 RX ADMIN — Medication 2: at 22:28

## 2024-11-02 RX ADMIN — DIBASIC SODIUM PHOSPHATE, MONOBASIC POTASSIUM PHOSPHATE AND MONOBASIC SODIUM PHOSPHATE 1 PACKET(S): 852; 155; 130 TABLET ORAL at 10:13

## 2024-11-02 RX ADMIN — SUCRALFATE 1 GRAM(S): 1 SUSPENSION ORAL at 01:04

## 2024-11-02 RX ADMIN — Medication 40 MILLIGRAM(S): at 20:04

## 2024-11-02 RX ADMIN — SUCRALFATE 1 GRAM(S): 1 SUSPENSION ORAL at 12:14

## 2024-11-02 RX ADMIN — GANCICLOVIR SODIUM 100 MILLIGRAM(S): 50 INJECTION, POWDER, LYOPHILIZED, FOR SOLUTION INTRAVENOUS at 21:44

## 2024-11-02 RX ADMIN — PANTOPRAZOLE SODIUM 40 MILLIGRAM(S): 40 TABLET, DELAYED RELEASE ORAL at 06:07

## 2024-11-02 RX ADMIN — GABAPENTIN 300 MILLIGRAM(S): 300 CAPSULE ORAL at 14:12

## 2024-11-02 RX ADMIN — GANCICLOVIR SODIUM 100 MILLIGRAM(S): 50 INJECTION, POWDER, LYOPHILIZED, FOR SOLUTION INTRAVENOUS at 07:49

## 2024-11-02 NOTE — PROGRESS NOTE ADULT - SUBJECTIVE AND OBJECTIVE BOX
HOSPITAL COURSE:  69M w/ PMHx of non-operable lung cancer, DM, HTN, HLD recently admitted for symptomatic anemia and chemotherapy-induced nausea and vomiting (10/14-10/15) presenting with hiccups, nausea, vomiting, and fever found to have RML pnuemonia, admitted on 10/21. GI consulted, s/p EGD 10/23, found to have medium size hiatal hernia, diffuse erythema of mucosa of whole stomach. Started on pantoprazole IV BID and sucralfate. EGD biopsy showed CMV esophagitis, started on ganciclovir 5 mg/kg IV q12h (10/24). Patient was on empiric zosyn 4.5 mg IV q8h from 10/21-10/27. Patient has been having fevers daily, given ofirmev each time and all bcx are NGTD thus far (10/21, 10/23, 10/24, 10/26). Tmax of 103F (10/27).  Hemoglobin dropped to 6.7 on 10/28, s/p 1U pRBC, hemoglobin increased to 8.7 on 10/29. Patient has ongoing issue of hiccups and inability to tolerate PO as he will vomit/regurgitate his food 2/2 hiccups. Patient trialed on baclofen, currently on gabapentin 300 BID with improvement of tolerating PO. ID, heme/onc following.     INTERVAL EVENTS:   Temp to 100.6 overnight, no tylenol was given and temp decreased to 99.4 on repeat    SUBJECTIVE:   Patient seen at bedside lying flat. Patient has no acute complaints and states that he is feeling much improved. He denies hiccuping and has not had any episodes of vomiting overnight. Denies abdominal pain, nausea, fevers, chills, sob at this time.    PHYSICAL EXAM:  General: Alert and oriented x 3. No acute distress.   HEENT: Moist mucous membranes.   Cardiovascular: Regular rate and rhythm.   Lungs: Clear to auscultation bilaterally.  Abdomen: Soft, non-tender and non-distended.  Extremities: No edema. Non-tender. Warm.    VITAL SIGNS:  Vital Signs Last 24 Hrs  T(C): 37.6 (29 Oct 2024 05:32), Max: 38.6 (28 Oct 2024 19:00)  T(F): 99.7 (29 Oct 2024 05:32), Max: 101.4 (28 Oct 2024 19:00)  HR: 112 (29 Oct 2024 05:32) (99 - 112)  BP: 111/60 (29 Oct 2024 05:32) (101/63 - 112/68)  BP(mean): --  RR: 18 (29 Oct 2024 05:32) (18 - 18)  SpO2: 94% (29 Oct 2024 05:32) (94% - 97%)    Parameters below as of 29 Oct 2024 05:32  Patient On (Oxygen Delivery Method): nasal cannula      INPATIENT MEDICATIONS:   MEDICATIONS  (STANDING):  amLODIPine   Tablet 5 milliGRAM(s) Oral daily  atorvastatin 20 milliGRAM(s) Oral at bedtime  dextrose 5%. 1000 milliLiter(s) (100 mL/Hr) IV Continuous <Continuous>  dextrose 5%. 1000 milliLiter(s) (50 mL/Hr) IV Continuous <Continuous>  dextrose 50% Injectable 25 Gram(s) IV Push once  dextrose 50% Injectable 12.5 Gram(s) IV Push once  dextrose 50% Injectable 25 Gram(s) IV Push once  enoxaparin Injectable 40 milliGRAM(s) SubCutaneous every 24 hours  gabapentin 300 milliGRAM(s) Oral every 24 hours  ganciclovir IVPB 300 milliGRAM(s) IV Intermittent every 12 hours  ganciclovir IVPB      glucagon  Injectable 1 milliGRAM(s) IntraMuscular once  insulin lispro (ADMELOG) corrective regimen sliding scale   SubCutaneous Before meals and at bedtime  magnesium sulfate  IVPB 4 Gram(s) IV Intermittent once  pantoprazole    Tablet 40 milliGRAM(s) Oral before breakfast  sucralfate 1 Gram(s) Oral every 6 hours    MEDICATIONS  (PRN):  aluminum hydroxide/magnesium hydroxide/simethicone Suspension 30 milliLiter(s) Oral every 4 hours PRN Dyspepsia  benzocaine/menthol Lozenge 1 Lozenge Oral every 2 hours PRN Sore Throat  dextrose Oral Gel 15 Gram(s) Oral once PRN Blood Glucose LESS THAN 70 milliGRAM(s)/deciliter  lidocaine 2% Viscous 15 milliLiter(s) Mucosal every 8 hours PRN Sore throat  melatonin 3 milliGRAM(s) Oral at bedtime PRN Insomnia  ondansetron Injectable 4 milliGRAM(s) IV Push every 8 hours PRN Nausea and/or Vomiting  polyethylene glycol 3350 17 Gram(s) Oral every 24 hours PRN for constipation  senna 2 Tablet(s) Oral at bedtime PRN for constipation    ALLERGIES:  Allergies    chlorhexidine containing compounds (Rash)    Intolerances    LABS:                       8.7    3.69  )-----------( 197      ( 29 Oct 2024 05:30 )             26.1     10-29    129[L]  |  97  |  12  ----------------------------<  98  3.6   |  23  |  0.64    Ca    8.1[L]      29 Oct 2024 05:30  Phos  2.1     10-29  Mg     1.4     10-29    TPro  6.0  /  Alb  2.1[L]  /  TBili  0.8  /  DBili  x   /  AST  20  /  ALT  19  /  AlkPhos  165[H]  10-29      Urinalysis Basic - ( 29 Oct 2024 05:30 )    Color: x / Appearance: x / SG: x / pH: x  Gluc: 98 mg/dL / Ketone: x  / Bili: x / Urobili: x   Blood: x / Protein: x / Nitrite: x   Leuk Esterase: x / RBC: x / WBC x   Sq Epi: x / Non Sq Epi: x / Bacteria: x      CAPILLARY BLOOD GLUCOSE      POCT Blood Glucose.: 156 mg/dL (28 Oct 2024 21:52)    RADIOLOGY & ADDITIONAL TESTS: Reviewed.

## 2024-11-02 NOTE — PROGRESS NOTE ADULT - ASSESSMENT
69M with hx of DM (A1c 7.2%), HTN, HLD, squamous cell lung cancer currently on carboplatin/taxol (last received 10/10) and RT (last received 10/15), who on 10/14 developed odynophagia, cough, and hiccups, subsequently developed nausea/vomiting and then on 10/20 developed fever for which he presented to [facility] ED, admitted on 10/21 for further management. Was febrile, with mild hypoxia (2L NC), leukopenic with severe lymphopenia. CT C/A/P w/ IVC with decreased size of known COLETTE mass (malignancy), and new bilateral upper lobe GGOs with interlobular septal thickening, as well as mid-distal esophagitis. S/p EGD on 10/23 with finding of erosive changes of middle third of esophagus and esophageal biopsy +CMV inclusions. Started on ganciclovir 10/24.

## 2024-11-02 NOTE — PROGRESS NOTE ADULT - PROBLEM SELECTOR PLAN 11
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 S/p 7 cycles of neoadjuvant platinum based with gemcitabine and nivolumab chemoimmunotherapy. Follows with Dr. Mueller for RT, s/p last round of RT on 10/15.   Follows with Dr. Larose outpatient.

## 2024-11-02 NOTE — PROGRESS NOTE ADULT - PROBLEM SELECTOR PLAN 4
Pt has had hiccups since his dc last week. Rad-onc started pt on Zofran which did not help. S/p Reglan in ED w/o improvement. Hiccups have caused the pt N/V.  S/P gabapentin 300 mg x1 10/21 AM, switched to baclofen on 10/25  Patient unable to tolerate PO, was able to do so when on gabapentin. Switched baclofen back to eduar on 10/27.     - c/w gabapentin 300 bid

## 2024-11-02 NOTE — PROGRESS NOTE ADULT - ATTENDING COMMENTS
Patient seen and examined at bedside on 11/02/2024.    69M w non-operable lung CA, DM2, HTN, HLD, recent admitted earlier this month for symptomatic anemia and chemo-induced N/V 10/14-15 now w worsening nausea and vomiting w odynophagia and dysphagia of solids>liquids and fever, admitted to due concern for sepsis and on IV zosyn, EGD 10/23 found to have radiation esophagitis - biopsy +CMV inclusions, now on IV ganciclovir starting 10/24 - s/p 1u RBC 10/28    He reports he is feeling much better. Last temp was 99.3. He is starting to eat better. No abdo pain, no n/v, no cp. Some fatigue.     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 On IV Ganciclovir. Monitor fever curve and plan for CMV PCR on Monday. Appreciate ID recs as well.  #Radiation esophagitis - appears to be improved  #Sepsis d/t pneumonia - improved w 7d zosyn. Unlikely CMV as causes  #Lung Cancer  #Hyponatremia - Imprving. Likely d/t poor PO intake  #HTN - on amlodipine 5  #HLD - on atorvastatin 20  #Leukopenia wo neutropenia.   #Normocytic anemia - hgb 7.7 today. Retic shows adequate production. No signs/sxs of blood loss    Discussed with housestaff.

## 2024-11-02 NOTE — PROGRESS NOTE ADULT - PROBLEM SELECTOR PLAN 1
s/p EGD with biopsy 10/23. Risk factor is chemo, HIV negative. CMV PCR 10/24 126k. CMV PCR 5.10 (elevated).  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 Small focus of epithelium with atypia c/w prior radiation antrum no significant pathology. Negative for H pylori.  No pulmonary consult necessary at this time as patient is being treated with Zosyn for PNA and CMV esophagitis and pneumonitis simultaneously is rare.  All Bcx NGTD thus far (10/26, 10/24, 10/21)  CMV PCR specimen received 10/28  Continues to spike fevers daily    As per ID,   - Started on ganciclovir 5mg/kg IV q12h (10/24 - )  - Continue close monitoring of cell lines and renal function (with daily CBC w/ diff and BMP)  - Monitor for ocular symptoms  - f/u CMV PCR on day 10 of ganciclovir (11/4)  - Anticipate 3 weeks minimum therapy. Will plan to switch to PO valcyte after his symptoms have improved significantly.

## 2024-11-02 NOTE — PROGRESS NOTE ADULT - PROBLEM SELECTOR PLAN 9
Na 127 on admission, previously 134. Pt denies HA but admits to N/V, which is chronic for him. Has had poor PO intake iso N/V. Drinks gatorade and ensures. No visual disturbances, palpitations.   Corrected Na 128. Calculated serum osm 268. S/p 2L NS in ED. Na improved to 131 s/p NS 2 L. Na has dropped to 128 today. No CNS changes. Likely d/t poor PO intake.  Na 129 (10/29)  Urine Studies 10/25: uOsm 451, Jeremi 55, TSH 0.685 wnl. Likely SIADH.   Na 128 this AM.     - f/u urine studies  - Likely due to SIADH, CTM

## 2024-11-03 LAB
ADD ON TEST-SPECIMEN IN LAB: SIGNIFICANT CHANGE UP
ANION GAP SERPL CALC-SCNC: 6 MMOL/L — SIGNIFICANT CHANGE UP (ref 5–17)
ANION GAP SERPL CALC-SCNC: 8 MMOL/L — SIGNIFICANT CHANGE UP (ref 5–17)
BASOPHILS # BLD AUTO: 0.02 K/UL — SIGNIFICANT CHANGE UP (ref 0–0.2)
BASOPHILS NFR BLD AUTO: 0.5 % — SIGNIFICANT CHANGE UP (ref 0–2)
BUN SERPL-MCNC: 12 MG/DL — SIGNIFICANT CHANGE UP (ref 7–23)
BUN SERPL-MCNC: 13 MG/DL — SIGNIFICANT CHANGE UP (ref 7–23)
CALCIUM SERPL-MCNC: 8 MG/DL — LOW (ref 8.4–10.5)
CALCIUM SERPL-MCNC: 8.2 MG/DL — LOW (ref 8.4–10.5)
CHLORIDE SERPL-SCNC: 96 MMOL/L — SIGNIFICANT CHANGE UP (ref 96–108)
CHLORIDE SERPL-SCNC: 97 MMOL/L — SIGNIFICANT CHANGE UP (ref 96–108)
CO2 SERPL-SCNC: 26 MMOL/L — SIGNIFICANT CHANGE UP (ref 22–31)
CO2 SERPL-SCNC: 26 MMOL/L — SIGNIFICANT CHANGE UP (ref 22–31)
CREAT SERPL-MCNC: 0.56 MG/DL — SIGNIFICANT CHANGE UP (ref 0.5–1.3)
CREAT SERPL-MCNC: 0.59 MG/DL — SIGNIFICANT CHANGE UP (ref 0.5–1.3)
EGFR: 104 ML/MIN/1.73M2 — SIGNIFICANT CHANGE UP
EGFR: 106 ML/MIN/1.73M2 — SIGNIFICANT CHANGE UP
EOSINOPHIL # BLD AUTO: 0.1 K/UL — SIGNIFICANT CHANGE UP (ref 0–0.5)
EOSINOPHIL NFR BLD AUTO: 2.6 % — SIGNIFICANT CHANGE UP (ref 0–6)
GLUCOSE BLDC GLUCOMTR-MCNC: 127 MG/DL — HIGH (ref 70–99)
GLUCOSE BLDC GLUCOMTR-MCNC: 134 MG/DL — HIGH (ref 70–99)
GLUCOSE BLDC GLUCOMTR-MCNC: 148 MG/DL — HIGH (ref 70–99)
GLUCOSE BLDC GLUCOMTR-MCNC: 180 MG/DL — HIGH (ref 70–99)
GLUCOSE BLDC GLUCOMTR-MCNC: 198 MG/DL — HIGH (ref 70–99)
GLUCOSE SERPL-MCNC: 113 MG/DL — HIGH (ref 70–99)
GLUCOSE SERPL-MCNC: 162 MG/DL — HIGH (ref 70–99)
HCT VFR BLD CALC: 23.1 % — LOW (ref 39–50)
HGB BLD-MCNC: 7.6 G/DL — LOW (ref 13–17)
IMM GRANULOCYTES NFR BLD AUTO: 0.5 % — SIGNIFICANT CHANGE UP (ref 0–0.9)
LYMPHOCYTES # BLD AUTO: 0.6 K/UL — LOW (ref 1–3.3)
LYMPHOCYTES # BLD AUTO: 15.6 % — SIGNIFICANT CHANGE UP (ref 13–44)
MAGNESIUM SERPL-MCNC: 1.6 MG/DL — SIGNIFICANT CHANGE UP (ref 1.6–2.6)
MCHC RBC-ENTMCNC: 28.7 PG — SIGNIFICANT CHANGE UP (ref 27–34)
MCHC RBC-ENTMCNC: 32.9 G/DL — SIGNIFICANT CHANGE UP (ref 32–36)
MCV RBC AUTO: 87.2 FL — SIGNIFICANT CHANGE UP (ref 80–100)
MONOCYTES # BLD AUTO: 0.32 K/UL — SIGNIFICANT CHANGE UP (ref 0–0.9)
MONOCYTES NFR BLD AUTO: 8.3 % — SIGNIFICANT CHANGE UP (ref 2–14)
NEUTROPHILS # BLD AUTO: 2.78 K/UL — SIGNIFICANT CHANGE UP (ref 1.8–7.4)
NEUTROPHILS NFR BLD AUTO: 72.5 % — SIGNIFICANT CHANGE UP (ref 43–77)
NRBC # BLD: 0 /100 WBCS — SIGNIFICANT CHANGE UP (ref 0–0)
OSMOLALITY UR: 237 MOSM/KG — LOW (ref 300–900)
PHOSPHATE SERPL-MCNC: 3 MG/DL — SIGNIFICANT CHANGE UP (ref 2.5–4.5)
PLATELET # BLD AUTO: 183 K/UL — SIGNIFICANT CHANGE UP (ref 150–400)
POTASSIUM SERPL-MCNC: 3.5 MMOL/L — SIGNIFICANT CHANGE UP (ref 3.5–5.3)
POTASSIUM SERPL-MCNC: 3.6 MMOL/L — SIGNIFICANT CHANGE UP (ref 3.5–5.3)
POTASSIUM SERPL-SCNC: 3.5 MMOL/L — SIGNIFICANT CHANGE UP (ref 3.5–5.3)
POTASSIUM SERPL-SCNC: 3.6 MMOL/L — SIGNIFICANT CHANGE UP (ref 3.5–5.3)
RBC # BLD: 2.65 M/UL — LOW (ref 4.2–5.8)
RBC # FLD: 15.5 % — HIGH (ref 10.3–14.5)
SODIUM SERPL-SCNC: 128 MMOL/L — LOW (ref 135–145)
SODIUM SERPL-SCNC: 131 MMOL/L — LOW (ref 135–145)
SODIUM UR-SCNC: 27 MMOL/L — SIGNIFICANT CHANGE UP
WBC # BLD: 3.84 K/UL — SIGNIFICANT CHANGE UP (ref 3.8–10.5)
WBC # FLD AUTO: 3.84 K/UL — SIGNIFICANT CHANGE UP (ref 3.8–10.5)

## 2024-11-03 PROCEDURE — 99232 SBSQ HOSP IP/OBS MODERATE 35: CPT

## 2024-11-03 RX ORDER — MAGNESIUM SULFATE IN 0.9% NACL 2 G/50 ML
2 INTRAVENOUS SOLUTION, PIGGYBACK (ML) INTRAVENOUS ONCE
Refills: 0 | Status: COMPLETED | OUTPATIENT
Start: 2024-11-03 | End: 2024-11-03

## 2024-11-03 RX ORDER — SODIUM CHLORIDE 9 MG/ML
500 INJECTION, SOLUTION INTRAMUSCULAR; INTRAVENOUS; SUBCUTANEOUS ONCE
Refills: 0 | Status: COMPLETED | OUTPATIENT
Start: 2024-11-03 | End: 2024-11-03

## 2024-11-03 RX ADMIN — Medication 20 MILLIGRAM(S): at 21:46

## 2024-11-03 RX ADMIN — Medication 25 GRAM(S): at 10:53

## 2024-11-03 RX ADMIN — Medication 40 MILLIGRAM(S): at 17:41

## 2024-11-03 RX ADMIN — SUCRALFATE 1 GRAM(S): 1 SUSPENSION ORAL at 17:38

## 2024-11-03 RX ADMIN — SUCRALFATE 1 GRAM(S): 1 SUSPENSION ORAL at 00:05

## 2024-11-03 RX ADMIN — GANCICLOVIR SODIUM 100 MILLIGRAM(S): 50 INJECTION, POWDER, LYOPHILIZED, FOR SOLUTION INTRAVENOUS at 06:35

## 2024-11-03 RX ADMIN — SUCRALFATE 1 GRAM(S): 1 SUSPENSION ORAL at 11:07

## 2024-11-03 RX ADMIN — PANTOPRAZOLE SODIUM 40 MILLIGRAM(S): 40 TABLET, DELAYED RELEASE ORAL at 06:24

## 2024-11-03 RX ADMIN — GANCICLOVIR SODIUM 100 MILLIGRAM(S): 50 INJECTION, POWDER, LYOPHILIZED, FOR SOLUTION INTRAVENOUS at 17:41

## 2024-11-03 RX ADMIN — GABAPENTIN 300 MILLIGRAM(S): 300 CAPSULE ORAL at 13:40

## 2024-11-03 RX ADMIN — SODIUM CHLORIDE 500 MILLILITER(S): 9 INJECTION, SOLUTION INTRAMUSCULAR; INTRAVENOUS; SUBCUTANEOUS at 18:10

## 2024-11-03 RX ADMIN — GABAPENTIN 300 MILLIGRAM(S): 300 CAPSULE ORAL at 06:24

## 2024-11-03 RX ADMIN — Medication 2: at 18:10

## 2024-11-03 RX ADMIN — SUCRALFATE 1 GRAM(S): 1 SUSPENSION ORAL at 06:24

## 2024-11-03 RX ADMIN — Medication 5 MILLIGRAM(S): at 06:25

## 2024-11-03 NOTE — PROGRESS NOTE ADULT - ASSESSMENT
69M with hx of DM (A1c 7.2%), HTN, HLD, squamous cell lung cancer currently on carboplatin/taxol (last received 10/10) and RT (last received 10/15), who on 10/14 developed odynophagia, cough, and hiccups, subsequently developed nausea/vomiting and then on 10/20 developed fever for which he presented to [facility] ED, admitted on 10/21 for further management. Was febrile, with mild hypoxia (2L NC), leukopenic with severe lymphopenia. CT C/A/P w/ IVC with decreased size of known COLETTE mass (malignancy), and new bilateral upper lobe GGOs with interlobular septal thickening, as well as mid-distal esophagitis. S/p EGD on 10/23 with finding of erosive changes of middle third of esophagus and esophageal biopsy +CMV inclusions. Started on ganciclovir 10/24.

## 2024-11-03 NOTE — PROGRESS NOTE ADULT - PROBLEM SELECTOR PLAN 9
Na 127 on admission, previously 134. Pt denies HA but admits to N/V, which is chronic for him. Has had poor PO intake iso N/V. Drinks gatorade and ensures. No visual disturbances, palpitations.   Corrected Na 128. Calculated serum osm 268. S/p 2L NS in ED. Na improved to 131 s/p NS 2 L. Na has dropped to 128 today. No CNS changes. Likely d/t poor PO intake.  Na 129 (10/29)  Urine Studies 10/25: uOsm 451, Jeremi 55, TSH 0.685 wnl. Likely SIADH.     this   improving    - f/u urine studies, trend BMP bid  - Likely due to SIADH, CTM

## 2024-11-03 NOTE — PROGRESS NOTE ADULT - NUTRITIONAL ASSESSMENT
This patient has been assessed with a concern for Malnutrition and has been determined to have a diagnosis/diagnoses of Severe protein-calorie malnutrition.    This patient is being managed with:   Diet Consistent Carbohydrate w/Evening Snack-  Halal  No Pork  Supplement Feeding Modality:  Oral  Glucerna Shake Cans or Servings Per Day:  1       Frequency:  Two Times a day  Entered: Oct 28 2024 12:41PM  
This patient has been assessed with a concern for Malnutrition and has been determined to have a diagnosis/diagnoses of Severe protein-calorie malnutrition.    This patient is being managed with:   Diet Consistent Carbohydrate w/Evening Snack-  Supplement Feeding Modality:  Oral  Glucerna Shake Cans or Servings Per Day:  1       Frequency:  Daily  Entered: Oct 25 2024  2:35PM  
This patient has been assessed with a concern for Malnutrition and has been determined to have a diagnosis/diagnoses of Severe protein-calorie malnutrition.    This patient is being managed with:   Diet Consistent Carbohydrate w/Evening Snack-  Halal  No Pork  Supplement Feeding Modality:  Oral  Glucerna Shake Cans or Servings Per Day:  1       Frequency:  Two Times a day  Entered: Oct 28 2024 12:41PM  
This patient has been assessed with a concern for Malnutrition and has been determined to have a diagnosis/diagnoses of Severe protein-calorie malnutrition.    This patient is being managed with:   Diet Consistent Carbohydrate w/Evening Snack-  Supplement Feeding Modality:  Oral  Glucerna Shake Cans or Servings Per Day:  1       Frequency:  Daily  Entered: Oct 25 2024  2:35PM  
This patient has been assessed with a concern for Malnutrition and has been determined to have a diagnosis/diagnoses of Severe protein-calorie malnutrition.    This patient is being managed with:   Diet Consistent Carbohydrate w/Evening Snack-  Halal  No Pork  Supplement Feeding Modality:  Oral  Glucerna Shake Cans or Servings Per Day:  1       Frequency:  Two Times a day  Entered: Oct 28 2024 12:41PM  
This patient has been assessed with a concern for Malnutrition and has been determined to have a diagnosis/diagnoses of Severe protein-calorie malnutrition.    This patient is being managed with:   Diet Consistent Carbohydrate w/Evening Snack-  Supplement Feeding Modality:  Oral  Glucerna Shake Cans or Servings Per Day:  1       Frequency:  Daily  Entered: Oct 25 2024  2:35PM  
This patient has been assessed with a concern for Malnutrition and has been determined to have a diagnosis/diagnoses of Severe protein-calorie malnutrition.    This patient is being managed with:   Diet Consistent Carbohydrate w/Evening Snack-  Halal  No Pork  Supplement Feeding Modality:  Oral  Glucerna Shake Cans or Servings Per Day:  1       Frequency:  Two Times a day  Entered: Oct 28 2024 12:41PM  
This patient has been assessed with a concern for Malnutrition and has been determined to have a diagnosis/diagnoses of Severe protein-calorie malnutrition.    This patient is being managed with:   Diet Consistent Carbohydrate w/Evening Snack-  Halal  No Pork  Supplement Feeding Modality:  Oral  Glucerna Shake Cans or Servings Per Day:  1       Frequency:  Two Times a day  Entered: Oct 28 2024 12:41PM

## 2024-11-03 NOTE — PROGRESS NOTE ADULT - PROBLEM SELECTOR PLAN 1
s/p EGD with biopsy 10/23. Risk factor is chemo, HIV negative. CMV PCR 10/24 126k. CMV PCR 5.10 (elevated).  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 Small focus of epithelium with atypia c/w prior radiation antrum no significant pathology. Negative for H pylori.  No pulmonary consult necessary at this time as patient is being treated with Zosyn for PNA and CMV esophagitis and pneumonitis simultaneously is rare.  All Bcx NGTD thus far (10/26, 10/24, 10/21)  CMV PCR specimen received 10/28    now afebrile 48 hours.    As per ID,   - Started on ganciclovir 5mg/kg IV q12h (10/24 - )  - Continue close monitoring of cell lines and renal function (with daily CBC w/ diff and BMP)  - Monitor for ocular symptoms  - f/u CMV PCR on day 10 of ganciclovir (11/4)  - Anticipate 3 weeks minimum therapy. Will plan to switch to PO valcyte after his symptoms have improved significantly.

## 2024-11-03 NOTE — PROGRESS NOTE ADULT - SUBJECTIVE AND OBJECTIVE BOX
CATHERINE APONTE 69y Male    INTERVAL EVENTS:   no acute events during interval/overnight.    SUBJECTIVE:  Patient seen and examined at bedside.    Rest of 12 point review of systems is negative except as stated above.    OBJECTIVE:  Vital Signs Last 24 Hrs  T(C): 37.6 (2024 05:30), Max: 37.6 (2024 05:30)  T(F): 99.6 (2024 05:30), Max: 99.6 (2024 05:30)  HR: 108 (2024 05:30) (99 - 108)  BP: 117/68 (2024 05:30) (109/67 - 120/88)  BP(mean): --  RR: 18 (2024 05:30) (18 - 18)  SpO2: 98% (2024 05:30) (94% - 98%)    Parameters below as of 2024 05:30  Patient On (Oxygen Delivery Method): nasal cannula    O2 Concentration (%): 2    PHYSICAL EXAM:  General: NAD. Speaking in full sentences. Resting in bed.   HEENT: NC/AT; PERRL, clear conjunctiva  Neck: supple  Respiratory: CTA b/l, no retractions or accessory muscle use. No increased work of breathing.   Cardiovascular: +S1/S2; RRR  Abdomen: soft, NT/ND; +BS x4  Extremities: 2+ peripheral pulses b/l; no LE edema  Skin: normal color and turgor; no rash; warm and well perfused  Neurological: AAOx3. No FND noted.  Psychiatry: Mood and Affect appropriate.    LABS:                        7.7    3.61  )-----------( 175      ( 2024 08:05 )             22.7     11-02    128[L]  |  96  |  16  ----------------------------<  91  3.7   |  23  |  0.59    Ca    8.2[L]      2024 08:05  Phos  2.8     11-02  Mg     1.4     11-02          CAPILLARY BLOOD GLUCOSE      POCT Blood Glucose.: 171 mg/dL (2024 21:48)  POCT Blood Glucose.: 100 mg/dL (2024 17:15)  POCT Blood Glucose.: 100 mg/dL (2024 12:45)  POCT Blood Glucose.: 105 mg/dL (2024 08:24)    Urinalysis Basic - ( 2024 09:41 )    Color: Yellow / Appearance: Clear / S.013 / pH: x  Gluc: x / Ketone: 15 mg/dL  / Bili: Negative / Urobili: 2.0 mg/dL   Blood: x / Protein: Trace mg/dL / Nitrite: Negative   Leuk Esterase: Negative / RBC: x / WBC x   Sq Epi: x / Non Sq Epi: x / Bacteria: x        MICRODATA:      RADIOLOGY/OTHER STUDIES: reviewed.      MEDICATIONS:  aluminum hydroxide/magnesium hydroxide/simethicone Suspension 30 milliLiter(s) Oral every 4 hours PRN  amLODIPine   Tablet 5 milliGRAM(s) Oral daily  atorvastatin 20 milliGRAM(s) Oral at bedtime  benzocaine/menthol Lozenge 1 Lozenge Oral every 2 hours PRN  dextrose 5%. 1000 milliLiter(s) IV Continuous <Continuous>  dextrose 5%. 1000 milliLiter(s) IV Continuous <Continuous>  dextrose 50% Injectable 25 Gram(s) IV Push once  dextrose 50% Injectable 12.5 Gram(s) IV Push once  dextrose 50% Injectable 25 Gram(s) IV Push once  dextrose Oral Gel 15 Gram(s) Oral once PRN  enoxaparin Injectable 40 milliGRAM(s) SubCutaneous every 24 hours  gabapentin 300 milliGRAM(s) Oral <User Schedule>  ganciclovir IVPB 300 milliGRAM(s) IV Intermittent every 12 hours  ganciclovir IVPB      glucagon  Injectable 1 milliGRAM(s) IntraMuscular once  insulin lispro (ADMELOG) corrective regimen sliding scale   SubCutaneous Before meals and at bedtime  lidocaine 2% Viscous 15 milliLiter(s) Mucosal every 8 hours PRN  melatonin 3 milliGRAM(s) Oral at bedtime PRN  ondansetron Injectable 4 milliGRAM(s) IV Push every 8 hours PRN  pantoprazole    Tablet 40 milliGRAM(s) Oral before breakfast  polyethylene glycol 3350 17 Gram(s) Oral every 24 hours PRN  senna 2 Tablet(s) Oral at bedtime PRN  sucralfate 1 Gram(s) Oral every 6 hours    chlorhexidine containing compounds (Rash)   CATHERINE APONTE 69y Male    INTERVAL EVENTS:   no acute events during interval/overnight.    SUBJECTIVE:  Patient seen and examined at bedside.  Feels well. Denies any new complaints.   Rest of 12 point review of systems is negative except as stated above.    OBJECTIVE:  Vital Signs Last 24 Hrs  T(C): 37.6 (2024 05:30), Max: 37.6 (2024 05:30)  T(F): 99.6 (2024 05:30), Max: 99.6 (2024 05:30)  HR: 108 (2024 05:30) (99 - 108)  BP: 117/68 (2024 05:30) (109/67 - 120/88)  BP(mean): --  RR: 18 (2024 05:30) (18 - 18)  SpO2: 98% (2024 05:30) (94% - 98%)    Parameters below as of 2024 05:30  Patient On (Oxygen Delivery Method): nasal cannula    O2 Concentration (%): 2    PHYSICAL EXAM:  General: NAD. Speaking in full sentences. Resting in bed.   HEENT: NC/AT; PERRL, clear conjunctiva  Neck: supple  Respiratory: CTA b/l, no retractions or accessory muscle use. No increased work of breathing.   Cardiovascular: +S1/S2; RRR  Abdomen: soft, NT/ND; +BS x4  Extremities: 2+ peripheral pulses b/l; no LE edema  Skin: normal color and turgor; no rash; warm and well perfused  Neurological: AAOx3. No FND noted.  Psychiatry: Mood and Affect appropriate.    LABS:                        7.7    3.61  )-----------( 175      ( 2024 08:05 )             22.7     11-02    128[L]  |  96  |  16  ----------------------------<  91  3.7   |  23  |  0.59    Ca    8.2[L]      2024 08:05  Phos  2.8     11-02  Mg     1.4     11-02          CAPILLARY BLOOD GLUCOSE      POCT Blood Glucose.: 171 mg/dL (2024 21:48)  POCT Blood Glucose.: 100 mg/dL (2024 17:15)  POCT Blood Glucose.: 100 mg/dL (2024 12:45)  POCT Blood Glucose.: 105 mg/dL (2024 08:24)    Urinalysis Basic - ( 2024 09:41 )    Color: Yellow / Appearance: Clear / S.013 / pH: x  Gluc: x / Ketone: 15 mg/dL  / Bili: Negative / Urobili: 2.0 mg/dL   Blood: x / Protein: Trace mg/dL / Nitrite: Negative   Leuk Esterase: Negative / RBC: x / WBC x   Sq Epi: x / Non Sq Epi: x / Bacteria: x        MICRODATA:      RADIOLOGY/OTHER STUDIES: reviewed.      MEDICATIONS:  aluminum hydroxide/magnesium hydroxide/simethicone Suspension 30 milliLiter(s) Oral every 4 hours PRN  amLODIPine   Tablet 5 milliGRAM(s) Oral daily  atorvastatin 20 milliGRAM(s) Oral at bedtime  benzocaine/menthol Lozenge 1 Lozenge Oral every 2 hours PRN  dextrose 5%. 1000 milliLiter(s) IV Continuous <Continuous>  dextrose 5%. 1000 milliLiter(s) IV Continuous <Continuous>  dextrose 50% Injectable 25 Gram(s) IV Push once  dextrose 50% Injectable 12.5 Gram(s) IV Push once  dextrose 50% Injectable 25 Gram(s) IV Push once  dextrose Oral Gel 15 Gram(s) Oral once PRN  enoxaparin Injectable 40 milliGRAM(s) SubCutaneous every 24 hours  gabapentin 300 milliGRAM(s) Oral <User Schedule>  ganciclovir IVPB 300 milliGRAM(s) IV Intermittent every 12 hours  ganciclovir IVPB      glucagon  Injectable 1 milliGRAM(s) IntraMuscular once  insulin lispro (ADMELOG) corrective regimen sliding scale   SubCutaneous Before meals and at bedtime  lidocaine 2% Viscous 15 milliLiter(s) Mucosal every 8 hours PRN  melatonin 3 milliGRAM(s) Oral at bedtime PRN  ondansetron Injectable 4 milliGRAM(s) IV Push every 8 hours PRN  pantoprazole    Tablet 40 milliGRAM(s) Oral before breakfast  polyethylene glycol 3350 17 Gram(s) Oral every 24 hours PRN  senna 2 Tablet(s) Oral at bedtime PRN  sucralfate 1 Gram(s) Oral every 6 hours    chlorhexidine containing compounds (Rash)

## 2024-11-03 NOTE — PROGRESS NOTE ADULT - PROBLEM SELECTOR PLAN 11
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 S/p 7 cycles of neoadjuvant platinum based with gemcitabine and nivolumab chemoimmunotherapy. Follows with Dr. Mueller for RT, s/p last round of RT on 10/15.   Follows with Dr. Larose outpatient.

## 2024-11-04 ENCOUNTER — TRANSCRIPTION ENCOUNTER (OUTPATIENT)
Age: 69
End: 2024-11-04

## 2024-11-04 VITALS
OXYGEN SATURATION: 94 % | RESPIRATION RATE: 18 BRPM | SYSTOLIC BLOOD PRESSURE: 128 MMHG | DIASTOLIC BLOOD PRESSURE: 68 MMHG | HEART RATE: 80 BPM | TEMPERATURE: 99 F

## 2024-11-04 LAB
ALBUMIN SERPL ELPH-MCNC: 2.3 G/DL — LOW (ref 3.3–5)
ALP SERPL-CCNC: 124 U/L — HIGH (ref 40–120)
ALT FLD-CCNC: 37 U/L — SIGNIFICANT CHANGE UP (ref 10–45)
ANION GAP SERPL CALC-SCNC: 8 MMOL/L — SIGNIFICANT CHANGE UP (ref 5–17)
AST SERPL-CCNC: 45 U/L — HIGH (ref 10–40)
BASOPHILS # BLD AUTO: 0.02 K/UL — SIGNIFICANT CHANGE UP (ref 0–0.2)
BASOPHILS NFR BLD AUTO: 0.6 % — SIGNIFICANT CHANGE UP (ref 0–2)
BILIRUB SERPL-MCNC: 0.4 MG/DL — SIGNIFICANT CHANGE UP (ref 0.2–1.2)
BUN SERPL-MCNC: 11 MG/DL — SIGNIFICANT CHANGE UP (ref 7–23)
CALCIUM SERPL-MCNC: 8.2 MG/DL — LOW (ref 8.4–10.5)
CHLORIDE SERPL-SCNC: 97 MMOL/L — SIGNIFICANT CHANGE UP (ref 96–108)
CO2 SERPL-SCNC: 25 MMOL/L — SIGNIFICANT CHANGE UP (ref 22–31)
CREAT SERPL-MCNC: 0.61 MG/DL — SIGNIFICANT CHANGE UP (ref 0.5–1.3)
EGFR: 103 ML/MIN/1.73M2 — SIGNIFICANT CHANGE UP
EOSINOPHIL # BLD AUTO: 0.08 K/UL — SIGNIFICANT CHANGE UP (ref 0–0.5)
EOSINOPHIL NFR BLD AUTO: 2.3 % — SIGNIFICANT CHANGE UP (ref 0–6)
GLUCOSE BLDC GLUCOMTR-MCNC: 113 MG/DL — HIGH (ref 70–99)
GLUCOSE BLDC GLUCOMTR-MCNC: 224 MG/DL — HIGH (ref 70–99)
GLUCOSE SERPL-MCNC: 107 MG/DL — HIGH (ref 70–99)
HCT VFR BLD CALC: 23.9 % — LOW (ref 39–50)
HGB BLD-MCNC: 7.8 G/DL — LOW (ref 13–17)
IMM GRANULOCYTES NFR BLD AUTO: 0.9 % — SIGNIFICANT CHANGE UP (ref 0–0.9)
LYMPHOCYTES # BLD AUTO: 0.61 K/UL — LOW (ref 1–3.3)
LYMPHOCYTES # BLD AUTO: 17.6 % — SIGNIFICANT CHANGE UP (ref 13–44)
MAGNESIUM SERPL-MCNC: 1.5 MG/DL — LOW (ref 1.6–2.6)
MCHC RBC-ENTMCNC: 28.5 PG — SIGNIFICANT CHANGE UP (ref 27–34)
MCHC RBC-ENTMCNC: 32.6 G/DL — SIGNIFICANT CHANGE UP (ref 32–36)
MCV RBC AUTO: 87.2 FL — SIGNIFICANT CHANGE UP (ref 80–100)
MONOCYTES # BLD AUTO: 0.29 K/UL — SIGNIFICANT CHANGE UP (ref 0–0.9)
MONOCYTES NFR BLD AUTO: 8.4 % — SIGNIFICANT CHANGE UP (ref 2–14)
NEUTROPHILS # BLD AUTO: 2.44 K/UL — SIGNIFICANT CHANGE UP (ref 1.8–7.4)
NEUTROPHILS NFR BLD AUTO: 70.2 % — SIGNIFICANT CHANGE UP (ref 43–77)
NRBC # BLD: 0 /100 WBCS — SIGNIFICANT CHANGE UP (ref 0–0)
PHOSPHATE SERPL-MCNC: 2.7 MG/DL — SIGNIFICANT CHANGE UP (ref 2.5–4.5)
PLATELET # BLD AUTO: 185 K/UL — SIGNIFICANT CHANGE UP (ref 150–400)
POTASSIUM SERPL-MCNC: 3.4 MMOL/L — LOW (ref 3.5–5.3)
POTASSIUM SERPL-SCNC: 3.4 MMOL/L — LOW (ref 3.5–5.3)
PROT SERPL-MCNC: 5.8 G/DL — LOW (ref 6–8.3)
RBC # BLD: 2.74 M/UL — LOW (ref 4.2–5.8)
RBC # FLD: 15.3 % — HIGH (ref 10.3–14.5)
SODIUM SERPL-SCNC: 130 MMOL/L — LOW (ref 135–145)
WBC # BLD: 3.47 K/UL — LOW (ref 3.8–10.5)
WBC # FLD AUTO: 3.47 K/UL — LOW (ref 3.8–10.5)

## 2024-11-04 PROCEDURE — 99239 HOSP IP/OBS DSCHRG MGMT >30: CPT | Mod: GC

## 2024-11-04 PROCEDURE — 99232 SBSQ HOSP IP/OBS MODERATE 35: CPT

## 2024-11-04 RX ORDER — VALGANCICLOVIR 450 MG/1
2 TABLET, FILM COATED ORAL
Qty: 44 | Refills: 0
Start: 2024-11-04 | End: 2024-11-14

## 2024-11-04 RX ORDER — DIBASIC SODIUM PHOSPHATE, MONOBASIC POTASSIUM PHOSPHATE AND MONOBASIC SODIUM PHOSPHATE 852; 155; 130 MG/1; MG/1; MG/1
1 TABLET ORAL ONCE
Refills: 0 | Status: COMPLETED | OUTPATIENT
Start: 2024-11-04 | End: 2024-11-04

## 2024-11-04 RX ORDER — MAGNESIUM SULFATE IN 0.9% NACL 2 G/50 ML
4 INTRAVENOUS SOLUTION, PIGGYBACK (ML) INTRAVENOUS ONCE
Refills: 0 | Status: COMPLETED | OUTPATIENT
Start: 2024-11-04 | End: 2024-11-04

## 2024-11-04 RX ADMIN — SUCRALFATE 1 GRAM(S): 1 SUSPENSION ORAL at 06:19

## 2024-11-04 RX ADMIN — Medication 5 MILLIGRAM(S): at 06:19

## 2024-11-04 RX ADMIN — Medication 25 GRAM(S): at 12:57

## 2024-11-04 RX ADMIN — SUCRALFATE 1 GRAM(S): 1 SUSPENSION ORAL at 11:26

## 2024-11-04 RX ADMIN — DIBASIC SODIUM PHOSPHATE, MONOBASIC POTASSIUM PHOSPHATE AND MONOBASIC SODIUM PHOSPHATE 1 PACKET(S): 852; 155; 130 TABLET ORAL at 12:57

## 2024-11-04 RX ADMIN — GANCICLOVIR SODIUM 100 MILLIGRAM(S): 50 INJECTION, POWDER, LYOPHILIZED, FOR SOLUTION INTRAVENOUS at 07:46

## 2024-11-04 RX ADMIN — SUCRALFATE 1 GRAM(S): 1 SUSPENSION ORAL at 00:55

## 2024-11-04 RX ADMIN — GABAPENTIN 300 MILLIGRAM(S): 300 CAPSULE ORAL at 06:19

## 2024-11-04 RX ADMIN — PANTOPRAZOLE SODIUM 40 MILLIGRAM(S): 40 TABLET, DELAYED RELEASE ORAL at 06:19

## 2024-11-04 NOTE — DISCHARGE NOTE NURSING/CASE MANAGEMENT/SOCIAL WORK - FINANCIAL ASSISTANCE
[facility] provides services at a reduced cost to those who are determined to be eligible through [facility]’s financial assistance program. Information regarding [facility]’s financial assistance program can be found by going to [email or web address] or by calling [phone].

## 2024-11-04 NOTE — PROGRESS NOTE ADULT - REASON FOR ADMISSION
Fever, nausea, vomiting w poor oral intake

## 2024-11-04 NOTE — DISCHARGE NOTE NURSING/CASE MANAGEMENT/SOCIAL WORK - PATIENT PORTAL LINK FT
You can access the FollowMyHealth Patient Portal offered by [facility] by registering at the following website: [email or web address]. By joining [facility]’s FollowMyHealth portal, you will also be able to view your health information using other applications (apps) compatible with our system.

## 2024-11-04 NOTE — PROGRESS NOTE ADULT - SUBJECTIVE AND OBJECTIVE BOX
INFECTIOUS DISEASES CONSULT FOLLOW-UP NOTE    INTERVAL HPI/OVERNIGHT EVENTS:  Afebrile since 11/1. All symptoms of odynophagia, and all respiratory sx have resolved. Currently asymptomatic. No GI sx, or vision disturbance.    ROS:   Constitutional, eyes, ENT, cardiovascular, respiratory, gastrointestinal, genitourinary, integumentary, neurological, psychiatric and heme/lymph are otherwise negative other than noted above       ANTIBIOTICS/RELEVANT:    MEDICATIONS  (STANDING):  amLODIPine   Tablet 5 milliGRAM(s) Oral daily  atorvastatin 20 milliGRAM(s) Oral at bedtime  dextrose 5%. 1000 milliLiter(s) (50 mL/Hr) IV Continuous <Continuous>  dextrose 5%. 1000 milliLiter(s) (100 mL/Hr) IV Continuous <Continuous>  dextrose 50% Injectable 25 Gram(s) IV Push once  dextrose 50% Injectable 25 Gram(s) IV Push once  dextrose 50% Injectable 12.5 Gram(s) IV Push once  enoxaparin Injectable 40 milliGRAM(s) SubCutaneous every 24 hours  gabapentin 300 milliGRAM(s) Oral <User Schedule>  ganciclovir IVPB 300 milliGRAM(s) IV Intermittent every 12 hours  ganciclovir IVPB      glucagon  Injectable 1 milliGRAM(s) IntraMuscular once  insulin lispro (ADMELOG) corrective regimen sliding scale   SubCutaneous Before meals and at bedtime  magnesium sulfate  IVPB 4 Gram(s) IV Intermittent once  pantoprazole    Tablet 40 milliGRAM(s) Oral before breakfast  potassium phosphate / sodium phosphate Powder (PHOS-NaK) 1 Packet(s) Oral once  potassium phosphate / sodium phosphate Powder (PHOS-NaK) 1 Packet(s) Oral once  sucralfate 1 Gram(s) Oral every 6 hours    MEDICATIONS  (PRN):  aluminum hydroxide/magnesium hydroxide/simethicone Suspension 30 milliLiter(s) Oral every 4 hours PRN Dyspepsia  benzocaine/menthol Lozenge 1 Lozenge Oral every 2 hours PRN Sore Throat  dextrose Oral Gel 15 Gram(s) Oral once PRN Blood Glucose LESS THAN 70 milliGRAM(s)/deciliter  lidocaine 2% Viscous 15 milliLiter(s) Mucosal every 8 hours PRN Sore throat  melatonin 3 milliGRAM(s) Oral at bedtime PRN Insomnia  ondansetron Injectable 4 milliGRAM(s) IV Push every 8 hours PRN Nausea and/or Vomiting  polyethylene glycol 3350 17 Gram(s) Oral every 24 hours PRN for constipation  senna 2 Tablet(s) Oral at bedtime PRN for constipation        Vital Signs Last 24 Hrs  T(C): 37.3 (04 Nov 2024 05:33), Max: 37.4 (03 Nov 2024 21:03)  T(F): 99.2 (04 Nov 2024 05:33), Max: 99.4 (03 Nov 2024 21:03)  HR: 87 (04 Nov 2024 05:33) (87 - 97)  BP: 130/80 (04 Nov 2024 05:33) (105/67 - 130/80)  BP(mean): --  RR: 17 (04 Nov 2024 05:33) (16 - 17)  SpO2: 98% (04 Nov 2024 05:33) (98% - 98%)    Parameters below as of 03 Nov 2024 21:03  Patient On (Oxygen Delivery Method): nasal cannula  O2 Flow (L/min): 2      11-03-24 @ 07:01  -  11-04-24 @ 07:00  --------------------------------------------------------  IN: 0 mL / OUT: 700 mL / NET: -700 mL      PHYSICAL EXAM:  Constitutional: alert, NAD, well appearing   Eyes: the sclera and conjunctiva were normal. No change in visual acuity.  ENT: the ears and nose were normal in appearance. No sinus tenderness. Normal oropharynx  Neck: the appearance of the neck was normal  Pulmonary: no respiratory distress   Vascular: no peripheral edema  Abdomen: soft, non-tender  Skin: no rash      LABS:                        7.8    3.47  )-----------( 185      ( 04 Nov 2024 05:30 )             23.9     11-04    130[L]  |  97  |  11  ----------------------------<  107[H]  3.4[L]   |  25  |  0.61    Ca    8.2[L]      04 Nov 2024 05:30  Phos  2.7     11-04  Mg     1.5     11-04    TPro  5.8[L]  /  Alb  2.3[L]  /  TBili  0.4  /  DBili  x   /  AST  45[H]  /  ALT  37  /  AlkPhos  124[H]  11-04      Urinalysis Basic - ( 04 Nov 2024 05:30 )    Color: x / Appearance: x / SG: x / pH: x  Gluc: 107 mg/dL / Ketone: x  / Bili: x / Urobili: x   Blood: x / Protein: x / Nitrite: x   Leuk Esterase: x / RBC: x / WBC x   Sq Epi: x / Non Sq Epi: x / Bacteria: x        MICROBIOLOGY:  Reviewed    RADIOLOGY & ADDITIONAL STUDIES:  Reviewed

## 2024-11-04 NOTE — DISCHARGE NOTE NURSING/CASE MANAGEMENT/SOCIAL WORK - NSDCPEFALRISK_GEN_ALL_CORE
Right leg swelling x3d
For information on Fall & Injury Prevention, visit: [email or web address] OR  [email or web address] OR  https://www.cdc.gov/steadi/patient.html

## 2024-11-04 NOTE — DISCHARGE NOTE NURSING/CASE MANAGEMENT/SOCIAL WORK - NSDCFUADDAPPT_GEN_ALL_CORE_FT
Please follow up with infectious diseases, Dr. Robin, in 1 week. His office will call you to schedule an appointment.   Infectious Disease - Serg Robin  [address], [place] [id number] [phone]

## 2024-11-04 NOTE — PROGRESS NOTE ADULT - ASSESSMENT
69M with hx of DM (A1c 7.2%), HTN, HLD, squamous cell lung cancer currently on carboplatin/taxol (last received 10/10) and RT (last received 10/15), who on 10/14 developed odynophagia, cough, and hiccups, subsequently developed nausea/vomiting and then on 10/20 developed fever for which he presented to [facility] ED, admitted on 10/21 for further management. Was febrile, with mild hypoxia (2L NC), leukopenic with severe lymphopenia. CT C/A/P w/ IVC with decreased size of known COLETTE mass (malignancy), and new bilateral upper lobe GGOs with interlobular septal thickening, as well as mid-distal esophagitis. S/p EGD on 10/23 with finding of erosive changes of middle third of esophagus and esophageal biopsy +CMV inclusions. Serum CMV PCR was log 5.1. Started on gancyte 5mg/kg IV q12h on 10/24 and odynophagia and fevers resolved completely, repeat serum CMV PCR on day 5 of therapy down to log 4.2. Also completed 7 days of zosyn on 10/27 for possible PNA.    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  - Repeat serum CMV PCR today  - We will arrange outpatient ID follow up with me in ~1-2 weeks    # Possible bacterial PNA  - Patient reported significant improvement in his cough since presentation (with zosyn), which argues against CMV disease also being responsible for lung pathology/symptoms, although CMV pneumonitis is still a possibility.   - S/p zosyn 4.5g IV q8h EI x 7 days total (EOT 10/27)    ID Team 1 will sign off. Please call if further ID input is desired.

## 2024-11-04 NOTE — PROGRESS NOTE ADULT - TIME BILLING
CMV disease
Managing CMV esophagitis
Managing CMV disease
Managing CMV esophagitis
Managing CMV esophagitis
CMV disease
CMV disease
Managing CMV disease
Managing CMV esophagitis
Bedside exam and interview.  Reviewed of laboratory data, radiology results, consultants' recommendations.   Discussion with patient/caregivers/housestaff and interdisciplinary staff (such as , social workers, etc).  Documentation of encounter.  Interventions were performed as documented above.  Excludes teaching time and/or separately reported services.
Bedside exam and interview   Reviewed vitals, labs   Discussed patient's plan of care with housestaff   Documentation of encounter  Excludes teaching time and/or separately reported services
Bedside exam and interview   Reviewed vitals, labs   Discussed patient's plan of care with housestaff   Documentation of encounter  Excludes teaching time and/or separately reported services
Bedside exam and interview.  Reviewed of laboratory data, radiology results, consultants' recommendations.   Discussion with patient/caregivers/housestaff and interdisciplinary staff (such as , social workers, etc).  Documentation of encounter.  Interventions were performed as documented above.  Excludes teaching time and/or separately reported services.
Face to face encounter with chart review
Face to face time and chart review

## 2024-11-05 ENCOUNTER — APPOINTMENT (OUTPATIENT)
Dept: HEMATOLOGY ONCOLOGY | Facility: CLINIC | Age: 69
End: 2024-11-05
Payer: MEDICARE

## 2024-11-05 VITALS
TEMPERATURE: 97.5 F | OXYGEN SATURATION: 95 % | HEIGHT: 66 IN | DIASTOLIC BLOOD PRESSURE: 69 MMHG | SYSTOLIC BLOOD PRESSURE: 105 MMHG | HEART RATE: 109 BPM | RESPIRATION RATE: 18 BRPM

## 2024-11-05 DIAGNOSIS — C34.92 MALIGNANT NEOPLASM OF UNSPECIFIED PART OF LEFT BRONCHUS OR LUNG: ICD-10-CM

## 2024-11-05 DIAGNOSIS — R91.8 OTHER NONSPECIFIC ABNORMAL FINDING OF LUNG FIELD: ICD-10-CM

## 2024-11-05 DIAGNOSIS — C78.7 SECONDARY MALIGNANT NEOPLASM OF LIVER AND INTRAHEPATIC BILE DUCT: ICD-10-CM

## 2024-11-05 DIAGNOSIS — B25.9 CYTOMEGALOVIRAL DISEASE, UNSPECIFIED: ICD-10-CM

## 2024-11-05 LAB
CMV DNA CSF QL NAA+PROBE: HIGH IU/ML
CMV DNA SPEC NAA+PROBE-LOG#: 4.55 LOG10IU/ML — HIGH

## 2024-11-05 PROCEDURE — G2211 COMPLEX E/M VISIT ADD ON: CPT

## 2024-11-05 PROCEDURE — 99215 OFFICE O/P EST HI 40 MIN: CPT

## 2024-11-06 PROBLEM — B25.9: Status: ACTIVE | Noted: 2024-11-05

## 2024-11-06 PROBLEM — C78.7 METASTATIC CARCINOMA TO LIVER: Status: ACTIVE | Noted: 2024-11-06

## 2024-11-08 DIAGNOSIS — L59.8 OTHER SPECIFIED DISORDERS OF THE SKIN AND SUBCUTANEOUS TISSUE RELATED TO RADIATION: ICD-10-CM

## 2024-11-08 DIAGNOSIS — K44.9 DIAPHRAGMATIC HERNIA WITHOUT OBSTRUCTION OR GANGRENE: ICD-10-CM

## 2024-11-08 DIAGNOSIS — J98.11 ATELECTASIS: ICD-10-CM

## 2024-11-08 DIAGNOSIS — E11.9 TYPE 2 DIABETES MELLITUS WITHOUT COMPLICATIONS: ICD-10-CM

## 2024-11-08 DIAGNOSIS — E78.5 HYPERLIPIDEMIA, UNSPECIFIED: ICD-10-CM

## 2024-11-08 DIAGNOSIS — B25.8 OTHER CYTOMEGALOVIRAL DISEASES: ICD-10-CM

## 2024-11-08 DIAGNOSIS — E22.2 SYNDROME OF INAPPROPRIATE SECRETION OF ANTIDIURETIC HORMONE: ICD-10-CM

## 2024-11-08 DIAGNOSIS — A41.89 OTHER SPECIFIED SEPSIS: ICD-10-CM

## 2024-11-08 DIAGNOSIS — R13.12 DYSPHAGIA, OROPHARYNGEAL PHASE: ICD-10-CM

## 2024-11-08 DIAGNOSIS — T45.1X5A ADVERSE EFFECT OF ANTINEOPLASTIC AND IMMUNOSUPPRESSIVE DRUGS, INITIAL ENCOUNTER: ICD-10-CM

## 2024-11-08 DIAGNOSIS — K29.60 OTHER GASTRITIS WITHOUT BLEEDING: ICD-10-CM

## 2024-11-08 DIAGNOSIS — D64.81 ANEMIA DUE TO ANTINEOPLASTIC CHEMOTHERAPY: ICD-10-CM

## 2024-11-08 DIAGNOSIS — R50.81 FEVER PRESENTING WITH CONDITIONS CLASSIFIED ELSEWHERE: ICD-10-CM

## 2024-11-08 DIAGNOSIS — D70.1 AGRANULOCYTOSIS SECONDARY TO CANCER CHEMOTHERAPY: ICD-10-CM

## 2024-11-08 DIAGNOSIS — E43 UNSPECIFIED SEVERE PROTEIN-CALORIE MALNUTRITION: ICD-10-CM
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
## 2024-11-08 DIAGNOSIS — Z87.891 PERSONAL HISTORY OF NICOTINE DEPENDENCE: ICD-10-CM

## 2024-11-08 DIAGNOSIS — J12.89 OTHER VIRAL PNEUMONIA: ICD-10-CM

## 2024-11-08 DIAGNOSIS — I10 ESSENTIAL (PRIMARY) HYPERTENSION: ICD-10-CM

## 2024-11-08 DIAGNOSIS — C34.90 MALIGNANT NEOPLASM OF UNSPECIFIED PART OF UNSPECIFIED BRONCHUS OR LUNG: ICD-10-CM

## 2024-11-08 DIAGNOSIS — D84.821 IMMUNODEFICIENCY DUE TO DRUGS: ICD-10-CM

## 2024-11-08 DIAGNOSIS — R06.6 HICCOUGH: ICD-10-CM

## 2024-11-12 LAB
CMV DNA SPEC QL NAA+PROBE: 758 IU/ML
CMVPCR LOG: 2.88 LOG10IU/ML

## 2024-11-15 ENCOUNTER — APPOINTMENT (OUTPATIENT)
Dept: INFECTIOUS DISEASE | Facility: CLINIC | Age: 69
End: 2024-11-15
Payer: MEDICARE

## 2024-11-15 VITALS
HEIGHT: 66 IN | SYSTOLIC BLOOD PRESSURE: 104 MMHG | OXYGEN SATURATION: 97 % | HEART RATE: 134 BPM | DIASTOLIC BLOOD PRESSURE: 69 MMHG | WEIGHT: 116 LBS | TEMPERATURE: 97.7 F | BODY MASS INDEX: 18.64 KG/M2

## 2024-11-15 PROCEDURE — 99214 OFFICE O/P EST MOD 30 MIN: CPT

## 2024-11-26 ENCOUNTER — APPOINTMENT (OUTPATIENT)
Dept: HEMATOLOGY ONCOLOGY | Facility: CLINIC | Age: 69
End: 2024-11-26
Payer: MEDICARE

## 2024-11-26 ENCOUNTER — OUTPATIENT (OUTPATIENT)
Dept: OUTPATIENT SERVICES | Facility: HOSPITAL | Age: 69
LOS: 1 days | End: 2024-11-26

## 2024-11-26 VITALS
HEIGHT: 66 IN | OXYGEN SATURATION: 98 % | BODY MASS INDEX: 18.64 KG/M2 | HEART RATE: 128 BPM | DIASTOLIC BLOOD PRESSURE: 65 MMHG | WEIGHT: 116 LBS | RESPIRATION RATE: 18 BRPM | SYSTOLIC BLOOD PRESSURE: 96 MMHG

## 2024-11-26 VITALS — DIASTOLIC BLOOD PRESSURE: 69 MMHG | SYSTOLIC BLOOD PRESSURE: 107 MMHG

## 2024-11-26 DIAGNOSIS — D64.9 ANEMIA, UNSPECIFIED: ICD-10-CM

## 2024-11-26 DIAGNOSIS — Z90.49 ACQUIRED ABSENCE OF OTHER SPECIFIED PARTS OF DIGESTIVE TRACT: Chronic | ICD-10-CM

## 2024-11-26 DIAGNOSIS — C34.92 MALIGNANT NEOPLASM OF UNSPECIFIED PART OF LEFT BRONCHUS OR LUNG: ICD-10-CM

## 2024-11-26 DIAGNOSIS — B25.9 CYTOMEGALOVIRAL DISEASE, UNSPECIFIED: ICD-10-CM

## 2024-11-26 LAB
ALBUMIN SERPL ELPH-MCNC: 2.2 G/DL
ALP BLD-CCNC: 78 U/L
ALT SERPL-CCNC: 26 U/L
ANION GAP SERPL CALC-SCNC: 12 MMOL/L
AST SERPL-CCNC: 19 U/L
BILIRUB SERPL-MCNC: 0.6 MG/DL
BUN SERPL-MCNC: 15 MG/DL
CALCIUM SERPL-MCNC: 11.2 MG/DL
CHLORIDE SERPL-SCNC: 97 MMOL/L
CO2 SERPL-SCNC: 28 MMOL/L
CREAT SERPL-MCNC: 0.6 MG/DL
EGFR: 104 ML/MIN/1.73M2
GLUCOSE SERPL-MCNC: 208 MG/DL
HCT VFR BLD CALC: 29 %
HGB BLD-MCNC: 9.8 G/DL
LYMPHOCYTES # BLD AUTO: 0.9 K/UL
LYMPHOCYTES NFR BLD AUTO: 11.4 %
MAN DIFF?: NO
MCHC RBC-ENTMCNC: 29.9 PG
MCHC RBC-ENTMCNC: 33.8 G/DL
MCV RBC AUTO: 88.4 FL
NEUTROPHILS # BLD AUTO: 5.7 K/UL
NEUTROPHILS NFR BLD AUTO: 73.7 %
PLATELET # BLD AUTO: 309 K/UL
POTASSIUM SERPL-SCNC: 3.7 MMOL/L
PROT SERPL-MCNC: 6.7 G/DL
RBC # BLD: 3.28 M/UL
RBC # FLD: 16.8 %
SODIUM SERPL-SCNC: 137 MMOL/L
WBC # FLD AUTO: 7.8 K/UL

## 2024-11-26 PROCEDURE — 85610 PROTHROMBIN TIME: CPT

## 2024-11-26 PROCEDURE — 87899 AGENT NOS ASSAY W/OPTIC: CPT

## 2024-11-26 PROCEDURE — 84100 ASSAY OF PHOSPHORUS: CPT

## 2024-11-26 PROCEDURE — 97116 GAIT TRAINING THERAPY: CPT

## 2024-11-26 PROCEDURE — 82570 ASSAY OF URINE CREATININE: CPT

## 2024-11-26 PROCEDURE — 96375 TX/PRO/DX INJ NEW DRUG ADDON: CPT

## 2024-11-26 PROCEDURE — 82746 ASSAY OF FOLIC ACID SERUM: CPT

## 2024-11-26 PROCEDURE — 85045 AUTOMATED RETICULOCYTE COUNT: CPT

## 2024-11-26 PROCEDURE — 87086 URINE CULTURE/COLONY COUNT: CPT

## 2024-11-26 PROCEDURE — 83550 IRON BINDING TEST: CPT

## 2024-11-26 PROCEDURE — 84156 ASSAY OF PROTEIN URINE: CPT

## 2024-11-26 PROCEDURE — G2211 COMPLEX E/M VISIT ADD ON: CPT

## 2024-11-26 PROCEDURE — 84540 ASSAY OF URINE/UREA-N: CPT

## 2024-11-26 PROCEDURE — 87641 MR-STAPH DNA AMP PROBE: CPT

## 2024-11-26 PROCEDURE — 86923 COMPATIBILITY TEST ELECTRIC: CPT

## 2024-11-26 PROCEDURE — 83735 ASSAY OF MAGNESIUM: CPT

## 2024-11-26 PROCEDURE — 81003 URINALYSIS AUTO W/O SCOPE: CPT

## 2024-11-26 PROCEDURE — 99285 EMERGENCY DEPT VISIT HI MDM: CPT | Mod: 25

## 2024-11-26 PROCEDURE — 86901 BLOOD TYPING SEROLOGIC RH(D): CPT

## 2024-11-26 PROCEDURE — 83540 ASSAY OF IRON: CPT

## 2024-11-26 PROCEDURE — 99215 OFFICE O/P EST HI 40 MIN: CPT

## 2024-11-26 PROCEDURE — 84132 ASSAY OF SERUM POTASSIUM: CPT

## 2024-11-26 PROCEDURE — 92610 EVALUATE SWALLOWING FUNCTION: CPT

## 2024-11-26 PROCEDURE — 80048 BASIC METABOLIC PNL TOTAL CA: CPT

## 2024-11-26 PROCEDURE — 74177 CT ABD & PELVIS W/CONTRAST: CPT | Mod: MC

## 2024-11-26 PROCEDURE — 86850 RBC ANTIBODY SCREEN: CPT

## 2024-11-26 PROCEDURE — 71046 X-RAY EXAM CHEST 2 VIEWS: CPT

## 2024-11-26 PROCEDURE — 87637 SARSCOV2&INF A&B&RSV AMP PRB: CPT

## 2024-11-26 PROCEDURE — 82962 GLUCOSE BLOOD TEST: CPT

## 2024-11-26 PROCEDURE — 97161 PT EVAL LOW COMPLEX 20 MIN: CPT

## 2024-11-26 PROCEDURE — 0225U NFCT DS DNA&RNA 21 SARSCOV2: CPT

## 2024-11-26 PROCEDURE — 36430 TRANSFUSION BLD/BLD COMPNT: CPT

## 2024-11-26 PROCEDURE — 83935 ASSAY OF URINE OSMOLALITY: CPT

## 2024-11-26 PROCEDURE — P9040: CPT

## 2024-11-26 PROCEDURE — 87040 BLOOD CULTURE FOR BACTERIA: CPT

## 2024-11-26 PROCEDURE — 84300 ASSAY OF URINE SODIUM: CPT

## 2024-11-26 PROCEDURE — 87449 NOS EACH ORGANISM AG IA: CPT

## 2024-11-26 PROCEDURE — 84133 ASSAY OF URINE POTASSIUM: CPT

## 2024-11-26 PROCEDURE — 81001 URINALYSIS AUTO W/SCOPE: CPT

## 2024-11-26 PROCEDURE — 96376 TX/PRO/DX INJ SAME DRUG ADON: CPT

## 2024-11-26 PROCEDURE — 86900 BLOOD TYPING SEROLOGIC ABO: CPT

## 2024-11-26 PROCEDURE — 83690 ASSAY OF LIPASE: CPT

## 2024-11-26 PROCEDURE — 85025 COMPLETE CBC W/AUTO DIFF WBC: CPT

## 2024-11-26 PROCEDURE — 82607 VITAMIN B-12: CPT

## 2024-11-26 PROCEDURE — 85730 THROMBOPLASTIN TIME PARTIAL: CPT

## 2024-11-26 PROCEDURE — 36415 COLL VENOUS BLD VENIPUNCTURE: CPT

## 2024-11-26 PROCEDURE — 96374 THER/PROPH/DIAG INJ IV PUSH: CPT

## 2024-11-26 PROCEDURE — 82728 ASSAY OF FERRITIN: CPT

## 2024-11-26 PROCEDURE — 85027 COMPLETE CBC AUTOMATED: CPT

## 2024-11-26 PROCEDURE — 80053 COMPREHEN METABOLIC PANEL: CPT

## 2024-11-26 PROCEDURE — 82803 BLOOD GASES ANY COMBINATION: CPT

## 2024-11-26 PROCEDURE — 87389 HIV-1 AG W/HIV-1&-2 AB AG IA: CPT

## 2024-11-26 PROCEDURE — 87640 STAPH A DNA AMP PROBE: CPT

## 2024-11-26 PROCEDURE — 88305 TISSUE EXAM BY PATHOLOGIST: CPT

## 2024-11-26 PROCEDURE — 71260 CT THORAX DX C+: CPT | Mod: MC

## 2024-11-26 PROCEDURE — 83930 ASSAY OF BLOOD OSMOLALITY: CPT

## 2024-11-26 PROCEDURE — 83605 ASSAY OF LACTIC ACID: CPT

## 2024-11-26 PROCEDURE — 93005 ELECTROCARDIOGRAM TRACING: CPT

## 2025-01-07 ENCOUNTER — APPOINTMENT (OUTPATIENT)
Dept: RADIATION ONCOLOGY | Facility: CLINIC | Age: 70
End: 2025-01-07

## 2025-05-13 NOTE — ED PROVIDER NOTE - INPATIENT RESIDENT/ACP NOTIFIED
"FOLLOW UP: Please advise. Declines an appointment .   Asking for an antibiotic , Uses [facility].  was seen in April and mentioned her symptoms. Was given Zpak that she took but was not effective, feels she needs something longer in duration. Home advice given until a call back is received   REASON FOR CONVERSATION: Sinusitis    SYMPTOMS: nasal congestion, denies headache or facial pain      OTHER: taking a benadryl 25 mg at night , using Atrovent as well. Leaving on vacation Thursday     DISPOSITION: See Today or Tomorrow in Office  Reason for Disposition   Sinus congestion (pressure, fullness) present > 10 days    Answer Assessment - Initial Assessment Questions  1. LOCATION: \"Where does it hurt?\"       Nasal congestion   2. ONSET: \"When did the sinus pain start?\"  (e.g., hours, days)       Denies   3. SEVERITY: \"How bad is the pain?\"   (Scale 0-10; or none, mild, moderate or severe)      Moderate   4. RECURRENT SYMPTOM: \"Have you ever had sinus problems before?\" If Yes, ask: \"When was the last time?\" and \"What happened that time?\"       Yes , February and in April   5. NASAL CONGESTION: \"Is the nose blocked?\" If Yes, ask: \"Can you open it or must you breathe through your mouth?\"      yes  6. NASAL DISCHARGE: \"Do you have discharge from your nose?\" If so ask, \"What color?\"      yellow  7. FEVER: \"Do you have a fever?\" If Yes, ask: \"What is it, how was it measured, and when did it start?\"       Fever   8. OTHER SYMPTOMS: \"Do you have any other symptoms?\" (e.g., sore throat, cough, earache, difficulty breathing)      Denies    Protocols used: Sinus Pain or Congestion-Adult-[place]    "
"Regarding: Sinus Infection  ----- Message from Teressa AZAR sent at 5/13/2025  9:39 AM EDT -----  \"Wanted to see if the doctor can call in an antibiodic for me. I have a sinus infection and he gave me something back in feburary but not sure what it was, not amoxicilln, but it was some kind of cillin but it didn't help. Would like something sent to [facility] [address] [place] [address].\"    "
I sent to you for signature  
Patient declined appointment although I think this would require reevaluation. If you can please take a look at this and determine best plan for this patient. Thank you   
Medicine

## (undated) DEVICE — XI STAPLER SUREFORM 45

## (undated) DEVICE — NDL ASPIRATION VIZISHOT2 25G

## (undated) DEVICE — ELCTR GROUNDING PAD ADULT COVIDIEN

## (undated) DEVICE — GLV 7.5 PROTEXIS (WHITE)

## (undated) DEVICE — XI SEAL UNIV 5- 8 MM

## (undated) DEVICE — STAPLER COVIDIEN ENDO GIA STANDARD HANDLE

## (undated) DEVICE — D HELP - CLEARVIEW CLEARIFY SYSTEM

## (undated) DEVICE — NDL ASPIRATION 21G

## (undated) DEVICE — XI STAPLER SUREFORM 60

## (undated) DEVICE — XI ENDOWRIST STAPLER SHEATH

## (undated) DEVICE — DRSG TELFA 3 X 8

## (undated) DEVICE — DRSG DERMABOND 0.7ML

## (undated) DEVICE — SUT VLOC 90 4-0 9" CV-23 VIOLET

## (undated) DEVICE — VESSEL LOOP EXTRA MAXI-BLUE 0.200" X 22"

## (undated) DEVICE — XI TIP COVER

## (undated) DEVICE — VALVE SUCTION EVIS 160/200/240

## (undated) DEVICE — SUT PDS II 2-0 27" SH

## (undated) DEVICE — DVC ASCOPE 4 SNGL USE SLIM

## (undated) DEVICE — XI VESSEL SEALER

## (undated) DEVICE — SUT MONOCRYL 4-0 27" PS-2 UNDYED

## (undated) DEVICE — GOWN XXL

## (undated) DEVICE — XI ENDOWRIST 12 - 8 MM CANNULA REDUCER

## (undated) DEVICE — ELCTR BOVIE TIP SPATULA MEGADYNE E-Z CLEAN LAPAROSCOPIC 13.5" STANDARD

## (undated) DEVICE — XI 12MM AND STAPLER CANNULA SEAL

## (undated) DEVICE — DRAPE 3/4 SHEET 52X76"

## (undated) DEVICE — ELCTR STRYKER NEPTUNE SMOKE EVACUATION PENCIL (GREEN)

## (undated) DEVICE — WARMING BLANKET LOWER ADULT

## (undated) DEVICE — XI OBTURATOR OPTICAL BLADELESS 8MM

## (undated) DEVICE — ENDOCATCH GENERAL 10MM (PURPLE)

## (undated) DEVICE — BRUSH CYTO DISP

## (undated) DEVICE — ELCTR BOVIE PENCIL HANDPIECE ROCKER SWITCH 15FT

## (undated) DEVICE — SYR SLIP 10CC

## (undated) DEVICE — TUBING STRYKER PNEUMOCLEAR SMOKE HEAT HUMID

## (undated) DEVICE — XI SEAL UNIVERSIAL 5-12MM

## (undated) DEVICE — PACK UPPER BODY

## (undated) DEVICE — DVC ASCOPE 4 SNGL USE REG

## (undated) DEVICE — PACK ROBOTIC

## (undated) DEVICE — SUT VICRYL 2-0 27" SH

## (undated) DEVICE — VALVE BIOPSY BRONCHOVIDEOSCOPE

## (undated) DEVICE — TUBING SUCTION NONCONDUCTIVE 6MM X 12FT

## (undated) DEVICE — XI DRAPE COLUMN

## (undated) DEVICE — TRAP SPECIMEN SPUTUM 40CC

## (undated) DEVICE — NDL EXPECT PULMONARY 22GA

## (undated) DEVICE — FORCEP RADIAL JAW 4 W NDL 2.2MM 2.8MM 240CM ORANGE DISP

## (undated) DEVICE — FORCEP RADIAL JAW 4 PULMONARY

## (undated) DEVICE — VENODYNE/SCD SLEEVE CALF MEDIUM

## (undated) DEVICE — ENDOCATCH GENERAL 15MM (PURPLE)

## (undated) DEVICE — GLV 8 PROTEXIS (WHITE)

## (undated) DEVICE — DRAPE TOWEL BLUE STICKY

## (undated) DEVICE — XI ARM GRASPER TIP UP FENESTRATED

## (undated) DEVICE — KIT ENDO PROCEDURE CUST W/VLV

## (undated) DEVICE — TROCAR ETHICON ENDOPATH XCEL BLADELESS 5MM X 100MM STABILITY

## (undated) DEVICE — TROCAR ETHICON ENDOPATH XCEL BLADELESS 15MM X 100MM STABILITY

## (undated) DEVICE — PREP CHLORAPREP HI-LITE ORANGE 26ML

## (undated) DEVICE — XI DRAPE ARM

## (undated) DEVICE — INFLATION DEVICE BIG 60

## (undated) DEVICE — TROCAR ETHICON ENDOPATH XCEL BLADELESS 12MM X 100MM STABILITY

## (undated) DEVICE — DVC ASCOPE 4 SNGL USE LRG

## (undated) DEVICE — ADAPTER FIBEROPTIC BRONCHOSCOPE DUAL AXIS SWIVEL

## (undated) DEVICE — VESSEL LOOP MINI-BLUE 0.075" X 16"

## (undated) DEVICE — XI ENDOWRIST SUCTION IRRIGATOR 8MM

## (undated) DEVICE — DEVICE GRASPING RAPTOR MINI 1.9X200

## (undated) DEVICE — SUT PROLENE 0 30" CT-1

## (undated) DEVICE — SYR LUER SLIP TIP 30CC

## (undated) DEVICE — GOWN XL W TOWEL

## (undated) DEVICE — DRAPE TOWEL BLUE 17" X 24"

## (undated) DEVICE — NDL ASPIRATION VIZISHOT2 19G

## (undated) DEVICE — DRSG GAUZE PACKTNER ROLL

## (undated) DEVICE — NDL ASPIRATION VIZISHOT2 21G

## (undated) DEVICE — SUT VICRYL PLUS 2-0 27" SH UNDYED

## (undated) DEVICE — CHEST DRAIN PLEUR-EVAC DRY/WET ADULT-PEDS SINGLE (QUICK)

## (undated) DEVICE — TROCAR ETHICON ENDOPATH XCEL BLADELESS 12MM X 100MM SMOOTH